# Patient Record
Sex: FEMALE | Race: WHITE | NOT HISPANIC OR LATINO | Employment: OTHER | URBAN - METROPOLITAN AREA
[De-identification: names, ages, dates, MRNs, and addresses within clinical notes are randomized per-mention and may not be internally consistent; named-entity substitution may affect disease eponyms.]

---

## 2017-01-04 ENCOUNTER — ANESTHESIA - HEALTHEAST (OUTPATIENT)
Dept: SURGERY | Facility: CLINIC | Age: 82
End: 2017-01-04

## 2017-01-05 ENCOUNTER — SURGERY - HEALTHEAST (OUTPATIENT)
Dept: SURGERY | Facility: CLINIC | Age: 82
End: 2017-01-05

## 2017-01-05 ENCOUNTER — HOME CARE/HOSPICE - HEALTHEAST (OUTPATIENT)
Dept: HOME HEALTH SERVICES | Facility: HOME HEALTH | Age: 82
End: 2017-01-05

## 2017-01-05 ASSESSMENT — MIFFLIN-ST. JEOR: SCORE: 1141.58

## 2017-01-09 ENCOUNTER — HOME CARE/HOSPICE - HEALTHEAST (OUTPATIENT)
Dept: HOME HEALTH SERVICES | Facility: HOME HEALTH | Age: 82
End: 2017-01-09

## 2017-01-09 ENCOUNTER — COMMUNICATION - HEALTHEAST (OUTPATIENT)
Dept: INTERNAL MEDICINE | Facility: CLINIC | Age: 82
End: 2017-01-09

## 2017-01-10 ENCOUNTER — HOME CARE/HOSPICE - HEALTHEAST (OUTPATIENT)
Dept: HOME HEALTH SERVICES | Facility: HOME HEALTH | Age: 82
End: 2017-01-10

## 2017-01-10 ENCOUNTER — COMMUNICATION - HEALTHEAST (OUTPATIENT)
Dept: INTERNAL MEDICINE | Facility: CLINIC | Age: 82
End: 2017-01-10

## 2017-01-12 ENCOUNTER — HOME CARE/HOSPICE - HEALTHEAST (OUTPATIENT)
Dept: HOME HEALTH SERVICES | Facility: HOME HEALTH | Age: 82
End: 2017-01-12

## 2017-01-12 ENCOUNTER — COMMUNICATION - HEALTHEAST (OUTPATIENT)
Dept: INTERNAL MEDICINE | Facility: CLINIC | Age: 82
End: 2017-01-12

## 2017-01-13 ENCOUNTER — COMMUNICATION - HEALTHEAST (OUTPATIENT)
Dept: INTERNAL MEDICINE | Facility: CLINIC | Age: 82
End: 2017-01-13

## 2017-01-13 ENCOUNTER — HOME CARE/HOSPICE - HEALTHEAST (OUTPATIENT)
Dept: HOME HEALTH SERVICES | Facility: HOME HEALTH | Age: 82
End: 2017-01-13

## 2017-01-14 ENCOUNTER — COMMUNICATION - HEALTHEAST (OUTPATIENT)
Dept: INTERNAL MEDICINE | Facility: CLINIC | Age: 82
End: 2017-01-14

## 2017-01-14 DIAGNOSIS — E03.9 HYPOTHYROIDISM: ICD-10-CM

## 2017-01-16 ENCOUNTER — HOME CARE/HOSPICE - HEALTHEAST (OUTPATIENT)
Dept: HOME HEALTH SERVICES | Facility: HOME HEALTH | Age: 82
End: 2017-01-16

## 2017-01-16 ENCOUNTER — COMMUNICATION - HEALTHEAST (OUTPATIENT)
Dept: INTERNAL MEDICINE | Facility: CLINIC | Age: 82
End: 2017-01-16

## 2017-01-18 ENCOUNTER — HOME CARE/HOSPICE - HEALTHEAST (OUTPATIENT)
Dept: HOME HEALTH SERVICES | Facility: HOME HEALTH | Age: 82
End: 2017-01-18

## 2017-01-19 ENCOUNTER — HOME CARE/HOSPICE - HEALTHEAST (OUTPATIENT)
Dept: HOME HEALTH SERVICES | Facility: HOME HEALTH | Age: 82
End: 2017-01-19

## 2017-01-19 ENCOUNTER — COMMUNICATION - HEALTHEAST (OUTPATIENT)
Dept: INTERNAL MEDICINE | Facility: CLINIC | Age: 82
End: 2017-01-19

## 2017-01-20 ENCOUNTER — HOME CARE/HOSPICE - HEALTHEAST (OUTPATIENT)
Dept: HOME HEALTH SERVICES | Facility: HOME HEALTH | Age: 82
End: 2017-01-20

## 2017-01-23 ENCOUNTER — HOME CARE/HOSPICE - HEALTHEAST (OUTPATIENT)
Dept: HOME HEALTH SERVICES | Facility: HOME HEALTH | Age: 82
End: 2017-01-23

## 2017-01-24 ENCOUNTER — COMMUNICATION - HEALTHEAST (OUTPATIENT)
Dept: INTERNAL MEDICINE | Facility: CLINIC | Age: 82
End: 2017-01-24

## 2017-01-24 ENCOUNTER — HOME CARE/HOSPICE - HEALTHEAST (OUTPATIENT)
Dept: HOME HEALTH SERVICES | Facility: HOME HEALTH | Age: 82
End: 2017-01-24

## 2017-01-29 ENCOUNTER — COMMUNICATION - HEALTHEAST (OUTPATIENT)
Dept: INTERNAL MEDICINE | Facility: CLINIC | Age: 82
End: 2017-01-29

## 2017-01-30 ENCOUNTER — OFFICE VISIT - HEALTHEAST (OUTPATIENT)
Dept: INTERNAL MEDICINE | Facility: CLINIC | Age: 82
End: 2017-01-30

## 2017-01-30 DIAGNOSIS — R06.02 SOB (SHORTNESS OF BREATH): ICD-10-CM

## 2017-01-30 DIAGNOSIS — M17.11 PRIMARY OSTEOARTHRITIS OF RIGHT KNEE: ICD-10-CM

## 2017-01-30 DIAGNOSIS — R11.0 NAUSEA: ICD-10-CM

## 2017-02-02 ENCOUNTER — OFFICE VISIT - HEALTHEAST (OUTPATIENT)
Dept: NURSING | Facility: CLINIC | Age: 82
End: 2017-02-02

## 2017-02-02 ENCOUNTER — COMMUNICATION - HEALTHEAST (OUTPATIENT)
Dept: NURSING | Facility: CLINIC | Age: 82
End: 2017-02-02

## 2017-02-02 DIAGNOSIS — G47.00 INSOMNIA: ICD-10-CM

## 2017-02-02 DIAGNOSIS — Z96.659 STATUS POST TOTAL KNEE REPLACEMENT, UNSPECIFIED LATERALITY: ICD-10-CM

## 2017-02-02 DIAGNOSIS — H35.30 MACULAR DEGENERATION (SENILE) OF RETINA: ICD-10-CM

## 2017-02-02 DIAGNOSIS — J84.10 POSTINFLAMMATORY PULMONARY FIBROSIS (H): ICD-10-CM

## 2017-02-02 DIAGNOSIS — Z96.659 S/P TOTAL KNEE ARTHROPLASTY: ICD-10-CM

## 2017-02-02 DIAGNOSIS — E03.9 HYPOTHYROIDISM, UNSPECIFIED TYPE: ICD-10-CM

## 2017-02-02 DIAGNOSIS — I48.0 PAF (PAROXYSMAL ATRIAL FIBRILLATION) (H): ICD-10-CM

## 2017-02-02 DIAGNOSIS — G47.00 INSOMNIA, UNSPECIFIED TYPE: ICD-10-CM

## 2017-02-02 DIAGNOSIS — M81.0 OSTEOPOROSIS, SENILE: ICD-10-CM

## 2017-02-03 ENCOUNTER — COMMUNICATION - HEALTHEAST (OUTPATIENT)
Dept: INTERNAL MEDICINE | Facility: CLINIC | Age: 82
End: 2017-02-03

## 2017-02-08 ENCOUNTER — COMMUNICATION - HEALTHEAST (OUTPATIENT)
Dept: NURSING | Facility: CLINIC | Age: 82
End: 2017-02-08

## 2017-02-08 ENCOUNTER — AMBULATORY - HEALTHEAST (OUTPATIENT)
Dept: LAB | Facility: CLINIC | Age: 82
End: 2017-02-08

## 2017-02-08 DIAGNOSIS — I48.0 PAF (PAROXYSMAL ATRIAL FIBRILLATION) (H): ICD-10-CM

## 2017-02-14 ENCOUNTER — RECORDS - HEALTHEAST (OUTPATIENT)
Dept: BONE DENSITY | Facility: CLINIC | Age: 82
End: 2017-02-14

## 2017-02-14 ENCOUNTER — RECORDS - HEALTHEAST (OUTPATIENT)
Dept: ADMINISTRATIVE | Facility: OTHER | Age: 82
End: 2017-02-14

## 2017-02-14 ENCOUNTER — OFFICE VISIT - HEALTHEAST (OUTPATIENT)
Dept: INTERNAL MEDICINE | Facility: CLINIC | Age: 82
End: 2017-02-14

## 2017-02-14 DIAGNOSIS — M81.0 AGE-RELATED OSTEOPOROSIS WITHOUT CURRENT PATHOLOGICAL FRACTURE: ICD-10-CM

## 2017-02-14 DIAGNOSIS — M81.0 OSTEOPOROSIS, SENILE: ICD-10-CM

## 2017-02-15 ENCOUNTER — RECORDS - HEALTHEAST (OUTPATIENT)
Dept: ADMINISTRATIVE | Facility: OTHER | Age: 82
End: 2017-02-15

## 2017-02-16 ENCOUNTER — COMMUNICATION - HEALTHEAST (OUTPATIENT)
Dept: NURSING | Facility: CLINIC | Age: 82
End: 2017-02-16

## 2017-02-16 ENCOUNTER — OFFICE VISIT - HEALTHEAST (OUTPATIENT)
Dept: INTERNAL MEDICINE | Facility: CLINIC | Age: 82
End: 2017-02-16

## 2017-02-16 DIAGNOSIS — I48.0 PAF (PAROXYSMAL ATRIAL FIBRILLATION) (H): ICD-10-CM

## 2017-02-16 DIAGNOSIS — F51.01 PRIMARY INSOMNIA: ICD-10-CM

## 2017-02-16 DIAGNOSIS — J84.9 ILD (INTERSTITIAL LUNG DISEASE) (H): ICD-10-CM

## 2017-02-16 DIAGNOSIS — R06.02 SHORTNESS OF BREATH: ICD-10-CM

## 2017-02-16 RX ORDER — ACETAMINOPHEN 500 MG
1000 TABLET ORAL PRN
Status: SHIPPED | COMMUNITY
Start: 2017-02-16

## 2017-02-16 ASSESSMENT — MIFFLIN-ST. JEOR: SCORE: 1110.4

## 2017-02-20 ENCOUNTER — COMMUNICATION - HEALTHEAST (OUTPATIENT)
Dept: INTERNAL MEDICINE | Facility: CLINIC | Age: 82
End: 2017-02-20

## 2017-02-27 ENCOUNTER — RECORDS - HEALTHEAST (OUTPATIENT)
Dept: ADMINISTRATIVE | Facility: OTHER | Age: 82
End: 2017-02-27

## 2017-03-02 ENCOUNTER — COMMUNICATION - HEALTHEAST (OUTPATIENT)
Dept: NURSING | Facility: CLINIC | Age: 82
End: 2017-03-02

## 2017-03-02 ENCOUNTER — AMBULATORY - HEALTHEAST (OUTPATIENT)
Dept: LAB | Facility: CLINIC | Age: 82
End: 2017-03-02

## 2017-03-02 DIAGNOSIS — I48.0 PAF (PAROXYSMAL ATRIAL FIBRILLATION) (H): ICD-10-CM

## 2017-03-02 DIAGNOSIS — G47.00 INSOMNIA: ICD-10-CM

## 2017-03-06 ENCOUNTER — RECORDS - HEALTHEAST (OUTPATIENT)
Dept: ADMINISTRATIVE | Facility: OTHER | Age: 82
End: 2017-03-06

## 2017-03-06 ENCOUNTER — COMMUNICATION - HEALTHEAST (OUTPATIENT)
Dept: INTERNAL MEDICINE | Facility: CLINIC | Age: 82
End: 2017-03-06

## 2017-03-07 ENCOUNTER — COMMUNICATION - HEALTHEAST (OUTPATIENT)
Dept: NURSING | Facility: CLINIC | Age: 82
End: 2017-03-07

## 2017-03-10 ENCOUNTER — COMMUNICATION - HEALTHEAST (OUTPATIENT)
Dept: INTERNAL MEDICINE | Facility: CLINIC | Age: 82
End: 2017-03-10

## 2017-03-10 DIAGNOSIS — G47.00 INSOMNIA: ICD-10-CM

## 2017-03-16 ENCOUNTER — COMMUNICATION - HEALTHEAST (OUTPATIENT)
Dept: NURSING | Facility: CLINIC | Age: 82
End: 2017-03-16

## 2017-03-16 ENCOUNTER — AMBULATORY - HEALTHEAST (OUTPATIENT)
Dept: LAB | Facility: CLINIC | Age: 82
End: 2017-03-16

## 2017-03-16 DIAGNOSIS — I48.0 PAF (PAROXYSMAL ATRIAL FIBRILLATION) (H): ICD-10-CM

## 2017-04-12 ENCOUNTER — COMMUNICATION - HEALTHEAST (OUTPATIENT)
Dept: INTERNAL MEDICINE | Facility: CLINIC | Age: 82
End: 2017-04-12

## 2017-04-12 DIAGNOSIS — E03.9 HYPOTHYROIDISM: ICD-10-CM

## 2017-04-14 ENCOUNTER — AMBULATORY - HEALTHEAST (OUTPATIENT)
Dept: LAB | Facility: CLINIC | Age: 82
End: 2017-04-14

## 2017-04-14 ENCOUNTER — COMMUNICATION - HEALTHEAST (OUTPATIENT)
Dept: NURSING | Facility: CLINIC | Age: 82
End: 2017-04-14

## 2017-04-14 DIAGNOSIS — I48.0 PAF (PAROXYSMAL ATRIAL FIBRILLATION) (H): ICD-10-CM

## 2017-04-28 ENCOUNTER — COMMUNICATION - HEALTHEAST (OUTPATIENT)
Dept: ADMINISTRATIVE | Facility: CLINIC | Age: 82
End: 2017-04-28

## 2017-05-26 ENCOUNTER — AMBULATORY - HEALTHEAST (OUTPATIENT)
Dept: LAB | Facility: CLINIC | Age: 82
End: 2017-05-26

## 2017-05-26 ENCOUNTER — COMMUNICATION - HEALTHEAST (OUTPATIENT)
Dept: NURSING | Facility: CLINIC | Age: 82
End: 2017-05-26

## 2017-05-26 DIAGNOSIS — I48.0 PAF (PAROXYSMAL ATRIAL FIBRILLATION) (H): ICD-10-CM

## 2017-05-30 ENCOUNTER — COMMUNICATION - HEALTHEAST (OUTPATIENT)
Dept: INTERNAL MEDICINE | Facility: CLINIC | Age: 82
End: 2017-05-30

## 2017-06-12 ENCOUNTER — COMMUNICATION - HEALTHEAST (OUTPATIENT)
Dept: INTERNAL MEDICINE | Facility: CLINIC | Age: 82
End: 2017-06-12

## 2017-06-12 DIAGNOSIS — G47.00 INSOMNIA: ICD-10-CM

## 2017-06-22 ENCOUNTER — RECORDS - HEALTHEAST (OUTPATIENT)
Dept: ADMINISTRATIVE | Facility: OTHER | Age: 82
End: 2017-06-22

## 2017-06-26 ENCOUNTER — COMMUNICATION - HEALTHEAST (OUTPATIENT)
Dept: INTERNAL MEDICINE | Facility: CLINIC | Age: 82
End: 2017-06-26

## 2017-06-26 DIAGNOSIS — G47.00 INSOMNIA: ICD-10-CM

## 2017-06-26 DIAGNOSIS — E03.9 HYPOTHYROIDISM: ICD-10-CM

## 2017-07-07 ENCOUNTER — COMMUNICATION - HEALTHEAST (OUTPATIENT)
Dept: NURSING | Facility: CLINIC | Age: 82
End: 2017-07-07

## 2017-07-07 ENCOUNTER — COMMUNICATION - HEALTHEAST (OUTPATIENT)
Dept: INTERNAL MEDICINE | Facility: CLINIC | Age: 82
End: 2017-07-07

## 2017-07-10 ENCOUNTER — AMBULATORY - HEALTHEAST (OUTPATIENT)
Dept: LAB | Facility: CLINIC | Age: 82
End: 2017-07-10

## 2017-07-10 ENCOUNTER — COMMUNICATION - HEALTHEAST (OUTPATIENT)
Dept: INTERNAL MEDICINE | Facility: CLINIC | Age: 82
End: 2017-07-10

## 2017-07-10 ENCOUNTER — COMMUNICATION - HEALTHEAST (OUTPATIENT)
Dept: NURSING | Facility: CLINIC | Age: 82
End: 2017-07-10

## 2017-07-10 DIAGNOSIS — I48.19 PERSISTENT ATRIAL FIBRILLATION (H): ICD-10-CM

## 2017-07-10 DIAGNOSIS — E03.9 HYPOTHYROIDISM: ICD-10-CM

## 2017-07-18 ENCOUNTER — COMMUNICATION - HEALTHEAST (OUTPATIENT)
Dept: NURSING | Facility: CLINIC | Age: 82
End: 2017-07-18

## 2017-07-18 DIAGNOSIS — I48.19 PERSISTENT ATRIAL FIBRILLATION (H): ICD-10-CM

## 2017-07-24 ENCOUNTER — COMMUNICATION - HEALTHEAST (OUTPATIENT)
Dept: NURSING | Facility: CLINIC | Age: 82
End: 2017-07-24

## 2017-07-24 ENCOUNTER — AMBULATORY - HEALTHEAST (OUTPATIENT)
Dept: LAB | Facility: CLINIC | Age: 82
End: 2017-07-24

## 2017-07-24 DIAGNOSIS — I48.19 PERSISTENT ATRIAL FIBRILLATION (H): ICD-10-CM

## 2017-08-08 ENCOUNTER — COMMUNICATION - HEALTHEAST (OUTPATIENT)
Dept: INTERNAL MEDICINE | Facility: CLINIC | Age: 82
End: 2017-08-08

## 2017-08-16 ENCOUNTER — OFFICE VISIT - HEALTHEAST (OUTPATIENT)
Dept: INTERNAL MEDICINE | Facility: CLINIC | Age: 82
End: 2017-08-16

## 2017-08-16 ENCOUNTER — COMMUNICATION - HEALTHEAST (OUTPATIENT)
Dept: NURSING | Facility: CLINIC | Age: 82
End: 2017-08-16

## 2017-08-16 DIAGNOSIS — E03.9 HYPOTHYROIDISM: ICD-10-CM

## 2017-08-16 DIAGNOSIS — I48.19 PERSISTENT ATRIAL FIBRILLATION (H): ICD-10-CM

## 2017-08-16 DIAGNOSIS — M81.0 OSTEOPOROSIS, SENILE: ICD-10-CM

## 2017-08-16 DIAGNOSIS — G47.00 INSOMNIA: ICD-10-CM

## 2017-08-16 DIAGNOSIS — J84.9 ILD (INTERSTITIAL LUNG DISEASE) (H): ICD-10-CM

## 2017-08-16 ASSESSMENT — MIFFLIN-ST. JEOR: SCORE: 1110.4

## 2017-08-17 ENCOUNTER — COMMUNICATION - HEALTHEAST (OUTPATIENT)
Dept: INTERNAL MEDICINE | Facility: CLINIC | Age: 82
End: 2017-08-17

## 2017-08-18 ENCOUNTER — AMBULATORY - HEALTHEAST (OUTPATIENT)
Dept: INTERNAL MEDICINE | Facility: CLINIC | Age: 82
End: 2017-08-18

## 2017-08-18 DIAGNOSIS — E03.9 HYPOTHYROID: ICD-10-CM

## 2017-08-30 ENCOUNTER — RECORDS - HEALTHEAST (OUTPATIENT)
Dept: ADMINISTRATIVE | Facility: OTHER | Age: 82
End: 2017-08-30

## 2017-08-31 ENCOUNTER — AMBULATORY - HEALTHEAST (OUTPATIENT)
Dept: NURSING | Facility: CLINIC | Age: 82
End: 2017-08-31

## 2017-09-11 ENCOUNTER — COMMUNICATION - HEALTHEAST (OUTPATIENT)
Dept: INTERNAL MEDICINE | Facility: CLINIC | Age: 82
End: 2017-09-11

## 2017-09-11 DIAGNOSIS — G47.00 INSOMNIA: ICD-10-CM

## 2017-09-11 DIAGNOSIS — E03.9 HYPOTHYROIDISM: ICD-10-CM

## 2017-09-12 ENCOUNTER — AMBULATORY - HEALTHEAST (OUTPATIENT)
Dept: LAB | Facility: CLINIC | Age: 82
End: 2017-09-12

## 2017-09-12 ENCOUNTER — COMMUNICATION - HEALTHEAST (OUTPATIENT)
Dept: NURSING | Facility: CLINIC | Age: 82
End: 2017-09-12

## 2017-09-12 DIAGNOSIS — E03.9 HYPOTHYROID: ICD-10-CM

## 2017-09-12 DIAGNOSIS — I48.19 PERSISTENT ATRIAL FIBRILLATION (H): ICD-10-CM

## 2017-09-13 ENCOUNTER — COMMUNICATION - HEALTHEAST (OUTPATIENT)
Dept: INTERNAL MEDICINE | Facility: CLINIC | Age: 82
End: 2017-09-13

## 2017-09-26 ENCOUNTER — RECORDS - HEALTHEAST (OUTPATIENT)
Dept: ADMINISTRATIVE | Facility: OTHER | Age: 82
End: 2017-09-26

## 2017-09-27 ENCOUNTER — COMMUNICATION - HEALTHEAST (OUTPATIENT)
Dept: NURSING | Facility: CLINIC | Age: 82
End: 2017-09-27

## 2017-09-28 ENCOUNTER — AMBULATORY - HEALTHEAST (OUTPATIENT)
Dept: NURSING | Facility: CLINIC | Age: 82
End: 2017-09-28

## 2017-09-28 ENCOUNTER — OFFICE VISIT - HEALTHEAST (OUTPATIENT)
Dept: INTERNAL MEDICINE | Facility: CLINIC | Age: 82
End: 2017-09-28

## 2017-09-28 DIAGNOSIS — I42.9 CARDIOMYOPATHY (H): ICD-10-CM

## 2017-09-28 DIAGNOSIS — I48.19 PERSISTENT ATRIAL FIBRILLATION (H): ICD-10-CM

## 2017-09-28 DIAGNOSIS — R44.1 VISUAL HALLUCINATIONS: ICD-10-CM

## 2017-09-28 DIAGNOSIS — R41.82 MENTAL STATUS CHANGE: ICD-10-CM

## 2017-09-28 ASSESSMENT — MIFFLIN-ST. JEOR: SCORE: 1110.4

## 2017-10-03 ENCOUNTER — COMMUNICATION - HEALTHEAST (OUTPATIENT)
Dept: INTERNAL MEDICINE | Facility: CLINIC | Age: 82
End: 2017-10-03

## 2017-10-03 DIAGNOSIS — N39.0 UTI (URINARY TRACT INFECTION): ICD-10-CM

## 2017-10-04 ENCOUNTER — HOSPITAL ENCOUNTER (OUTPATIENT)
Dept: CT IMAGING | Facility: CLINIC | Age: 82
Discharge: HOME | End: 2017-10-04
Attending: INTERNAL MEDICINE

## 2017-10-04 DIAGNOSIS — R44.1 VISUAL HALLUCINATIONS: ICD-10-CM

## 2017-10-04 DIAGNOSIS — R41.82 MENTAL STATUS CHANGE: ICD-10-CM

## 2017-10-10 ENCOUNTER — OFFICE VISIT - HEALTHEAST (OUTPATIENT)
Dept: INTERNAL MEDICINE | Facility: CLINIC | Age: 82
End: 2017-10-10

## 2017-10-10 ENCOUNTER — COMMUNICATION - HEALTHEAST (OUTPATIENT)
Dept: NURSING | Facility: CLINIC | Age: 82
End: 2017-10-10

## 2017-10-10 DIAGNOSIS — N39.0 UTI (URINARY TRACT INFECTION): ICD-10-CM

## 2017-10-10 DIAGNOSIS — R41.0 CONFUSION: ICD-10-CM

## 2017-10-10 DIAGNOSIS — R44.3 HALLUCINATIONS: ICD-10-CM

## 2017-10-10 DIAGNOSIS — R26.81 UNSTEADY GAIT: ICD-10-CM

## 2017-10-10 DIAGNOSIS — I48.19 PERSISTENT ATRIAL FIBRILLATION (H): ICD-10-CM

## 2017-10-10 DIAGNOSIS — M89.9 LYTIC BONE LESIONS ON XRAY: ICD-10-CM

## 2017-10-10 DIAGNOSIS — H11.30 SUBCONJUNCTIVAL BLEED: ICD-10-CM

## 2017-10-10 ASSESSMENT — MIFFLIN-ST. JEOR: SCORE: 1110.4

## 2017-10-11 ENCOUNTER — COMMUNICATION - HEALTHEAST (OUTPATIENT)
Dept: INTERNAL MEDICINE | Facility: CLINIC | Age: 82
End: 2017-10-11

## 2017-10-11 LAB
ALBUMIN PERCENT: 68.9 % (ref 51–67)
ALBUMIN SERPL ELPH-MCNC: 4.3 G/DL (ref 3.2–4.7)
ALPHA 1 PERCENT: 2.7 % (ref 2–4)
ALPHA 2 PERCENT: 9.3 % (ref 5–13)
ALPHA1 GLOB SERPL ELPH-MCNC: 0.2 G/DL (ref 0.1–0.3)
ALPHA2 GLOB SERPL ELPH-MCNC: 0.6 G/DL (ref 0.4–0.9)
B-GLOBULIN SERPL ELPH-MCNC: 0.5 G/DL (ref 0.7–1.2)
BETA PERCENT: 8.7 % (ref 10–17)
GAMMA GLOB SERPL ELPH-MCNC: 0.7 G/DL (ref 0.6–1.4)
GAMMA GLOBULIN PERCENT: 10.4 % (ref 9–20)
PATH ICD:: ABNORMAL
PROT PATTERN SERPL ELPH-IMP: ABNORMAL
PROT SERPL-MCNC: 6.3 G/DL (ref 6–8)
REVIEWING PATHOLOGIST: ABNORMAL

## 2017-10-13 ENCOUNTER — AMBULATORY - HEALTHEAST (OUTPATIENT)
Dept: LAB | Facility: CLINIC | Age: 82
End: 2017-10-13

## 2017-10-13 ENCOUNTER — COMMUNICATION - HEALTHEAST (OUTPATIENT)
Dept: INTERNAL MEDICINE | Facility: CLINIC | Age: 82
End: 2017-10-13

## 2017-10-13 ENCOUNTER — COMMUNICATION - HEALTHEAST (OUTPATIENT)
Dept: NURSING | Facility: CLINIC | Age: 82
End: 2017-10-13

## 2017-10-13 DIAGNOSIS — Z79.01 LONG-TERM (CURRENT) USE OF ANTICOAGULANTS: ICD-10-CM

## 2017-10-13 DIAGNOSIS — I48.19 PERSISTENT ATRIAL FIBRILLATION (H): ICD-10-CM

## 2017-10-13 RX ORDER — WARFARIN SODIUM 3 MG/1
TABLET ORAL
Qty: 110 TABLET | Refills: 0 | Status: SHIPPED | OUTPATIENT
Start: 2017-10-13 | End: 2018-01-13

## 2017-10-16 ENCOUNTER — HOSPITAL ENCOUNTER (OUTPATIENT)
Dept: NUCLEAR MEDICINE | Facility: CLINIC | Age: 82
Discharge: HOME | End: 2017-10-16
Attending: INTERNAL MEDICINE

## 2017-10-16 ENCOUNTER — COMMUNICATION - HEALTHEAST (OUTPATIENT)
Dept: INTERNAL MEDICINE | Facility: CLINIC | Age: 82
End: 2017-10-16

## 2017-10-16 DIAGNOSIS — R82.90 ABNORMAL URINALYSIS: ICD-10-CM

## 2017-10-16 DIAGNOSIS — M89.9 LYTIC BONE LESIONS ON XRAY: ICD-10-CM

## 2017-10-17 ENCOUNTER — COMMUNICATION - HEALTHEAST (OUTPATIENT)
Dept: INTERNAL MEDICINE | Facility: CLINIC | Age: 82
End: 2017-10-17

## 2017-10-20 ENCOUNTER — AMBULATORY - HEALTHEAST (OUTPATIENT)
Dept: LAB | Facility: CLINIC | Age: 82
End: 2017-10-20

## 2017-10-20 ENCOUNTER — COMMUNICATION - HEALTHEAST (OUTPATIENT)
Dept: NURSING | Facility: CLINIC | Age: 82
End: 2017-10-20

## 2017-10-20 DIAGNOSIS — R82.90 ABNORMAL URINALYSIS: ICD-10-CM

## 2017-10-20 DIAGNOSIS — I48.19 PERSISTENT ATRIAL FIBRILLATION (H): ICD-10-CM

## 2017-10-26 ENCOUNTER — COMMUNICATION - HEALTHEAST (OUTPATIENT)
Dept: INTERNAL MEDICINE | Facility: CLINIC | Age: 82
End: 2017-10-26

## 2017-10-31 ENCOUNTER — RECORDS - HEALTHEAST (OUTPATIENT)
Dept: ADMINISTRATIVE | Facility: OTHER | Age: 82
End: 2017-10-31

## 2017-11-01 ENCOUNTER — COMMUNICATION - HEALTHEAST (OUTPATIENT)
Dept: INTERNAL MEDICINE | Facility: CLINIC | Age: 82
End: 2017-11-01

## 2017-11-01 DIAGNOSIS — G47.00 INSOMNIA: ICD-10-CM

## 2017-11-02 ENCOUNTER — COMMUNICATION - HEALTHEAST (OUTPATIENT)
Dept: NURSING | Facility: CLINIC | Age: 82
End: 2017-11-02

## 2017-11-02 ENCOUNTER — OFFICE VISIT - HEALTHEAST (OUTPATIENT)
Dept: INTERNAL MEDICINE | Facility: CLINIC | Age: 82
End: 2017-11-02

## 2017-11-02 DIAGNOSIS — M89.9 LYTIC BONE LESIONS ON XRAY: ICD-10-CM

## 2017-11-02 DIAGNOSIS — R41.82 MENTAL STATUS CHANGE: ICD-10-CM

## 2017-11-02 DIAGNOSIS — N30.90 CYSTITIS: ICD-10-CM

## 2017-11-02 DIAGNOSIS — R44.1 HALLUCINATION, VISUAL: ICD-10-CM

## 2017-11-02 DIAGNOSIS — I48.19 PERSISTENT ATRIAL FIBRILLATION (H): ICD-10-CM

## 2017-11-02 ASSESSMENT — MIFFLIN-ST. JEOR: SCORE: 1155.76

## 2017-11-13 ENCOUNTER — AMBULATORY - HEALTHEAST (OUTPATIENT)
Dept: INTERNAL MEDICINE | Facility: CLINIC | Age: 82
End: 2017-11-13

## 2017-11-13 DIAGNOSIS — N39.0 UTI (URINARY TRACT INFECTION): ICD-10-CM

## 2017-11-13 DIAGNOSIS — E03.9 HYPOTHYROIDISM: ICD-10-CM

## 2017-11-13 DIAGNOSIS — R82.90 ABNORMAL URINALYSIS: ICD-10-CM

## 2017-11-14 ENCOUNTER — AMBULATORY - HEALTHEAST (OUTPATIENT)
Dept: LAB | Facility: CLINIC | Age: 82
End: 2017-11-14

## 2017-11-14 ENCOUNTER — COMMUNICATION - HEALTHEAST (OUTPATIENT)
Dept: NURSING | Facility: CLINIC | Age: 82
End: 2017-11-14

## 2017-11-14 DIAGNOSIS — N39.0 UTI (URINARY TRACT INFECTION): ICD-10-CM

## 2017-11-14 DIAGNOSIS — I48.19 PERSISTENT ATRIAL FIBRILLATION (H): ICD-10-CM

## 2017-11-14 DIAGNOSIS — E03.9 HYPOTHYROIDISM: ICD-10-CM

## 2017-11-16 ENCOUNTER — COMMUNICATION - HEALTHEAST (OUTPATIENT)
Dept: INTERNAL MEDICINE | Facility: CLINIC | Age: 82
End: 2017-11-16

## 2017-11-16 ENCOUNTER — COMMUNICATION - HEALTHEAST (OUTPATIENT)
Dept: NURSING | Facility: CLINIC | Age: 82
End: 2017-11-16

## 2017-11-16 ENCOUNTER — RECORDS - HEALTHEAST (OUTPATIENT)
Dept: ADMINISTRATIVE | Facility: OTHER | Age: 82
End: 2017-11-16

## 2017-11-16 DIAGNOSIS — E03.9 HYPOTHYROIDISM: ICD-10-CM

## 2017-11-16 DIAGNOSIS — N39.0 RECURRENT UTI: ICD-10-CM

## 2017-11-16 DIAGNOSIS — E03.9 HYPOTHYROID: ICD-10-CM

## 2017-11-20 ENCOUNTER — RECORDS - HEALTHEAST (OUTPATIENT)
Dept: ADMINISTRATIVE | Facility: OTHER | Age: 82
End: 2017-11-20

## 2017-11-20 ENCOUNTER — COMMUNICATION - HEALTHEAST (OUTPATIENT)
Dept: INTERNAL MEDICINE | Facility: CLINIC | Age: 82
End: 2017-11-20

## 2017-11-21 ENCOUNTER — RECORDS - HEALTHEAST (OUTPATIENT)
Dept: ADMINISTRATIVE | Facility: OTHER | Age: 82
End: 2017-11-21

## 2017-11-21 ENCOUNTER — AMBULATORY - HEALTHEAST (OUTPATIENT)
Dept: LAB | Facility: CLINIC | Age: 82
End: 2017-11-21

## 2017-11-21 ENCOUNTER — COMMUNICATION - HEALTHEAST (OUTPATIENT)
Dept: NURSING | Facility: CLINIC | Age: 82
End: 2017-11-21

## 2017-11-21 ENCOUNTER — AMBULATORY - HEALTHEAST (OUTPATIENT)
Dept: ADMINISTRATIVE | Facility: REHABILITATION | Age: 82
End: 2017-11-21

## 2017-11-21 DIAGNOSIS — I48.19 PERSISTENT ATRIAL FIBRILLATION (H): ICD-10-CM

## 2017-11-21 DIAGNOSIS — N30.20 CHRONIC CYSTITIS WITHOUT HEMATURIA: ICD-10-CM

## 2017-11-21 DIAGNOSIS — R39.15 URGENCY OF URINATION: ICD-10-CM

## 2017-11-21 DIAGNOSIS — M62.838 SPASM OF MUSCLE: ICD-10-CM

## 2017-12-07 ENCOUNTER — COMMUNICATION - HEALTHEAST (OUTPATIENT)
Dept: NURSING | Facility: CLINIC | Age: 82
End: 2017-12-07

## 2017-12-07 ENCOUNTER — AMBULATORY - HEALTHEAST (OUTPATIENT)
Dept: LAB | Facility: CLINIC | Age: 82
End: 2017-12-07

## 2017-12-07 DIAGNOSIS — I48.19 PERSISTENT ATRIAL FIBRILLATION (H): ICD-10-CM

## 2018-01-08 ENCOUNTER — COMMUNICATION - HEALTHEAST (OUTPATIENT)
Dept: INTERNAL MEDICINE | Facility: CLINIC | Age: 83
End: 2018-01-08

## 2018-01-08 ENCOUNTER — OFFICE VISIT - HEALTHEAST (OUTPATIENT)
Dept: INTERNAL MEDICINE | Facility: CLINIC | Age: 83
End: 2018-01-08

## 2018-01-08 DIAGNOSIS — J20.9 ACUTE BRONCHITIS: ICD-10-CM

## 2018-01-08 DIAGNOSIS — J84.10 POSTINFLAMMATORY PULMONARY FIBROSIS (H): ICD-10-CM

## 2018-01-08 RX ORDER — ALBUTEROL SULFATE 90 UG/1
2 AEROSOL, METERED RESPIRATORY (INHALATION) EVERY 6 HOURS PRN
Qty: 1 EACH | Refills: 0 | Status: SHIPPED | OUTPATIENT
Start: 2018-01-08

## 2018-01-08 ASSESSMENT — MIFFLIN-ST. JEOR: SCORE: 1155.76

## 2018-01-12 ENCOUNTER — COMMUNICATION - HEALTHEAST (OUTPATIENT)
Dept: INTERNAL MEDICINE | Facility: CLINIC | Age: 83
End: 2018-01-12

## 2018-01-18 ENCOUNTER — COMMUNICATION - HEALTHEAST (OUTPATIENT)
Dept: NURSING | Facility: CLINIC | Age: 83
End: 2018-01-18

## 2018-01-18 ENCOUNTER — AMBULATORY - HEALTHEAST (OUTPATIENT)
Dept: LAB | Facility: CLINIC | Age: 83
End: 2018-01-18

## 2018-01-18 DIAGNOSIS — I48.19 PERSISTENT ATRIAL FIBRILLATION (H): ICD-10-CM

## 2018-01-18 LAB — INR PPP: 3.5 (ref 0.9–1.1)

## 2018-01-29 ENCOUNTER — RECORDS - HEALTHEAST (OUTPATIENT)
Dept: ADMINISTRATIVE | Facility: OTHER | Age: 83
End: 2018-01-29

## 2018-01-31 ENCOUNTER — COMMUNICATION - HEALTHEAST (OUTPATIENT)
Dept: INTERNAL MEDICINE | Facility: CLINIC | Age: 83
End: 2018-01-31

## 2018-01-31 DIAGNOSIS — G47.00 INSOMNIA: ICD-10-CM

## 2018-02-01 ENCOUNTER — AMBULATORY - HEALTHEAST (OUTPATIENT)
Dept: LAB | Facility: CLINIC | Age: 83
End: 2018-02-01

## 2018-02-01 ENCOUNTER — COMMUNICATION - HEALTHEAST (OUTPATIENT)
Dept: NURSING | Facility: CLINIC | Age: 83
End: 2018-02-01

## 2018-02-01 DIAGNOSIS — I48.19 PERSISTENT ATRIAL FIBRILLATION (H): ICD-10-CM

## 2018-02-01 LAB — INR PPP: 3.3 (ref 0.9–1.1)

## 2018-02-08 ENCOUNTER — COMMUNICATION - HEALTHEAST (OUTPATIENT)
Dept: NURSING | Facility: CLINIC | Age: 83
End: 2018-02-08

## 2018-02-08 ENCOUNTER — AMBULATORY - HEALTHEAST (OUTPATIENT)
Dept: LAB | Facility: CLINIC | Age: 83
End: 2018-02-08

## 2018-02-08 DIAGNOSIS — I48.19 PERSISTENT ATRIAL FIBRILLATION (H): ICD-10-CM

## 2018-02-08 LAB — INR PPP: 2.9 (ref 0.9–1.1)

## 2018-02-22 ENCOUNTER — COMMUNICATION - HEALTHEAST (OUTPATIENT)
Dept: NURSING | Facility: CLINIC | Age: 83
End: 2018-02-22

## 2018-02-22 ENCOUNTER — AMBULATORY - HEALTHEAST (OUTPATIENT)
Dept: LAB | Facility: CLINIC | Age: 83
End: 2018-02-22

## 2018-02-22 DIAGNOSIS — I48.19 PERSISTENT ATRIAL FIBRILLATION (H): ICD-10-CM

## 2018-02-22 LAB — INR PPP: 2.7 (ref 0.9–1.1)

## 2018-04-05 ENCOUNTER — COMMUNICATION - HEALTHEAST (OUTPATIENT)
Dept: ANTICOAGULATION | Facility: CLINIC | Age: 83
End: 2018-04-05

## 2018-04-05 ENCOUNTER — AMBULATORY - HEALTHEAST (OUTPATIENT)
Dept: LAB | Facility: CLINIC | Age: 83
End: 2018-04-05

## 2018-04-05 DIAGNOSIS — I48.19 PERSISTENT ATRIAL FIBRILLATION (H): ICD-10-CM

## 2018-04-05 LAB — INR PPP: 2.7 (ref 0.9–1.1)

## 2018-04-12 ENCOUNTER — RECORDS - HEALTHEAST (OUTPATIENT)
Dept: ADMINISTRATIVE | Facility: OTHER | Age: 83
End: 2018-04-12

## 2018-04-23 ENCOUNTER — COMMUNICATION - HEALTHEAST (OUTPATIENT)
Dept: INTERNAL MEDICINE | Facility: CLINIC | Age: 83
End: 2018-04-23

## 2018-04-24 ENCOUNTER — COMMUNICATION - HEALTHEAST (OUTPATIENT)
Dept: INTERNAL MEDICINE | Facility: CLINIC | Age: 83
End: 2018-04-24

## 2018-04-24 DIAGNOSIS — G47.00 INSOMNIA: ICD-10-CM

## 2018-05-03 ENCOUNTER — COMMUNICATION - HEALTHEAST (OUTPATIENT)
Dept: INTERNAL MEDICINE | Facility: CLINIC | Age: 83
End: 2018-05-03

## 2018-05-08 ENCOUNTER — COMMUNICATION - HEALTHEAST (OUTPATIENT)
Dept: GENERAL RADIOLOGY | Facility: CLINIC | Age: 83
End: 2018-05-08

## 2018-05-14 ENCOUNTER — COMMUNICATION - HEALTHEAST (OUTPATIENT)
Dept: INTERNAL MEDICINE | Facility: CLINIC | Age: 83
End: 2018-05-14

## 2018-05-14 DIAGNOSIS — I48.19 PERSISTENT ATRIAL FIBRILLATION (H): ICD-10-CM

## 2018-05-14 DIAGNOSIS — Z79.01 LONG-TERM (CURRENT) USE OF ANTICOAGULANTS: ICD-10-CM

## 2018-05-17 ENCOUNTER — AMBULATORY - HEALTHEAST (OUTPATIENT)
Dept: LAB | Facility: CLINIC | Age: 83
End: 2018-05-17

## 2018-05-17 ENCOUNTER — COMMUNICATION - HEALTHEAST (OUTPATIENT)
Dept: ANTICOAGULATION | Facility: CLINIC | Age: 83
End: 2018-05-17

## 2018-05-17 DIAGNOSIS — I48.19 PERSISTENT ATRIAL FIBRILLATION (H): ICD-10-CM

## 2018-05-17 LAB — INR PPP: 3.6 (ref 0.9–1.1)

## 2018-05-21 ENCOUNTER — COMMUNICATION - HEALTHEAST (OUTPATIENT)
Dept: SCHEDULING | Facility: CLINIC | Age: 83
End: 2018-05-21

## 2018-05-21 ENCOUNTER — COMMUNICATION - HEALTHEAST (OUTPATIENT)
Dept: INTERNAL MEDICINE | Facility: CLINIC | Age: 83
End: 2018-05-21

## 2018-05-24 ENCOUNTER — AMBULATORY - HEALTHEAST (OUTPATIENT)
Dept: LAB | Facility: CLINIC | Age: 83
End: 2018-05-24

## 2018-05-24 ENCOUNTER — OFFICE VISIT - HEALTHEAST (OUTPATIENT)
Dept: INTERNAL MEDICINE | Facility: CLINIC | Age: 83
End: 2018-05-24

## 2018-05-24 ENCOUNTER — COMMUNICATION - HEALTHEAST (OUTPATIENT)
Dept: ANTICOAGULATION | Facility: CLINIC | Age: 83
End: 2018-05-24

## 2018-05-24 DIAGNOSIS — I48.19 PERSISTENT ATRIAL FIBRILLATION (H): ICD-10-CM

## 2018-05-24 DIAGNOSIS — M81.0 OSTEOPOROSIS: ICD-10-CM

## 2018-05-24 LAB
ANION GAP SERPL CALCULATED.3IONS-SCNC: 7 MMOL/L (ref 5–18)
BUN SERPL-MCNC: 22 MG/DL (ref 8–28)
CALCIUM SERPL-MCNC: 9.8 MG/DL (ref 8.5–10.5)
CHLORIDE BLD-SCNC: 107 MMOL/L (ref 98–107)
CO2 SERPL-SCNC: 27 MMOL/L (ref 22–31)
CREAT SERPL-MCNC: 0.71 MG/DL (ref 0.6–1.1)
GFR SERPL CREATININE-BSD FRML MDRD: >60 ML/MIN/1.73M2
GLUCOSE BLD-MCNC: 77 MG/DL (ref 70–125)
INR PPP: 2.4 (ref 0.9–1.1)
POTASSIUM BLD-SCNC: 4.4 MMOL/L (ref 3.5–5)
SODIUM SERPL-SCNC: 141 MMOL/L (ref 136–145)

## 2018-05-25 LAB — 25(OH)D3 SERPL-MCNC: 56.6 NG/ML (ref 30–80)

## 2018-05-31 ENCOUNTER — RECORDS - HEALTHEAST (OUTPATIENT)
Dept: ADMINISTRATIVE | Facility: OTHER | Age: 83
End: 2018-05-31

## 2018-06-04 ENCOUNTER — COMMUNICATION - HEALTHEAST (OUTPATIENT)
Dept: INTERNAL MEDICINE | Facility: CLINIC | Age: 83
End: 2018-06-04

## 2018-06-04 DIAGNOSIS — E03.9 HYPOTHYROIDISM, UNSPECIFIED TYPE: ICD-10-CM

## 2018-06-07 ENCOUNTER — COMMUNICATION - HEALTHEAST (OUTPATIENT)
Dept: ANTICOAGULATION | Facility: CLINIC | Age: 83
End: 2018-06-07

## 2018-06-07 ENCOUNTER — AMBULATORY - HEALTHEAST (OUTPATIENT)
Dept: LAB | Facility: CLINIC | Age: 83
End: 2018-06-07

## 2018-06-07 DIAGNOSIS — I48.19 PERSISTENT ATRIAL FIBRILLATION (H): ICD-10-CM

## 2018-06-07 LAB — INR PPP: 2.9 (ref 0.9–1.1)

## 2018-06-19 ENCOUNTER — COMMUNICATION - HEALTHEAST (OUTPATIENT)
Dept: ANTICOAGULATION | Facility: CLINIC | Age: 83
End: 2018-06-19

## 2018-06-19 DIAGNOSIS — I48.19 PERSISTENT ATRIAL FIBRILLATION (H): ICD-10-CM

## 2018-07-02 ENCOUNTER — OFFICE VISIT - HEALTHEAST (OUTPATIENT)
Dept: INTERNAL MEDICINE | Facility: CLINIC | Age: 83
End: 2018-07-02

## 2018-07-02 DIAGNOSIS — I48.19 PERSISTENT ATRIAL FIBRILLATION (H): ICD-10-CM

## 2018-07-02 DIAGNOSIS — I42.9 CARDIOMYOPATHY (H): ICD-10-CM

## 2018-07-02 DIAGNOSIS — J20.9 ACUTE BRONCHITIS: ICD-10-CM

## 2018-07-02 DIAGNOSIS — J84.10 POSTINFLAMMATORY PULMONARY FIBROSIS (H): ICD-10-CM

## 2018-07-03 ENCOUNTER — COMMUNICATION - HEALTHEAST (OUTPATIENT)
Dept: ANTICOAGULATION | Facility: CLINIC | Age: 83
End: 2018-07-03

## 2018-07-05 ENCOUNTER — COMMUNICATION - HEALTHEAST (OUTPATIENT)
Dept: ANTICOAGULATION | Facility: CLINIC | Age: 83
End: 2018-07-05

## 2018-07-05 ENCOUNTER — AMBULATORY - HEALTHEAST (OUTPATIENT)
Dept: LAB | Facility: CLINIC | Age: 83
End: 2018-07-05

## 2018-07-05 DIAGNOSIS — I48.19 PERSISTENT ATRIAL FIBRILLATION (H): ICD-10-CM

## 2018-07-05 LAB — INR PPP: 3.2 (ref 0.9–1.1)

## 2018-07-10 ENCOUNTER — COMMUNICATION - HEALTHEAST (OUTPATIENT)
Dept: INTERNAL MEDICINE | Facility: CLINIC | Age: 83
End: 2018-07-10

## 2018-07-16 ENCOUNTER — COMMUNICATION - HEALTHEAST (OUTPATIENT)
Dept: INTERNAL MEDICINE | Facility: CLINIC | Age: 83
End: 2018-07-16

## 2018-07-24 ENCOUNTER — AMBULATORY - HEALTHEAST (OUTPATIENT)
Dept: LAB | Facility: CLINIC | Age: 83
End: 2018-07-24

## 2018-07-24 ENCOUNTER — COMMUNICATION - HEALTHEAST (OUTPATIENT)
Dept: ANTICOAGULATION | Facility: CLINIC | Age: 83
End: 2018-07-24

## 2018-07-24 DIAGNOSIS — I48.19 PERSISTENT ATRIAL FIBRILLATION (H): ICD-10-CM

## 2018-07-24 LAB — INR PPP: 3.8 (ref 0.9–1.1)

## 2018-07-25 ENCOUNTER — COMMUNICATION - HEALTHEAST (OUTPATIENT)
Dept: INTERNAL MEDICINE | Facility: CLINIC | Age: 83
End: 2018-07-25

## 2018-08-06 ENCOUNTER — COMMUNICATION - HEALTHEAST (OUTPATIENT)
Dept: ANTICOAGULATION | Facility: CLINIC | Age: 83
End: 2018-08-06

## 2018-08-06 ENCOUNTER — AMBULATORY - HEALTHEAST (OUTPATIENT)
Dept: LAB | Facility: CLINIC | Age: 83
End: 2018-08-06

## 2018-08-06 DIAGNOSIS — I48.19 PERSISTENT ATRIAL FIBRILLATION (H): ICD-10-CM

## 2018-08-06 LAB — INR PPP: 3.1 (ref 0.9–1.1)

## 2018-08-17 ENCOUNTER — OFFICE VISIT - HEALTHEAST (OUTPATIENT)
Dept: INTERNAL MEDICINE | Facility: CLINIC | Age: 83
End: 2018-08-17

## 2018-08-17 ENCOUNTER — COMMUNICATION - HEALTHEAST (OUTPATIENT)
Dept: ANTICOAGULATION | Facility: CLINIC | Age: 83
End: 2018-08-17

## 2018-08-17 DIAGNOSIS — I48.19 PERSISTENT ATRIAL FIBRILLATION (H): ICD-10-CM

## 2018-08-17 DIAGNOSIS — J84.10 POSTINFLAMMATORY PULMONARY FIBROSIS (H): ICD-10-CM

## 2018-08-17 LAB — INR PPP: 2.4 (ref 0.9–1.1)

## 2018-08-17 ASSESSMENT — MIFFLIN-ST. JEOR: SCORE: 1155.76

## 2018-08-27 ENCOUNTER — COMMUNICATION - HEALTHEAST (OUTPATIENT)
Dept: INTERNAL MEDICINE | Facility: CLINIC | Age: 83
End: 2018-08-27

## 2018-08-27 DIAGNOSIS — G47.00 INSOMNIA: ICD-10-CM

## 2018-08-29 ENCOUNTER — RECORDS - HEALTHEAST (OUTPATIENT)
Dept: ADMINISTRATIVE | Facility: OTHER | Age: 83
End: 2018-08-29

## 2018-08-31 ENCOUNTER — AMBULATORY - HEALTHEAST (OUTPATIENT)
Dept: LAB | Facility: CLINIC | Age: 83
End: 2018-08-31

## 2018-08-31 ENCOUNTER — COMMUNICATION - HEALTHEAST (OUTPATIENT)
Dept: ANTICOAGULATION | Facility: CLINIC | Age: 83
End: 2018-08-31

## 2018-08-31 DIAGNOSIS — I48.19 PERSISTENT ATRIAL FIBRILLATION (H): ICD-10-CM

## 2018-08-31 LAB — INR PPP: 2.8 (ref 0.9–1.1)

## 2018-10-05 ENCOUNTER — AMBULATORY - HEALTHEAST (OUTPATIENT)
Dept: LAB | Facility: CLINIC | Age: 83
End: 2018-10-05

## 2018-10-05 ENCOUNTER — COMMUNICATION - HEALTHEAST (OUTPATIENT)
Dept: ANTICOAGULATION | Facility: CLINIC | Age: 83
End: 2018-10-05

## 2018-10-05 DIAGNOSIS — I48.19 PERSISTENT ATRIAL FIBRILLATION (H): ICD-10-CM

## 2018-10-05 LAB — INR PPP: 2.8 (ref 0.9–1.1)

## 2018-10-09 ENCOUNTER — AMBULATORY - HEALTHEAST (OUTPATIENT)
Dept: LAB | Facility: CLINIC | Age: 83
End: 2018-10-09

## 2018-10-09 ENCOUNTER — COMMUNICATION - HEALTHEAST (OUTPATIENT)
Dept: ANTICOAGULATION | Facility: CLINIC | Age: 83
End: 2018-10-09

## 2018-10-09 DIAGNOSIS — I48.19 PERSISTENT ATRIAL FIBRILLATION (H): ICD-10-CM

## 2018-10-09 LAB — INR PPP: 4.5 (ref 0.9–1.1)

## 2018-10-11 ENCOUNTER — COMMUNICATION - HEALTHEAST (OUTPATIENT)
Dept: INTERNAL MEDICINE | Facility: CLINIC | Age: 83
End: 2018-10-11

## 2018-10-15 ENCOUNTER — COMMUNICATION - HEALTHEAST (OUTPATIENT)
Dept: INTERNAL MEDICINE | Facility: CLINIC | Age: 83
End: 2018-10-15

## 2018-10-16 ENCOUNTER — COMMUNICATION - HEALTHEAST (OUTPATIENT)
Dept: ANTICOAGULATION | Facility: CLINIC | Age: 83
End: 2018-10-16

## 2018-10-16 ENCOUNTER — AMBULATORY - HEALTHEAST (OUTPATIENT)
Dept: LAB | Facility: CLINIC | Age: 83
End: 2018-10-16

## 2018-10-16 DIAGNOSIS — I48.19 PERSISTENT ATRIAL FIBRILLATION (H): ICD-10-CM

## 2018-10-16 LAB — INR PPP: 2 (ref 0.9–1.1)

## 2018-10-30 ENCOUNTER — COMMUNICATION - HEALTHEAST (OUTPATIENT)
Dept: ANTICOAGULATION | Facility: CLINIC | Age: 83
End: 2018-10-30

## 2018-10-30 ENCOUNTER — AMBULATORY - HEALTHEAST (OUTPATIENT)
Dept: LAB | Facility: CLINIC | Age: 83
End: 2018-10-30

## 2018-10-30 DIAGNOSIS — I48.19 PERSISTENT ATRIAL FIBRILLATION (H): ICD-10-CM

## 2018-10-30 LAB — INR PPP: 3 (ref 0.9–1.1)

## 2018-11-07 ENCOUNTER — COMMUNICATION - HEALTHEAST (OUTPATIENT)
Dept: INTERNAL MEDICINE | Facility: CLINIC | Age: 83
End: 2018-11-07

## 2018-11-08 ENCOUNTER — RECORDS - HEALTHEAST (OUTPATIENT)
Dept: ADMINISTRATIVE | Facility: OTHER | Age: 83
End: 2018-11-08

## 2018-11-08 ENCOUNTER — COMMUNICATION - HEALTHEAST (OUTPATIENT)
Dept: INTERNAL MEDICINE | Facility: CLINIC | Age: 83
End: 2018-11-08

## 2018-11-08 DIAGNOSIS — G47.00 INSOMNIA: ICD-10-CM

## 2018-11-26 ENCOUNTER — COMMUNICATION - HEALTHEAST (OUTPATIENT)
Dept: INTERNAL MEDICINE | Facility: CLINIC | Age: 83
End: 2018-11-26

## 2018-11-26 DIAGNOSIS — I48.19 PERSISTENT ATRIAL FIBRILLATION (H): ICD-10-CM

## 2018-11-27 ENCOUNTER — AMBULATORY - HEALTHEAST (OUTPATIENT)
Dept: LAB | Facility: CLINIC | Age: 83
End: 2018-11-27

## 2018-11-27 ENCOUNTER — COMMUNICATION - HEALTHEAST (OUTPATIENT)
Dept: ANTICOAGULATION | Facility: CLINIC | Age: 83
End: 2018-11-27

## 2018-11-27 DIAGNOSIS — I48.19 PERSISTENT ATRIAL FIBRILLATION (H): ICD-10-CM

## 2018-11-27 LAB — INR PPP: 3.3 (ref 0.9–1.1)

## 2018-11-28 ENCOUNTER — COMMUNICATION - HEALTHEAST (OUTPATIENT)
Dept: INTERNAL MEDICINE | Facility: CLINIC | Age: 83
End: 2018-11-28

## 2018-12-10 ENCOUNTER — AMBULATORY - HEALTHEAST (OUTPATIENT)
Dept: LAB | Facility: CLINIC | Age: 83
End: 2018-12-10

## 2018-12-10 ENCOUNTER — AMBULATORY - HEALTHEAST (OUTPATIENT)
Dept: INTERNAL MEDICINE | Facility: CLINIC | Age: 83
End: 2018-12-10

## 2018-12-10 ENCOUNTER — AMBULATORY - HEALTHEAST (OUTPATIENT)
Dept: NURSING | Facility: CLINIC | Age: 83
End: 2018-12-10

## 2018-12-10 ENCOUNTER — COMMUNICATION - HEALTHEAST (OUTPATIENT)
Dept: ANTICOAGULATION | Facility: CLINIC | Age: 83
End: 2018-12-10

## 2018-12-10 DIAGNOSIS — M81.0 OSTEOPOROSIS: ICD-10-CM

## 2018-12-10 DIAGNOSIS — I48.19 PERSISTENT ATRIAL FIBRILLATION (H): ICD-10-CM

## 2018-12-10 LAB — INR PPP: 3.2 (ref 0.9–1.1)

## 2018-12-27 ENCOUNTER — COMMUNICATION - HEALTHEAST (OUTPATIENT)
Dept: ANTICOAGULATION | Facility: CLINIC | Age: 83
End: 2018-12-27

## 2018-12-27 ENCOUNTER — AMBULATORY - HEALTHEAST (OUTPATIENT)
Dept: LAB | Facility: CLINIC | Age: 83
End: 2018-12-27

## 2018-12-27 DIAGNOSIS — I48.19 PERSISTENT ATRIAL FIBRILLATION (H): ICD-10-CM

## 2018-12-27 LAB — INR PPP: 2.9 (ref 0.9–1.1)

## 2019-01-02 ENCOUNTER — TRANSFERRED RECORDS (OUTPATIENT)
Dept: HEALTH INFORMATION MANAGEMENT | Facility: CLINIC | Age: 84
End: 2019-01-02

## 2019-01-07 ENCOUNTER — COMMUNICATION - HEALTHEAST (OUTPATIENT)
Dept: INTERNAL MEDICINE | Facility: CLINIC | Age: 84
End: 2019-01-07

## 2019-01-10 ENCOUNTER — AMBULATORY - HEALTHEAST (OUTPATIENT)
Dept: LAB | Facility: CLINIC | Age: 84
End: 2019-01-10

## 2019-01-10 ENCOUNTER — COMMUNICATION - HEALTHEAST (OUTPATIENT)
Dept: ANTICOAGULATION | Facility: CLINIC | Age: 84
End: 2019-01-10

## 2019-01-10 ENCOUNTER — RECORDS - HEALTHEAST (OUTPATIENT)
Dept: ANTICOAGULATION | Facility: CLINIC | Age: 84
End: 2019-01-10

## 2019-01-10 DIAGNOSIS — I48.19 PERSISTENT ATRIAL FIBRILLATION (H): ICD-10-CM

## 2019-01-10 LAB — INR PPP: 2.4 (ref 0.9–1.1)

## 2019-01-22 ENCOUNTER — OFFICE VISIT - HEALTHEAST (OUTPATIENT)
Dept: OTOLARYNGOLOGY | Facility: CLINIC | Age: 84
End: 2019-01-22

## 2019-01-22 DIAGNOSIS — H61.23 BILATERAL IMPACTED CERUMEN: ICD-10-CM

## 2019-02-04 ENCOUNTER — HOSPITAL ENCOUNTER (OUTPATIENT)
Dept: OCCUPATIONAL THERAPY | Facility: CLINIC | Age: 84
Discharge: HOME | End: 2019-02-04
Attending: OCCUPATIONAL THERAPIST | Admitting: OCCUPATIONAL THERAPIST
Payer: MEDICARE

## 2019-02-04 PROCEDURE — 97165 OT EVAL LOW COMPLEX 30 MIN: CPT | Mod: GO | Performed by: OCCUPATIONAL THERAPIST

## 2019-02-04 PROCEDURE — 97535 SELF CARE MNGMENT TRAINING: CPT | Mod: GO | Performed by: OCCUPATIONAL THERAPIST

## 2019-02-04 ASSESSMENT — VISUAL ACUITY
OS: 8'/200
OD: 20/100

## 2019-02-04 ASSESSMENT — ACTIVITIES OF DAILY LIVING (ADL): PRIOR_ADL/IADL_STATUS: INDEPENDENT PRIOR TO ONSET

## 2019-02-04 NOTE — PROGRESS NOTES
02/04/19 0900   Visit Type   Type of Visit Initial       Present No   General Information   Start Of Care Date 02/04/19   Referring Physician Joseluis Call   Orders Evaluate And Treat As Indicated   Date of Order 01/23/19   Medical Diagnosis Wet AMD initially, now dry   Onset Of Illness/injury Or Date Of Surgery 12/2018   Surgical/Medical history reviewed Yes  (Afib, pacemaker, Ablation, pulmonary fibrosis, COPD)   Precautions/Limitations COPD   Prior ADL/IADL status Independent prior to onset   Others present at visit Spouse/significant other  (Adam)   Patient/family Goals Statement recipes, measuring spoons, chopping and cutting, clock time microwave, shopping, mobile audio   Social History/Home Environment   Living Environment Apartment/condo   Patient Role/employment History  Retired  ( and other)   Avocational reading, cooking, family activities, walking   Pain Assessment   Pain Reported No   Fall Risk Screen   Fall screen completed by OT   Have you fallen 2 or more times in the past year? No   Have you fallen and had an injury in the past year? No   Is patient a fall risk? Yes  (vision related risk)   Fall screen comments Is active with balance class    Cognitive/Behavioral   Communication Intact   Cognitive Status Intact for evaluation process   Behavior Appropriate   Patient/family aware of diagnosis Yes   How well do you understand your eye condition? Not well   Vision related restrictions on visiting with family/friends Moderate   Reported emotional impact of visual impairment Moderate   Adjustment to disability Good   Physical Status/Equipment   Physical Status No problems observed/reported   Visual Report   Functional Complaints Reading;Writing;Homemaking;Safety in mobility  (distance viewing, cooking, financial management)   Visual Complaints Scotoma Hindrance right eye;Scotoma Hindrance left eye   Robert Bonnet Symptoms? Yes   Magnifier (strength and type) 4X  handheld   Reading glasses Trifocal   Technology (ipad, Susan, audio books - Susan for books)   Lighting and Glare   Is your lighting adequate? No/ at home  (dark apartment)   Is glare a problem? No/ indoors;No/ outdoors   Are you satisfied with your sunglasses? No   Visual Acuity   Acuity right eye 20/100   Acuity left eye 8'/200   Contrast Sensitivity   Contrast sensitivity (score/25) 7/25   Preferred Retinal Locus   Right eye Ring scotoma   Left eye Ring scotoma   MN Read   Smallest print size read 4.0M    Critical print size 4.0M only print size read   Words per minute at critical print size very slowed. Pt with ring scotoma struggling with reading large and small print.  Sees one-two letters at a time   Functional Reading Screen   Reading screen comments SRAFVP: scored at 57% visual impairment on self report assessment   Education   Learner Patient;Significant Other   Readiness Eager   Method Demonstration   Response Verbalizes understanding;Needs reinforcement   Clinical Impression, OT Eval   Criteria for Skilled Therapeutic Interventions Met yes;treatment indicated   Therapy  Diagnosis: Impaired ADL/IADL with deficits in Reading based ADL;Written communication;Home management;Financial management;Eating  (safety: mobility, managment light and glare, cooking tasks)   Assessment of Occupational Performance 5 or more Performance Deficits   Identified Performance Deficits as above   Clinical Decision Making (Complexity) Low complexity   OT Visual Rehabilitation Evaluation Plan   Therapy Plan Occupational therapy intervention   Planned Interventions Scotoma awareness;Visual skills training for near tasks;Visual skills training for safety in mobility;Low vision compensatory training for reading;Low vision compensatory training for written communication;Low vision compensatory trainging for financial management;Low vision compensatory training for object identification;Instruction in environmental adaptations for  glare;Instruction in environmental adaptations for contrast;Instruction in environmental adaptations for lighting;Optical device/ADL device instruction and training;Computer modifications;Instruction in community resources   Frequency / Duration 6 tx visits over 12 week s- 1X every 2 weeks for 12 weeks   Risks and Benefits of Treatment have been explained. Yes   Patient, Family in agreement with plan of care Yes   GOALS   Goals Near Vision;Environmental Modification;Resource Education;Distance Viewing   Goals Addressed this Session Near vision   Goal 1 - Near Vision   Goal Description: Near Vision Patient will verbalize awareness of visual field Loss and demonstrate improved use of visual skills/adaptive equipment for increased independence in reading-based activities of daily living, written communication and detail ADL tasks.   Target Date 04/29/19   Goal 3 - Environmental modification   Goal Description: Environment modification Patient will demonstrate understanding of the impact of lighting, contrast and glare on ADL activities, in conjunction with environmentally-based ADL modifications   Target Date 04/29/19   Goal 4 - Resource education   Goal Description: Resource education Patient will verbalize awareness of community resources for the following:;Audio access to print materials;Access to large print materials;Access to low vision devices;Transportation alternatives   Target Date 04/29/19   Goal 5 - Distance viewing   Goal Description: Distance viewing Patient will demonstrate proficient use of a distance device in conjunction with appropriate visual skills techniques to correctly survey objects in the distance   Target Date 04/29/19   Total Evaluation Time   OT Eval, Low Complexity Minutes (82534) 40   Therapy Certification   Certification date from 02/04/19   Certification date to 04/29/19   Medical Diagnosis AMD, central scotomas

## 2019-02-04 NOTE — DISCHARGE INSTRUCTIONS
Visual Rehabilitation Summary  1. Clinical Findings  a. Your visual acuity (sharpness of vision) is 20/100 in the right eye and 20/600 in the left eye.  b. Your blind spots appear to be formed in a ring around your center vision.  This breaks up large print.   c. Your ability to see things of low contrast is very reduced.   2. Plan  a. Reading  i. Explore technology further   ii. Identify the best way to move your eyes to see what you want to see  iii. Explore lighting  b. Ipad  i. We will explore the accessibility settings  c. Cooking  i. We will address the goals you brought in.  3. Resources  a. Understand resources for low vision  4. Environmental Modifications  a. Identify ways to make things more visible in the home  5. Lighting and glare management  Patti Petersen, OTR/L  - [phone]

## 2019-02-04 NOTE — PROGRESS NOTES
[facility]          OUTPATIENT OCCUPATIONALTHERAPY  EVALUATION  PLAN OF TREATMENT FOR OUTPATIENT REHABILITATION  (COMPLETE FOR INITIAL CLAIMS ONLY)  Patient's Last Name, First Name, M.I.  YOB: 1935  Mariia Tinoco      Provider's Name  [facility] Medical Record No.  [id number]   Onset Date:  12/2018 Start of Care Date:  02/04/19   Type:     ___PT  _X_OT   ___SLP Medical Diagnosis:  AMD, central scotomas   Therapy Diagnosis: Impaired ADL/IADL with deficits in Reading based ADL, Written communication, Home management, Financial management, Eating(safety: mobility, managment light and glare, cooking tasks)    Visits from SOC: 1     _________________________________________________________________________________  Plan of Treatment/Functional Goals:  Planned Interventions: Scotoma awareness, Visual skills training for near tasks, Visual skills training for safety in mobility, Low vision compensatory training for reading, Low vision compensatory training for written communication, Low vision compensatory trainging for financial management, Low vision compensatory training for object identification, Instruction in environmental adaptations for glare, Instruction in environmental adaptations for contrast, Instruction in environmental adaptations for lighting, Optical device/ADL device instruction and training, Computer modifications, Instruction in community resources        Goals  1. Patient will verbalize awareness of visual field Loss and demonstrate improved use of visual skills/adaptive equipment for increased independence in reading-based activities of daily living, written communication and detail ADL tasks.         Target Date: 04/29/19      2.                  3.  Patient will demonstrate understanding of the impact of lighting, contrast and glare on ADL activities,  in conjunction with environmentally-based ADL modifications         Target Date: 04/29/19      4. Patient will verbalize awareness of community resources for the following:, Audio access to print materials, Access to large print materials, Access to low vision devices, Transportation alternatives         Target Date: 04/29/19      5. Patient will demonstrate proficient use of a distance device in conjunction with appropriate visual skills techniques to correctly survey objects in the distance          Target Date: 04/29/19      6.                    7.                 8.                            Therapy Frequency/Duration:  6 tx visits over 12 week s- 1X every 2 weeks for 12 weeks    Patti Petersen, OT       I CERTIFY THE NEED FOR THESE SERVICES FURNISHED UNDER        THIS PLAN OF TREATMENT AND WHILE UNDER MY CARE .             Physician Signature               Date    X_____________________________________________________                        Certification date from: 02/04/19 Certification date to: 04/29/19          Referring Physician: Joseluis Call     Initial Assessment        See Epic Evaluation Start Of Care Date: 02/04/19     Patti Petersen

## 2019-02-07 ENCOUNTER — COMMUNICATION - HEALTHEAST (OUTPATIENT)
Dept: ANTICOAGULATION | Facility: CLINIC | Age: 84
End: 2019-02-07

## 2019-02-07 ENCOUNTER — AMBULATORY - HEALTHEAST (OUTPATIENT)
Dept: LAB | Facility: CLINIC | Age: 84
End: 2019-02-07

## 2019-02-07 DIAGNOSIS — I48.19 PERSISTENT ATRIAL FIBRILLATION (H): ICD-10-CM

## 2019-02-07 LAB — INR PPP: 2.7 (ref 0.9–1.1)

## 2019-02-08 ENCOUNTER — RECORDS - HEALTHEAST (OUTPATIENT)
Dept: ADMINISTRATIVE | Facility: OTHER | Age: 84
End: 2019-02-08

## 2019-02-14 ENCOUNTER — HOSPITAL ENCOUNTER (OUTPATIENT)
Dept: OCCUPATIONAL THERAPY | Facility: CLINIC | Age: 84
Discharge: HOME | End: 2019-02-14
Attending: OCCUPATIONAL THERAPIST | Admitting: OCCUPATIONAL THERAPIST
Payer: MEDICARE

## 2019-02-14 PROCEDURE — 97535 SELF CARE MNGMENT TRAINING: CPT | Mod: GO | Performed by: OCCUPATIONAL THERAPIST

## 2019-02-14 NOTE — DISCHARGE INSTRUCTIONS
Visual Rehabilitation Summary       02/14/2019  1. Seeing  application  a. Press home button until screen turns black   b. Say  Open Seeing    c. Hold phone in front of text and listen  d. The little [place] in the middle is stop, start and pause.   2. Load BARD onto your ipad for listening to books.  3. Enlist family to start typing up your recipes.   Patti Petersen, OTR/L  [phone]

## 2019-02-21 ENCOUNTER — HOSPITAL ENCOUNTER (OUTPATIENT)
Dept: OCCUPATIONAL THERAPY | Facility: CLINIC | Age: 84
Discharge: HOME | End: 2019-02-21
Attending: OCCUPATIONAL THERAPIST | Admitting: OCCUPATIONAL THERAPIST
Payer: MEDICARE

## 2019-02-21 PROCEDURE — 97535 SELF CARE MNGMENT TRAINING: CPT | Mod: GO | Performed by: OCCUPATIONAL THERAPIST

## 2019-02-21 NOTE — DISCHARGE INSTRUCTIONS
Visual Rehabilitation Summary  Cookin. Measuring  a. Jourdan your liquid measure with fabric paint and/or nail polish  b. Use dry measure cups for liquids  c. Scoop liquids with measuring spoons  2. Cutting  a. Cutting glove  b. Slicing guide knife  3. Pouring  a. Liquid level indicators  b. Float a ping pong ball  Eating  1. Use bowls  2. Use contrasting dishes  Patti Petersen, OTR/L  [phone]

## 2019-03-07 ENCOUNTER — HOSPITAL ENCOUNTER (OUTPATIENT)
Dept: OCCUPATIONAL THERAPY | Facility: CLINIC | Age: 84
Discharge: HOME | End: 2019-03-07
Attending: OCCUPATIONAL THERAPIST | Admitting: OCCUPATIONAL THERAPIST
Payer: MEDICARE

## 2019-03-07 PROCEDURE — 97535 SELF CARE MNGMENT TRAINING: CPT | Mod: GO | Performed by: OCCUPATIONAL THERAPIST

## 2019-03-08 ENCOUNTER — AMBULATORY - HEALTHEAST (OUTPATIENT)
Dept: LAB | Facility: CLINIC | Age: 84
End: 2019-03-08

## 2019-03-08 ENCOUNTER — COMMUNICATION - HEALTHEAST (OUTPATIENT)
Dept: ANTICOAGULATION | Facility: CLINIC | Age: 84
End: 2019-03-08

## 2019-03-08 DIAGNOSIS — I48.19 PERSISTENT ATRIAL FIBRILLATION (H): ICD-10-CM

## 2019-03-08 LAB — INR PPP: 1.7 (ref 0.9–1.1)

## 2019-03-11 NOTE — ADDENDUM NOTE
Encounter addended by: Patti Petersen OT on: 3/11/2019 3:12 PM   Actions taken: Flowsheet data copied forward, Flowsheet accepted

## 2019-03-22 ENCOUNTER — COMMUNICATION - HEALTHEAST (OUTPATIENT)
Dept: ANTICOAGULATION | Facility: CLINIC | Age: 84
End: 2019-03-22

## 2019-03-22 ENCOUNTER — AMBULATORY - HEALTHEAST (OUTPATIENT)
Dept: LAB | Facility: CLINIC | Age: 84
End: 2019-03-22

## 2019-03-22 DIAGNOSIS — I48.19 PERSISTENT ATRIAL FIBRILLATION (H): ICD-10-CM

## 2019-03-22 LAB — INR PPP: 2 (ref 0.9–1.1)

## 2019-03-25 ENCOUNTER — COMMUNICATION - HEALTHEAST (OUTPATIENT)
Dept: INTERNAL MEDICINE | Facility: CLINIC | Age: 84
End: 2019-03-25

## 2019-03-25 DIAGNOSIS — E03.9 HYPOTHYROIDISM, UNSPECIFIED TYPE: ICD-10-CM

## 2019-03-28 ENCOUNTER — COMMUNICATION - HEALTHEAST (OUTPATIENT)
Dept: ADMINISTRATIVE | Facility: CLINIC | Age: 84
End: 2019-03-28

## 2019-04-10 ENCOUNTER — AMBULATORY - HEALTHEAST (OUTPATIENT)
Dept: LAB | Facility: CLINIC | Age: 84
End: 2019-04-10

## 2019-04-10 ENCOUNTER — COMMUNICATION - HEALTHEAST (OUTPATIENT)
Dept: ANTICOAGULATION | Facility: CLINIC | Age: 84
End: 2019-04-10

## 2019-04-10 DIAGNOSIS — I48.19 PERSISTENT ATRIAL FIBRILLATION (H): ICD-10-CM

## 2019-04-10 LAB — INR PPP: 1.9 (ref 0.9–1.1)

## 2019-04-23 ENCOUNTER — AMBULATORY - HEALTHEAST (OUTPATIENT)
Dept: LAB | Facility: CLINIC | Age: 84
End: 2019-04-23

## 2019-04-23 ENCOUNTER — COMMUNICATION - HEALTHEAST (OUTPATIENT)
Dept: ANTICOAGULATION | Facility: CLINIC | Age: 84
End: 2019-04-23

## 2019-04-23 DIAGNOSIS — I48.19 PERSISTENT ATRIAL FIBRILLATION (H): ICD-10-CM

## 2019-04-23 LAB — INR PPP: 2.2 (ref 0.9–1.1)

## 2019-04-29 ENCOUNTER — HOSPITAL ENCOUNTER (OUTPATIENT)
Dept: OCCUPATIONAL THERAPY | Facility: CLINIC | Age: 84
Discharge: HOME | End: 2019-04-29
Attending: OCCUPATIONAL THERAPIST | Admitting: OCCUPATIONAL THERAPIST
Payer: MEDICARE

## 2019-04-29 ENCOUNTER — COMMUNICATION - HEALTHEAST (OUTPATIENT)
Dept: INTERNAL MEDICINE | Facility: CLINIC | Age: 84
End: 2019-04-29

## 2019-04-29 DIAGNOSIS — G47.00 INSOMNIA: ICD-10-CM

## 2019-04-29 PROCEDURE — 97535 SELF CARE MNGMENT TRAINING: CPT | Mod: GO | Performed by: OCCUPATIONAL THERAPIST

## 2019-04-29 NOTE — DISCHARGE INSTRUCTIONS
Visual Rehabilitation Summary  Vision Loss Resources: [phone]  1. Consider technology class   a. Voiceover for iphone  b. Calendar management  2. Consider cooking class  3. Consider support group  Contrast   Use felt for managing medications   Avoid contrast around the house  Lisa  Mess around - have her call businesses and function like Susan Petersen, OTR/L  [phone]

## 2019-05-14 ENCOUNTER — AMBULATORY - HEALTHEAST (OUTPATIENT)
Dept: LAB | Facility: CLINIC | Age: 84
End: 2019-05-14

## 2019-05-14 ENCOUNTER — COMMUNICATION - HEALTHEAST (OUTPATIENT)
Dept: ANTICOAGULATION | Facility: CLINIC | Age: 84
End: 2019-05-14

## 2019-05-14 DIAGNOSIS — I48.19 PERSISTENT ATRIAL FIBRILLATION (H): ICD-10-CM

## 2019-05-14 LAB — INR PPP: 1.5 (ref 0.9–1.1)

## 2019-05-15 NOTE — ADDENDUM NOTE
Encounter addended by: Patti Petersen, OT on: 5/15/2019 3:19 PM   Actions taken: Sign clinical note, Flowsheet accepted, Episode resolved

## 2019-05-15 NOTE — PROGRESS NOTES
04/29/19 0800   Signing Clinician's Name / Credentials   Signing clinician's name / credentials Patti PetersenOTR/L   Session Number   Session Number 4   Reporting period 02/04019-04/29/2019   Authorization status authorized: Medicare   Recertification   Recertification Due 04/29/19   Progress Notes   Progress Note Due 04/29/19   GOALS   Goals Near Vision;Environmental Modification;Resource Education;Distance Viewing   Goals Addressed this Session Near vision   Goal 1 - Near Vision   Goal Description: Near Vision Patient will verbalize awareness of visual field Loss and demonstrate improved use of visual skills/adaptive equipment for increased independence in reading-based activities of daily living, written communication and detail ADL tasks.   Near Vision Goal Comment Goal partially met. Pt did not benefit from magnfication aides but did perform spot reading with high power flashlight and OCR application for smart phone. Recommended audio books for continous text, and application completed. Written communcation: demonstrated ability to write and read list using markers and large print, and to complete check writing using check template and large format checks   Target Date 04/29/19   Date Met 04/29/19   Goal 3 - Environmental modification   Goal Description: Environment modification Patient will demonstrate understanding of the impact of lighting, contrast and glare on ADL activities, in conjunction with environmentally-based ADL modifications   Environmental Modification Goal Comment Goal Met. Identified contrast strategies for seeing food on plate, use of cutting glove for ind. chopping foods, liquid level indicator device for ind. pouring liquids, Tactile marking methods for setting appliance dials   Target Date 04/29/19   Date Met 04/29/19   Goal 4 - Resource education   Goal Description: Resource education Patient will verbalize awareness of community resources for the following:;Audio access to print  "materials;Access to large print materials;Access to low vision devices;Transportation alternatives   Resource Education Goal Comment goal met as written. Application to [place] completed for audio book program   Target Date 04/29/19   Goal 5 - Distance viewing   Goal Description: Distance viewing Patient will demonstrate proficient use of a distance device in conjunction with appropriate visual skills techniques to correctly survey objects in the distance   Distance Viewing Goal Comment goal met addressed. Pt declined.   Target Date 04/29/19       Present No   Therapy Diagnosis   Therapy  Diagnosis: Impaired ADL/IADL with deficits in Reading based ADL;Written communication;Home management;Financial management;Eating  (safety: mobility, managment light and glare, cooking tasks)   Subjective Report   Subjective Report \"I will work more with RegainGo (voice assistant for cell phone). \"   Visual Rehab Treatment Lander   Daily Treatment Lander Self Care/Home Management   Self Care/Home Management   Self-Care/Home Mgmt/ADL, Compensatory, Meal Prep Minutes (05087) 60 Minutes   Skilled Intervention Training in use of electronic aids for reading;Instruction in methods for enhancing contrast   Patient Response verbalized and demonstrated understanding   Treatment Detail Technology training: completed technology training using RegainGo Voice Assistant this date. Pt demonstrated understanding, but frustration with investment needed to master technology. Issued resources for Vision Loss resources, and recommended technology classes, cooking and support group via VLR. Pt will contact them to discuss. Contrast strategies: provided training in strategies to increase ind. in locating objects and managing medications. Pt demonstrated ability to locate small pills placed on dark felt. Identfiied this would provide background needed to manage pills independenlty. Dark felt issued    Progress goals completed this date. Pt " declined distance viewing goal   MN Read   Smallest print size read all with Seeing  application, 2M print with flashlight   Equipment/Resources   Written resources provided   (written summary of all recommendations and resources)   Education   Learner Patient;Family   Readiness Acceptance   Method Demonstration   Response Demonstrates understanding   Plan   Plan   (Discharge)   Total Session Time   Timed Code Treatment Minutes 60   Total Treatment Time (sum of timed and untimed services) 60

## 2019-05-20 ENCOUNTER — AMBULATORY - HEALTHEAST (OUTPATIENT)
Dept: LAB | Facility: CLINIC | Age: 84
End: 2019-05-20

## 2019-05-20 ENCOUNTER — COMMUNICATION - HEALTHEAST (OUTPATIENT)
Dept: ANTICOAGULATION | Facility: CLINIC | Age: 84
End: 2019-05-20

## 2019-05-20 DIAGNOSIS — I48.19 PERSISTENT ATRIAL FIBRILLATION (H): ICD-10-CM

## 2019-05-20 LAB — INR PPP: 2.4 (ref 0.9–1.1)

## 2019-05-27 ENCOUNTER — COMMUNICATION - HEALTHEAST (OUTPATIENT)
Dept: INTERNAL MEDICINE | Facility: CLINIC | Age: 84
End: 2019-05-27

## 2019-05-27 DIAGNOSIS — I48.19 PERSISTENT ATRIAL FIBRILLATION (H): ICD-10-CM

## 2019-06-07 ENCOUNTER — COMMUNICATION - HEALTHEAST (OUTPATIENT)
Dept: INTERNAL MEDICINE | Facility: CLINIC | Age: 84
End: 2019-06-07

## 2019-06-11 ENCOUNTER — AMBULATORY - HEALTHEAST (OUTPATIENT)
Dept: NURSING | Facility: CLINIC | Age: 84
End: 2019-06-11

## 2019-06-11 ENCOUNTER — AMBULATORY - HEALTHEAST (OUTPATIENT)
Dept: LAB | Facility: CLINIC | Age: 84
End: 2019-06-11

## 2019-06-11 ENCOUNTER — COMMUNICATION - HEALTHEAST (OUTPATIENT)
Dept: ANTICOAGULATION | Facility: CLINIC | Age: 84
End: 2019-06-11

## 2019-06-11 DIAGNOSIS — Z79.01 LONG TERM (CURRENT) USE OF ANTICOAGULANTS: ICD-10-CM

## 2019-06-11 DIAGNOSIS — I48.19 PERSISTENT ATRIAL FIBRILLATION (H): ICD-10-CM

## 2019-06-11 LAB — INR PPP: 2 (ref 0.9–1.1)

## 2019-07-09 ENCOUNTER — COMMUNICATION - HEALTHEAST (OUTPATIENT)
Dept: ANTICOAGULATION | Facility: CLINIC | Age: 84
End: 2019-07-09

## 2019-07-09 ENCOUNTER — COMMUNICATION - HEALTHEAST (OUTPATIENT)
Dept: LAB | Facility: CLINIC | Age: 84
End: 2019-07-09

## 2019-07-09 ENCOUNTER — AMBULATORY - HEALTHEAST (OUTPATIENT)
Dept: LAB | Facility: CLINIC | Age: 84
End: 2019-07-09

## 2019-07-09 DIAGNOSIS — I48.19 PERSISTENT ATRIAL FIBRILLATION (H): ICD-10-CM

## 2019-07-09 LAB — INR PPP: 2.4 (ref 0.9–1.1)

## 2019-08-15 ENCOUNTER — COMMUNICATION - HEALTHEAST (OUTPATIENT)
Dept: ANTICOAGULATION | Facility: CLINIC | Age: 84
End: 2019-08-15

## 2019-08-15 ENCOUNTER — AMBULATORY - HEALTHEAST (OUTPATIENT)
Dept: LAB | Facility: CLINIC | Age: 84
End: 2019-08-15

## 2019-08-15 DIAGNOSIS — I48.19 PERSISTENT ATRIAL FIBRILLATION (H): ICD-10-CM

## 2019-08-15 LAB — INR PPP: 2.2 (ref 0.9–1.1)

## 2019-09-18 ENCOUNTER — RECORDS - HEALTHEAST (OUTPATIENT)
Dept: ADMINISTRATIVE | Facility: OTHER | Age: 84
End: 2019-09-18

## 2019-09-24 ENCOUNTER — COMMUNICATION - HEALTHEAST (OUTPATIENT)
Dept: INTERNAL MEDICINE | Facility: CLINIC | Age: 84
End: 2019-09-24

## 2019-09-24 DIAGNOSIS — I48.19 PERSISTENT ATRIAL FIBRILLATION (H): ICD-10-CM

## 2019-09-24 DIAGNOSIS — Z79.01 LONG TERM (CURRENT) USE OF ANTICOAGULANTS: ICD-10-CM

## 2019-09-26 ENCOUNTER — COMMUNICATION - HEALTHEAST (OUTPATIENT)
Dept: ANTICOAGULATION | Facility: CLINIC | Age: 84
End: 2019-09-26

## 2019-09-26 ENCOUNTER — AMBULATORY - HEALTHEAST (OUTPATIENT)
Dept: LAB | Facility: CLINIC | Age: 84
End: 2019-09-26

## 2019-09-26 DIAGNOSIS — I48.19 PERSISTENT ATRIAL FIBRILLATION (H): ICD-10-CM

## 2019-09-26 LAB — INR PPP: 1.8 (ref 0.9–1.1)

## 2019-10-10 ENCOUNTER — COMMUNICATION - HEALTHEAST (OUTPATIENT)
Dept: ANTICOAGULATION | Facility: CLINIC | Age: 84
End: 2019-10-10

## 2019-10-10 ENCOUNTER — AMBULATORY - HEALTHEAST (OUTPATIENT)
Dept: LAB | Facility: CLINIC | Age: 84
End: 2019-10-10

## 2019-10-10 DIAGNOSIS — I48.19 PERSISTENT ATRIAL FIBRILLATION (H): ICD-10-CM

## 2019-10-10 LAB — INR PPP: 2.3 (ref 0.9–1.1)

## 2019-10-31 ENCOUNTER — COMMUNICATION - HEALTHEAST (OUTPATIENT)
Dept: ANTICOAGULATION | Facility: CLINIC | Age: 84
End: 2019-10-31

## 2019-10-31 ENCOUNTER — AMBULATORY - HEALTHEAST (OUTPATIENT)
Dept: LAB | Facility: CLINIC | Age: 84
End: 2019-10-31

## 2019-10-31 DIAGNOSIS — I48.19 PERSISTENT ATRIAL FIBRILLATION (H): ICD-10-CM

## 2019-10-31 LAB — INR PPP: 2.7 (ref 0.9–1.1)

## 2019-11-20 ENCOUNTER — COMMUNICATION - HEALTHEAST (OUTPATIENT)
Dept: SCHEDULING | Facility: CLINIC | Age: 84
End: 2019-11-20

## 2019-11-22 ENCOUNTER — COMMUNICATION - HEALTHEAST (OUTPATIENT)
Dept: INTERNAL MEDICINE | Facility: CLINIC | Age: 84
End: 2019-11-22

## 2019-11-22 ENCOUNTER — OFFICE VISIT - HEALTHEAST (OUTPATIENT)
Dept: INTERNAL MEDICINE | Facility: CLINIC | Age: 84
End: 2019-11-22

## 2019-11-22 DIAGNOSIS — J84.10 POSTINFLAMMATORY PULMONARY FIBROSIS (H): ICD-10-CM

## 2019-11-22 DIAGNOSIS — I48.19 PERSISTENT ATRIAL FIBRILLATION (H): ICD-10-CM

## 2019-11-22 DIAGNOSIS — H35.30 MACULAR DEGENERATION (SENILE) OF RETINA: ICD-10-CM

## 2019-11-22 DIAGNOSIS — Z95.0 BIVENTRICULAR CARDIAC PACEMAKER IN SITU: ICD-10-CM

## 2019-11-22 DIAGNOSIS — H81.10 BENIGN PAROXYSMAL POSITIONAL VERTIGO, UNSPECIFIED LATERALITY: ICD-10-CM

## 2019-11-22 DIAGNOSIS — E03.9 HYPOTHYROIDISM, UNSPECIFIED TYPE: ICD-10-CM

## 2019-11-22 LAB
ANION GAP SERPL CALCULATED.3IONS-SCNC: 6 MMOL/L (ref 5–18)
BASOPHILS # BLD AUTO: 0.1 THOU/UL (ref 0–0.2)
BASOPHILS NFR BLD AUTO: 1 % (ref 0–2)
BUN SERPL-MCNC: 15 MG/DL (ref 8–28)
CALCIUM SERPL-MCNC: 9 MG/DL (ref 8.5–10.5)
CHLORIDE BLD-SCNC: 108 MMOL/L (ref 98–107)
CO2 SERPL-SCNC: 26 MMOL/L (ref 22–31)
CREAT SERPL-MCNC: 0.71 MG/DL (ref 0.6–1.1)
EOSINOPHIL # BLD AUTO: 0.2 THOU/UL (ref 0–0.4)
EOSINOPHIL NFR BLD AUTO: 3 % (ref 0–6)
ERYTHROCYTE [DISTWIDTH] IN BLOOD BY AUTOMATED COUNT: 14.5 % (ref 11–14.5)
GFR SERPL CREATININE-BSD FRML MDRD: >60 ML/MIN/1.73M2
GLUCOSE BLD-MCNC: 84 MG/DL (ref 70–125)
HCT VFR BLD AUTO: 38.8 % (ref 35–47)
HGB BLD-MCNC: 13.6 G/DL (ref 12–16)
LYMPHOCYTES # BLD AUTO: 1.5 THOU/UL (ref 0.8–4.4)
LYMPHOCYTES NFR BLD AUTO: 28 % (ref 20–40)
MCH RBC QN AUTO: 33.7 PG (ref 27–34)
MCHC RBC AUTO-ENTMCNC: 35.1 G/DL (ref 32–36)
MCV RBC AUTO: 96 FL (ref 80–100)
MONOCYTES # BLD AUTO: 0.5 THOU/UL (ref 0–0.9)
MONOCYTES NFR BLD AUTO: 9 % (ref 2–10)
NEUTROPHILS # BLD AUTO: 3 THOU/UL (ref 2–7.7)
NEUTROPHILS NFR BLD AUTO: 58 % (ref 50–70)
PLATELET # BLD AUTO: 175 THOU/UL (ref 140–440)
PMV BLD AUTO: 11 FL (ref 8.5–12.5)
POTASSIUM BLD-SCNC: 4.6 MMOL/L (ref 3.5–5)
RBC # BLD AUTO: 4.03 MILL/UL (ref 3.8–5.4)
SODIUM SERPL-SCNC: 140 MMOL/L (ref 136–145)
TSH SERPL DL<=0.005 MIU/L-ACNC: 3.28 UIU/ML (ref 0.3–5)
WBC: 5.2 THOU/UL (ref 4–11)

## 2019-11-22 ASSESSMENT — MIFFLIN-ST. JEOR: SCORE: 1155.76

## 2019-12-05 ENCOUNTER — COMMUNICATION - HEALTHEAST (OUTPATIENT)
Dept: INTERNAL MEDICINE | Facility: CLINIC | Age: 84
End: 2019-12-05

## 2019-12-05 DIAGNOSIS — M81.0 OSTEOPOROSIS: ICD-10-CM

## 2019-12-05 DIAGNOSIS — Z92.29 PERSONAL HISTORY OF OTHER DRUG THERAPY: ICD-10-CM

## 2019-12-09 ENCOUNTER — COMMUNICATION - HEALTHEAST (OUTPATIENT)
Dept: INTERNAL MEDICINE | Facility: CLINIC | Age: 84
End: 2019-12-09

## 2019-12-12 ENCOUNTER — AMBULATORY - HEALTHEAST (OUTPATIENT)
Dept: NURSING | Facility: CLINIC | Age: 84
End: 2019-12-12

## 2019-12-12 ENCOUNTER — AMBULATORY - HEALTHEAST (OUTPATIENT)
Dept: LAB | Facility: CLINIC | Age: 84
End: 2019-12-12

## 2019-12-12 ENCOUNTER — COMMUNICATION - HEALTHEAST (OUTPATIENT)
Dept: ANTICOAGULATION | Facility: CLINIC | Age: 84
End: 2019-12-12

## 2019-12-12 DIAGNOSIS — I48.19 PERSISTENT ATRIAL FIBRILLATION (H): ICD-10-CM

## 2019-12-12 LAB — INR PPP: 2 (ref 0.9–1.1)

## 2020-01-16 ENCOUNTER — COMMUNICATION - HEALTHEAST (OUTPATIENT)
Dept: ANTICOAGULATION | Facility: CLINIC | Age: 85
End: 2020-01-16

## 2020-01-21 ENCOUNTER — COMMUNICATION - HEALTHEAST (OUTPATIENT)
Dept: ANTICOAGULATION | Facility: CLINIC | Age: 85
End: 2020-01-21

## 2020-01-21 ENCOUNTER — AMBULATORY - HEALTHEAST (OUTPATIENT)
Dept: LAB | Facility: CLINIC | Age: 85
End: 2020-01-21

## 2020-01-21 DIAGNOSIS — I48.19 PERSISTENT ATRIAL FIBRILLATION (H): ICD-10-CM

## 2020-01-21 LAB — INR PPP: 2.2 (ref 0.9–1.1)

## 2020-02-07 ENCOUNTER — RECORDS - HEALTHEAST (OUTPATIENT)
Dept: ADMINISTRATIVE | Facility: OTHER | Age: 85
End: 2020-02-07

## 2020-02-11 ENCOUNTER — RECORDS - HEALTHEAST (OUTPATIENT)
Dept: ADMINISTRATIVE | Facility: OTHER | Age: 85
End: 2020-02-11

## 2020-02-18 ENCOUNTER — RECORDS - HEALTHEAST (OUTPATIENT)
Dept: ADMINISTRATIVE | Facility: OTHER | Age: 85
End: 2020-02-18

## 2020-02-18 ENCOUNTER — COMMUNICATION - HEALTHEAST (OUTPATIENT)
Dept: INTERNAL MEDICINE | Facility: CLINIC | Age: 85
End: 2020-02-18

## 2020-02-18 LAB — INR PPP: 3.8 (ref 0.9–1.1)

## 2020-02-25 ENCOUNTER — OFFICE VISIT - HEALTHEAST (OUTPATIENT)
Dept: INTERNAL MEDICINE | Facility: CLINIC | Age: 85
End: 2020-02-25

## 2020-02-25 ENCOUNTER — COMMUNICATION - HEALTHEAST (OUTPATIENT)
Dept: ANTICOAGULATION | Facility: CLINIC | Age: 85
End: 2020-02-25

## 2020-02-25 DIAGNOSIS — I48.19 PERSISTENT ATRIAL FIBRILLATION (H): ICD-10-CM

## 2020-02-25 DIAGNOSIS — R42 VERTIGO: ICD-10-CM

## 2020-02-25 DIAGNOSIS — J84.10 POSTINFLAMMATORY PULMONARY FIBROSIS (H): ICD-10-CM

## 2020-02-25 DIAGNOSIS — H35.30 MACULAR DEGENERATION (SENILE) OF RETINA: ICD-10-CM

## 2020-02-25 DIAGNOSIS — M15.0 PRIMARY OSTEOARTHRITIS INVOLVING MULTIPLE JOINTS: ICD-10-CM

## 2020-02-25 DIAGNOSIS — I42.9 CARDIOMYOPATHY, UNSPECIFIED TYPE (H): ICD-10-CM

## 2020-02-25 DIAGNOSIS — M81.0 OSTEOPOROSIS, SENILE: ICD-10-CM

## 2020-02-25 LAB — INR PPP: 1.9 (ref 0.9–1.1)

## 2020-02-25 ASSESSMENT — MIFFLIN-ST. JEOR: SCORE: 1155.76

## 2020-03-02 ENCOUNTER — RECORDS - HEALTHEAST (OUTPATIENT)
Dept: BONE DENSITY | Facility: CLINIC | Age: 85
End: 2020-03-02

## 2020-03-02 ENCOUNTER — RECORDS - HEALTHEAST (OUTPATIENT)
Dept: ADMINISTRATIVE | Facility: OTHER | Age: 85
End: 2020-03-02

## 2020-03-02 DIAGNOSIS — M81.0 AGE-RELATED OSTEOPOROSIS WITHOUT CURRENT PATHOLOGICAL FRACTURE: ICD-10-CM

## 2020-03-04 ENCOUNTER — COMMUNICATION - HEALTHEAST (OUTPATIENT)
Dept: INTERNAL MEDICINE | Facility: CLINIC | Age: 85
End: 2020-03-04

## 2020-03-10 ENCOUNTER — COMMUNICATION - HEALTHEAST (OUTPATIENT)
Dept: ANTICOAGULATION | Facility: CLINIC | Age: 85
End: 2020-03-10

## 2020-03-10 ENCOUNTER — OFFICE VISIT - HEALTHEAST (OUTPATIENT)
Dept: OCCUPATIONAL THERAPY | Facility: REHABILITATION | Age: 85
End: 2020-03-10

## 2020-03-10 ENCOUNTER — AMBULATORY - HEALTHEAST (OUTPATIENT)
Dept: LAB | Facility: CLINIC | Age: 85
End: 2020-03-10

## 2020-03-10 DIAGNOSIS — R11.0 NAUSEA: ICD-10-CM

## 2020-03-10 DIAGNOSIS — H81.12 BENIGN PAROXYSMAL POSITIONAL VERTIGO, LEFT: ICD-10-CM

## 2020-03-10 DIAGNOSIS — R26.81 UNSTEADINESS ON FEET: ICD-10-CM

## 2020-03-10 DIAGNOSIS — Z78.9 DECREASED ACTIVITIES OF DAILY LIVING (ADL): ICD-10-CM

## 2020-03-10 DIAGNOSIS — I48.19 PERSISTENT ATRIAL FIBRILLATION (H): ICD-10-CM

## 2020-03-10 LAB — INR PPP: 2.2 (ref 0.9–1.1)

## 2020-03-16 ENCOUNTER — COMMUNICATION - HEALTHEAST (OUTPATIENT)
Dept: ANTICOAGULATION | Facility: CLINIC | Age: 85
End: 2020-03-16

## 2020-03-17 ENCOUNTER — OFFICE VISIT - HEALTHEAST (OUTPATIENT)
Dept: OCCUPATIONAL THERAPY | Facility: REHABILITATION | Age: 85
End: 2020-03-17

## 2020-03-17 ENCOUNTER — AMBULATORY - HEALTHEAST (OUTPATIENT)
Dept: LAB | Facility: CLINIC | Age: 85
End: 2020-03-17

## 2020-03-17 ENCOUNTER — COMMUNICATION - HEALTHEAST (OUTPATIENT)
Dept: ANTICOAGULATION | Facility: CLINIC | Age: 85
End: 2020-03-17

## 2020-03-17 DIAGNOSIS — R11.0 NAUSEA: ICD-10-CM

## 2020-03-17 DIAGNOSIS — H81.12 BENIGN PAROXYSMAL POSITIONAL VERTIGO, LEFT: ICD-10-CM

## 2020-03-17 DIAGNOSIS — R26.81 UNSTEADINESS ON FEET: ICD-10-CM

## 2020-03-17 DIAGNOSIS — Z78.9 DECREASED ACTIVITIES OF DAILY LIVING (ADL): ICD-10-CM

## 2020-03-17 DIAGNOSIS — I48.19 PERSISTENT ATRIAL FIBRILLATION (H): ICD-10-CM

## 2020-03-17 LAB — INR PPP: 1.8 (ref 0.9–1.1)

## 2020-03-18 ENCOUNTER — COMMUNICATION - HEALTHEAST (OUTPATIENT)
Dept: INTERNAL MEDICINE | Facility: CLINIC | Age: 85
End: 2020-03-18

## 2020-03-18 DIAGNOSIS — E03.9 HYPOTHYROIDISM, UNSPECIFIED TYPE: ICD-10-CM

## 2020-03-19 ENCOUNTER — COMMUNICATION - HEALTHEAST (OUTPATIENT)
Dept: INTERNAL MEDICINE | Facility: CLINIC | Age: 85
End: 2020-03-19

## 2020-03-19 DIAGNOSIS — R42 VERTIGO: ICD-10-CM

## 2020-03-20 ENCOUNTER — RECORDS - HEALTHEAST (OUTPATIENT)
Dept: ADMINISTRATIVE | Facility: OTHER | Age: 85
End: 2020-03-20

## 2020-04-14 ENCOUNTER — AMBULATORY - HEALTHEAST (OUTPATIENT)
Dept: LAB | Facility: CLINIC | Age: 85
End: 2020-04-14

## 2020-04-14 ENCOUNTER — COMMUNICATION - HEALTHEAST (OUTPATIENT)
Dept: ANTICOAGULATION | Facility: CLINIC | Age: 85
End: 2020-04-14

## 2020-04-14 DIAGNOSIS — I48.19 PERSISTENT ATRIAL FIBRILLATION (H): ICD-10-CM

## 2020-04-14 LAB — INR PPP: 2.9 (ref 0.9–1.1)

## 2020-04-22 ENCOUNTER — COMMUNICATION - HEALTHEAST (OUTPATIENT)
Dept: ANTICOAGULATION | Facility: CLINIC | Age: 85
End: 2020-04-22

## 2020-04-22 DIAGNOSIS — I48.19 PERSISTENT ATRIAL FIBRILLATION (H): ICD-10-CM

## 2020-05-06 ENCOUNTER — COMMUNICATION - HEALTHEAST (OUTPATIENT)
Dept: INTERNAL MEDICINE | Facility: CLINIC | Age: 85
End: 2020-05-06

## 2020-05-06 DIAGNOSIS — Z79.01 LONG TERM (CURRENT) USE OF ANTICOAGULANTS: ICD-10-CM

## 2020-05-06 DIAGNOSIS — I48.19 PERSISTENT ATRIAL FIBRILLATION (H): ICD-10-CM

## 2020-05-12 ENCOUNTER — COMMUNICATION - HEALTHEAST (OUTPATIENT)
Dept: ANTICOAGULATION | Facility: CLINIC | Age: 85
End: 2020-05-12

## 2020-05-12 ENCOUNTER — AMBULATORY - HEALTHEAST (OUTPATIENT)
Dept: LAB | Facility: CLINIC | Age: 85
End: 2020-05-12

## 2020-05-12 DIAGNOSIS — I48.19 PERSISTENT ATRIAL FIBRILLATION (H): ICD-10-CM

## 2020-05-12 LAB — INR PPP: 2.5 (ref 0.9–1.1)

## 2020-06-15 ENCOUNTER — COMMUNICATION - HEALTHEAST (OUTPATIENT)
Dept: INTERNAL MEDICINE | Facility: CLINIC | Age: 85
End: 2020-06-15

## 2020-06-15 ENCOUNTER — COMMUNICATION - HEALTHEAST (OUTPATIENT)
Dept: ANTICOAGULATION | Facility: CLINIC | Age: 85
End: 2020-06-15

## 2020-06-15 ENCOUNTER — AMBULATORY - HEALTHEAST (OUTPATIENT)
Dept: LAB | Facility: CLINIC | Age: 85
End: 2020-06-15

## 2020-06-15 DIAGNOSIS — I48.19 PERSISTENT ATRIAL FIBRILLATION (H): ICD-10-CM

## 2020-06-15 LAB — INR PPP: 1.9 (ref 0.9–1.1)

## 2020-06-18 ENCOUNTER — COMMUNICATION - HEALTHEAST (OUTPATIENT)
Dept: INTERNAL MEDICINE | Facility: CLINIC | Age: 85
End: 2020-06-18

## 2020-06-18 DIAGNOSIS — M81.0 OSTEOPOROSIS: ICD-10-CM

## 2020-06-22 ENCOUNTER — COMMUNICATION - HEALTHEAST (OUTPATIENT)
Dept: ANTICOAGULATION | Facility: CLINIC | Age: 85
End: 2020-06-22

## 2020-06-22 ENCOUNTER — AMBULATORY - HEALTHEAST (OUTPATIENT)
Dept: LAB | Facility: CLINIC | Age: 85
End: 2020-06-22

## 2020-06-22 ENCOUNTER — AMBULATORY - HEALTHEAST (OUTPATIENT)
Dept: NURSING | Facility: CLINIC | Age: 85
End: 2020-06-22

## 2020-06-22 DIAGNOSIS — I48.19 PERSISTENT ATRIAL FIBRILLATION (H): ICD-10-CM

## 2020-06-22 LAB — INR PPP: 1.8 (ref 0.9–1.1)

## 2020-06-30 ENCOUNTER — COMMUNICATION - HEALTHEAST (OUTPATIENT)
Dept: ANTICOAGULATION | Facility: CLINIC | Age: 85
End: 2020-06-30

## 2020-06-30 ENCOUNTER — AMBULATORY - HEALTHEAST (OUTPATIENT)
Dept: LAB | Facility: CLINIC | Age: 85
End: 2020-06-30

## 2020-06-30 DIAGNOSIS — I48.19 PERSISTENT ATRIAL FIBRILLATION (H): ICD-10-CM

## 2020-06-30 LAB — INR PPP: 1.8 (ref 0.9–1.1)

## 2020-07-08 ENCOUNTER — RECORDS - HEALTHEAST (OUTPATIENT)
Dept: ADMINISTRATIVE | Facility: OTHER | Age: 85
End: 2020-07-08

## 2020-07-14 ENCOUNTER — AMBULATORY - HEALTHEAST (OUTPATIENT)
Dept: LAB | Facility: CLINIC | Age: 85
End: 2020-07-14

## 2020-07-14 ENCOUNTER — COMMUNICATION - HEALTHEAST (OUTPATIENT)
Dept: ANTICOAGULATION | Facility: CLINIC | Age: 85
End: 2020-07-14

## 2020-07-14 DIAGNOSIS — I48.19 PERSISTENT ATRIAL FIBRILLATION (H): ICD-10-CM

## 2020-07-14 LAB — INR PPP: 1.5 (ref 0.9–1.1)

## 2020-08-05 ENCOUNTER — AMBULATORY - HEALTHEAST (OUTPATIENT)
Dept: LAB | Facility: CLINIC | Age: 85
End: 2020-08-05

## 2020-08-05 ENCOUNTER — COMMUNICATION - HEALTHEAST (OUTPATIENT)
Dept: ANTICOAGULATION | Facility: CLINIC | Age: 85
End: 2020-08-05

## 2020-08-05 DIAGNOSIS — I48.19 PERSISTENT ATRIAL FIBRILLATION (H): ICD-10-CM

## 2020-08-05 LAB — INR PPP: 3.7 (ref 0.9–1.1)

## 2020-08-12 ENCOUNTER — RECORDS - HEALTHEAST (OUTPATIENT)
Dept: ADMINISTRATIVE | Facility: OTHER | Age: 85
End: 2020-08-12

## 2020-08-19 ENCOUNTER — COMMUNICATION - HEALTHEAST (OUTPATIENT)
Dept: ANTICOAGULATION | Facility: CLINIC | Age: 85
End: 2020-08-19

## 2020-08-19 ENCOUNTER — AMBULATORY - HEALTHEAST (OUTPATIENT)
Dept: LAB | Facility: CLINIC | Age: 85
End: 2020-08-19

## 2020-08-19 DIAGNOSIS — I48.19 PERSISTENT ATRIAL FIBRILLATION (H): ICD-10-CM

## 2020-08-19 LAB — INR PPP: 2.8 (ref 0.9–1.1)

## 2020-09-01 ENCOUNTER — OFFICE VISIT - HEALTHEAST (OUTPATIENT)
Dept: INTERNAL MEDICINE | Facility: CLINIC | Age: 85
End: 2020-09-01

## 2020-09-01 DIAGNOSIS — R05.9 COUGH: ICD-10-CM

## 2020-09-01 DIAGNOSIS — J84.10 POSTINFLAMMATORY PULMONARY FIBROSIS (H): ICD-10-CM

## 2020-09-02 ENCOUNTER — AMBULATORY - HEALTHEAST (OUTPATIENT)
Dept: FAMILY MEDICINE | Facility: CLINIC | Age: 85
End: 2020-09-02

## 2020-09-02 DIAGNOSIS — R05.9 COUGH: ICD-10-CM

## 2020-09-04 ENCOUNTER — COMMUNICATION - HEALTHEAST (OUTPATIENT)
Dept: SCHEDULING | Facility: CLINIC | Age: 85
End: 2020-09-04

## 2020-09-10 ENCOUNTER — COMMUNICATION - HEALTHEAST (OUTPATIENT)
Dept: INTERNAL MEDICINE | Facility: CLINIC | Age: 85
End: 2020-09-10

## 2020-09-15 ENCOUNTER — OFFICE VISIT - HEALTHEAST (OUTPATIENT)
Dept: INTERNAL MEDICINE | Facility: CLINIC | Age: 85
End: 2020-09-15

## 2020-09-15 ENCOUNTER — COMMUNICATION - HEALTHEAST (OUTPATIENT)
Dept: ANTICOAGULATION | Facility: CLINIC | Age: 85
End: 2020-09-15

## 2020-09-15 DIAGNOSIS — R39.15 URINARY URGENCY: ICD-10-CM

## 2020-09-15 DIAGNOSIS — M81.0 OSTEOPOROSIS, SENILE: ICD-10-CM

## 2020-09-15 DIAGNOSIS — Z00.00 ENCOUNTER FOR MEDICARE ANNUAL WELLNESS EXAM: ICD-10-CM

## 2020-09-15 DIAGNOSIS — H53.16 CHARLES BONNET SYNDROME: ICD-10-CM

## 2020-09-15 DIAGNOSIS — N39.41 URGE INCONTINENCE OF URINE: ICD-10-CM

## 2020-09-15 DIAGNOSIS — I48.19 PERSISTENT ATRIAL FIBRILLATION (H): ICD-10-CM

## 2020-09-15 DIAGNOSIS — H35.30 MACULAR DEGENERATION (SENILE) OF RETINA: ICD-10-CM

## 2020-09-15 DIAGNOSIS — J84.10 POSTINFLAMMATORY PULMONARY FIBROSIS (H): ICD-10-CM

## 2020-09-15 DIAGNOSIS — Z23 IMMUNIZATION DUE: ICD-10-CM

## 2020-09-15 DIAGNOSIS — E03.9 HYPOTHYROIDISM, UNSPECIFIED TYPE: ICD-10-CM

## 2020-09-15 DIAGNOSIS — I42.9 CARDIOMYOPATHY, UNSPECIFIED TYPE (H): ICD-10-CM

## 2020-09-15 LAB
ALBUMIN SERPL-MCNC: 3.9 G/DL (ref 3.5–5)
ALBUMIN UR-MCNC: NEGATIVE MG/DL
ALP SERPL-CCNC: 75 U/L (ref 45–120)
ALT SERPL W P-5'-P-CCNC: 15 U/L (ref 0–45)
ANION GAP SERPL CALCULATED.3IONS-SCNC: 9 MMOL/L (ref 5–18)
APPEARANCE UR: CLEAR
AST SERPL W P-5'-P-CCNC: 18 U/L (ref 0–40)
BACTERIA #/AREA URNS HPF: ABNORMAL HPF
BILIRUB SERPL-MCNC: 2.2 MG/DL (ref 0–1)
BILIRUB UR QL STRIP: NEGATIVE
BUN SERPL-MCNC: 17 MG/DL (ref 8–28)
CALCIUM SERPL-MCNC: 9.4 MG/DL (ref 8.5–10.5)
CHLORIDE BLD-SCNC: 104 MMOL/L (ref 98–107)
CHOLEST SERPL-MCNC: 193 MG/DL
CO2 SERPL-SCNC: 28 MMOL/L (ref 22–31)
COLOR UR AUTO: YELLOW
CREAT SERPL-MCNC: 0.72 MG/DL (ref 0.6–1.1)
ERYTHROCYTE [DISTWIDTH] IN BLOOD BY AUTOMATED COUNT: 12.8 % (ref 11–14.5)
FASTING STATUS PATIENT QL REPORTED: YES
GFR SERPL CREATININE-BSD FRML MDRD: >60 ML/MIN/1.73M2
GLUCOSE BLD-MCNC: 87 MG/DL (ref 70–125)
GLUCOSE UR STRIP-MCNC: NEGATIVE MG/DL
HCT VFR BLD AUTO: 42.5 % (ref 35–47)
HDLC SERPL-MCNC: 56 MG/DL
HGB BLD-MCNC: 14.4 G/DL (ref 12–16)
HGB UR QL STRIP: ABNORMAL
INR PPP: 4.4 (ref 0.9–1.1)
KETONES UR STRIP-MCNC: NEGATIVE MG/DL
LDLC SERPL CALC-MCNC: 105 MG/DL
LEUKOCYTE ESTERASE UR QL STRIP: ABNORMAL
MCH RBC QN AUTO: 33.8 PG (ref 27–34)
MCHC RBC AUTO-ENTMCNC: 33.9 G/DL (ref 32–36)
MCV RBC AUTO: 100 FL (ref 80–100)
NITRATE UR QL: NEGATIVE
PH UR STRIP: 6 [PH] (ref 5–8)
PLATELET # BLD AUTO: 200 THOU/UL (ref 140–440)
PMV BLD AUTO: 7.8 FL (ref 7–10)
POTASSIUM BLD-SCNC: 4.4 MMOL/L (ref 3.5–5)
PROT SERPL-MCNC: 6.2 G/DL (ref 6–8)
RBC # BLD AUTO: 4.25 MILL/UL (ref 3.8–5.4)
RBC #/AREA URNS AUTO: ABNORMAL HPF
SODIUM SERPL-SCNC: 141 MMOL/L (ref 136–145)
SP GR UR STRIP: 1.02 (ref 1–1.03)
SQUAMOUS #/AREA URNS AUTO: ABNORMAL LPF
TRIGL SERPL-MCNC: 161 MG/DL
TSH SERPL DL<=0.005 MIU/L-ACNC: 2.88 UIU/ML (ref 0.3–5)
UROBILINOGEN UR STRIP-ACNC: ABNORMAL
WBC #/AREA URNS AUTO: ABNORMAL HPF
WBC: 8.4 THOU/UL (ref 4–11)

## 2020-09-15 ASSESSMENT — MIFFLIN-ST. JEOR: SCORE: 1155.76

## 2020-09-16 LAB
25(OH)D3 SERPL-MCNC: 48.6 NG/ML (ref 30–80)
25(OH)D3 SERPL-MCNC: 48.6 NG/ML (ref 30–80)
BACTERIA SPEC CULT: ABNORMAL
BACTERIA SPEC CULT: ABNORMAL

## 2020-09-17 ENCOUNTER — AMBULATORY - HEALTHEAST (OUTPATIENT)
Dept: INTERNAL MEDICINE | Facility: CLINIC | Age: 85
End: 2020-09-17

## 2020-09-17 ENCOUNTER — COMMUNICATION - HEALTHEAST (OUTPATIENT)
Dept: INTERNAL MEDICINE | Facility: CLINIC | Age: 85
End: 2020-09-17

## 2020-09-17 DIAGNOSIS — R39.15 URINARY URGENCY: ICD-10-CM

## 2020-09-23 ENCOUNTER — AMBULATORY - HEALTHEAST (OUTPATIENT)
Dept: LAB | Facility: CLINIC | Age: 85
End: 2020-09-23

## 2020-09-23 ENCOUNTER — RECORDS - HEALTHEAST (OUTPATIENT)
Dept: ANTICOAGULATION | Facility: CLINIC | Age: 85
End: 2020-09-23

## 2020-09-23 ENCOUNTER — COMMUNICATION - HEALTHEAST (OUTPATIENT)
Dept: ANTICOAGULATION | Facility: CLINIC | Age: 85
End: 2020-09-23

## 2020-09-23 DIAGNOSIS — I48.19 PERSISTENT ATRIAL FIBRILLATION (H): ICD-10-CM

## 2020-09-23 DIAGNOSIS — R39.15 URINARY URGENCY: ICD-10-CM

## 2020-09-23 LAB
ALBUMIN UR-MCNC: ABNORMAL MG/DL
APPEARANCE UR: CLEAR
BACTERIA #/AREA URNS HPF: ABNORMAL HPF
BILIRUB UR QL STRIP: NEGATIVE
COLOR UR AUTO: YELLOW
GLUCOSE UR STRIP-MCNC: NEGATIVE MG/DL
HGB UR QL STRIP: ABNORMAL
INR PPP: 2.7 (ref 0.9–1.1)
KETONES UR STRIP-MCNC: NEGATIVE MG/DL
LEUKOCYTE ESTERASE UR QL STRIP: ABNORMAL
MUCOUS THREADS #/AREA URNS LPF: ABNORMAL LPF
NITRATE UR QL: NEGATIVE
PH UR STRIP: 5.5 [PH] (ref 5–8)
RBC #/AREA URNS AUTO: ABNORMAL HPF
SP GR UR STRIP: 1.02 (ref 1–1.03)
SQUAMOUS #/AREA URNS AUTO: ABNORMAL LPF
TRANS CELLS #/AREA URNS HPF: ABNORMAL LPF
UROBILINOGEN UR STRIP-ACNC: ABNORMAL
WBC #/AREA URNS AUTO: ABNORMAL HPF

## 2020-09-26 LAB — BACTERIA SPEC CULT: ABNORMAL

## 2020-09-28 ENCOUNTER — COMMUNICATION - HEALTHEAST (OUTPATIENT)
Dept: INTERNAL MEDICINE | Facility: CLINIC | Age: 85
End: 2020-09-28

## 2020-10-15 ENCOUNTER — AMBULATORY - HEALTHEAST (OUTPATIENT)
Dept: LAB | Facility: CLINIC | Age: 85
End: 2020-10-15

## 2020-10-15 ENCOUNTER — COMMUNICATION - HEALTHEAST (OUTPATIENT)
Dept: ANTICOAGULATION | Facility: CLINIC | Age: 85
End: 2020-10-15

## 2020-10-15 DIAGNOSIS — I48.19 PERSISTENT ATRIAL FIBRILLATION (H): ICD-10-CM

## 2020-10-15 LAB — INR PPP: 4.5 (ref 0.9–1.1)

## 2020-10-22 ENCOUNTER — COMMUNICATION - HEALTHEAST (OUTPATIENT)
Dept: ANTICOAGULATION | Facility: CLINIC | Age: 85
End: 2020-10-22

## 2020-10-22 ENCOUNTER — AMBULATORY - HEALTHEAST (OUTPATIENT)
Dept: LAB | Facility: CLINIC | Age: 85
End: 2020-10-22

## 2020-10-22 DIAGNOSIS — I48.19 PERSISTENT ATRIAL FIBRILLATION (H): ICD-10-CM

## 2020-10-22 LAB — INR PPP: 2.4 (ref 0.9–1.1)

## 2020-11-04 ENCOUNTER — AMBULATORY - HEALTHEAST (OUTPATIENT)
Dept: LAB | Facility: CLINIC | Age: 85
End: 2020-11-04

## 2020-11-04 ENCOUNTER — COMMUNICATION - HEALTHEAST (OUTPATIENT)
Dept: ANTICOAGULATION | Facility: CLINIC | Age: 85
End: 2020-11-04

## 2020-11-04 DIAGNOSIS — I48.19 PERSISTENT ATRIAL FIBRILLATION (H): ICD-10-CM

## 2020-11-04 LAB — INR PPP: 2.1 (ref 0.9–1.1)

## 2020-11-19 ENCOUNTER — COMMUNICATION - HEALTHEAST (OUTPATIENT)
Dept: SCHEDULING | Facility: CLINIC | Age: 85
End: 2020-11-19

## 2020-11-19 ENCOUNTER — COMMUNICATION - HEALTHEAST (OUTPATIENT)
Dept: ANTICOAGULATION | Facility: CLINIC | Age: 85
End: 2020-11-19

## 2020-11-20 ENCOUNTER — COMMUNICATION - HEALTHEAST (OUTPATIENT)
Dept: CARE COORDINATION | Facility: CLINIC | Age: 85
End: 2020-11-20

## 2020-11-20 ENCOUNTER — COMMUNICATION - HEALTHEAST (OUTPATIENT)
Dept: INTERNAL MEDICINE | Facility: CLINIC | Age: 85
End: 2020-11-20

## 2020-11-20 ENCOUNTER — OFFICE VISIT - HEALTHEAST (OUTPATIENT)
Dept: INTERNAL MEDICINE | Facility: CLINIC | Age: 85
End: 2020-11-20

## 2020-11-20 ENCOUNTER — AMBULATORY - HEALTHEAST (OUTPATIENT)
Dept: CARE COORDINATION | Facility: CLINIC | Age: 85
End: 2020-11-20

## 2020-11-20 DIAGNOSIS — U07.1 PNEUMONIA DUE TO 2019 NOVEL CORONAVIRUS: ICD-10-CM

## 2020-11-20 DIAGNOSIS — U07.1 2019 NOVEL CORONAVIRUS DISEASE (COVID-19): ICD-10-CM

## 2020-11-20 DIAGNOSIS — J12.82 PNEUMONIA DUE TO 2019 NOVEL CORONAVIRUS: ICD-10-CM

## 2020-11-21 ENCOUNTER — COMMUNICATION - HEALTHEAST (OUTPATIENT)
Dept: SCHEDULING | Facility: CLINIC | Age: 85
End: 2020-11-21

## 2020-11-21 ENCOUNTER — NURSE TRIAGE (OUTPATIENT)
Dept: NURSING | Facility: CLINIC | Age: 85
End: 2020-11-21

## 2020-11-21 NOTE — TELEPHONE ENCOUNTER
Janny daughter     ER Thursday, got COVID test, and found out it is positive.     Has [facility] chart...

## 2020-11-23 ENCOUNTER — OFFICE VISIT - HEALTHEAST (OUTPATIENT)
Dept: INTERNAL MEDICINE | Facility: CLINIC | Age: 85
End: 2020-11-23

## 2020-11-23 ENCOUNTER — COMMUNICATION - HEALTHEAST (OUTPATIENT)
Dept: INTERNAL MEDICINE | Facility: CLINIC | Age: 85
End: 2020-11-23

## 2020-11-23 ENCOUNTER — COMMUNICATION - HEALTHEAST (OUTPATIENT)
Dept: SCHEDULING | Facility: CLINIC | Age: 85
End: 2020-11-23

## 2020-11-23 DIAGNOSIS — U07.1 PNEUMONIA DUE TO 2019 NOVEL CORONAVIRUS: ICD-10-CM

## 2020-11-23 DIAGNOSIS — J12.82 PNEUMONIA DUE TO 2019 NOVEL CORONAVIRUS: ICD-10-CM

## 2020-11-23 DIAGNOSIS — R94.5 ABNORMAL RESULTS OF LIVER FUNCTION STUDIES: ICD-10-CM

## 2020-11-24 ENCOUNTER — RECORDS - HEALTHEAST (OUTPATIENT)
Dept: ADMINISTRATIVE | Facility: OTHER | Age: 85
End: 2020-11-24

## 2020-11-25 ENCOUNTER — RECORDS - HEALTHEAST (OUTPATIENT)
Dept: ADMINISTRATIVE | Facility: OTHER | Age: 85
End: 2020-11-25

## 2020-11-30 ENCOUNTER — COMMUNICATION - HEALTHEAST (OUTPATIENT)
Dept: INTERNAL MEDICINE | Facility: CLINIC | Age: 85
End: 2020-11-30

## 2020-11-30 ENCOUNTER — COMMUNICATION - HEALTHEAST (OUTPATIENT)
Dept: SCHEDULING | Facility: CLINIC | Age: 85
End: 2020-11-30

## 2020-12-01 ENCOUNTER — RECORDS - HEALTHEAST (OUTPATIENT)
Dept: ADMINISTRATIVE | Facility: OTHER | Age: 85
End: 2020-12-01

## 2020-12-01 ENCOUNTER — COMMUNICATION - HEALTHEAST (OUTPATIENT)
Dept: INTERNAL MEDICINE | Facility: CLINIC | Age: 85
End: 2020-12-01

## 2020-12-02 ENCOUNTER — COMMUNICATION - HEALTHEAST (OUTPATIENT)
Dept: INTERNAL MEDICINE | Facility: CLINIC | Age: 85
End: 2020-12-02

## 2020-12-02 ENCOUNTER — OFFICE VISIT - HEALTHEAST (OUTPATIENT)
Dept: INTERNAL MEDICINE | Facility: CLINIC | Age: 85
End: 2020-12-02

## 2020-12-02 DIAGNOSIS — I42.9 CARDIOMYOPATHY, UNSPECIFIED TYPE (H): ICD-10-CM

## 2020-12-02 DIAGNOSIS — I48.19 PERSISTENT ATRIAL FIBRILLATION (H): ICD-10-CM

## 2020-12-02 DIAGNOSIS — U07.1 PNEUMONIA DUE TO 2019 NOVEL CORONAVIRUS: ICD-10-CM

## 2020-12-02 DIAGNOSIS — J12.82 PNEUMONIA DUE TO 2019 NOVEL CORONAVIRUS: ICD-10-CM

## 2020-12-02 DIAGNOSIS — J84.10 POSTINFLAMMATORY PULMONARY FIBROSIS (H): ICD-10-CM

## 2020-12-02 LAB — INR PPP: 3.4 (ref 0.9–1.1)

## 2020-12-02 ASSESSMENT — PATIENT HEALTH QUESTIONNAIRE - PHQ9: SUM OF ALL RESPONSES TO PHQ QUESTIONS 1-9: 13

## 2020-12-03 ENCOUNTER — RECORDS - HEALTHEAST (OUTPATIENT)
Dept: ADMINISTRATIVE | Facility: OTHER | Age: 85
End: 2020-12-03

## 2020-12-07 ENCOUNTER — COMMUNICATION - HEALTHEAST (OUTPATIENT)
Dept: INTERNAL MEDICINE | Facility: CLINIC | Age: 85
End: 2020-12-07

## 2020-12-07 LAB — INR PPP: 2.3 (ref 0.9–1.1)

## 2020-12-08 ENCOUNTER — DOCUMENTATION ONLY (OUTPATIENT)
Dept: OTHER | Facility: CLINIC | Age: 85
End: 2020-12-08

## 2020-12-08 ENCOUNTER — AMBULATORY - HEALTHEAST (OUTPATIENT)
Dept: OTHER | Facility: CLINIC | Age: 85
End: 2020-12-08

## 2020-12-08 ENCOUNTER — COMMUNICATION - HEALTHEAST (OUTPATIENT)
Dept: INTERNAL MEDICINE | Facility: CLINIC | Age: 85
End: 2020-12-08

## 2020-12-10 ENCOUNTER — COMMUNICATION - HEALTHEAST (OUTPATIENT)
Dept: INTERNAL MEDICINE | Facility: CLINIC | Age: 85
End: 2020-12-10

## 2020-12-10 LAB — INR PPP: 2.3 (ref 0.9–1.1)

## 2020-12-14 ENCOUNTER — COMMUNICATION - HEALTHEAST (OUTPATIENT)
Dept: INTERNAL MEDICINE | Facility: CLINIC | Age: 85
End: 2020-12-14

## 2020-12-14 LAB — INR PPP: 2.9 (ref 0.9–1.1)

## 2020-12-17 ENCOUNTER — HOSPITAL ENCOUNTER (OUTPATIENT)
Dept: CT IMAGING | Facility: CLINIC | Age: 85
Discharge: HOME | End: 2020-12-17
Attending: INTERNAL MEDICINE

## 2020-12-17 ENCOUNTER — OFFICE VISIT - HEALTHEAST (OUTPATIENT)
Dept: INTERNAL MEDICINE | Facility: CLINIC | Age: 85
End: 2020-12-17

## 2020-12-17 DIAGNOSIS — J84.10 POSTINFLAMMATORY PULMONARY FIBROSIS (H): ICD-10-CM

## 2020-12-17 DIAGNOSIS — J12.82 PNEUMONIA DUE TO 2019 NOVEL CORONAVIRUS: ICD-10-CM

## 2020-12-17 DIAGNOSIS — R91.8 PULMONARY NODULES: ICD-10-CM

## 2020-12-17 DIAGNOSIS — B00.1 COLD SORE: ICD-10-CM

## 2020-12-17 DIAGNOSIS — J12.89 OTHER VIRAL PNEUMONIA: ICD-10-CM

## 2020-12-17 DIAGNOSIS — U07.1 PNEUMONIA DUE TO 2019 NOVEL CORONAVIRUS: ICD-10-CM

## 2020-12-17 DIAGNOSIS — H81.10 BENIGN PAROXYSMAL POSITIONAL VERTIGO, UNSPECIFIED LATERALITY: ICD-10-CM

## 2020-12-17 ASSESSMENT — MIFFLIN-ST. JEOR: SCORE: 1133.08

## 2020-12-17 ASSESSMENT — PATIENT HEALTH QUESTIONNAIRE - PHQ9: SUM OF ALL RESPONSES TO PHQ QUESTIONS 1-9: 4

## 2020-12-18 ENCOUNTER — COMMUNICATION - HEALTHEAST (OUTPATIENT)
Dept: INTERNAL MEDICINE | Facility: CLINIC | Age: 85
End: 2020-12-18

## 2020-12-21 ENCOUNTER — RECORDS - HEALTHEAST (OUTPATIENT)
Dept: ADMINISTRATIVE | Facility: OTHER | Age: 85
End: 2020-12-21

## 2020-12-21 ENCOUNTER — COMMUNICATION - HEALTHEAST (OUTPATIENT)
Dept: INTERNAL MEDICINE | Facility: CLINIC | Age: 85
End: 2020-12-21

## 2020-12-21 LAB — INR PPP: 1.8 (ref 0.9–1.1)

## 2020-12-23 ENCOUNTER — COMMUNICATION - HEALTHEAST (OUTPATIENT)
Dept: INTERNAL MEDICINE | Facility: CLINIC | Age: 85
End: 2020-12-23

## 2020-12-23 LAB — INR PPP: 2.2 (ref 0.9–1.1)

## 2020-12-24 ENCOUNTER — RECORDS - HEALTHEAST (OUTPATIENT)
Dept: ADMINISTRATIVE | Facility: OTHER | Age: 85
End: 2020-12-24

## 2020-12-29 ENCOUNTER — RECORDS - HEALTHEAST (OUTPATIENT)
Dept: ADMINISTRATIVE | Facility: OTHER | Age: 85
End: 2020-12-29

## 2021-01-04 ENCOUNTER — COMMUNICATION - HEALTHEAST (OUTPATIENT)
Dept: INTERNAL MEDICINE | Facility: CLINIC | Age: 86
End: 2021-01-04

## 2021-01-04 DIAGNOSIS — I48.19 PERSISTENT ATRIAL FIBRILLATION (H): ICD-10-CM

## 2021-01-04 DIAGNOSIS — Z79.01 LONG TERM (CURRENT) USE OF ANTICOAGULANTS: ICD-10-CM

## 2021-01-04 RX ORDER — WARFARIN SODIUM 3 MG/1
TABLET ORAL
Qty: 97 TABLET | Refills: 1 | Status: SHIPPED | OUTPATIENT
Start: 2021-01-04 | End: 2021-10-06

## 2021-01-05 ENCOUNTER — RECORDS - HEALTHEAST (OUTPATIENT)
Dept: ADMINISTRATIVE | Facility: OTHER | Age: 86
End: 2021-01-05

## 2021-01-06 ENCOUNTER — COMMUNICATION - HEALTHEAST (OUTPATIENT)
Dept: ANTICOAGULATION | Facility: CLINIC | Age: 86
End: 2021-01-06

## 2021-01-06 ENCOUNTER — AMBULATORY - HEALTHEAST (OUTPATIENT)
Dept: LAB | Facility: CLINIC | Age: 86
End: 2021-01-06

## 2021-01-06 DIAGNOSIS — I48.19 PERSISTENT ATRIAL FIBRILLATION (H): ICD-10-CM

## 2021-01-06 LAB — INR PPP: 1.8 (ref 0.9–1.1)

## 2021-01-19 ENCOUNTER — COMMUNICATION - HEALTHEAST (OUTPATIENT)
Dept: INTERNAL MEDICINE | Facility: CLINIC | Age: 86
End: 2021-01-19

## 2021-01-27 ENCOUNTER — AMBULATORY - HEALTHEAST (OUTPATIENT)
Dept: LAB | Facility: CLINIC | Age: 86
End: 2021-01-27

## 2021-01-27 ENCOUNTER — COMMUNICATION - HEALTHEAST (OUTPATIENT)
Dept: ANTICOAGULATION | Facility: CLINIC | Age: 86
End: 2021-01-27

## 2021-01-27 DIAGNOSIS — I48.19 PERSISTENT ATRIAL FIBRILLATION (H): ICD-10-CM

## 2021-01-27 LAB — INR PPP: 1.9 (ref 0.9–1.1)

## 2021-02-11 ENCOUNTER — COMMUNICATION - HEALTHEAST (OUTPATIENT)
Dept: ANTICOAGULATION | Facility: CLINIC | Age: 86
End: 2021-02-11

## 2021-02-11 ENCOUNTER — AMBULATORY - HEALTHEAST (OUTPATIENT)
Dept: LAB | Facility: CLINIC | Age: 86
End: 2021-02-11

## 2021-02-11 DIAGNOSIS — I48.19 PERSISTENT ATRIAL FIBRILLATION (H): ICD-10-CM

## 2021-02-11 LAB — INR PPP: 2 (ref 0.9–1.1)

## 2021-02-17 ENCOUNTER — AMBULATORY - HEALTHEAST (OUTPATIENT)
Dept: ANTICOAGULATION | Facility: CLINIC | Age: 86
End: 2021-02-17

## 2021-03-01 ENCOUNTER — COMMUNICATION - HEALTHEAST (OUTPATIENT)
Dept: INTERNAL MEDICINE | Facility: CLINIC | Age: 86
End: 2021-03-01

## 2021-03-01 DIAGNOSIS — E03.9 HYPOTHYROIDISM, UNSPECIFIED TYPE: ICD-10-CM

## 2021-03-01 RX ORDER — LEVOTHYROXINE SODIUM 75 UG/1
TABLET ORAL
Qty: 90 TABLET | Refills: 1 | Status: SHIPPED | OUTPATIENT
Start: 2021-03-01 | End: 2021-10-06

## 2021-03-04 ENCOUNTER — COMMUNICATION - HEALTHEAST (OUTPATIENT)
Dept: ANTICOAGULATION | Facility: CLINIC | Age: 86
End: 2021-03-04

## 2021-03-04 ENCOUNTER — AMBULATORY - HEALTHEAST (OUTPATIENT)
Dept: LAB | Facility: CLINIC | Age: 86
End: 2021-03-04

## 2021-03-04 DIAGNOSIS — I48.19 PERSISTENT ATRIAL FIBRILLATION (H): ICD-10-CM

## 2021-03-04 LAB — INR PPP: 2.9 (ref 0.9–1.1)

## 2021-03-16 ENCOUNTER — COMMUNICATION - HEALTHEAST (OUTPATIENT)
Dept: ANTICOAGULATION | Facility: CLINIC | Age: 86
End: 2021-03-16

## 2021-03-16 ENCOUNTER — OFFICE VISIT - HEALTHEAST (OUTPATIENT)
Dept: INTERNAL MEDICINE | Facility: CLINIC | Age: 86
End: 2021-03-16

## 2021-03-16 DIAGNOSIS — E03.9 HYPOTHYROIDISM, UNSPECIFIED TYPE: ICD-10-CM

## 2021-03-16 DIAGNOSIS — M81.0 OSTEOPOROSIS, SENILE: ICD-10-CM

## 2021-03-16 DIAGNOSIS — I48.19 PERSISTENT ATRIAL FIBRILLATION (H): ICD-10-CM

## 2021-03-16 DIAGNOSIS — N64.4 PAIN OF LEFT BREAST: ICD-10-CM

## 2021-03-16 DIAGNOSIS — J84.10 POSTINFLAMMATORY PULMONARY FIBROSIS (H): ICD-10-CM

## 2021-03-16 DIAGNOSIS — I42.9 CARDIOMYOPATHY, UNSPECIFIED TYPE (H): ICD-10-CM

## 2021-03-16 LAB
ALBUMIN SERPL-MCNC: 3.6 G/DL (ref 3.5–5)
ALP SERPL-CCNC: 115 U/L (ref 45–120)
ALT SERPL W P-5'-P-CCNC: 21 U/L (ref 0–45)
ANION GAP SERPL CALCULATED.3IONS-SCNC: 10 MMOL/L (ref 5–18)
AST SERPL W P-5'-P-CCNC: 23 U/L (ref 0–40)
BILIRUB SERPL-MCNC: 2.3 MG/DL (ref 0–1)
BUN SERPL-MCNC: 13 MG/DL (ref 8–28)
CALCIUM SERPL-MCNC: 9.1 MG/DL (ref 8.5–10.5)
CHLORIDE BLD-SCNC: 106 MMOL/L (ref 98–107)
CO2 SERPL-SCNC: 27 MMOL/L (ref 22–31)
CREAT SERPL-MCNC: 0.77 MG/DL (ref 0.6–1.1)
ERYTHROCYTE [DISTWIDTH] IN BLOOD BY AUTOMATED COUNT: 15.1 % (ref 11–14.5)
GFR SERPL CREATININE-BSD FRML MDRD: >60 ML/MIN/1.73M2
GLUCOSE BLD-MCNC: 74 MG/DL (ref 70–125)
HCT VFR BLD AUTO: 37.9 % (ref 35–47)
HGB BLD-MCNC: 13.4 G/DL (ref 12–16)
INR PPP: 2 (ref 0.9–1.1)
MCH RBC QN AUTO: 32.8 PG (ref 27–34)
MCHC RBC AUTO-ENTMCNC: 35.4 G/DL (ref 32–36)
MCV RBC AUTO: 93 FL (ref 80–100)
PLATELET # BLD AUTO: 182 THOU/UL (ref 140–440)
PMV BLD AUTO: 10.1 FL (ref 7–10)
POTASSIUM BLD-SCNC: 4.8 MMOL/L (ref 3.5–5)
PROT SERPL-MCNC: 5.9 G/DL (ref 6–8)
RBC # BLD AUTO: 4.08 MILL/UL (ref 3.8–5.4)
SODIUM SERPL-SCNC: 143 MMOL/L (ref 136–145)
TSH SERPL DL<=0.005 MIU/L-ACNC: 4.59 UIU/ML (ref 0.3–5)
WBC: 5.5 THOU/UL (ref 4–11)

## 2021-03-16 RX ORDER — LANOLIN ALCOHOL/MO/W.PET/CERES
3 CREAM (GRAM) TOPICAL AT BEDTIME
Status: SHIPPED | COMMUNITY
Start: 2021-03-16

## 2021-03-16 ASSESSMENT — MIFFLIN-ST. JEOR: SCORE: 1155.76

## 2021-03-17 ENCOUNTER — HOSPITAL ENCOUNTER (OUTPATIENT)
Dept: ULTRASOUND IMAGING | Facility: CLINIC | Age: 86
Discharge: HOME | End: 2021-03-17
Attending: INTERNAL MEDICINE

## 2021-03-17 ENCOUNTER — HOSPITAL ENCOUNTER (OUTPATIENT)
Dept: MAMMOGRAPHY | Facility: CLINIC | Age: 86
Discharge: HOME | End: 2021-03-17
Attending: INTERNAL MEDICINE

## 2021-03-17 DIAGNOSIS — N64.4 PAIN OF LEFT BREAST: ICD-10-CM

## 2021-03-17 LAB
25(OH)D3 SERPL-MCNC: 80.3 NG/ML (ref 30–80)
25(OH)D3 SERPL-MCNC: 80.3 NG/ML (ref 30–80)

## 2021-03-18 ENCOUNTER — COMMUNICATION - HEALTHEAST (OUTPATIENT)
Dept: INTERNAL MEDICINE | Facility: CLINIC | Age: 86
End: 2021-03-18

## 2021-03-19 ENCOUNTER — COMMUNICATION - HEALTHEAST (OUTPATIENT)
Dept: INTERNAL MEDICINE | Facility: CLINIC | Age: 86
End: 2021-03-19

## 2021-03-22 ENCOUNTER — COMMUNICATION - HEALTHEAST (OUTPATIENT)
Dept: LAB | Facility: CLINIC | Age: 86
End: 2021-03-22

## 2021-03-23 ENCOUNTER — AMBULATORY - HEALTHEAST (OUTPATIENT)
Dept: NURSING | Facility: CLINIC | Age: 86
End: 2021-03-23

## 2021-03-25 ENCOUNTER — COMMUNICATION - HEALTHEAST (OUTPATIENT)
Dept: ANTICOAGULATION | Facility: CLINIC | Age: 86
End: 2021-03-25

## 2021-03-25 DIAGNOSIS — I48.19 PERSISTENT ATRIAL FIBRILLATION (H): ICD-10-CM

## 2021-03-25 DIAGNOSIS — Z79.01 LONG TERM (CURRENT) USE OF ANTICOAGULANTS: ICD-10-CM

## 2021-03-29 ENCOUNTER — COMMUNICATION - HEALTHEAST (OUTPATIENT)
Dept: INTERNAL MEDICINE | Facility: CLINIC | Age: 86
End: 2021-03-29

## 2021-04-13 ENCOUNTER — AMBULATORY - HEALTHEAST (OUTPATIENT)
Dept: LAB | Facility: CLINIC | Age: 86
End: 2021-04-13

## 2021-04-13 ENCOUNTER — COMMUNICATION - HEALTHEAST (OUTPATIENT)
Dept: ANTICOAGULATION | Facility: CLINIC | Age: 86
End: 2021-04-13

## 2021-04-13 DIAGNOSIS — I48.19 PERSISTENT ATRIAL FIBRILLATION (H): ICD-10-CM

## 2021-04-13 DIAGNOSIS — Z79.01 LONG TERM (CURRENT) USE OF ANTICOAGULANTS: ICD-10-CM

## 2021-04-13 LAB — INR PPP: 3.5 (ref 0.9–1.1)

## 2021-04-28 ENCOUNTER — AMBULATORY - HEALTHEAST (OUTPATIENT)
Dept: LAB | Facility: CLINIC | Age: 86
End: 2021-04-28

## 2021-04-28 ENCOUNTER — COMMUNICATION - HEALTHEAST (OUTPATIENT)
Dept: ANTICOAGULATION | Facility: CLINIC | Age: 86
End: 2021-04-28

## 2021-04-28 DIAGNOSIS — Z79.01 LONG TERM (CURRENT) USE OF ANTICOAGULANTS: ICD-10-CM

## 2021-04-28 DIAGNOSIS — I48.19 PERSISTENT ATRIAL FIBRILLATION (H): ICD-10-CM

## 2021-04-28 LAB — INR PPP: 3.3 (ref 0.9–1.1)

## 2021-05-07 ENCOUNTER — RECORDS - HEALTHEAST (OUTPATIENT)
Dept: ADMINISTRATIVE | Facility: OTHER | Age: 86
End: 2021-05-07

## 2021-05-19 ENCOUNTER — AMBULATORY - HEALTHEAST (OUTPATIENT)
Dept: LAB | Facility: CLINIC | Age: 86
End: 2021-05-19

## 2021-05-19 ENCOUNTER — COMMUNICATION - HEALTHEAST (OUTPATIENT)
Dept: ANTICOAGULATION | Facility: CLINIC | Age: 86
End: 2021-05-19

## 2021-05-19 DIAGNOSIS — Z79.01 LONG TERM (CURRENT) USE OF ANTICOAGULANTS: ICD-10-CM

## 2021-05-19 DIAGNOSIS — I48.19 PERSISTENT ATRIAL FIBRILLATION (H): ICD-10-CM

## 2021-05-19 LAB — INR PPP: 3.4 (ref 0.9–1.1)

## 2021-05-24 ENCOUNTER — RECORDS - HEALTHEAST (OUTPATIENT)
Dept: ADMINISTRATIVE | Facility: CLINIC | Age: 86
End: 2021-05-24

## 2021-05-25 ENCOUNTER — RECORDS - HEALTHEAST (OUTPATIENT)
Dept: ADMINISTRATIVE | Facility: CLINIC | Age: 86
End: 2021-05-25

## 2021-05-26 ENCOUNTER — RECORDS - HEALTHEAST (OUTPATIENT)
Dept: ADMINISTRATIVE | Facility: CLINIC | Age: 86
End: 2021-05-26

## 2021-05-26 NOTE — TELEPHONE ENCOUNTER
ANTICOAGULATION  MANAGEMENT    Assessment     Today's INR result of 2.00 is Therapeutic (goal INR of 2.0-3.0)        Warfarin taken as previously instructed    No new diet changes affecting INR    No interaction expected between CBD Oil and warfarin    Continues to tolerate warfarin with no reported s/s of bleeding or thromboembolism     Previous INR was Subtherapeutic at 1.70 on 3/8/19.    Plan:     Spoke with Tete regarding INR result and instructed:     Warfarin Dosing Instructions:  (has 3mg tabs).   - Continue current warfarin dose 4.5 mg daily on Thursdays; and 3 mg daily rest of week.    Instructed patient to follow up no later than:  2 wks.   - scheduled on 4/9/19 @ [facility].    Education provided: importance of consistent vitamin K intake, target INR goal and significance of current INR result, no interaction anticipated between warfarin and new to CBD Oil and importance of notifying clinic for changes in medications    Tete verbalizes understanding and agrees to warfarin dosing plan.    Instructed to call the [facility] for any changes, questions or concerns. (#[phone])   ?   Cassandra Banuelos RN    Subjective/Objective:      Mariia Tinoco, a 83 y.o. female is on warfarin.     Mariia reports:     Home warfarin dose: as updated on anticoagulation calendar per template     Missed doses: No     Medication changes:  Yes:  Started CBD oil 4 days for sleep.     S/S of bleeding or thromboembolism:  No     New Injury or illness:  No     Changes in diet or alcohol consumption:  No     Upcoming surgery, procedure or cardioversion:  No    Anticoagulation Episode Summary     Current INR goal:   2.0-3.0   TTR:   71.5 % (4.3 y)   Next INR check:   4/5/2019   INR from last check:   2.00 (3/22/2019)   Weekly max warfarin dose:      Target end date:      INR check location:      Preferred lab:      Send INR reminders to:   ANTICOAGULATION POOL C (DTN,VAD,CGR,GAV)    Indications    PAF (paroxysmal atrial  fibrillation) (H) (Resolved) [I48.0]           Comments:            Anticoagulation Care Providers     Provider Role Specialty Phone number    Eagle Shabazz MD Referring Internal Medicine [phone]

## 2021-05-27 ENCOUNTER — COMMUNICATION - HEALTHEAST (OUTPATIENT)
Dept: INTERNAL MEDICINE | Facility: CLINIC | Age: 86
End: 2021-05-27

## 2021-05-27 ENCOUNTER — RECORDS - HEALTHEAST (OUTPATIENT)
Dept: ADMINISTRATIVE | Facility: CLINIC | Age: 86
End: 2021-05-27

## 2021-05-27 ASSESSMENT — PATIENT HEALTH QUESTIONNAIRE - PHQ9
SUM OF ALL RESPONSES TO PHQ QUESTIONS 1-9: 4
SUM OF ALL RESPONSES TO PHQ QUESTIONS 1-9: 13

## 2021-05-27 NOTE — TELEPHONE ENCOUNTER
Check on this please.  I thought [employer] had an employee who worked on this.  Otherwise,  [employer] may be of some benefit.

## 2021-05-27 NOTE — TELEPHONE ENCOUNTER
Called [employer] and spoke to Christiano. She confirmed pt is covered 100% between Medicare and [facility].  Called pt. She was very relieved and happy I had called. Gave her my direct # if any more concerns.

## 2021-05-27 NOTE — TELEPHONE ENCOUNTER
Manish    Wondering if patient will qualify for the Prolia Injection assistance program? States that she knows that will have some copays left over after insurance pays their portion. It will be too costly for her portion.    Please call her back @ [phone], ok to lv msg.

## 2021-05-27 NOTE — TELEPHONE ENCOUNTER
ANTICOAGULATION  MANAGEMENT    Assessment     Today's INR result of 1.9 is Subtherapeutic (goal INR of 2.0-3.0)        Missed dose(s) may be affecting INR     Per template, missed 2 doses in the past week.    No new diet changes affecting INR    No new medication/supplements affecting INR    Continues to tolerate warfarin with no reported s/s of bleeding or thromboembolism     Previous INR was Therapeutic    Plan:     Left a detailed message for Mariia regarding INR result and instructed:   No call back received from patient after multiple messages.    Warfarin Dosing Instructions:  take a one time booster dose of 4.5 mg today then continue current warfarin dose    4.5 mg every Thu; 3 mg all other days        (0 % change)    Instructed patient to follow up no later than: 1-2 weeks.    Education provided: importance of therapeutic range, target INR goal and significance of current INR result and importance of taking warfarin as instructed      Instructed to call the [facility] for any changes, questions or concerns. (#[phone])   ?   Patti Lindsey RN    Subjective/Objective:      Mariia Tinoco, a 83 y.o. female is on warfarin.     Mariia reports:     Home warfarin dose: as updated on anticoagulation calendar per template     Missed doses: Yes: 2 times in the past week     Medication changes:  No     S/S of bleeding or thromboembolism:  No     New Injury or illness:  No     Changes in diet or alcohol consumption:  No     Upcoming surgery, procedure or cardioversion:  No    Anticoagulation Episode Summary     Current INR goal:   2.0-3.0   TTR:   70.6 % (4.3 y)   Next INR check:   4/24/2019   INR from last check:   1.90! (4/10/2019)   Weekly max warfarin dose:      Target end date:      INR check location:      Preferred lab:      Send INR reminders to:   ANTICOAGULATION POOL C (DTN,VAD,CGR,GAV)    Indications    PAF (paroxysmal atrial fibrillation) (H) (Resolved) [I48.0]           Comments:             Anticoagulation Care Providers     Provider Role Specialty Phone number    Eagle Shabazz MD Referring Internal Medicine [phone]

## 2021-05-27 NOTE — TELEPHONE ENCOUNTER
RN cannot approve Refill Request    RN can NOT refill this medication PCP messaged that patient is overdue for Labs.         Micheline Mas, [facility] Triage/Med Refill 3/25/2019    Requested Prescriptions   Pending Prescriptions Disp Refills     levothyroxine (SYNTHROID, LEVOTHROID) 75 MCG tablet [Pharmacy Med Name: LEVOTHYROXIN TAB 0.075MG] 90 tablet 2     Sig: TAKE 1 TABLET DAILY    Thyroid Hormones Protocol Failed - 3/25/2019 11:46 AM       Failed - TSH on file in past 12 months for patient age 12 & older    TSH   Date Value Ref Range Status   11/14/2017 5.46 (H) 0.30 - 5.00 uIU/mL Final                  Passed - Provider visit in past 12 months or next 3 months    Last office visit with prescriber/PCP: 8/17/2018 Eagle Shabazz MD OR same dept: 8/17/2018 Eagle Shabazz MD OR same specialty: 8/17/2018 Eagle Shabazz MD  Last physical: 12/7/2016 Last MTM visit: Visit date not found   Next visit within 3 mo: Visit date not found  Next physical within 3 mo: Visit date not found  Prescriber OR PCP: Eagle Shabazz MD  Last diagnosis associated with med order: 1. Hypothyroidism, unspecified type  - levothyroxine (SYNTHROID, LEVOTHROID) 75 MCG tablet [Pharmacy Med Name: LEVOTHYROXIN TAB 0.075MG]; TAKE 1 TABLET DAILY  Dispense: 90 tablet; Refill: 2    If protocol passes may refill for 12 months if within 3 months of last provider visit (or a total of 15 months).

## 2021-05-28 ENCOUNTER — RECORDS - HEALTHEAST (OUTPATIENT)
Dept: ADMINISTRATIVE | Facility: CLINIC | Age: 86
End: 2021-05-28

## 2021-05-28 NOTE — TELEPHONE ENCOUNTER
ANTICOAGULATION  MANAGEMENT    Assessment     Today's INR result of 2.40 is Therapeutic (goal INR of 2.0-3.0)        Warfarin taken as previously instructed    No new diet changes affecting INR    No new medication/supplements affecting INR    Continues to tolerate warfarin with no reported s/s of bleeding or thromboembolism     Previous INR was Subtherapeutic at 1.50 on 5/14/19 d/t missed dose.    Leaving for [place] on 5/21/19 for grand-daughter's graduation.    Plan:     Spoke with Tete regarding INR result and instructed:     Warfarin Dosing Instructions:    - Continue current warfarin dose 4.5 mg daily on Thursdays; and 3 mg daily rest of week.    Instructed patient to follow up no later than: 2 wks.    - scheduled on 6/11/19 during Prolia injection @ [facility].   - her  drives her to [employer] due to macular degeneration.    Education provided: importance of consistent vitamin K intake, target INR goal and significance of current INR result and importance of notifying clinic for changes in medications    Tete verbalizes understanding and agrees to warfarin dosing plan.    Instructed to call the [facility] for any changes, questions or concerns. (#[phone])   ?   Cassandra Banuelos RN    Subjective/Objective:      Mariia Tinoco, a 84 y.o. female is on warfarin.     Mariia reports:     Home warfarin dose: as updated on anticoagulation calendar per template     Missed doses: No     Medication changes:  No     S/S of bleeding or thromboembolism:  No     New Injury or illness:  No     Changes in diet or alcohol consumption:  No     Upcoming surgery, procedure or cardioversion:  No    Anticoagulation Episode Summary     Current INR goal:   2.0-3.0   TTR:   69.9 % (4.4 y)   Next INR check:   6/3/2019   INR from last check:   2.40 (5/20/2019)   Weekly max warfarin dose:      Target end date:      INR check location:      Preferred lab:      Send INR reminders to:   [facility]     PAF (paroxysmal atrial fibrillation) (H) (Resolved) [I48.0]           Comments:            Anticoagulation Care Providers     Provider Role Specialty Phone number    Eagle Shabazz MD Referring Internal Medicine [phone]

## 2021-05-28 NOTE — TELEPHONE ENCOUNTER
ANTICOAGULATION  MANAGEMENT    Assessment     Today's INR result of 2.20 is Therapeutic (goal INR of 2.0-3.0)        Warfarin taken as previously instructed    No new diet changes affecting INR    No new medication/supplements affecting INR    Continues to tolerate warfarin with no reported s/s of bleeding or thromboembolism     Previous INR was Subtherapeutic at 1.90 on 4/10/19.    Plan:     Spoke with Tete regarding INR result and instructed:     Warfarin Dosing Instructions:    - Continue current warfarin dose 4.5 mg daily on Thursdays; and 3 mg daily rest of week.    Instructed patient to follow up no later than:  2 wks.   - scheduled on 5/14/19 @ [facility].    Education provided: importance of consistent vitamin K intake, target INR goal and significance of current INR result, importance of taking warfarin as instructed and importance of notifying clinic for changes in medications    Tete verbalizes understanding and agrees to warfarin dosing plan.    Instructed to call the [facility] for any changes, questions or concerns. (#[phone])   ?   Cassandra Banuelos RN    Subjective/Objective:      Mariia Tinoco, a 83 y.o. female is on warfarin.     Mariia reports:     Home warfarin dose: as updated on anticoagulation calendar per template     Missed doses: No     Medication changes:  No     S/S of bleeding or thromboembolism:  No     New Injury or illness:  No     Changes in diet or alcohol consumption:  No     Upcoming surgery, procedure or cardioversion:  No    Anticoagulation Episode Summary     Current INR goal:   2.0-3.0   TTR:   70.6 % (4.4 y)   Next INR check:   5/7/2019   INR from last check:   2.20 (4/23/2019)   Weekly max warfarin dose:      Target end date:      INR check location:      Preferred lab:      Send INR reminders to:   ANTICOAGULATION POOL C (DTN,VAD,CGR,GAV)    Indications    PAF (paroxysmal atrial fibrillation) (H) (Resolved) [I48.0]           Comments:            Anticoagulation Care  Providers     Provider Role Specialty Phone number    Eagle Shabazz MD Referring Internal Medicine [phone]

## 2021-05-28 NOTE — TELEPHONE ENCOUNTER
ANTICOAGULATION  MANAGEMENT    Assessment     Today's INR result of 1.50 is Subtherapeutic (goal INR of 2.0-3.0)        Missed dose(s) may be affecting INR    Increased greens/vitamin K intake may be affecting INR    No new medication/supplements affecting INR    Continues to tolerate warfarin with no reported s/s of bleeding or thromboembolism     Previous INR was Therapeutic at 2.20 on 4/23/19.    Leaving for [place] on 5/21/19 for grand-daughter's graduation.    Plan:     Spoke with Tete regarding INR result and instructed:     Warfarin Dosing Instructions:  (has 3mg tabs)    - just for tonight, advised one time booster with 4.5mg warfarin dose,   - then continue current warfarin dose 4.5 mg daily on Thursdays; and 3 mg daily rest of week.    Instructed patient to follow up no later than:  5-7 days.   - scheduled on 5/20/19 @ [facility].    Education provided: importance of consistent vitamin K intake, impact of vitamin K foods on INR, target INR goal and significance of current INR result and importance of taking warfarin as instructed    Tete verbalizes understanding and agrees to warfarin dosing plan.    Instructed to call the [facility] for any changes, questions or concerns. (#[phone])   ?   Cassandra Banuelos RN    Subjective/Objective:      Mariia Tinoco, a 84 y.o. female is on warfarin.     Mariia reports:     Home warfarin dose: as updated on anticoagulation calendar per template    -      Missed doses: Yes:  Reported missing warfarin dose on 5/3 or 5/4.     Medication changes:  No     S/S of bleeding or thromboembolism:  No     New Injury or illness:  Yes:  Had f/u visit at [facility] - decreased vision sec.to macular degeneration.     Changes in diet or alcohol consumption:  Yes:  Reported eating spinach salad.  (which normally does not eat.)     Upcoming surgery, procedure or cardioversion:  No    Anticoagulation Episode Summary     Current INR goal:   2.0-3.0   TTR:   70.0 % (4.4 y)   Next INR  check:   5/21/2019   INR from last check:   1.50! (5/14/2019)   Weekly max warfarin dose:      Target end date:      INR check location:      Preferred lab:      Send INR reminders to:   ANTICOAGULATION POOL C (DTN,VAD,CGR,GAV)    Indications    PAF (paroxysmal atrial fibrillation) (H) (Resolved) [I48.0]           Comments:            Anticoagulation Care Providers     Provider Role Specialty Phone number    Eagle Shabazz MD Referring Internal Medicine [phone]

## 2021-05-28 NOTE — TELEPHONE ENCOUNTER
Controlled Substance Refill Request  Medication:   Requested Prescriptions     Pending Prescriptions Disp Refills     zolpidem (AMBIEN) 5 MG tablet 30 tablet 0     Sig: TAKE 1 TABLET AT BEDTIME ASNEEDED     Date Last Fill: 11/9/2018  Pharmacy: [facility]   Submit electronically to pharmacy  Controlled Substance Agreement on File:   Encounter-Level CSA Scan Date:    There are no encounter-level csa scan date.       Last office visit: 8/17/2018 Eagle Shabazz MD Teresa K., RN Clinical Supervisor...................10:22 AM

## 2021-05-28 NOTE — TELEPHONE ENCOUNTER
Who is calling:  patient  Reason for Call:  Patient returning missed call, patient states she will be out of the house for the next 2 hours.  Date of last appointment with primary care: 8-17-18  Okay to leave a detailed message: Yes

## 2021-05-29 ENCOUNTER — RECORDS - HEALTHEAST (OUTPATIENT)
Dept: ADMINISTRATIVE | Facility: CLINIC | Age: 86
End: 2021-05-29

## 2021-05-29 NOTE — TELEPHONE ENCOUNTER
ANTICOAGULATION  MANAGEMENT    Assessment     Today's INR result of 2.00 is Therapeutic (goal INR of 2.0-3.0)        Warfarin taken as previously instructed    No new diet changes affecting INR    No interaction expected between Prolia injection and warfarin    Continues to tolerate warfarin with no reported s/s of bleeding or thromboembolism     Previous INR was Therapeutic at 2.40 on 5/20/19.    Plan:     Spoke with Tete regarding INR result and instructed:     Warfarin Dosing Instructions:    - Continue current warfarin dose 4.5 mg daily on Thursdays; and 3 mg daily rest of week.    Instructed patient to follow up no later than:  4 wks.   - scheduled on 7/9/19 @ [facility].    Education provided: importance of consistent vitamin K intake, target INR goal and significance of current INR result and importance of notifying clinic for changes in medications    Tete verbalizes understanding and agrees to warfarin dosing plan.    Instructed to call the [facility] for any changes, questions or concerns. (#[phone])   ?   Cassandra Banuelos RN    Subjective/Objective:      Mariia Tinoco, a 84 y.o. female is on warfarin.     Mariia reports:     Home warfarin dose: as updated on anticoagulation calendar per template    (has macular degeneration and hard to see what she is filling out.  But verbalized correct warfarin dose.)     Missed doses: No     Medication changes:  Yes:  6/12/19 Prolia injection administered; (q6 months).     S/S of bleeding or thromboembolism:  No     New Injury or illness:  No     Changes in diet or alcohol consumption:  No     Upcoming surgery, procedure or cardioversion:  No    Anticoagulation Episode Summary     Current INR goal:   2.0-3.0   TTR:   70.3 % (4.5 y)   Next INR check:   7/9/2019   INR from last check:   2.00 (6/11/2019)   Weekly max warfarin dose:      Target end date:      INR check location:      Preferred lab:      Send INR reminders to:   [facility]     Indications    PAF (paroxysmal atrial fibrillation) (H) (Resolved) [I48.0]           Comments:            Anticoagulation Care Providers     Provider Role Specialty Phone number    Eagle Shabazz MD Referring Internal Medicine [phone]

## 2021-05-29 NOTE — PROGRESS NOTES
The following steps were completed to comply with the REMS program for Prolia:  1. Ordering provider has previously reviewed information in the Medication Guide and Patient Counseling Chart, including the serious risks of Prolia  and the symptoms of each risk and have been advised to seek prompt medical attention if they have signs or symptoms of any of the serious risks.  2. Provided each patient a copy of the Medication Guide and Patient Brochure.  See MAR for administration details.   Indication: Prolia  (denosumab) is a prescription medicine used to treat osteoporosis in patients who:   Are at high risk for fracture, meaning patients who have had a fracture related to osteoporosis, or who have multiple risk factors for fracture; Cannot use another osteoporosis medicine or other osteoporosis medicines did not work well.   The timeline for early/late injections would be 4 weeks early and any time after the 6 month cathy. If a patient receives their injection late, then the subsequent injection would be 6 months from the date that they actually received the injection    Have the screening questions been asked prior to this administration? Yes

## 2021-05-29 NOTE — TELEPHONE ENCOUNTER
Patient Returning Call  Reason for call:  Patient returned call   Information relayed to patient:  Yes and patient answered no to all questions except for #8 and stated they did have to change insurances but unsure it this was prior to her last injection in December of 2018.  Patient has additional questions:  Yes  If YES, what are your questions/concerns:  Patient requested to let Jazlyn know she did return call and to follow up with her if there is anything else needed.  Okay to leave a detailed message?: Yes

## 2021-05-29 NOTE — TELEPHONE ENCOUNTER
"LMTCB.  [facility].  Please go over below questions with pt.  Do not transfer pt to the Osteo or Endo line.    Prolia Injection Phone Screen      Screening questions have been asked 2-3 days prior to administration visit for Prolia. If any questions are answered with \"Yes,\" this phone encounter were will routed to ordering provider for further evaluation.     1.  When was the last injection?  12/10/18    2.  Has insurance for this injection been verified?  Yes    3.  Did you experience any new onset achiness or rashes that lasted for over a month with your previous Prolia injection?       4.  Do you have a fever over 101?F or a new deep cough that is unusual for you today?     5.  Have you started any new medications in the last 6 months that you were told could affect your immune system? These may have been prescribed by oncologist, transplant, rheumatology, or dermatology.       6.  In the last 6 months have you have gastric bypass or parathyroid surgery?       7.  Do you plan dental work requiring drilling into the bone such as implants/extractions or oral surgery in the next 2-3 months?       8. Do you have new insurance since the last injection?    Patient informed if symptoms discussed above present prior to their administration appointment, they are to notify clinic immediately.     Jazlyn Monique              "

## 2021-05-29 NOTE — TELEPHONE ENCOUNTER
RN cannot approve Refill Request    RN can NOT refill this medication med is not covered by policy/route to provider.      Micheline Mas, [facility] Triage/Med Refill 5/27/2019    Requested Prescriptions   Pending Prescriptions Disp Refills     JANTOVEN 3 mg tablet [Pharmacy Med Name: JANTOVEN TAB 3MG] 110 tablet 1     Sig: FOR DIRECTIONS ON HOW TO   TAKE THIS MEDICINE, READ   THE ENCLOSED MEDICATION    INFORMATION FORM       Warfarin Refill Protocol  Failed - 5/27/2019  5:38 AM        Failed -  Route to appropriate pool/provider     Last Anticoagulation Summary:   Anticoagulation Episode Summary     Current INR goal:   2.0-3.0   TTR:   69.9 % (4.4 y)   Next INR check:   6/3/2019   INR from last check:   2.40 (5/20/2019)   Weekly max warfarin dose:      Target end date:      INR check location:      Preferred lab:      Send INR reminders to:   [facility]    Indications    PAF (paroxysmal atrial fibrillation) (H) (Resolved) [I48.0]           Comments:            Anticoagulation Care Providers     Provider Role Specialty Phone number    Eagle Shabazz MD Referring Internal Medicine [phone]                Passed - Provider visit in last year     Last office visit with prescriber/PCP: 8/17/2018 Eagle Shabazz MD OR same dept: 8/17/2018 Eagle Shabazz MD OR same specialty: 8/17/2018 Eagle Shabazz MD  Last physical: 12/7/2016 Last MTM visit: Visit date not found    Next appt within 3 mo: Visit date not found Next physical within 3 mo: Visit date not found  Prescriber OR PCP: Eagle Shabazz MD  Last diagnosis associated with med order: There are no diagnoses linked to this encounter.  If protocol passes may refill for 6 months if within 3 months of last provider visit (or a total of 9 months).

## 2021-05-30 ENCOUNTER — RECORDS - HEALTHEAST (OUTPATIENT)
Dept: ADMINISTRATIVE | Facility: CLINIC | Age: 86
End: 2021-05-30

## 2021-05-30 VITALS — HEIGHT: 64 IN | WEIGHT: 150 LBS | BODY MASS INDEX: 25.61 KG/M2

## 2021-05-30 VITALS — BODY MASS INDEX: 26.33 KG/M2 | WEIGHT: 157 LBS

## 2021-05-30 VITALS — WEIGHT: 154.25 LBS | BODY MASS INDEX: 25.7 KG/M2 | HEIGHT: 65 IN

## 2021-05-30 VITALS — WEIGHT: 150 LBS | BODY MASS INDEX: 25.75 KG/M2

## 2021-05-30 NOTE — TELEPHONE ENCOUNTER
Patient here now and needs INR I think the order is  can you please put in a new standing order thanks

## 2021-05-30 NOTE — TELEPHONE ENCOUNTER
ANTICOAGULATION  MANAGEMENT    Assessment     Today's INR result of 2.40 is Therapeutic (goal INR of 2.0-3.0)        Warfarin taken as previously instructed    No new diet changes affecting INR    No new medication/supplements affecting INR    Continues to tolerate warfarin with no reported s/s of bleeding or thromboembolism     Previous INR was Therapeutic at 2.00 on 6/11/19.    Plan:     Spoke with Mariia regarding INR result and instructed:     Warfarin Dosing Instructions:  (has 3mg tabs)   - Continue current warfarin dose 4.5 mg daily on Saturday; and 3 mg daily rest of week.     Instructed patient to follow up no later than:  4 wks.   - scheduledo n 8/15/19 @ [facility].    Education provided: importance of consistent vitamin K intake, target INR goal and significance of current INR result and importance of notifying clinic for changes in medications    Tete verbalizes understanding and agrees to warfarin dosing plan.    Instructed to call the [facility] for any changes, questions or concerns. (#[phone])   ?   Cassandra Banuelos RN    Subjective/Objective:      Mariia MELLY Tinoco, a 84 y.o. female is on warfarin.     Mariia reports:     Home warfarin dose: as updated on anticoagulation calendar per template     Missed doses: No     Medication changes:  No     S/S of bleeding or thromboembolism:  No     New Injury or illness:  No     Changes in diet or alcohol consumption:  No     Upcoming surgery, procedure or cardioversion:  No    Anticoagulation Episode Summary     Current INR goal:   2.0-3.0   TTR:   70.8 % (4.6 y)   Next INR check:   8/13/2019   INR from last check:   2.40 (7/9/2019)   Weekly max warfarin dose:      Target end date:      INR check location:      Preferred lab:      Send INR reminders to:   [facility]    Indications    PAF (paroxysmal atrial fibrillation) (H) (Resolved) [I48.0]           Comments:            Anticoagulation Care Providers     Provider Role Specialty Phone  number    Eagle Shabazz MD [facility] Internal Medicine [phone]

## 2021-05-31 VITALS — HEIGHT: 64 IN | WEIGHT: 160 LBS | BODY MASS INDEX: 27.31 KG/M2

## 2021-05-31 VITALS — HEIGHT: 64 IN | BODY MASS INDEX: 25.61 KG/M2 | WEIGHT: 150 LBS

## 2021-05-31 VITALS — BODY MASS INDEX: 25.61 KG/M2 | WEIGHT: 150 LBS | HEIGHT: 64 IN

## 2021-05-31 VITALS — WEIGHT: 150 LBS | BODY MASS INDEX: 25.61 KG/M2 | HEIGHT: 64 IN

## 2021-05-31 NOTE — TELEPHONE ENCOUNTER
ANTICOAGULATION  MANAGEMENT    Assessment     Today's INR result of 2.20 is Therapeutic (goal INR of 2.0-3.0)        Missed dose(s) may be affecting INR    No new diet changes affecting INR    No new medication/supplements affecting INR    Continues to tolerate warfarin with no reported s/s of bleeding or thromboembolism     Previous INR was Therapeutic at 2.40 on 7/9/19.    Plan:     Spoke with Tete regarding INR result and instructed:     Warfarin Dosing Instructions:  (has 3mg tabs)   - Continue current warfarin dose 4.5 mg daily on Thursday; and 3 mg daily rest of week.    Instructed patient to follow up no later than:  4-6 wks.   - scheduled on 9/26/19 @ [facility].    Education provided: importance of consistent vitamin K intake, target INR goal and significance of current INR result and importance of notifying clinic for changes in medications    Tete verbalizes understanding and agrees to warfarin dosing plan.    Instructed to call the [facility] for any changes, questions or concerns. (#[phone])   ?   Cassandra Banuelos RN    Subjective/Objective:      Mariia Tinoco, a 84 y.o. female is on warfarin.     Mariai reports:     Home warfarin dose: as updated on anticoagulation calendar per template     Missed doses: Yes:  Reported missing warfarin doses on 7/16 and 7/27.     Medication changes:  No     S/S of bleeding or thromboembolism:  No     New Injury or illness:  No     Changes in diet or alcohol consumption:  No     Upcoming surgery, procedure or cardioversion:  No    Anticoagulation Episode Summary     Current INR goal:   2.0-3.0   TTR:   71.5 % (4.7 y)   Next INR check:   9/26/2019   INR from last check:   2.20 (8/15/2019)   Weekly max warfarin dose:      Target end date:      INR check location:      Preferred lab:      Send INR reminders to:   [facility]    Indications    PAF (paroxysmal atrial fibrillation) (H) (Resolved) [I48.0]           Comments:            Anticoagulation  Care Providers     Provider Role Specialty Phone number    Eagle Shabazz MD Referring Internal Medicine [phone]

## 2021-06-01 ENCOUNTER — RECORDS - HEALTHEAST (OUTPATIENT)
Dept: ADMINISTRATIVE | Facility: CLINIC | Age: 86
End: 2021-06-01

## 2021-06-01 VITALS — WEIGHT: 157.2 LBS | BODY MASS INDEX: 26.98 KG/M2

## 2021-06-01 VITALS — BODY MASS INDEX: 27.31 KG/M2 | HEIGHT: 64 IN | WEIGHT: 160 LBS

## 2021-06-01 VITALS — WEIGHT: 158.5 LBS | BODY MASS INDEX: 27.21 KG/M2

## 2021-06-01 NOTE — TELEPHONE ENCOUNTER
RN cannot approve Refill Request    RN can NOT refill this medication Protocol failed and NO refill given        Micheline Mas, [facility] Triage/Med Refill 9/24/2019    Requested Prescriptions   Pending Prescriptions Disp Refills     JANTOVEN 3 mg tablet [Pharmacy Med Name: JANTOVEN TAB 3MG] 110 tablet 1     Sig: FOR DIRECTIONS ON HOW TO   TAKE THIS MEDICINE, READ   THE ENCLOSED MEDICATION    INFORMATION FORM       Warfarin Refill Protocol  Failed - 9/24/2019 12:18 AM        Failed - Provider visit in last year     Last office visit with prescriber/PCP: Visit date not found OR same dept: Visit date not found OR same specialty: 8/17/2018 Eagle Shabazz MD  Last physical: Visit date not found Last MTM visit: Visit date not found    Next appt within 3 mo: Visit date not found Next physical within 3 mo: Visit date not found  Prescriber OR PCP: Robert Atwood MD  Last diagnosis associated with med order: There are no diagnoses linked to this encounter.  If protocol passes may refill for 6 months if within 3 months of last provider visit (or a total of 9 months).          Failed -  Route to appropriate pool/provider     Last Anticoagulation Summary:   Anticoagulation Episode Summary     Current INR goal:   2.0-3.0   TTR:   61.7 %   Next INR check:   9/26/2019   INR from last check:   2.20 (8/15/2019)   Weekly max warfarin dose:      Target end date:      INR check location:      Preferred lab:      Send INR reminders to:   [facility]    Indications    PAF (paroxysmal atrial fibrillation) (H) (Resolved) [I48.0]           Comments:            Anticoagulation Care Providers     Provider Role Specialty Phone number    Eagle Shabazz MD Referring Internal Medicine [phone]

## 2021-06-01 NOTE — TELEPHONE ENCOUNTER
ANTICOAGULATION  MANAGEMENT    Assessment     Today's INR result of 1.80 is Therapeutic (goal INR of 2.0-3.0)        Warfarin taken as previously instructed    Increased greens/vitamin K intake may be affecting INR    No new medication/supplements affecting INR    Continues to tolerate warfarin with no reported s/s of bleeding or thromboembolism     Previous INR was Therapeutic at 2.20 on 8/15/19.    Plan:     Spoke with Tete regarding INR result and instructed:     Warfarin Dosing Instructions:   (has 3mg tabs)   - today, advised one time booster with 6mg warfarin dose, then continue current warfarin dose 4.5 mg daily on Thursday; and 3 mg daily rest of week.    Instructed patient to follow up no later than:  1-2 wks.   - scheduled on 10/10/19 @ [facility].    Education provided: importance of consistent vitamin K intake, impact of vitamin K foods on INR and target INR goal and significance of current INR result    Tete verbalizes understanding and agrees to warfarin dosing plan.    Instructed to call the [facility] for any changes, questions or concerns. (#[phone])   ?   Cassandra Banuelos RN    Subjective/Objective:      Mariia Tinoco, a 84 y.o. female is on warfarin.     Mariia reports:     Home warfarin dose: as updated on anticoagulation calendar per template     Missed doses: No     Medication changes:  No     S/S of bleeding or thromboembolism:  No     New Injury or illness:  No:    - Progressive sight loss (legally blind).     Changes in diet or alcohol consumption:  Yes:  Will start incorporating spinach in her diet.     Upcoming surgery, procedure or cardioversion:  No    Anticoagulation Episode Summary     Current INR goal:   2.0-3.0   TTR:   60.2 %   Next INR check:   10/10/2019   INR from last check:   1.80! (9/26/2019)   Weekly max warfarin dose:      Target end date:      INR check location:      Preferred lab:      Send INR reminders to:   [facility]    Indications    PAF  (paroxysmal atrial fibrillation) (H) (Resolved) [I48.0]           Comments:            Anticoagulation Care Providers     Provider Role Specialty Phone number    Eagle Shabazz MD Referring Internal Medicine [phone]

## 2021-06-02 NOTE — TELEPHONE ENCOUNTER
ANTICOAGULATION  MANAGEMENT    Assessment     Today's INR result of 2.30 is Therapeutic (goal INR of 2.0-3.0)        Warfarin taken as previously instructed    No new diet changes affecting INR    No new medication/supplements affecting INR    Continues to tolerate warfarin with no reported s/s of bleeding or thromboembolism     Previous INR was Subtherapeutic at 1.80 on 9/26/19.    Plan:     Spoke with Tete regarding INR result and instructed:     Warfarin Dosing Instructions:   (has 3mg tabs)   - Continue current warfarin dose 4.5 mg daily on Thursdays; and 3 mg daily rest of week.    Instructed patient to follow up no later than:  2 wks.    Education provided: importance of consistent vitamin K intake and target INR goal and significance of current INR result    Tete verbalizes understanding and agrees to warfarin dosing plan.    Instructed to call the [facility] for any changes, questions or concerns. (#[phone])   ?   Cassandra Banuelos RN    Subjective/Objective:      Mariia Tinoco, a 84 y.o. female is on warfarin.     Mariia reports:     Home warfarin dose: as updated on anticoagulation calendar per template (she verbalized back correct warfarin dose.     Missed doses: No     Medication changes:  No     S/S of bleeding or thromboembolism:  No     New Injury or illness:  No     Changes in diet or alcohol consumption:  No     Upcoming surgery, procedure or cardioversion:  No    Anticoagulation Episode Summary     Current INR goal:   2.0-3.0   TTR:   59.8 %   Next INR check:   10/24/2019   INR from last check:   2.30 (10/10/2019)   Weekly max warfarin dose:      Target end date:      INR check location:      Preferred lab:      Send INR reminders to:   [facility]    Indications    PAF (paroxysmal atrial fibrillation) (H) (Resolved) [I48.0]           Comments:            Anticoagulation Care Providers     Provider Role Specialty Phone number    Eagle Shabazz MD Referring Internal  Medicine [phone]

## 2021-06-02 NOTE — TELEPHONE ENCOUNTER
ANTICOAGULATION  MANAGEMENT    Assessment     Today's INR result of 2.70 is Therapeutic (goal INR of 2.0-3.0)        Warfarin taken as previously instructed    No new diet changes affecting INR    No new medication/supplements affecting INR    Continues to tolerate warfarin with no reported s/s of bleeding or thromboembolism     Previous INR was Therapeutic at 2.30 on 10/10/19.    Reported doing well with no new complaints.    Plan:     Spoke with Mariia regarding INR result and instructed:    1.  Advised to come in sooner if any s/sx of any bleeding.    Warfarin Dosing Instructions:   (has 3mg tabs)   - Continue current warfarin dose 4.5 mg daily on Thursdays; and 3 mg daily rest of week.    Instructed patient to follow up no later than:  4 wks.   - does not drive and INR is scheduled on 12/12/19 during Prolia injection @ [facility]    Education provided: target INR goal and significance of current INR result    Tete verbalizes understanding and agrees to warfarin dosing plan.    Instructed to call the [facility] for any changes, questions or concerns. (#[phone])   ?   Cassandra Banuelos RN    Subjective/Objective:      Mariiasergey Tinoco, a 84 y.o. female is on warfarin.     Mariia reports:     Home warfarin dose: as updated on anticoagulation calendar per template     Missed doses: No     Medication changes:  No     S/S of bleeding or thromboembolism:  No     New Injury or illness:  No     Changes in diet or alcohol consumption:  No     Upcoming surgery, procedure or cardioversion:  No    Anticoagulation Episode Summary     Current INR goal:   2.0-3.0   TTR:   61.0 %   Next INR check:   11/28/2019   INR from last check:   2.70 (10/31/2019)   Weekly max warfarin dose:      Target end date:      INR check location:      Preferred lab:      Send INR reminders to:   [facility]    Indications    PAF (paroxysmal atrial fibrillation) (H) (Resolved) [I48.0]           Comments:            Anticoagulation  Care Providers     Provider Role Specialty Phone number    Eagle Shabazz MD Referring Internal Medicine [phone]

## 2021-06-03 NOTE — PATIENT INSTRUCTIONS - HE
1. Benign paroxysmal positional vertigo, symptom onset about 10 days ago, with very stereotypical symptom pattern.    She reported that she would feel a distinct sensation of the room spinning which would occur when she lay in bed and rolled over, and also when she sat up from supine position.  No hearing symptoms.   No symptoms to suggest central vertigo, namely: No new visual changes, no difficulty with swallowing or speech, no problems with motor coordination.    She will experience a little bit of nausea, but no vomiting.  Symptoms have been  consistently occurring when in bed.  Went up and about during the course of the day with head upright, she would be fairly symptom-free.    She told me that she is been actually almost symptom free for the past 2 days, so she seems to be getting over this episode.    On physical exam, she is unsteady during Romberg testing.  Pupils are equal, no nystagmus.  Tympanic canals clear, tympanic memories look normal.  Facial muscles symmetrical.  She is actually able to walk smoothly around exam room.    I explained to her the physiologic mechanism of BPPV involving the inner ear and otoliths.  I told her that she is probably getting over this episode of BPPV, and I expect her to make a full recovery over the next several days.  I told her that BPPV can recur, and if it does she should come back and see us.  If she is experiencing nausea or vomiting, we do have medication to help that.    It is very important that she not fall, since she takes warfarin for her paroxysmal atrial fibrillation.    Her other medical issues appear to be quite stable, as enumerated below    2.  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  Rate control is with metoprolol succinate.  Lab Results   Component Value Date    INR 2.70 (H) 10/31/2019    INR 2.30 (H) 10/10/2019    INR 1.80 (H) 09/26/2019     BP Readings from Last 3  Encounters:   11/22/19 108/60   08/17/18 122/70   07/02/18 118/62       3.  Hypothyroidism on levothyroxine replacement.  Lab Results   Component Value Date    TSH 5.46 (H) 11/14/2017     Today, will send her for to a TSH.  Let's also throw in a basic metabolic panel and CBC.    4.  COPD and pulmonary interstitial fibrosis, using Breo inhaler.    5.  Severe macular degeneration    See back in 3 months.

## 2021-06-03 NOTE — PROGRESS NOTES
Office Visit - Follow Up   Mariia Tinoco   84 y.o. female    Date of Visit: 11/22/2019    Chief Complaint   Patient presents with     Follow-up     pt c/o vertigo for the past 10-11 days-pt states the last 2 nights she hasn't had it, but this has happened before-pt states that it only happens at night while she is in bed or in the morning when she first wakes up        Assessment and Plan     1.  Benign paroxysmal positional vertigo, symptom onset about 10 days ago, with very stereotypical symptom pattern.    She reported that she would feel a distinct sensation of the room spinning which would occur when she lay in bed and rolled over, and also when she sat up from supine position.  No hearing symptoms.   No symptoms to suggest central vertigo, namely: No new visual changes, no difficulty with swallowing or speech, no problems with motor coordination.    She will experience a little bit of nausea, but no vomiting.  Symptoms have been  consistently occurring when in bed.  Went up and about during the course of the day with head upright, she would be fairly symptom-free.    She told me that she is been actually almost symptom free for the past 2 days, so she seems to be getting over this episode.    On physical exam, she is unsteady during Romberg testing.  Pupils are equal, no nystagmus.  Tympanic canals clear, tympanic memories look normal.  Facial muscles symmetrical.  She is actually able to walk smoothly around exam room.    I explained to her the physiologic mechanism of BPPV involving the inner ear and otoliths.  I told her that she is probably getting over this episode of BPPV, and I expect her to make a full recovery over the next several days.  I told her that BPPV can recur, and if it does she should come back and see us.  If she is experiencing nausea or vomiting, we do have medication to help that.    It is very important that she not fall, since she takes warfarin for her paroxysmal atrial  fibrillation.    Her other medical issues appear to be quite stable, as enumerated below    2.  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  Rate control is with metoprolol succinate.  Lab Results   Component Value Date    INR 2.70 (H) 10/31/2019    INR 2.30 (H) 10/10/2019    INR 1.80 (H) 09/26/2019     BP Readings from Last 3 Encounters:   11/22/19 108/60   08/17/18 122/70   07/02/18 118/62       3.  Hypothyroidism on levothyroxine replacement.  Lab Results   Component Value Date    TSH 5.46 (H) 11/14/2017     Today, will send her for to a TSH.  Let's also throw in a basic metabolic panel and CBC.    4.  COPD and pulmonary interstitial fibrosis, using Breo inhaler.    5.  Severe macular degeneration    See back in 3 months.         History of Present Illness   This 84 y.o. old comes to clinic with her  for evaluation of    vertigo, symptom onset about 10 days ago, with very stereotypical symptom pattern.    She reported that she would feel a distinct sensation of the room spinning which would occur when she lay in bed and rolled over, and also when she sat up from supine position.  No hearing symptoms.   No symptoms to suggest central vertigo, namely: No new visual changes, no difficulty with swallowing or speech, no problems with motor coordination.    She will experience a little bit of nausea, but no vomiting.  Symptoms have been  consistently occurring when in bed.  Went up and about during the course of the day with head upright, she would be fairly symptom-free.    She told me that she is been actually almost symptom free for the past 2 days, so she seems to be getting over this episode.    On physical exam, she is unsteady during Romberg testing.  Pupils are equal, no nystagmus.  Tympanic canals clear, tympanic memories look normal.  Facial muscles symmetrical.  She is actually able to walk smoothly around exam room.    Atrial  "fibrillation   Kanu Corrigan MD  - 09/18/2019 11:30 AM CDT    [facility]  [address], [address]  [place] [address]  ersistent atrial fibrillation who underwent radiofrequency ablation of the compact AV node and placement of a dual-chamber permanent pacemaker at [facility] in 2012.      Subsequently, Ms. Tinoco developed left ventricular systolic dysfunction secondary to chronic right ventricular pacing with an ejection fraction that fell to 35%. Therefore, in July of 2015 she underwent an upgrade of her dual-chamber permanent pacemaker to a biventricular pacemaker. With this, she has done very well, and her left ventricular function has improved, as well as ejection fraction of approximately 50%.      Echocardiogram 9-18-19 shows left ventricular ejection fraction of 50% with mild mitral regurgitation.   Toprol XL for recovered cardiomyopathy and Coumadin for cardioembolic stroke prophylaxis given her CHADS2-VASc risk score of 4.       COPD, Gold stage II, Group C; pulmonary fibrosis  No change in Breo. Previous absolute eosinophils 200-300. Some increase in exacerbation frequency treated by her primary care physician. Spirometry is stable. Low symptom burden.  Chronic and stable dyspnea secondary to pulmonary fibrosis  radiographically stable findings. The previous radiographic findings have been described as consistent with UIP. She has not demonstrated need for oxygen therapy and spirometry has remained relatively stable.      Pulmonary function testing has documented clear evidence of obstructive lung disease. Inhaled therapy with ICS/LABA was recommended. Cough symptoms are improved on ICS/LABA. Also has increase in \"phlegmy cough\" likely due to some underlying bronchiectasis. This has been described on previous CT scans. She is not interested in using a flutter device as symptoms are more nuisance that worrisome.        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 Recurrent, persistent atrial fibrillation requiring multiple direct current cardioversions and failure or intolerance to flecainide, Multaq, sotalol, and amiodarone. 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  2. Sinus node dysfunction leading to dual chamber permanent pacemaker implantation in September of 2012 at [facility].   3. Status post radiofrequency ablation of the compact AV node in February of 2014.   4. 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  5. Upgrade to a biventricular pacemaker July of 2015.  6. 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  7. Frozen shoulder of unclear etiology.        Medications, Allergies, Social, and Problem List   Current Outpatient Medications   Medication Sig Dispense Refill     acetaminophen (TYLENOL) 500 MG tablet Take 1,000 mg by mouth every 6 (six) hours.       albuterol (PROAIR HFA;PROVENTIL HFA;VENTOLIN HFA) 90 mcg/actuation inhaler Inhale 2 puffs every 6 (six) hours as needed for wheezing. 1 each 0     calcium carbonate (OS-ZARI) 600 mg (1,500 mg) tablet Take 600 mg by mouth 2 (two) times a day with meals.        cholecalciferol, vitamin D3, 2,000 unit capsule Take 1 capsule (2,000 Units total) by mouth daily.  0     clobetasol (TEMOVATE) 0.05 % external solution Apply 1 application topically 2 (two) times a day as needed. To scalp as needed       diphenhydrAMINE (BENADRYL) 25 mg tablet Take 25 mg by mouth at bedtime as needed for sleep.       fluticasone-vilanterol (BREO ELLIPTA) 100-25 mcg/dose DsDv inhaler Inhale 1 puff daily.       levothyroxine (SYNTHROID, LEVOTHROID) 75 MCG tablet TAKE 1 TABLET DAILY 90 tablet 3     metoprolol succinate (TOPROL XL) 25 MG Take 25 mg by mouth daily.       warfarin (JANTOVEN) 3 MG  "tablet Take 1 tablet (3mg) daily and take 1 &1/2 tablets (4.5mg) on Thursday by mouth joyce, as directed.  Adjust dose based on INR results. 110 tablet 1     ondansetron (ZOFRAN) 8 MG tablet Take 1 tablet (8 mg total) by mouth every 8 (eight) hours as needed. 20 tablet 0     No current facility-administered medications for this visit.      Allergies   Allergen Reactions     Bee Venom Protein (Honey Bee) Anaphylaxis and Hives     Ultram [Tramadol] Nausea And Vomiting     Social History     Tobacco Use     Smoking status: Never Smoker     Smokeless tobacco: Never Used   Substance Use Topics     Alcohol use: Yes     Alcohol/week: 1.0 standard drinks     Types: 1 Glasses of wine per week     Comment: occasional     Drug use: No     Patient Active Problem List   Diagnosis     Macular Degeneration     Colonic Diverticulosis     Cervical Disc Degeneration     Hypothyroidism     Chronic Interstitial Lung Disease     Persistent atrial fibrillation     Chronic Bronchitis     Biventricular cardiac pacemaker in situ     Cardiomyopathy (H)     Osteoporosis, senile on Prolia     Primary osteoarthritis of right knee        Reviewed, reconciled and updated       Physical Exam   General Appearance: Very pleasant, normal mental status, clear speech, breathing comfortably, able to rise from seated position and walk unimpaired around exam room.    However, when I perform the Romberg test with her eyes closed, she became unsteady    /60 (Patient Site: Right Arm, Patient Position: Sitting, Cuff Size: Adult Regular)   Pulse 77   Temp 97.6  F (36.4  C)   Ht 5' 4\" (1.626 m)   Wt 160 lb (72.6 kg)   SpO2 97% Comment: RA  BMI 27.46 kg/m      Facial muscles symmetrical.  Pupils equal; no systagmus  Tympanic canals clear except for a little bit of wax, tympanic membranes appear normal  Lungs clear  Heart regular rate and rhythm  Abdomen nontender  Extremities no edema     Additional Information        Manas Graham MD      " 9

## 2021-06-03 NOTE — TELEPHONE ENCOUNTER
Vertigo this has been going on now for about 10-11 days    It always happens in bed. It happens about 3-4 times a night, it always when turning when turning her head or when getting up. It only lasts seconds but it is intense. It is a spinning sensation.    Walking and talking fine during the day    No chest    No fever    No vomiting    Nausea comes and goes    No diarrhea    No bleeding    No pregnancy    No chest pain    No headache    No ear pain    Appointment scheduled.    Tasha Bernard RN   [facility] Medication Refill and Triage Nurse  8:42 AM  11/20/2019      Reason for Disposition    Patient wants to be seen    Protocols used: DIZZINESS-A-OH

## 2021-06-04 VITALS
WEIGHT: 160 LBS | SYSTOLIC BLOOD PRESSURE: 118 MMHG | OXYGEN SATURATION: 98 % | HEIGHT: 64 IN | HEART RATE: 84 BPM | DIASTOLIC BLOOD PRESSURE: 60 MMHG | BODY MASS INDEX: 27.31 KG/M2

## 2021-06-04 VITALS
WEIGHT: 160 LBS | SYSTOLIC BLOOD PRESSURE: 108 MMHG | BODY MASS INDEX: 27.31 KG/M2 | HEART RATE: 77 BPM | TEMPERATURE: 97.6 F | DIASTOLIC BLOOD PRESSURE: 60 MMHG | OXYGEN SATURATION: 97 % | HEIGHT: 64 IN

## 2021-06-04 NOTE — TELEPHONE ENCOUNTER
ANTICOAGULATION  MANAGEMENT    Assessment     Today's INR result of 2.00 is Therapeutic (goal INR of 2.0-3.0)        Warfarin taken as previously instructed    Increased greens/vitamin K intake may be affecting INR    No interaction expected between Prolia injection today and warfarin    Continues to tolerate warfarin with no reported s/s of bleeding or thromboembolism     Previous INR was Therapeutic at 2.70 on     Plan:     Spoke with Tete regarding INR result and instructed:    1.  Advised when eating salads, to measure out 1 cup per servings.    Warfarin Dosing Instructions:  (had 3mg tabs)   - Continue current warfarin dose 4.5 mg daily on Thursdays; and 3 mg daily rest of week    Instructed patient to follow up no later than:  4 wks.   - scheduled on 1/9/20 @ [facility].    Education provided: importance of consistent vitamin K intake, impact of vitamin K foods on INR and target INR goal and significance of current INR result    Tete verbalizes understanding and agrees to warfarin dosing plan.    Instructed to call the [facility] for any changes, questions or concerns. (#[phone])   ?   Cassandra Banuelos RN    Subjective/Objective:      Mariia Tinoco, a 84 y.o. female is on warfarin.     Mariia reports:     Home warfarin dose: as updated on anticoagulation calendar per template     Missed doses: No     Medication changes:  Yes: today had Prolia injection.     S/S of bleeding or thromboembolism:  No     New Injury or illness:  No.   - has severe macular degeneration.     Changes in diet or alcohol consumption:  Yes: reported eating salads 3x/wk, but increased servings.     Upcoming surgery, procedure or cardioversion:  No    Anticoagulation Episode Summary     Current INR goal:   2.0-3.0   TTR:   72.5 % (1 y)   Next INR check:   1/9/2020   INR from last check:   2.00 (12/12/2019)   Weekly max warfarin dose:      Target end date:      INR check location:      Preferred lab:      Send INR reminders to:    [facility]    Indications    PAF (paroxysmal atrial fibrillation) (H) (Resolved) [I48.0]           Comments:            Anticoagulation Care Providers     Provider Role Specialty Phone number    Eagle Shabazz MD Referring Internal Medicine [phone]

## 2021-06-04 NOTE — TELEPHONE ENCOUNTER
"Prolia Injection Phone Screen      Screening questions have been asked 2-3 days prior to administration visit for Prolia. If any questions are answered with \"Yes,\" this phone encounter were will routed to ordering provider for further evaluation.     1.  When was the last injection?  6/11/19    2.  Has insurance for this injection been verified?  Yes    3.  Did you experience any new onset achiness or rashes that lasted for over a month with your previous Prolia injection?   No    4.  Do you have a fever over 101?F or a new deep cough that is unusual for you today? No    5.  Have you started any new medications in the last 6 months that you were told could affect your immune system? These may have been prescribed by oncologist, transplant, rheumatology, or dermatology.   No    6.  In the last 6 months have you have gastric bypass or parathyroid surgery?   No    7.  Do you plan dental work requiring drilling into the bone such as implants/extractions or oral surgery in the next 2-3 months?   No    8. Do you have new insurance since the last injection?  No    Patient informed if symptoms discussed above present prior to their administration appointment, they are to notify clinic immediately.     Jazlyn Monique                  "

## 2021-06-05 VITALS
OXYGEN SATURATION: 97 % | DIASTOLIC BLOOD PRESSURE: 70 MMHG | TEMPERATURE: 97.6 F | HEIGHT: 64 IN | BODY MASS INDEX: 26.46 KG/M2 | WEIGHT: 155 LBS | SYSTOLIC BLOOD PRESSURE: 118 MMHG | HEART RATE: 84 BPM

## 2021-06-05 VITALS
HEIGHT: 64 IN | WEIGHT: 160 LBS | BODY MASS INDEX: 27.31 KG/M2 | OXYGEN SATURATION: 99 % | TEMPERATURE: 97.6 F | HEART RATE: 74 BPM | SYSTOLIC BLOOD PRESSURE: 110 MMHG | DIASTOLIC BLOOD PRESSURE: 64 MMHG

## 2021-06-05 VITALS
HEIGHT: 64 IN | SYSTOLIC BLOOD PRESSURE: 110 MMHG | WEIGHT: 160 LBS | BODY MASS INDEX: 27.31 KG/M2 | HEART RATE: 78 BPM | DIASTOLIC BLOOD PRESSURE: 60 MMHG | OXYGEN SATURATION: 98 %

## 2021-06-05 NOTE — TELEPHONE ENCOUNTER
ANTICOAGULATION  MANAGEMENT PROGRAM    Mariia Tinoco is overdue for INR check.     Left message to call and schedule INR appointment as soon as possible.      Cassandra Banuelos RN

## 2021-06-05 NOTE — TELEPHONE ENCOUNTER
ANTICOAGULATION  MANAGEMENT    Assessment     Today's INR result of 2.20 is Therapeutic (goal INR of 2.0-3.0)        Missed dose(s) may be affecting INR    - reported missing one dose on 1/13,    No new diet changes affecting INR    No new medication/supplements affecting INR    Continues to tolerate warfarin with no reported s/s of bleeding or thromboembolism     Previous INR was Therapeutic at 2.00 on 12/12/19.  (last 4 INR's therapeutic).    Plan:     Spoke with Mariia regarding INR result and instructed:     Warfarin Dosing Instructions:  (has 3mg tabs)   - Continue current warfarin dose 4.5 mg daily on Thursdays; and 3 mg daily rest of week.    Instructed patient to follow up no later than:  4-6 wks.   - scheduled on 2/25/20 during f/u visit with Dr. Shabazz.    Education provided: importance of consistent vitamin K intake, target INR goal and significance of current INR result and importance of taking warfarin as instructed    Tete verbalizes understanding and agrees to warfarin dosing plan.    Instructed to call the [facility] for any changes, questions or concerns. (#[phone])   ?   Cassandra Banuelos RN    Subjective/Objective:      Mariia A Earnest, a 84 y.o. female is on warfarin.     Mariia reports:     Home warfarin dose: as updated on anticoagulation calendar per template     Missed doses: Yes:  Reported missing one dose on 1/13/20.     Medication changes:  No     S/S of bleeding or thromboembolism:  No     New Injury or illness:  No     Changes in diet or alcohol consumption:  No     Upcoming surgery, procedure or cardioversion:  No    Anticoagulation Episode Summary     Current INR goal:   2.0-3.0   TTR:   75.1 % (1 y)   Next INR check:   3/3/2020   INR from last check:   2.20 (1/21/2020)   Weekly max warfarin dose:      Target end date:      INR check location:      Preferred lab:      Send INR reminders to:   [facility]    Indications    PAF (paroxysmal atrial fibrillation)  (H) (Resolved) [I48.0]           Comments:            Anticoagulation Care Providers     Provider Role Specialty Phone number    Eagle Shabazz MD Referring Internal Medicine [phone]

## 2021-06-06 NOTE — TELEPHONE ENCOUNTER
ANTICOAGULATION  MANAGEMENT    Assessment     Today's INR result of 1.90 is Subtherapeutic (goal INR of 2.0-3.0)        Warfarin recently held as instructed which may be affecting INR    - HELD warfarin dose on 2/18 after Dermatology procedure.    No new diet changes affecting INR    No new medication/supplements affecting INR    Continues to tolerate warfarin with no reported s/s of bleeding or thromboembolism     Previous INR was Supratherapeutic at 3.80 on 2/18/2020.    Plan:     Spoke with Tete regarding INR result and instructed:     Warfarin Dosing Instructions:  (has 3mg tabs)   - Continue current warfarin dose 4.5 mg daily on Thursdays; and 3 mg daily rest of week.    Instructed patient to follow up no later than:  1-2 wks.   - INR scheduled on 3/10/2020 @ MID after PT/OT.     Education provided: importance of consistent vitamin K intake and target INR goal and significance of current INR result    Tete verbalizes understanding and agrees to warfarin dosing plan.    Instructed to call the [facility] for any changes, questions or concerns. (#[phone])   ?   Cassandra Banuelos RN    Subjective/Objective:      Mariia Tinoco, a 84 y.o. female is on warfarin.     Mariia reports:     Home warfarin dose: as updated on anticoagulation calendar per template     Missed doses: Yes:  Reported HELD warfarin dose on 2/18, after dermatology procedure.     Medication changes:  No     S/S of bleeding or thromboembolism:  No     New Injury or illness:  No     Changes in diet or alcohol consumption:  No     Upcoming surgery, procedure or cardioversion:  No    Anticoagulation Episode Summary     Current INR goal:   2.0-3.0   TTR:   70.4 % (1 y)   Next INR check:   3/10/2020   INR from last check:   1.90! (2/25/2020)   Weekly max warfarin dose:      Target end date:      INR check location:      Preferred lab:      Send INR reminders to:   [facility]    Indications    PAF (paroxysmal atrial  fibrillation) (H) (Resolved) [I48.0]           Comments:            Anticoagulation Care Providers     Provider Role Specialty Phone number    Eagle Shabazz MD Referring Internal Medicine [phone]

## 2021-06-06 NOTE — TELEPHONE ENCOUNTER
Who is calling:  patient  Reason for Call:  Patient called stating she had surgery for her skin cancer this morning & had an INR result of 3.8. She is concerned because it has never been that high before.  Date of last appointment with primary care: 11/22/19  Okay to leave a detailed message: Yes

## 2021-06-06 NOTE — PROGRESS NOTES
Office Visit - Follow Up   Mariia Tinoco   84 y.o. female    Date of Visit: 2/25/2020    Chief Complaint   Patient presents with     Follow-up     3 month     INR Check        Assessment and Plan   1. Vertigo  Refer to vestibular rehab.  - Ambulatory referral to PT/OT    2. Macular Degeneration  Continue follow-up with her ophthalmologist.  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    3. 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  - DXA Bone Density Scan; Future    4. Cardiomyopathy, unspecified type (H)  Euvolemic and stable.    5. Persistent atrial fibrillation  INR today I believe is due.    6. Primary osteoarthritis involving multiple joints  Continue regular exercise.    7. Chronic Interstitial Lung Disease  Stable.  Continue inhaled medications per pulmonary.    Tells me she is not sleeping well and she is taking some sort of over-the-counter sleep aid.  She notes dry mouth at night.  I suspect that is likely the cause.  I have urged discontinuation of the sleep aid but that is not an option for her she tells me.  I have suggested Biotene for the dry mouth.        Return in about 6 months (around 8/25/2020) for AWV.     History of Present Illness   This 84 y.o. old Tete comes in for follow-up of her multi-medical problems.  I have not seen her in over a year.  Delightful woman with a history of interstitial lung disease followed by pulmonary as well as cardiomyopathy followed by cardiology.  All of this is been stable she tells me.  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  Follows with ophthalmology for that.  She notes generalized aches and pains from arthritis which if she stays active is improved.  She is due for an INR today.  She is eating healthy she tells me.  Her biggest concern is ongoing vertigo.  Saw Dr. Graham this last fall.  She still has vertigo at middle of the night when she turns over in bed she will get nauseated and have room spinning.  He is due for a  "bone density on her Prolia.  It is been a couple years at least.    Review of Systems: A comprehensive review of systems was negative except as noted.     Medications, Allergies and Problem List   Reviewed, reconciled and updated  Post Discharge Medication Reconciliation Status:      Physical Exam   General Appearance:   Delightful woman.  No distress.  She has a recent skin cancer surgery on her right cheek which is healing well.  Lungs with dry crackles at the bases bilaterally.  No wheezes.  Heart regular.    /60   Pulse 84   Ht 5' 4\" (1.626 m)   Wt 160 lb (72.6 kg)   SpO2 98%   BMI 27.46 kg/m           Additional Information   Current Outpatient Medications   Medication Sig Dispense Refill     acetaminophen (TYLENOL) 500 MG tablet Take 1,000 mg by mouth every 6 (six) hours.       albuterol (PROAIR HFA;PROVENTIL HFA;VENTOLIN HFA) 90 mcg/actuation inhaler Inhale 2 puffs every 6 (six) hours as needed for wheezing. 1 each 0     calcium carbonate (OS-ZARI) 600 mg (1,500 mg) tablet Take 600 mg by mouth 2 (two) times a day with meals.        cholecalciferol, vitamin D3, 2,000 unit capsule Take 1 capsule (2,000 Units total) by mouth daily.  0     clobetasol (TEMOVATE) 0.05 % external solution Apply 1 application topically 2 (two) times a day as needed. To scalp as needed       diphenhydrAMINE (BENADRYL) 25 mg tablet Take 25 mg by mouth at bedtime as needed for sleep.       fluticasone-vilanterol (BREO ELLIPTA) 100-25 mcg/dose DsDv inhaler Inhale 1 puff daily.       levothyroxine (SYNTHROID, LEVOTHROID) 75 MCG tablet TAKE 1 TABLET DAILY 90 tablet 3     metoprolol succinate (TOPROL XL) 25 MG Take 25 mg by mouth daily.       warfarin (JANTOVEN) 3 MG tablet Take 1 tablet (3mg) daily and take 1 &1/2 tablets (4.5mg) on Thursday by mouth daicassandra, as directed.  Adjust dose based on INR results. 110 tablet 1     ondansetron (ZOFRAN) 8 MG tablet Take 1 tablet (8 mg total) by mouth every 8 (eight) hours as needed. 20 tablet " 0     No current facility-administered medications for this visit.      Allergies   Allergen Reactions     Bee Venom Protein (Honey Bee) Anaphylaxis and Hives     Ultram [Tramadol] Nausea And Vomiting     Social History     Tobacco Use     Smoking status: Never Smoker     Smokeless tobacco: Never Used   Substance Use Topics     Alcohol use: Yes     Alcohol/week: 1.0 standard drinks     Types: 1 Glasses of wine per week     Comment: occasional     Drug use: No       Review and/or order of clinical lab tests:  Review and/or order of radiology tests:  Review and/or order of medicine tests:  Discussion of test results with performing physician:  Decision to obtain old records and/or obtain history from someone other than the patient:  Review and summarization of old records and/or obtaining history from someone other than the patient and.or discussion of case with another health care provider:  Independent visualization of image, tracing or specimen itself:    Time: not applicable     Eagle Shabazz MD

## 2021-06-06 NOTE — TELEPHONE ENCOUNTER
Noted that patient is scheduled for OT tomorrow prior to her INR.   Did not contact patient at this time, will outreach if she no shows or reschedules the OT/INR appointments

## 2021-06-06 NOTE — TELEPHONE ENCOUNTER
Called and spoke with Tete.     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     - INR as supra at 3.80     - [facility] [phone] to obtain and have them fax INR result - awaiting for INR results to be faxed.

## 2021-06-06 NOTE — TELEPHONE ENCOUNTER
Anticoagulation    Mariia Tinoco, 84 y.o., female    Chart reviewed for evaluation of next INR follow up date to limit exposure to health care setting during COVID-19 concern.    Lab Results   Component Value Date    INR 2.20 (H) 03/10/2020    INR 1.90 (H) 02/25/2020    INR 3.80 (!) 02/18/2020    INR 2.20 (H) 01/21/2020    INR 2.00 (H) 12/12/2019    INR 2.70 (H) 10/31/2019    INR 2.30 (H) 10/10/2019    INR 1.80 (H) 09/26/2019    INR 2.20 (H) 08/15/2019    INR 2.40 (H) 07/09/2019    INR 2.00 (H) 06/11/2019    INR 2.40 (H) 05/20/2019    INR 2.80 (!) 01/24/2017    INR 2.40 (!) 01/19/2017    INR 4.10 (!) 01/16/2017    INR 3.50 (!) 01/12/2017    INR 2.80 (!) 01/09/2017         Assessment/plan:    Last out of range INR 2/18/20 with no adjustment.    Recommend next INR in 1-2 weeks ( to 3/31)  (2-3 weeks since last check)    Required patient education:      Importance of notifying clinic for diarrhea, nausea/vomiting, reduced intake , illness and/or medication changes.    Dorene Oconnell, PharmD  Anticoagulation clinic

## 2021-06-06 NOTE — TELEPHONE ENCOUNTER
ANTICOAGULATION  MANAGEMENT    Assessment     Today's INR result of 3.80 is Supratherapeutic (goal INR of 2.0-3.0)        Warfarin taken as previously instructed    No new diet changes affecting INR    Reported taking Doxycycline at 7am for prophylaxis prior to dermatology procedure.    Continues to tolerate warfarin with no reported s/s of bleeding or thromboembolism     Previous INR was Therapeutic at 2.20 - 1/21/2020.   (last 4 INR's therapeutic).    Plan:     Spoke with Tete regarding INR result and instructed:     Warfarin Dosing Instructions:    - tonight, advised to HOLD warfarin dose,   - then continue current warfarin dose 4.5 mg daily on Thursdays; and 3 mg daily rest of week.    Instructed patient to follow up no later than:  One wk.   - INR scheduled on 2/25/20 during f/u visit with Dr. Shabazz.    Education provided: importance of consistent vitamin K intake, target INR goal and significance of current INR result and importance of notifying clinic for changes in medications    Tete verbalizes understanding and agrees to warfarin dosing plan.    Instructed to call the [facility] for any changes, questions or concerns. (#[phone])   ?   Cassandra Banuelos RN    Subjective/Objective:      Mariiasergey Tinoco, a 84 y.o. female is on warfarin.     Mariia reports:     Home warfarin dose: verbally confirmed home dose with Tete and updated on anticoagulation calendar     Missed doses: No     Medication changes:  Yes.  ABX prophylaxis prior to dermatology procedure, 2/18/20.    S/S of bleeding or thromboembolism:  No     New Injury or illness:  Yes.  Had cryosurgery for squamous cell 2/18/20.     Changes in diet or alcohol consumption:  No     Upcoming surgery, procedure or cardioversion:  No    Anticoagulation Episode Summary     Current INR goal:   2.0-3.0   TTR:   71.3 % (1 y)   Next INR check:   2/25/2020   INR from last check:   3.80! (2/18/2020)   Weekly max warfarin dose:      Target end date:       INR check location:      Preferred lab:      Send INR reminders to:   [facility]    Indications    PAF (paroxysmal atrial fibrillation) (H) (Resolved) [I48.0]           Comments:            Anticoagulation Care Providers     Provider Role Specialty Phone number    Eagle Shabazz MD Referring Internal Medicine [phone]

## 2021-06-06 NOTE — PROGRESS NOTES
Discharge Summary  Patient Name: Mariia Tinoco  Date: 2020  Referral Diagnosis: vertigo  Referring provider: Eagle Shabazz MD  Visit Diagnosis:   1. Benign paroxysmal positional vertigo, left     2. Unsteadiness on feet     3. Decreased activities of daily living (ADL)     4. Nausea         Goal status: not met    Patient was seen for 2 visits between 3- and 3-.    Therapy will be discontinued at this time.  The patient will need a new referral to resume.    Thank you for your referral.  Deborah Hayden  2020   7:06 PM    Rehabilitation Daily Progress     Patient Name: Mariia Tinoco  Date: 3/17/2020  Visit #: 2  Referral Diagnosis: vertigo  Referring provider: Eagle Shabazz MD  Visit Diagnosis:     ICD-10-CM    1. Benign paroxysmal positional vertigo, left  H81.12    2. Unsteadiness on feet  R26.81    3. Decreased activities of daily living (ADL)  Z78.9    4. Nausea  R11.0          Assessment:     Patient is appropriate to continue with skilled occupational therapy intervention, as indicated by initial plan of care.    Goal Status:  Patient will bend: to dress;without vertigo;without loss of balance;in 12 weeks  Patient able to perform bed mobility: without vertigo;in 12 weeks  Patient will turn head: without vertigo;for conversation;in 12 weeks  Patient will look up / down: for drinking;without vertigo;in 12 weeks      Plan / Patient Education:     Continue with initial plan of care. Progress with home program as tolerated.    Subjective:     Pain Ratin    Subjective progress relative to last visit:  Intensity of dizziness is:  less  Frequency of dizziness is: less  Duration of dizziness is: less  Balance is:  better    Objective:     Positional Tests:  Hallpike Right:  NT  Hallpike Left:  Abnormal - Nystagmus left torsional, up beating, 2 second latency and fatigues in 25 seconds  Head Roll Right: NT  Head Roll Left:  NT    Plan for next visit: consider half somersault  unless patient is significantly improved then will repeat Epley maneuver.    Treatment Today: Patient treated with left Epley maneuver today. Patient too nauseated to complete a second maneuver.  TREATMENT MINUTES COMMENTS   Evaluation     Self-care/ Home management     Neuromuscular Re-education     Canalith repositioning procedure 8          Total 8    Blank areas are intentional and mean the treatment did not include these items.          Deborah Hayden  3/17/2020  7:07 AM

## 2021-06-06 NOTE — TELEPHONE ENCOUNTER
Received a faxed INR result for Mariia Tinoco  From [facility]  INR result dated 2/18/2020 is 3.80

## 2021-06-07 NOTE — TELEPHONE ENCOUNTER
Patient having bouts of vertigo. She did go to the occupational therapist that Dr. Shabazz sent her to and she has not improved.  She would like to go to the [facility]. I did try to check with specialty to see if they knew their process right now and they were unsure. Ok for referral?  Cammy Fuentes CSS

## 2021-06-07 NOTE — TELEPHONE ENCOUNTER
ANTICOAGULATION  MANAGEMENT    Assessment     Today's INR result of 2.90 is Therapeutic (goal INR of 2.0-3.0)        Warfarin taken as previously instructed    No new diet changes affecting INR    No new medication/supplements affecting INR    Continues to tolerate warfarin with no reported s/s of bleeding or thromboembolism     Previous INR was Subtherapeutic at 1.80 on 3/17/20    Plan:     Spoke with Tete regarding INR result and instructed:   1.  Concerns with Covid-19 exposure due to age 84 and specifically with chronic Interstitial lung disease.   2.  Advised to come in sooner if any s/sx of any bleeding or med changes (ABX / Prednisone)       Warfarin Dosing Instructions:  (has 3mg tabs)   - instead of Tues/Thurs for 4.5mg tabs; advised to change to Mon/Thurs to spread taking higher warfarin dose.   - Continue current warfarin dose 4.5 mg daily on Mon/Thurs; and 3 mg daily rest of week.    Instructed patient to follow up no later than:  2-4 wks.   - scheduled on 5/12/20 @ MID   - (advised to come in sooner if any changes with diet, medications, and any s/sx of any bleeding).    Education provided: importance of consistent vitamin K intake, target INR goal and significance of current INR result, importance of taking warfarin as instructed, monitoring for bleeding signs and symptoms, when to seek medical attention/emergency care and importance of notifying clinic for diarrhea, nausea/vomiting, reduced intake and/or illness    Tete verbalizes understanding and agrees to warfarin dosing plan.    Instructed to call the [facility] for any changes, questions or concerns. (#[phone])   ?   Cassandra Banuelos RN    Subjective/Objective:      Mariia MELLY CoyTinoco, a 84 y.o. female is on warfarin.     Mariia reports:     Home warfarin dose: verbally confirmed home dose with Tete and updated on anticoagulation calendar     Missed doses: No     Medication changes:  No     S/S of bleeding or thromboembolism:   No     New Injury or illness:  No     Changes in diet or alcohol consumption:  No     Upcoming surgery, procedure or cardioversion:  No    Anticoagulation Episode Summary     Current INR goal:   2.0-3.0   TTR:   79.3 % (1 y)   Next INR check:   5/12/2020   INR from last check:   2.90 (4/14/2020)   Weekly max warfarin dose:      Target end date:      INR check location:      Preferred lab:      Send INR reminders to:   [facility]    Indications    PAF (paroxysmal atrial fibrillation) (H) (Resolved) [I48.0]           Comments:            Anticoagulation Care Providers     Provider Role Specialty Phone number    Eagle Shabazz MD Referring Internal Medicine [phone]

## 2021-06-07 NOTE — TELEPHONE ENCOUNTER
Referral Request  Type of referral: Vertigo treatment  Who s requesting: Patient  Why the request: Patient having bouts of vertigo  Have you been seen for this request: Yes  Does patient have a preference on a group/provider? [facility], [place] location.    Okay to leave a detailed message?  Yes     She did go to the occupational therapist that Dr. Shabazz sent her to and she has not improved.  She would like to go to the [facility].

## 2021-06-07 NOTE — TELEPHONE ENCOUNTER
Fine for that referral but I am not sure if they will be able to see her at this time due to the current pandemic.  Please place referral.  Diagnosis is vertigo.

## 2021-06-08 NOTE — PROGRESS NOTES
Medication Therapy Management Initial Visit     ASSESSMENT AND PLAN  1. Insomnia  Chronic issue, maintained on long-term Ambien which has been effective for her.  Based on her most recent hospitalization is come to the attention that the degree of anxiety is present and this may have contributed to some of her symptoms.  Discussed risks versus benefits of additional medications which may be used to address improving sleep as well as depression and/or anxiety.  Based on the ability to use a wide range of doses, recommend to initiate trazodone at 25 mg daily at bedtime.  Recommended to discontinue use of all Benadryl products.  Also educated to hold Ambien while initiating trazodone.  Her and her daughter demonstrate understanding and will follow up with me by phone next Monday to discuss efficacy.  -traZODone (DESYREL) 50 MG tablet; Take 0.5-1 tablets (25-50 mg total) by mouth bedtime.  Dispense: 30 tablet; Refill: 0  -Discontinue diphenhydramine  -Educate to hold Ambien while initiating trazodone    2. S/P total knee arthroplasty  Recovering nicely, however having difficulty managing pain.  Discussed that she could safely use a little more Tylenol throughout the day with a maximum dose of 4000 mg daily.  Until her pain is better controlled recommend he use this dose of Tylenol and as she heals start to decrease the dose back down. History of issues with constipation discussed more natural means of maintaining normal bowel regimen including senna tea.  - Increase tylenol to 1000mg by mouth four times daily   - Recommend senna tea, regular use of citracel, and fluids     3. Chronic Interstitial Lung Disease  Following with pulmonology, current regimen has been effective however observation in the hospital suggesting working on incentive spirometer and potentially a component of anxiety, hopefully addressed with more sleep and trazodone as discussed above. Provided coupon for one time free  "inhaler of Anoro Ellipta.   - provide free inhaler coupon     4. Hypothyroidism, unspecified type  Stable, serial TSHs have been within normal limits, recommend continue current regimen.    5. Osteoporosis, senile  Stable on Prolia every 6 months and following with Dr. Luis.  Recommend continue current regimen.    6. PAF (paroxysmal atrial fibrillation)  Stable, recommend continue following with cardiologist.  We'll INR drawn today for convenience of patient's warfarin dose will be adjusted per anticoagulation nurse program.  - INR    7. Macular Degeneration  Stable. Recommended to continue current regimen.     FOLLOW-UP PLAN  Mariia was advised to follow up by phone on Monday to reassess sleep.    The following instructions were given:   Patient Instructions   Consider senna tea (help with keeping bowels regular)     Tylenol (acetaminophen) 1000mg by mouth every 6 hours     Trazodone 25-50mg daily at bedtime     Anoro Ellipta free inhaler coupon     Call [facility] on Monday to follow up on sleeping [phone]       SUBJECTIVE AND OBJECTIVE  Mariia Tinoco is a 81 y.o. female who was referred by Eagle Shabazz MD for MTM services.  Mariia's chief concern today is medication management. Tete was recently discharged from the hospital for observation due to difficulty breathing.  This appointment today was due to her daughter's recommendation because they wanted more information on her medications.  Tete is very independent and knowledgeable about her medications.  She had knee surgery on January 5, and had a difficult time with the pain medications that she was given, making her very nauseous.  She also has not been eating very much since the surgery, however is feeling better week by week.  Her main concern is also that she is not sleeping.    Insomnia: She feels that her \"head doesn't turn off\" this makes it difficult for her to follow asleep and/or stay asleep.  She also has several children that have " difficulty with sleeping and feels this may be genetic.  She only gets about 2-3 hours of sleep per night.  Says that a long ongoing problem and has tried several medications including Tylenol PM, and a drill, over-the-counter sleep aids, and Ambien.  Ambien usually works the best for her and has been taking this for years.  She is not experiencing any of the negative side effects known to this medication.  This generally allows her to get 4-6 hours of sleep at night.    Total knee arthroplasty: Was very nauseous on her pain medications and has since stopped all opioid use.  She is now using Tylenol 3 times daily and icing.  She continues to do her physical therapy and exercising.  She is taking 975 mg by mouth 3 times daily, and finds that she is waking up overnight with excessive pain requiring treatment.  She is also chronically somewhat constipated, and normally only going every few days.  She has Citrucel at home which she does use regularly.  She was recommended to use milk of magnesia as needed and she is wondering why she needs to take this.  She had also been given senna with her knee surgery and is currently not taking this.  She is trying to drink more water.    Pulmonary fibrosis: Follows with pulmonologist, Dr. Salmon.  She was recently switched from Advair to Anoro Ellipta which he feels has maintained the same degree of breathing, however her coughing is improved.  This is expensive for her yet attainable.    Hypothyroid: Continues on stable dose of levothyroxine, denies any symptoms of hyper or hypothyroid, labs have been stable.    Osteoporosis: prolia every 6 month, second injection this month. Normal vitamin D level in 2015. Vitamin D daily and calcium twice daily.  Follows with Dr. Luis.    Cardiomyopathy with pacemaker: Follows with cardiology once yearly.  Pacemaker is tested twice yearly.  Stable on metoprolol once daily and anticoagulated with warfarin.  INR today.    Macular degeneration:  Stable on PreserVision supplement.    This note has been dictated using voice recognition software. Any grammatical or context distortions are unintentional and inherent to the software.    Mariia was provided with follow up instructions, and this care plan was communicated via EMR with her primary care provider, Eagle Shbaazz MD, and is the authorizing prescriber for this visit.  Direct supervision was available by either the patient's PCP or another available physician when needed.    Time spent: 60 minutes  Level of service: 5    Rasta DuffyD, BCACP  Medication Management (MTM) Pharmacist  [facility]    We reviewed Mariia's medication list with them, discussing reason for use, directions for use, and potential side effects of each medication.  Indication, safety, efficacy, and convenience was assessed for all reviewed medications.  No environmental factors were noted currently affecting patient.    Current Outpatient Prescriptions   Medication Sig Dispense Refill     acetaminophen (TYLENOL) 500 MG tablet Take 1,000 mg by mouth every 6 (six) hours.       calcium carbonate (OS-ZARI) 600 mg (1,500 mg) tablet Take 600 mg by mouth 2 (two) times a day with meals.        cholecalciferol, vitamin D3, 2,000 unit cap Take 2,000 Units by mouth daily.       clobetasol (TEMOVATE) 0.05 % external solution Apply 1 application topically 2 (two) times a day as needed. To scalp as needed       levothyroxine (SYNTHROID, LEVOTHROID) 50 MCG tablet TAKE 1 TABLET BY MOUTH EVERY DAY 90 tablet 0     magnesium hydroxide (MILK OF MAG) 400 mg/5 mL Susp suspension Take 30 mL by mouth daily as needed.  0     metoprolol succinate (TOPROL XL) 25 MG Take 25 mg by mouth daily.       MULTIVITAMIN ORAL Take 1 tablet by mouth daily.        ondansetron (ZOFRAN) 8 MG tablet Take 1 tablet (8 mg total) by mouth every 8 (eight) hours as needed. 20 tablet 0     traZODone (DESYREL) 50 MG tablet Take 0.5-1 tablets (25-50 mg total)  by mouth bedtime. 30 tablet 0     umeclidinium-vilanterol (ANORO ELLIPTA) 62.5-25 mcg/actuation inhaler Inhale 1 puff daily.       vitamins  A,C,E-zinc-copper (PRESERVISION AREDS) 14,320-226-200 unit-mg-unit cap Take 1 tablet by mouth 2 (two) times a day.       warfarin (COUMADIN) 3 MG tablet Take 1 tablet (3 mg total) by mouth daily. 30 tablet 0     zolpidem (AMBIEN) 5 MG tablet TAKE 1 TABLET BY MOUTH AT BEDTIME AS NEEDED 30 tablet 0     Current Facility-Administered Medications   Medication Dose Route Frequency Provider Last Rate Last Dose     denosumab 60 mg (PROLIA 60 mg/ml)  60 mg Subcutaneous Q6 Months Cleopatra Chong MD   60 mg at 07/05/16 5564

## 2021-06-08 NOTE — ANESTHESIA PROCEDURE NOTES
Spinal Block    Patient location during procedure: OR  Start time: 1/5/2017 9:02 AM  End time: 1/5/2017 9:07 AM  Reason for block: primary anesthetic    Staffing:  Performing  Anesthesiologist: SAM DUDLEY    Preanesthetic Checklist  Completed: patient identified, risks, benefits, and alternatives discussed, timeout performed, consent obtained, airway assessed, oxygen available, suction available, emergency drugs available and hand hygiene performed  Spinal Block  Patient position: sitting  Prep: ChloraPrep  Patient monitoring: heart rate, cardiac monitor, continuous pulse ox and blood pressure  Approach: midline  Location: L3-4  Injection technique: single-shot  Needle type: pencil-tip   Needle gauge: 24 G      Additional Notes:  Clear csf pre and post injection.

## 2021-06-08 NOTE — ANESTHESIA PROCEDURE NOTES
Peripheral Block    Patient location during procedure: pre-op  Start time: 1/5/2017 8:50 AM  End time: 1/5/2017 8:52 AM  post-op analgesia per surgeon order as noted in medical record  Staffing:  Performing  Anesthesiologist: SAM DUDLEY  Preanesthetic Checklist  Completed: patient identified, site marked, risks, benefits, and alternatives discussed, timeout performed, consent obtained, airway assessed, oxygen available, suction available, emergency drugs available and hand hygiene performed  Peripheral Block  Block type: saphenous  Prep: ChloraPrep  Patient position: supine  Patient monitoring: cardiac monitor, continuous pulse oximetry, heart rate and blood pressure  Laterality: right  Injection technique: ultrasound guided    Ultrasound used to visualize needle placement in proximity to nerve being blocked: yes   Permanent ultrasound image captured for medical record    Needle  Needle type: Stimuplex   Needle gauge: 22 G  Needle length: 4 in  no peripheral nerve catheter placed  Assessment  Injection assessment: negative aspiration for heme, no paresthesia on injection, incremental injection and no difficulty with injection

## 2021-06-08 NOTE — PROGRESS NOTES
Office Visit - Follow Up   Mariia Tinoco   81 y.o. female    Date of Visit: 2/16/2017    Chief Complaint   Patient presents with     Hospital Visit Follow Up     ER f/u - doing better, discuss if she should of Trazodone     INR Check        Assessment and Plan   1. Shortness of breath  Improved and breathing is at baseline.  Call if more problems.  She'll follow-up with her pulmonologist will full PFTs in 3 months.    2. ILD (interstitial lung disease)  As above.    3. PAF (paroxysmal atrial fibrillation)  I discussed with her that many if not all medications can interfere with her INR.  She is to notify her INR anticoagulation nurse about any med changes including the prednisone and the trazodone.  - INR    4. Primary insomnia  Counseled patient at length today on her symptoms.  She can try melatonin 5-6 mg at bedtime to see if that helps.  Otherwise I tried to reassure her about trazodone 25-50 mg a day and quit be a nice alternative to try rather than taking the Ambien.  She understands.        Return in about 6 months (around 8/16/2017) for Recheck.     History of Present Illness   This 81 y.o. old patient comes in today for follow-up.  She went in the hospital couple weeks ago now with shortness of breath.  Was felt likely this is due to anxiety.  She did extensive evaluation at that time.  Refer reader to the discharge summary.  She had follow-up with her pulmonologist who felt things are stable although she did have a cold and put her on prednisone for this.  She is feeling well at this time.  Her biggest concern however today is her chronic insomnia.  She saw Dr. Tripp from pharmacy who suggested getting off the Ambien and starting trazodone patient.  Patient is concerned.  She is worried about her INR and other side effects that can occur with trazodone and wants to discuss those with me.  She is also wondering if she could try melatonin again.  Otherwise things are going well for her.  She is feeling  "better.     Review of Systems: A comprehensive review of systems was negative except as noted.     Medications, Allergies and Problem List   Reviewed and updated     Physical Exam   General Appearance:   Delightful woman.  Good spirits.  Blood pressure is noted.  Lungs with the dry crackles unchanged otherwise clear.    Visit Vitals     /58 (Patient Site: Right Arm, Patient Position: Sitting, Cuff Size: Adult Regular)     Pulse 76     Ht 5' 4\" (1.626 m)     Wt 150 lb (68 kg)     SpO2 98%     BMI 25.75 kg/m2            Additional Information   Current Outpatient Prescriptions   Medication Sig Dispense Refill     acetaminophen (TYLENOL) 500 MG tablet Take 1,000 mg by mouth every 6 (six) hours.       calcium carbonate (OS-ZARI) 600 mg (1,500 mg) tablet Take 600 mg by mouth 2 (two) times a day with meals.        cholecalciferol, vitamin D3, 2,000 unit cap Take 2,000 Units by mouth daily.       clobetasol (TEMOVATE) 0.05 % external solution Apply 1 application topically 2 (two) times a day as needed. To scalp as needed       levothyroxine (SYNTHROID, LEVOTHROID) 50 MCG tablet TAKE 1 TABLET BY MOUTH EVERY DAY 90 tablet 0     magnesium hydroxide (MILK OF MAG) 400 mg/5 mL Susp suspension Take 30 mL by mouth daily as needed.  0     metoprolol succinate (TOPROL XL) 25 MG Take 25 mg by mouth daily.       MULTIVITAMIN ORAL Take 1 tablet by mouth daily.        predniSONE (DELTASONE) 20 MG tablet Take 20 mg by mouth daily.       umeclidinium-vilanterol (ANORO ELLIPTA) 62.5-25 mcg/actuation inhaler Inhale 1 puff daily.       vitamins  A,C,E-zinc-copper (PRESERVISION AREDS) 14,320-226-200 unit-mg-unit cap Take 1 tablet by mouth 2 (two) times a day.       warfarin (COUMADIN) 3 MG tablet Take 1 tablet (3 mg total) by mouth daily. 30 tablet 0     zolpidem (AMBIEN) 5 MG tablet TAKE 1 TABLET BY MOUTH AT BEDTIME AS NEEDED 30 tablet 0     acetaminophen (TYLENOL) 500 MG tablet Take 1,000 mg by mouth every 6 (six) hours.       " ondansetron (ZOFRAN) 8 MG tablet Take 1 tablet (8 mg total) by mouth every 8 (eight) hours as needed. 20 tablet 0     traZODone (DESYREL) 50 MG tablet Take 0.5-1 tablets (25-50 mg total) by mouth bedtime. 30 tablet 0     Current Facility-Administered Medications   Medication Dose Route Frequency Provider Last Rate Last Dose     denosumab 60 mg (PROLIA 60 mg/ml)  60 mg Subcutaneous Q6 Months Cleopatra Chong MD   60 mg at 07/05/16 1114     Allergies   Allergen Reactions     Bee Venom Protein (Honey Bee) Anaphylaxis and Hives     Ultram [Tramadol] Nausea And Vomiting     Social History   Substance Use Topics     Smoking status: Never Smoker     Smokeless tobacco: Never Used     Alcohol use 0.6 oz/week     1 Glasses of wine per week      Comment: occasional       Review and/or order of clinical lab tests:  Review and/or order of radiology tests:  Review and/or order of medicine tests:  Discussion of test results with performing physician:  Decision to obtain old records and/or obtain history from someone other than the patient:  Review and summarization of old records and/or obtaining history from someone other than the patient and.or discussion of case with another health care provider:  Independent visualization of image, tracing or specimen itself:    Time: not applicable     Eagle Shabazz MD

## 2021-06-08 NOTE — TELEPHONE ENCOUNTER
ANTICOAGULATION  MANAGEMENT    Assessment     Today's INR result of 1.90 is Subtherapeutic (goal INR of 2.0-3.0)        Missed dose(s) may be affecting INR    No new diet changes affecting INR    No new medication/supplements affecting INR    Continues to tolerate warfarin with no reported s/s of bleeding or thromboembolism     Previous INR was Therapeutic at 2.50 on 5/12/20.   (last 2 INR's therapeutic).    Plan:     Spoke with Tete regarding INR result and instructed:     Warfarin Dosing Instructions:    - today, advised one time booster with 6mg warfarin dose, then continue current warfarin dose 4.5 mg daily on Mon/Thurs; and 3 mg daily rest of week.    Instructed patient to follow up no later than:  1-2 wks.   - INR scheduled on 6/22/20 @ [facility].    Education provided: target INR goal and significance of current INR result and importance of taking warfarin as instructed    Tete verbalizes understanding and agrees to warfarin dosing plan.    Instructed to call the [facility] for any changes, questions or concerns. (#[phone])   ?   Cassandra Banuelos RN    Subjective/Objective:      Mariiasergye Tinoco, a 85 y.o. female is on warfarin.     Mariia reports:     Home warfarin dose: as updated on anticoagulation calendar per template     Missed doses: Yes:  Reported missing one warfarin dose.     Medication changes:  No     S/S of bleeding or thromboembolism:  No     New Injury or illness:  No     Changes in diet or alcohol consumption:  No     Upcoming surgery, procedure or cardioversion:  No    Anticoagulation Episode Summary     Current INR goal:   2.0-3.0   TTR:   82.8 % (1 y)   Next INR check:   6/29/2020   INR from last check:   1.90! (6/15/2020)   Weekly max warfarin dose:      Target end date:      INR check location:      Preferred lab:      Send INR reminders to:   [facility]    Indications    PAF (paroxysmal atrial fibrillation) (H) (Resolved) [I48.0]           Comments:             Anticoagulation Care Providers     Provider Role Specialty Phone number    Eagle Shabazz MD Referring Internal Medicine [phone]

## 2021-06-08 NOTE — TELEPHONE ENCOUNTER
New Appointment Needed  What is the reason for the visit:    Same day Lab- INR  Provider Preference: Lab  How soon do you need to be seen?: today, patient added for today at 1:40pm  Waitlist offered?: No  Okay to leave a detailed message:  Yes, no call back needed

## 2021-06-08 NOTE — PROGRESS NOTES
1. Osteoporosis, senile         Return in about 6 months (around 8/14/2017) for Recheck.    Patient Instructions   Prolia 3rd today.  Prolia 4th in 6 months with my nurse. I will see you in 1 year.  DXA in 2 years .   Phone number to schedule [phone].  Please avoid any extensive dental work as implants and teeth extractions for the next 1-2 months.  Daily calcium need is 5721-5406 mg a day from the diet and supplements.  Calcium citrate is easier to digest.  Vitamin D 2000 IU daily recommended.      Chief Complaint   Patient presents with     Osteoporosis Follow Up       Visit Vitals     /62     Pulse 70     Wt 150 lb (68 kg)     BMI 25.75 kg/m2         Did you experience any problems with previous Prolia injection? no  Any medication change in the last 6 months? no  Did you take prednisone or other immunosupressant drugs in the last 6 months   (chemo, transplant, rheum, dermatology conditions)? No, she does have pulmonary fibrosis, but not on the steroids.  Did you have any serious infection in the last 6 months?no  Any recent hospitalizations?no  Do you plan any dental work in the next 2-3 months?no  How much calcium do you take daily from the diet and supplements?1200 mg  How much vit D do you take daily? 2000 IU  Last DXA? Done today, discussed and reviewed      Patient is here today for the 3rd Prolia injection. Patient tolerated previous injections well.   We discussed calcium and vit D daily needs today. She was treated in the past with oral bisphosphonate for many years.  Next Prolia injection will be in 6 months.     15 minutes spent with the patient and more then 50 % of the time in counseling.  This note has been dictated using voice recognition software. Any grammatical or context distortions are unintentional and inherent to the software      Patient Active Problem List   Diagnosis     Macular Degeneration     Colonic Diverticulosis     Cervical Disc Degeneration     Hypothyroidism      Chronic Interstitial Lung Disease     Persistent atrial fibrillation     Chronic Bronchitis     Biventricular cardiac pacemaker in situ     Cardiomyopathy     Osteoporosis, senile     Primary osteoarthritis of right knee     ILD (interstitial lung disease)     Eye irritation     Shortness of breath     Nausea     Abnormal chest CT       Current Outpatient Prescriptions on File Prior to Visit   Medication Sig Dispense Refill     acetaminophen (TYLENOL) 500 MG tablet Take 1,000 mg by mouth every 6 (six) hours.       calcium carbonate (OS-ZARI) 600 mg (1,500 mg) tablet Take 600 mg by mouth 2 (two) times a day with meals.        cholecalciferol, vitamin D3, 2,000 unit cap Take 2,000 Units by mouth daily.       clobetasol (TEMOVATE) 0.05 % external solution Apply 1 application topically 2 (two) times a day as needed. To scalp as needed       levothyroxine (SYNTHROID, LEVOTHROID) 50 MCG tablet TAKE 1 TABLET BY MOUTH EVERY DAY 90 tablet 0     magnesium hydroxide (MILK OF MAG) 400 mg/5 mL Susp suspension Take 30 mL by mouth daily as needed.  0     metoprolol succinate (TOPROL XL) 25 MG Take 25 mg by mouth daily.       MULTIVITAMIN ORAL Take 1 tablet by mouth daily.        traZODone (DESYREL) 50 MG tablet Take 0.5-1 tablets (25-50 mg total) by mouth bedtime. 30 tablet 0     umeclidinium-vilanterol (ANORO ELLIPTA) 62.5-25 mcg/actuation inhaler Inhale 1 puff daily.       vitamins  A,C,E-zinc-copper (PRESERVISION AREDS) 14,320-226-200 unit-mg-unit cap Take 1 tablet by mouth 2 (two) times a day.       warfarin (COUMADIN) 3 MG tablet Take 1 tablet (3 mg total) by mouth daily. 30 tablet 0     zolpidem (AMBIEN) 5 MG tablet TAKE 1 TABLET BY MOUTH AT BEDTIME AS NEEDED 30 tablet 0     ondansetron (ZOFRAN) 8 MG tablet Take 1 tablet (8 mg total) by mouth every 8 (eight) hours as needed. 20 tablet 0     Current Facility-Administered Medications on File Prior to Visit   Medication Dose Route Frequency Provider Last Rate Last Dose      denosumab 60 mg (PROLIA 60 mg/ml)  60 mg Subcutaneous Q6 Months Cleopatra Chong MD   60 mg at 07/05/16 0548

## 2021-06-08 NOTE — ANESTHESIA CARE TRANSFER NOTE
Last vitals:   Vitals:    01/05/17 1058   BP: 103/56   Pulse: 75   Resp: 16   Temp: 36.4  C (97.5  F)   SpO2: 100%     Patient's level of consciousness is awake  Spontaneous respirations: yes  Maintains airway independently: yes  Dentition unchanged: yes  Oropharynx: oropharynx clear of all foreign objects    QCDR Measures:  ASA# 20 - Surgical Safety Checklist: ASA20A - Safety Checks Done  PQRS# 430 - Adult PONV Prevention: NA - Not adult patient, not GA or 3 or more risk factors NOT present  ASA# 8 - Peds PONV Prevention: NA - Not pediatric patient, not GA or 2 or more risk factors NOT present  PQRS# 424 - Svetlana-op Temp Management: 4559F - At least one body temp DOCUMENTED => 35.5C or 95.9F within required timeframe  PQRS# 426 - PACU Transfer Protocol: - Transfer of care checklist used  ASA# 14 - Acute Post-op Pain: ASA14B - Patient did NOT experience pain >= 7 out of 10    I completed my SBAR handoff to the receiving nurse per policy and procedure.

## 2021-06-08 NOTE — PROGRESS NOTES
This patient walked into our clinic this morning with complaints of shortness of breath after knee arthroplasty a couple weeks ago.  She's not been doing well.  She is very short of breath.  She is tearful today.  She states she is nauseated all the time.  She has a long history of heart and lung issues.  She has A. fib and cardiomyopathy as well as interstitial lung disease.    Her vital signs were reviewed.  Her vitals are stable at this time.  She is tearful.  No distress however.    Assessment and plan 81-year-old woman status post total knee with several days of shortness of breath and persistent nausea and not doing well at home.  Differential is broad and I discussed with patient and her family that we really cannot evaluate her issues here in the office.  She needs emergency room for prompt and comprehensive evaluation.  She may need admission to hospital.  She was wheeled over to the ER at [facility].

## 2021-06-08 NOTE — ANESTHESIA PREPROCEDURE EVALUATION
Anesthesia Evaluation      Patient summary reviewed   No history of anesthetic complications     Airway   Mallampati: II  Neck ROM: full   Pulmonary - normal exam   (+) COPD moderate,                          Cardiovascular   (+) dysrhythmias, cardiomyopathy,     Rhythm: irregular  Rate: normal,         Neuro/Psych - negative ROS     Endo/Other    (+) hypothyroidism, arthritis,      GI/Hepatic/Renal - negative ROS           Dental    (+) chipped and caps                       Anesthesia Plan  Planned anesthetic: spinal    ASA 3     Anesthetic plan and risks discussed with: patient  Anesthesia plan special considerations: antiemetics,   Post-op plan: routine recovery        Discussed regional and possible general anesthesia with patient.  Questions answered.

## 2021-06-08 NOTE — ANESTHESIA PROCEDURE NOTES
Peripheral Block    Patient location during procedure: pre-op  Start time: 1/5/2017 8:45 AM  End time: 1/5/2017 8:49 AM  post-op analgesia per surgeon order as noted in medical record  Staffing:  Performing  Anesthesiologist: SAM DUDLEY  Preanesthetic Checklist  Completed: patient identified, site marked, risks, benefits, and alternatives discussed, timeout performed, consent obtained, airway assessed, oxygen available, suction available, emergency drugs available and hand hygiene performed  Peripheral Block  Block type: sciatic  Prep: ChloraPrep  Patient position: supine  Patient monitoring: cardiac monitor, continuous pulse oximetry, heart rate and blood pressure  Laterality: right  Injection technique: ultrasound guided    Ultrasound used to visualize needle placement in proximity to nerve being blocked: yes   Permanent ultrasound image captured for medical record    Needle  Needle type: Stimuplex   Needle gauge: 22 G  Needle length: 4 in  no peripheral nerve catheter placed  Assessment  Injection assessment: negative aspiration for heme, no paresthesia on injection, incremental injection and no difficulty with injection  Additional Notes  A selective tibial nerve block was completed

## 2021-06-08 NOTE — ANESTHESIA POSTPROCEDURE EVALUATION
Patient: Mariia Tinoco  #2  RIGHT TOTAL KNEE ARTHROPLASTY  Anesthesia type: spinal    Patient location: PACU  Last vitals:   Vitals:    01/05/17 1325   BP: 113/66   Pulse: 75   Resp: 16   Temp:    SpO2: 97%     Post vital signs: stable  Level of consciousness: awake and responds to simple questions  Post-anesthesia pain: pain controlled  Post-anesthesia nausea and vomiting: no  Pulmonary: unassisted, return to baseline  Cardiovascular: stable and blood pressure at baseline  Hydration: adequate  Anesthetic events: no    QCDR Measures:  ASA# 11 - Svetlana-op Cardiac Arrest: ASA11B - Patient did NOT experience unanticipated cardiac arrest  ASA# 12 - Svetlana-op Mortality Rate: ASA12B - Patient did NOT die  ASA# 13 - PACU Re-Intubation Rate: ASA13B - Patient did NOT require a new airway mgmt  ASA# 10 - Composite Anes Safety: ASA10A - No serious adverse event  ASA# 38 - New Corneal Injury: ASA38A - No new exposure keratitis or corneal abrasion in PACU    Additional Notes:

## 2021-06-08 NOTE — TELEPHONE ENCOUNTER
ANTICOAGULATION  MANAGEMENT    Assessment     Today's INR result of 2.50 is Therapeutic (goal INR of 2.0-3.0)        Warfarin taken as previously instructed    No new diet changes affecting INR    No new medication/supplements affecting INR    Continues to tolerate warfarin with no reported s/s of bleeding or thromboembolism     Previous INR was Therapeutic at 2.90 on 4/14/20.    Plan:     Spoke with Tete regarding INR result and instructed:     Warfarin Dosing Instructions:   (has 3mg tabs)   - Continue current warfarin dose 4.5 mg daily on Mon/Thurs; and 3 mg daily rest of week.    Instructed patient to follow up no later than:  4 wks.   - INR scheduled on 6/15/20 along with Prolia injection @ [facility].    Education provided: importance of consistent vitamin K intake and target INR goal and significance of current INR result    Tete verbalizes understanding and agrees to warfarin dosing plan.    Instructed to call the [facility] for any changes, questions or concerns. (#[phone])   ?   Cassandra Banuelos RN    Subjective/Objective:      Mariia Tinoco, a 85 y.o. female is on warfarin.     Mariia reports:     Home warfarin dose: as updated on anticoagulation calendar per template     Missed doses: No     Medication changes:  No     S/S of bleeding or thromboembolism:  No     New Injury or illness:  No     Changes in diet or alcohol consumption:  No     Upcoming surgery, procedure or cardioversion:  No    Anticoagulation Episode Summary     Current INR goal:   2.0-3.0   TTR:   83.1 % (1 y)   Next INR check:   6/9/2020   INR from last check:   2.50 (5/12/2020)   Weekly max warfarin dose:      Target end date:      INR check location:      Preferred lab:      Send INR reminders to:   [facility]    Indications    PAF (paroxysmal atrial fibrillation) (H) (Resolved) [I48.0]           Comments:            Anticoagulation Care Providers     Provider Role Specialty Phone number    Eagle Shabazz MD  [facility] Internal Medicine [phone]

## 2021-06-09 ENCOUNTER — AMBULATORY - HEALTHEAST (OUTPATIENT)
Dept: LAB | Facility: CLINIC | Age: 86
End: 2021-06-09

## 2021-06-09 ENCOUNTER — COMMUNICATION - HEALTHEAST (OUTPATIENT)
Dept: ANTICOAGULATION | Facility: CLINIC | Age: 86
End: 2021-06-09

## 2021-06-09 DIAGNOSIS — I48.19 PERSISTENT ATRIAL FIBRILLATION (H): ICD-10-CM

## 2021-06-09 DIAGNOSIS — Z79.01 LONG TERM (CURRENT) USE OF ANTICOAGULANTS: ICD-10-CM

## 2021-06-09 LAB — INR PPP: 2.4 (ref 0.9–1.1)

## 2021-06-09 NOTE — TELEPHONE ENCOUNTER
ANTICOAGULATION  MANAGEMENT    Assessment     Today's INR result of 1.50 is Subtherapeutic (goal INR of 2.0-3.0)        Missed dose(s) may be affecting INR    - reported missing warfarin dose on 7/10 and also taking less warfarin dose per wk.    No new diet changes affecting INR    No new medication/supplements affecting INR    - reported ordering her Jantoven ([facility] mail-order and will be recvd on 7/21/20.    Continues to tolerate warfarin with no reported s/s of bleeding or thromboembolism     Previous INR was Subtherapeutic at 1.80 on 6/30/20.    Plan:     Spoke with Tete regarding INR result and instructed:    1.  Instead of cutting tabs in halves; adjusted warfarin dose so she wont have to cut tablets.    Warfarin Dosing Instructions:    (has 3mg tabs)   - Change warfarin dose to 6 mg daily on Tues/Thurs/Sat; and 3 mg daily rest of week.   - (11.1 % change)    Instructed patient to follow up no later than:  1-2 wks.    - does not drive (macular deg.) Will be coming in with 's appt also on 8/5/20 @ [facility].    Education provided: importance of consistent vitamin K intake, target INR goal and significance of current INR result, importance of taking warfarin as instructed and importance of notifying clinic for changes in medications    Tete verbalizes understanding and agrees to warfarin dosing plan.    Instructed to call the [facility] for any changes, questions or concerns. (#[phone])   ?   Cassandra Banuelos RN    Subjective/Objective:      Mariia Tinoco, a 85 y.o. female is on warfarin.     Mariia reports:     Home warfarin dose: template incorrect; verbally confirmed home dose with Tete and updated on anticoagulation calendar - taking less warfarin dose per wk.     Missed doses: Yes:  One missed dose on 7/10.     Medication changes:  No     S/S of bleeding or thromboembolism:  No     New Injury or illness:  No     Changes in diet or alcohol consumption:  No     Upcoming surgery, procedure or  cardioversion:  No    Anticoagulation Episode Summary     Current INR goal:   2.0-3.0   TTR:   74.9 % (1 y)   Next INR check:   7/28/2020   INR from last check:   1.50! (7/14/2020)   Weekly max warfarin dose:      Target end date:      INR check location:      Preferred lab:      Send INR reminders to:   [facility] [place]    Indications    PAF (paroxysmal atrial fibrillation) (H) (Resolved) [I48.0]           Comments:            Anticoagulation Care Providers     Provider Role Specialty Phone number    Eagle Shabazz MD Referring Internal Medicine [phone]

## 2021-06-09 NOTE — TELEPHONE ENCOUNTER
Last Prolia injection given on 12/12/19. Patient is scheduled for 6/22/20. She has no active Prolia injection orders, please advise if ok to place future orders. Thanks.

## 2021-06-09 NOTE — TELEPHONE ENCOUNTER
Who is calling:  Patient   Reason for Call:  The patient is returning a call to [facility] regarding today's INR. The patient is going to be out for a couple hours but available around 4 pm or later.    Okay to leave a detailed message: Yes

## 2021-06-09 NOTE — TELEPHONE ENCOUNTER
ANTICOAGULATION  MANAGEMENT    Assessment     Today's INR result of 1.80 is Subtherapeutic (goal INR of 2.0-3.0)        Warfarin taken as previously instructed    No new diet changes affecting INR    No new medication/supplements affecting INR    Continues to tolerate warfarin with no reported s/s of bleeding or thromboembolism     Previous INR was Subtherapeutic at 1.90 on 6/15/20.    No new changes with regiments - diet or meds.    Plan:     Spoke with Tete regarding INR result and instructed:     Warfarin Dosing Instructions:  (has 3mg tabs)   - Change warfarin dose to 4.5 mg daily on Mon/Wed/Fri ; and 3 mg daily rest of week.   - (6.3 % change)    Instructed patient to follow up no later than:  1-2 wks.   - INR scheduled on 7/6/20 @ [facility].    Education provided: importance of consistent vitamin K intake, target INR goal and significance of current INR result and monitoring for clotting signs and symptoms    Tete verbalizes understanding and agrees to warfarin dosing plan.    Instructed to call the [facility] for any changes, questions or concerns. (#[phone])   ?   Cassandra Banuelos RN    Subjective/Objective:      Mariia Tinoco, a 85 y.o. female is on warfarin.     Mariia reports:     Home warfarin dose: as updated on anticoagulation calendar per template     Missed doses: No     Medication changes:  Yes:  Prolia injection administered.   - during exacerbation of bronchitis - takes Doxycycline for 7 days and Prednisone 40mg for 5 days, then 20mg fo 5 days.     S/S of bleeding or thromboembolism:  No     New Injury or illness:  No     Changes in diet or alcohol consumption:  No     Upcoming surgery, procedure or cardioversion:  No    Anticoagulation Episode Summary     Current INR goal:   2.0-3.0   TTR:   80.9 % (1 y)   Next INR check:   7/6/2020   INR from last check:   1.80! (6/22/2020)   Weekly max warfarin dose:      Target end date:      INR check location:      Preferred lab:      Send INR  Problem: Pain (Non-Labor and/or Postpartum)  Goal: Acceptable pain/ comfort level is achieved/maintained at rest and with activity without oversedation (based on self-reporting using NRS / Faces)  RN and pt discussed pain management goals and levels.       reminders to:   [facility]    Indications    PAF (paroxysmal atrial fibrillation) (H) (Resolved) [I48.0]           Comments:            Anticoagulation Care Providers     Provider Role Specialty Phone number    Eagle Shabazz MD Referring Internal Medicine [phone]

## 2021-06-09 NOTE — TELEPHONE ENCOUNTER
"Prolia Injection Phone Screen      Screening questions have been asked 2-3 days prior to administration visit for Prolia. If any questions are answered with \"Yes,\" this phone encounter were will routed to ordering provider for further evaluation.     1.  When was the last injection?  12/12/19    2.  Has insurance for this injection been verified?  Yes    3.  Did you experience any new onset achiness or rashes that lasted for over a month with your previous Prolia injection?   No    4.  Do you have a fever over 101?F or a new deep cough that is unusual for you today? No    5.  Have you started any new medications in the last 6 months that you were told could affect your immune system? These may have been prescribed by oncologist, transplant, rheumatology, or dermatology.   No    6.  In the last 6 months have you have gastric bypass or parathyroid surgery?   No    7.  Do you plan dental work requiring drilling into the bone such as implants/extractions or oral surgery in the next 2-3 months?   No    Patient informed if symptoms discussed above present prior to their administration appointment, they are to notify clinic immediately.     Brynn Vazquez            "

## 2021-06-09 NOTE — TELEPHONE ENCOUNTER
Yes I believe you usually order the Prolia for patient. Orders placed to be co-sign today.     DXA last completed on 3/2/20    Notes recorded by Eagle Shabazz MD on 3/3/2020 at 5:10 PM CST   Please send results:   Bones are looking better.  Good news.  Continue Prolia and calcium and vitamin D, as well as regular walking, and we should recheck again in 2 years.

## 2021-06-09 NOTE — PROGRESS NOTES
Mariia Tinoco is here today for her Prolia injection. She is within the expected time frame. Injection given without incident. See MAR for administration details.       The following steps were completed to comply with the REMS program for Prolia:  1. Ordering provider has previously reviewed information in the Medication Guide and Patient Counseling Chart, including the serious risks of Prolia  and the symptoms of each risk and have been advised to seek prompt medical attention if they have signs or symptoms of any of the serious risks.  2. Provided each patient a copy of the Medication Guide and Patient Brochure.  See MAR for administration details.   Indication: Prolia  (denosumab) is a prescription medicine used to treat osteoporosis in patients who:   Are at high risk for fracture, meaning patients who have had a fracture related to osteoporosis, or who have multiple risk factors for fracture; Cannot use another osteoporosis medicine or other osteoporosis medicines did not work well.   The timeline for early/late injections would be 4 weeks early and any time after the 6 month cathy. If a patient receives their injection late, then the subsequent injection would be 6 months from the date that they actually received the injection    Have the screening questions been asked prior to this administration? Yes      Janny Rivero LPN

## 2021-06-09 NOTE — TELEPHONE ENCOUNTER
ANTICOAGULATION  MANAGEMENT    Assessment     Today's INR result of 1.80 is Subtherapeutic (goal INR of 2.0-3.0)        Warfarin taken as previously instructed    No new diet changes affecting INR    No new medication/supplements affecting INR    Continues to tolerate warfarin with no reported s/s of bleeding or thromboembolism     Previous INR was Subtherapeutic at 1.80 on 6/22/20.  (3x INR's subtherapeutic).    Continues to report no new changes in diet (no protein drinks or bars, or no new meds.  Has not taken any Prednisone any new supplements. Has not renewed her Jantoven from mail order yet.    Plan:     Spoke with Tete regarding INR result and instructed:    1.  Subtherapeutic INR continues, will adjust wkly warfarin dose again and ensure INR stability.    Warfarin Dosing Instructions:  (has 3mg tabs)   - tonight, advised one time booster with 6mg warfarin dose,   - then change warfarin dose to 3 mg daily on Mon/Wed/Fri; and 4.5 mg daily rest of week.   - (5.9 % change)    Instructed patient to follow up no later than:  1-2 wks.   - INR scheduled on 7/14/20 @ [facility].    Education provided: importance of consistent vitamin K intake, target INR goal and significance of current INR result, importance of notifying clinic for changes in medications, monitoring for clotting signs and symptoms and when to seek medical attention/emergency care    Tete verbalizes understanding and agrees to warfarin dosing plan.    Instructed to call the [facility] for any changes, questions or concerns. (#[phone])   ?   Cassandra Banuelos RN    Subjective/Objective:      Mariia Tinoco, a 85 y.o. female is on warfarin.     Mariia reports:     Home warfarin dose: as updated on anticoagulation calendar per template     Missed doses: No     Medication changes:  No     S/S of bleeding or thromboembolism:  No     New Injury or illness:  No     Changes in diet or alcohol consumption:  No     Upcoming surgery, procedure or  cardioversion:  No    Anticoagulation Episode Summary     Current INR goal:   2.0-3.0   TTR:   78.7 % (1 y)   Next INR check:   7/14/2020   INR from last check:   1.80! (6/30/2020)   Weekly max warfarin dose:      Target end date:      INR check location:      Preferred lab:      Send INR reminders to:   [facility]    Indications    PAF (paroxysmal atrial fibrillation) (H) (Resolved) [I48.0]           Comments:            Anticoagulation Care Providers     Provider Role Specialty Phone number    Eagle Shabazz MD Referring Internal Medicine [phone]

## 2021-06-10 NOTE — TELEPHONE ENCOUNTER
ANTICOAGULATION  MANAGEMENT    Assessment     Today's INR result of 2.80 is Therapeutic (goal INR of 2.0-3.0)        Missed dose(s) may be affecting INR    - reported missing 6mg warfarin dose one wk ago.    No new diet changes affecting INR    No new medication/supplements affecting INR    Continues to tolerate warfarin with no reported s/s of bleeding or thromboembolism     Previous INR was Supratherapeutic at 3.70 on 8/5/20.    Plan:     Spoke on phone with Tete regarding INR result and instructed:     Warfarin Dosing Instructions:   (has 3mg tabs)    Continue current warfarin dose 6 mg daily on Wed/Sat; and 3 mg daily rest of week.    Instructed patient to follow up no later than:  2 wks.   - INR scheduled on 9/2/20 @ [facility].    Education provided: importance of consistent vitamin K intake, target INR goal and significance of current INR result, importance of taking warfarin as instructed and importance of notifying clinic for changes in medications    Tete verbalizes understanding and agrees to warfarin dosing plan.    Instructed to call the [facility] for any changes, questions or concerns. (#[phone])   ?   Cassandra Banuelos RN    Subjective/Objective:      Mariiasergey Tinoco, a 85 y.o. female is on warfarin.     Mariia reports:     Home warfarin dose: verbally confirmed home dose with Tete and updated on anticoagulation calendar     Missed doses: Yes:  Missed 6mg warfarin dose on 8/12.     Medication changes:  Yes:  Has received her new batch of Jantoven from mail order and has been taking this RX since last INR.     S/S of bleeding or thromboembolism:  No     New Injury or illness:  No     Changes in diet or alcohol consumption:  No     Upcoming surgery, procedure or cardioversion:  No    Anticoagulation Episode Summary     Current INR goal:   2.0-3.0   TTR:   68.6 % (1 y)   Next INR check:   9/2/2020   INR from last check:   2.80 (8/19/2020)   Weekly max warfarin dose:      Target end date:       INR check location:      Preferred lab:      Send INR reminders to:   [facility]    Indications    PAF (paroxysmal atrial fibrillation) (H) (Resolved) [I48.0]           Comments:            Anticoagulation Care Providers     Provider Role Specialty Phone number    Eagle Shabazz MD Referring Internal Medicine [phone]

## 2021-06-10 NOTE — TELEPHONE ENCOUNTER
ANTICOAGULATION  MANAGEMENT    Assessment     Today's INR result of 3.70 is Supratherapeutic (goal INR of 2.0-3.0)        Warfarin taken as previously instructed    - received new batch of Jantoven tabs 2 wks ago from mail order.    No new diet changes affecting INR    No new medication/supplements affecting INR    Continues to tolerate warfarin with no reported s/s of bleeding or thromboembolism     Previous INR was Subtherapeutic at 1.50 on 7/14/20    Plan:     Spoke on phone with Tete regarding INR result and instructed:     Warfarin Dosing Instructions:  (has 3mg tabs)   - today, advised to hold warfarin dose,   - then change warfarin dose to 6 mg daily on Wed/Sat; and 3 mg daily rest of week.   - (10 % change)    Instructed patient to follow up no later than: 2 wks.   - INR scheduled on 8/19/20 @ [facility].    Education provided: importance of consistent vitamin K intake and target INR goal and significance of current INR result    Tete verbalizes understanding and agrees to warfarin dosing plan.    Instructed to call the [facility] for any changes, questions or concerns. (#[phone])   ?   Cassandra Banuelos RN    Subjective/Objective:      Mariia Tinoco, a 85 y.o. female is on warfarin.     Mariia reports:     Home warfarin dose: as updated on anticoagulation calendar per template     Missed doses: No     Medication changes:  No     S/S of bleeding or thromboembolism:  No     New Injury or illness:  No     Changes in diet or alcohol consumption:  No     Upcoming surgery, procedure or cardioversion:  No    Anticoagulation Episode Summary     Current INR goal:   2.0-3.0   TTR:   71.6 % (1 y)   Next INR check:   8/19/2020   INR from last check:   3.70! (8/5/2020)   Weekly max warfarin dose:      Target end date:      INR check location:      Preferred lab:      Send INR reminders to:   [facility]    Indications    PAF (paroxysmal atrial fibrillation) (H) (Resolved) [I48.0]            Comments:            Anticoagulation Care Providers     Provider Role Specialty Phone number    Eagle Shabazz MD Referring Internal Medicine [phone]

## 2021-06-11 NOTE — TELEPHONE ENCOUNTER
She can do it when she comes in for INR. Please place order for UA with micro.  Dx is abnormal UA.  Thanks.

## 2021-06-11 NOTE — TELEPHONE ENCOUNTER
----- Message from Eagle Shabazz MD sent at 9/16/2020  4:48 PM CDT -----  Please call pt:    Tete, you grew a few bacteria out in your urine.  Are you having any symptoms of urinary tract infection?  If not, I would recommend we just repeat a urinalysis at your convenience in the next few days.  If you are having symptoms I would recommend that we give you a few days of an antibiotic.  Let me know.  Remainder of your labs all look perfect.

## 2021-06-11 NOTE — TELEPHONE ENCOUNTER
Called and spoke with Tete.     - reported will be finished with Prednisone and Doxycylcine on 9/11/20.    (her pulmonologist, prescribes Doxy and Prednisone to have on hand, in case she has flare-up of chronic bronchitis.  Hx of chr. Interstitial lung disease._        - INR already scheduled on Tues. 9/15/20 during OV with Dr. Shabazz.

## 2021-06-11 NOTE — TELEPHONE ENCOUNTER
Who is calling: Patient  Reason for Call:  The patient is returning a call to [facility] regarding today's INR.     Okay to leave a detailed message: Yes

## 2021-06-11 NOTE — PROGRESS NOTES
Assessment and Plan:   1. Encounter for Medicare annual wellness exam  Given her underlying lung disease will update her 23 valent pneumococcal vaccine.  She will get flu shot soon.  Otherwise up-to-date on her health maintenance.  We discussed regular exercise through the winter months.    2. Persistent atrial fibrillation (H)    - INR  - HM2(CBC w/o Differential)    3. Urinary urgency  Offered repeat urologic evaluation she declines.  - Urinalysis-UC if Indicated    4. Urge incontinence of urine  Above.  - Urinalysis-UC if Indicated    5. 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   - Comprehensive Metabolic Panel  - Vitamin D, Total (25-Hydroxy)    6. Macular Degeneration  Stable.  Continue close follow-up with her ophthalmologist.    7. Robert Bonnet syndrome  As above.    8. Cardiomyopathy, unspecified type (H)  Seems euvolemic.  She will be meeting with her cardiologist later this fall.  - Comprehensive Metabolic Panel    9. Chronic Interstitial Lung Disease  Stable.  We discussed COVID-19 today.  She would be very high risk for morbidity and mortality from this.    10. Hypothyroidism, unspecified type  Seems euthyroid.  Check TSH.  - Lipid Cascade  - Thyroid Isabella        The patient's current medical problems were reviewed.    I have had an Advance Directives discussion with the patient.  The following health maintenance schedule was reviewed with the patient and provided in printed form in the after visit summary:   Health Maintenance   Topic Date Due     INFLUENZA VACCINE RULE BASED (1) 08/01/2020     FALL RISK ASSESSMENT  11/22/2020     MEDICARE ANNUAL WELLNESS VISIT  09/15/2021     ADVANCE CARE PLANNING  12/07/2021     DXA SCAN  03/02/2022     TD 18+ HE  11/28/2024     SPIROMETRY  Completed     COPD ACTION PLAN  Completed     PNEUMOCOCCAL IMMUNIZATION 65+ LOW/MEDIUM RISK   Completed     ZOSTER VACCINES  Addressed     HEPATITIS B VACCINES  Aged Out        Subjective:   Chief Complaint: Mariia Tinoco is an 85 y.o. female here for an Annual Wellness visit.   HPI: Tete comes in today for annual wellness.  Pleasant woman whom I have known for many years.  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  She has Robert Bonnet syndrome.  She will get a hallucination at least twice a day she tells me.  She has chronic bronchitis and pulmonary fibrosis for which she follows with pulmonary.  Due for pneumococcal booster today.  She will get her flu shot next week or later this week with her .  She had a recent flare of her chronic bronchitis which she treated with medications from her pulmonologist.  She is better now.  She sees her cardiologist later this year.  No heart failure or bleeding on her warfarin.  She notes urinary urgency and incontinence.  She has met with urology in the past but does not feel the need to see them again.  She felt that visit to be very unhelpful.  They told her to do some pelvic floor exercises which she dislikes doing so she does not do them.  She tries to stay active.  Walks nearly every day.  She listens to and audiobook frequently.  She can no longer read due to her blindness.    Review of Systems:    Please see above.  The rest of the review of systems are negative for all systems.    Patient Care Team:  Eagle Shabazz MD as PCP - Dorie Brito MD as Physician (Critical Care Medicine)  Eagle Shabazz MD as Assigned PCP  Dorene Oconnell, PharmD as Pharmacist     Patient Active Problem List   Diagnosis     Macular Degeneration     Colonic Diverticulosis     Cervical Disc Degeneration     Hypothyroidism     Chronic Interstitial Lung Disease     Persistent atrial fibrillation (H)     Chronic Bronchitis     Biventricular cardiac pacemaker in situ     Cardiomyopathy (H)     Osteoporosis, senile on Prolia     Primary osteoarthritis of right  knee     Benign paroxysmal positional vertigo, unspecified laterality     Robert Bonnet syndrome     Past Medical History:   Diagnosis Date     Acute sinusitis     past hx     Biventricular cardiac pacemaker in situ      Cardiomyopathy (H)      Chronic bronchitis (H)      Chronic interstitial lung disease (H)      COPD (chronic obstructive pulmonary disease) (H)      Disc disease, degenerative, cervical      Diverticulosis      Edema      Hypothyroidism      Macular degeneration      Osteoarthritis      Osteoporosis      Ovarian cancer (H)      Persistent atrial fibrillation (H)      Pulmonary fibrosis (H)      Tuberculosis     past hx      Past Surgical History:   Procedure Laterality Date     LUNG REMOVAL, PARTIAL Right     part of lobe     NC CARDIOVERSION ELECTIVE ARRHYTHMIA EXTERNAL      Description: Elective Cardioversion External;  Recorded: 08/31/2012;     NC REMOVAL OF OVARY(S)      Description: Oophorectomy;  Recorded: 08/31/2012;     NC REMOVAL OF TONSILS,<11 Y/O      Description: Tonsillectomy;  Recorded: 08/31/2012;     NC TOTAL ABDOM HYSTERECTOMY      Description: Hysterectomy;  Recorded: 08/31/2012;     NC TOTAL KNEE ARTHROPLASTY Right 1/5/2017    Procedure:  RIGHT TOTAL KNEE ARTHROPLASTY;  Surgeon: Andres Phillips MD;  Location: [facility];  Service: Orthopedics      No family history on file.   Social History     Socioeconomic History     Marital status:      Spouse name: Not on file     Number of children: Not on file     Years of education: Not on file     Highest education level: Not on file   Occupational History     Not on file   Social Needs     Financial resource strain: Not on file     Food insecurity     Worry: Not on file     Inability: Not on file     Transportation needs     Medical: Not on file     Non-medical: Not on file   Tobacco Use     Smoking status: Never Smoker     Smokeless tobacco: Never Used   Substance and Sexual Activity     Alcohol use: Yes      Alcohol/week: 1.0 standard drinks     Types: 1 Glasses of wine per week     Comment: occasional     Drug use: No     Sexual activity: Not on file   Lifestyle     Physical activity     Days per week: Not on file     Minutes per session: Not on file     Stress: Not on file   Relationships     Social connections     Talks on phone: Not on file     Gets together: Not on file     Attends Alevism service: Not on file     Active member of club or organization: Not on file     Attends meetings of clubs or organizations: Not on file     Relationship status: Not on file     Intimate partner violence     Fear of current or ex partner: Not on file     Emotionally abused: Not on file     Physically abused: Not on file     Forced sexual activity: Not on file   Other Topics Concern     Not on file   Social History Narrative     Not on file      Current Outpatient Medications   Medication Sig Dispense Refill     acetaminophen (TYLENOL) 500 MG tablet Take 1,000 mg by mouth every 6 (six) hours.       albuterol (PROAIR HFA;PROVENTIL HFA;VENTOLIN HFA) 90 mcg/actuation inhaler Inhale 2 puffs every 6 (six) hours as needed for wheezing. 1 each 0     calcium carbonate (OS-ZARI) 600 mg (1,500 mg) tablet Take 600 mg by mouth 2 (two) times a day with meals.        cholecalciferol, vitamin D3, 2,000 unit capsule Take 1 capsule (2,000 Units total) by mouth daily.  0     clobetasol (TEMOVATE) 0.05 % external solution Apply 1 application topically 2 (two) times a day as needed. To scalp as needed       doxylamine succinate (SLEEP AID, DOXYLAMINE, ORAL) Take 0.5 tablets by mouth at bedtime as needed.       fluticasone-vilanterol (BREO ELLIPTA) 100-25 mcg/dose DsDv inhaler Inhale 1 puff daily.       levothyroxine (SYNTHROID, LEVOTHROID) 75 MCG tablet TAKE 1 TABLET DAILY 90 tablet 3     metoprolol succinate (TOPROL XL) 25 MG Take 25 mg by mouth daily.       warfarin ANTICOAGULANT (JANTOVEN) 3 MG tablet Take 1 tablet (3mg) daily and take 1 &1/2  "tablets (4.5mg) on Thursday by mouth joyce, as directed.  Adjust dose based on INR results. 110 tablet 1     ondansetron (ZOFRAN) 8 MG tablet Take 1 tablet (8 mg total) by mouth every 8 (eight) hours as needed. 20 tablet 0     No current facility-administered medications for this visit.       Objective:   Vital Signs:   Visit Vitals  /64   Pulse 74   Temp 97.6  F (36.4  C)   Ht 5' 4\" (1.626 m)   Wt 160 lb (72.6 kg)   SpO2 99%   BMI 27.46 kg/m           VisionScreening:  No exam data present     PHYSICAL EXAM  Delightful woman.  No distress.  Pretty good spirits.  HEENT is unremarkable.  She is wearing her mask.  Neck supple.  Lungs with bibasilar dry crackles which are unchanged.  Good aeration no wheezes.  Heart sounds regular today.  Abdomen soft and nontender.  Extremities without edema.  No focal motor deficits today.    Assessment Results 9/15/2020   Activities of Daily Living No help needed   Instrumental Activities of Daily Living No help needed   Get Up and Go Score Less than 12 seconds   Some recent data might be hidden     A Mini-Cog score of 0-2 suggests the possibility of dementia, score of 3-5 suggests no dementia        Identified Health Risks:     Information on urinary incontinence and treatment options given to patient.  Patient's advanced directive was discussed and I am comfortable with the patient's wishes.        "

## 2021-06-11 NOTE — TELEPHONE ENCOUNTER
Who is calling:  patient  Reason for Call:  Tomorrow is the last day for patient to take her prednisone, and she is also done with her antibiotic.  Patient needs to know when she should schedule INR  Date of last appointment with primary care: n/a  Okay to leave a detailed message: Yes

## 2021-06-11 NOTE — TELEPHONE ENCOUNTER
ANTICOAGULATION  MANAGEMENT    Assessment     Today's INR result of 2.70 is Therapeutic (goal INR of 2.0-3.0)        Warfarin taken as previously instructed    No new diet changes affecting INR    No new medication/supplements affecting INR    Continues to tolerate warfarin with no reported s/s of bleeding or thromboembolism     Previous INR was Supratherapeutic at 4.40 on 9/15/20.    Reported doing well after ABX and Prednisone therapy for acute bronchitis.    Plan:     Spoke on phone with Tete regarding INR result and instructed:     Warfarin Dosing Instructions:    - Continue current warfarin dose 6 mg daily on Wed/Sat; and 3 mg daily rest of week.    Instructed patient to follow up no later than:  2 wks.   - INR scheduled on 10/14/20 @ [facility].    Education provided: target INR goal and significance of current INR result, importance of notifying clinic for changes in medications and importance of notifying clinic for diarrhea, nausea/vomiting, reduced intake and/or illness    Tete verbalizes understanding and agrees to warfarin dosing plan.    Instructed to call the [facility] for any changes, questions or concerns. (#[phone])   ?   Cassandra Banuelos RN    Subjective/Objective:      Mariia Tinoco, a 85 y.o. female is on warfarin.     Mariia reports:     Home warfarin dose: verbally confirmed home dose with Tete and updated on anticoagulation calendar     Missed doses: No     Medication changes:  No     S/S of bleeding or thromboembolism:  No     New Injury or illness:  No     Changes in diet or alcohol consumption:  No     Upcoming surgery, procedure or cardioversion:  No    Anticoagulation Episode Summary     Current INR goal:   2.0-3.0   TTR:   65.5 % (1 y)   Next INR check:   10/7/2020   INR from last check:   2.70 (9/23/2020)   Weekly max warfarin dose:      Target end date:      INR check location:      Preferred lab:      Send INR reminders to:   [facility]    Indications     PAF (paroxysmal atrial fibrillation) (H) (Resolved) [I48.0]           Comments:            Anticoagulation Care Providers     Provider Role Specialty Phone number    Eagle Shabazz MD Referring Internal Medicine [phone]

## 2021-06-11 NOTE — TELEPHONE ENCOUNTER
Spoke with her, she is not having any symptoms.  She dose have an INR appointment next week on the 23 rd, can she wait till then?  If not I will call her to come in this week.    Dolores Zayas CMA ([facility])

## 2021-06-11 NOTE — TELEPHONE ENCOUNTER
ANTICOAGULATION  MANAGEMENT    Assessment     Today's INR result of 4.40 is Supratherapeutic (goal INR of 2.0-3.0)        Less warfarin taken than instructed which may be affecting INR    No new diet changes affecting INR    Potential interaction between Doxycycline and Prednisone and warfarin which may affect subsequent INRs    - per Pulmonologist - completed Doxycycline for 7 days and tapering Prednisone 40mg daily x5 days, then 20mg for 5 days from 9/2 - 12/20    - has RX on hand given by Pulmonologist for flare-up of chronic bronchitis r/t Interstitial lung disease.   - vaccinated today with Pneumo Polysac 23-V.    Continues to tolerate warfarin with no reported s/s of bleeding or thromboembolism     Previous INR was Therapeutic at 2.80 on 8/1920.    Tete reported being so sick.  But feels so much better now.    Annual wellness visit today with Dr. Shabazz.  Urinary incontinence - UC result pending.      Plan:     Spoke on phone with Tete regarding INR result and instructed:     Warfarin Dosing Instructions:  (has 3mg tabs)   - HOLD warfarin doses for 2 days, 9/15-16. then continue current warfarin dose 6 mg daily on Wed/Sat; and 3 mg daily rest of week.    Instructed patient to follow up no later than:  7- 10 days.   - INR scheduled on 9/23/20 @ [facility].    Education provided: importance of consistent vitamin K intake, target INR goal and significance of current INR result, potential interaction between warfarin and Doxycycline and Prednisone and importance of notifying clinic for changes in medications    Tete verbalizes understanding and agrees to warfarin dosing plan.    Instructed to call the [facility] for any changes, questions or concerns. (#[phone])   ?   Cassandra Banuelos RN    Subjective/Objective:      Mariia Tinoco, a 85 y.o. female is on warfarin.     Mariia reports:     Home warfarin dose: template incorrect; verbally confirmed home dose with Tete and updated on anticoagulation  calendar - she reported she is blind, when filling out the form.  However, she stated correct warfarin dose, as instructed.     Missed doses: No     Medication changes:  Yes:  Completed Doxycycline and Prednisone.   - per [employer], Concurrent use of WARFARIN and DOXYCYCLINE may result in an increased risk of bleeding.   - per [employer], Concurrent use of PREDNISONE and WARFARIN may result in increased risk of bleeding or diminished effects of warfarin.        S/S of bleeding or thromboembolism:  No     New Injury or illness:  Yes:  Flare up of chronic bronchitis with cough, shortness of breath, and body aches.     Changes in diet or alcohol consumption:  No     Upcoming surgery, procedure or cardioversion:  No    Anticoagulation Episode Summary     Current INR goal:   2.0-3.0   TTR:   65.1 % (1 y)   Next INR check:   9/23/2020   INR from last check:   4.40! (9/15/2020)   Weekly max warfarin dose:      Target end date:      INR check location:      Preferred lab:      Send INR reminders to:   [facility]    Indications    PAF (paroxysmal atrial fibrillation) (H) (Resolved) [I48.0]           Comments:            Anticoagulation Care Providers     Provider Role Specialty Phone number    Eagle Shabazz MD Referring Internal Medicine [phone]

## 2021-06-11 NOTE — PROGRESS NOTES
"Mariia Tinoco is a 85 y.o. female who is being evaluated via a billable telephone visit.      The patient has been notified of following:     \"This telephone visit will be conducted via a call between you and your physician/provider. We have found that certain health care needs can be provided without the need for a physical exam.  This service lets us provide the care you need with a short phone conversation.  If a prescription is necessary we can send it directly to your pharmacy.  If lab work is needed we can place an order for that and you can then stop by our lab to have the test done at a later time.    Telephone visits are billed at different rates depending on your insurance coverage. During this emergency period, for some insurers they may be billed the same as an in-person visit.  Please reach out to your insurance provider with any questions.    If during the course of the call the physician/provider feels a telephone visit is not appropriate, you will not be charged for this service.\"    Patient has given verbal consent to a Telephone visit? Yes    What phone number would you like to be contacted at? [phone]    Patient would like to receive their AVS by AVS Preference: Nelida.    Additional provider notes: 85-year-old woman with a history of interstitial lung disease and chronic bronchitis.  The past 24 hours she has been experiencing increasing coughing, some chills, she is feeling unwell.  She has a rattle in her chest.  She feels a little better this morning compared to last night.  No known exposure to COVID.  Assessment/Plan:  This 85-year-old woman with underlying lung disease now has increasing cough and some chills.  She should be screened for COVID-19.  An order for Cologuard testing has been placed.  In the meantime she will quarantine until we get the results back.  If this test is negative and she continues to have chills and feeling sick, she should probably be evaluated for " pneumonia.  She knows she has to call back.      Phone call duration:  8 minutes    Cathy Fuentes [facility]

## 2021-06-11 NOTE — PATIENT INSTRUCTIONS - HE
Patient Education   Urinary Incontinence, Female (Adult)  Urinary incontinence means loss of control of the bladder. This problem affects many women, especially as they get older. If you have incontinence, you may be embarrassed to ask for help. But know that this problem can be treated.  Types of Incontinence  There are different types of incontinence. Two of the main types are described here. You can have more than one type.    Stress incontinence. With this type, urine leaks when pressure (stress) is put on the bladder. This may happen when you cough, sneeze, or laugh. Stress incontinence most often occurs because the pelvic floor muscles that support the bladder and urethra are weak. This can happen after pregnancy and vaginal childbirth or a hysterectomy. It can also be due to excess body weight or hormone changes.    Urge incontinence (also called overactive bladder). With this type, a sudden urge to urinate is felt often. This may happen even though there may not be much urine in the bladder. The need to urinate often during the night is common. Urge incontinence most often occurs because of bladder spasms. This may be due to bladder irritation or infection. Damage to bladder nerves or pelvic muscles, constipation, and certain medicines can also lead to urge incontinence.  Treatment of urinary incontinence depends on the cause. Further evaluation is needed to find the type you have. This will likely include an exam and certain tests. Based on the results, you and your healthcare provider can then plan treatment. Until a diagnosis is made, the home care tips below can help relieve symptoms.  Home care    Do pelvic floor muscle exercises, if they are prescribed. The pelvic floor muscles help support the bladder and urethra. Many women find that their symptoms improve when doing special exercises that strengthen these muscles. To do the exercises contract the muscles you would use to stop your stream of urine,  but do this when you re not urinating. Hold for 10 seconds, then relax. Repeat 10 to 20 times in a row, at least 3 times a day. Your provider may give you other instructions for how to do the exercises and how often.    Keep a bladder diary. This helps track how often and how much you urinate over a set period of time. Bring this diary with you to your next visit with the provider. The information can help your provider learn more about your bladder problem.    Lose weight, if advised to by your provider. Excess weight puts pressure on the bladder. Your provider can help you create a weight-loss plan that s right for you. This may include exercising more and making certain diet changes.    Don't consume foods and drinks that may irritate the bladder. These can include alcohol and caffeinated drinks.    Quit smoking. Smoking and other tobacco use can lead to chronic cough that strains the pelvic floor muscles. Smoking may also damage the bladder and urethra. Talk with your provider about treatments or methods you can use to quit smoking.    If drinking large amounts of fluid causes you to have symptoms, you may be advised to limit your fluid intake. You may also be advised to drink most of your fluids during the day and to limit fluids at night.    If you re worried about urine leakage or accidents, you may wear absorbent pads to catch urine. Change the pads often. This helps reduce discomfort. It may also reduce the risk of skin or bladder infections.  Follow-up care  Follow up with your healthcare provider, or as directed. It may take some to find the right treatment for your problem. Your treatment plan may include special therapies or medicines. Certain procedures or surgery may also be options. Be sure to discuss any questions you have with your provider.  When to seek medical advice  Call the healthcare provider right away if any of these occur:    Fever of 100.4 F (38 C) or higher, or as directed by your  provider    Bladder pain or fullness    Abdominal swelling    Nausea or vomiting    Back pain    Weakness, dizziness or fainting  Date Last Reviewed: 10/1/2017    1164-7154 [employer]. [address] [facility], [place] [address]. All rights reserved. This information is not intended as a substitute for professional medical care. Always follow your healthcare professional's instructions.       Advance Directive  Patient s advance directive was discussed and I am comfortable with the patient s wishes.  Patient Education   Personalized Prevention Plan  You are due for the preventive services outlined below.  Your care team is available to assist you in scheduling these services.  If you have already completed any of these items, please share that information with your care team to update in your medical record.  Health Maintenance   Topic Date Due     INFLUENZA VACCINE RULE BASED (1) 08/01/2020     FALL RISK ASSESSMENT  11/22/2020     MEDICARE ANNUAL WELLNESS VISIT  09/15/2021     ADVANCE CARE PLANNING  12/07/2021     DXA SCAN  03/02/2022     TD 18+ HE  11/28/2024     SPIROMETRY  Completed     COPD ACTION PLAN  Completed     PNEUMOCOCCAL IMMUNIZATION 65+ LOW/MEDIUM RISK  Completed     ZOSTER VACCINES  Addressed     HEPATITIS B VACCINES  Aged Out

## 2021-06-12 NOTE — PROGRESS NOTES
Mariia is here today for her Prolia injection. She is within the expected time frame. Injection given without incident. See MAR for administration details.   Cammy Fuentes CSS

## 2021-06-12 NOTE — PROGRESS NOTES
Office Visit - Follow Up   Mariia Tinoco   82 y.o. female    Date of Visit: 8/16/2017    Chief Complaint   Patient presents with     Follow-up     6 mo f/u - not fasting     Insomnia     discuss how much melatonin to take      INR Check        Assessment and Plan   1. Insomnia  She can continue with the melatonin.  She only uses rare Ambien once or twice a week.  We discussed good sleep hygiene today per    2. Osteoporosis, senile  She is due for a Prolia shot.  We will try to get that here at this clinic rather than having her go out to the [employer].  She will see Dr. Luis next year.  - Comprehensive Metabolic Panel    3. Persistent atrial fibrillation  INR today for monitoring.  - HM2(CBC w/o Differential)  - INR    4. ILD (interstitial lung disease)  Stable per patient report.  She is not hypoxic today.  She actually looks quite good.  Continue inhalers per Dr. Salmon.  - Comprehensive Metabolic Panel    5. Hypothyroidism  She seems euthyroid.  Check TSH today for monitoring.  - Thyroid Stimulating Hormone (TSH)        Return in about 6 months (around 2/16/2018) for AWV.     History of Present Illness   This 82 y.o. old patient comes in today for follow-up.  She reports she has been doing relatively well.  She continues to struggle with her sleep.  She complains bitterly of difficulties with sleeping.  She sleeps from 930 until about 1 and then will wake up and have varying amounts of time awake until 530 when she gets up.  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  Looking forward to going to work those days.  Enjoys that.  She is taking some melatonin and wonders if she can increase up from the 500 mcg that she is currently on for her sleep.  Her breathing seems stable.  She is on a new inhaler from her and pulmonologist who she states causes her to cough more but in general she is doing fairly stable.  There is no other new somatic  "concerns.  No symptoms of hyper or hypothyroidism.  No bleeding on her warfarin.  She is due for an INR today.  No other somatic concerns.    Review of Systems: A comprehensive review of systems was negative except as noted.     Medications, Allergies and Problem List   Reviewed and updated     Physical Exam   General Appearance:   Delightful woman.  Lungs are fairly clear today.  She looks very good today.    /60 (Patient Site: Right Arm, Patient Position: Sitting, Cuff Size: Adult Regular)  Pulse 77  Ht 5' 4\" (1.626 m)  Wt 150 lb (68 kg)  SpO2 99%  BMI 25.75 kg/m2         Additional Information   Current Outpatient Prescriptions   Medication Sig Dispense Refill     acetaminophen (TYLENOL) 500 MG tablet Take 1,000 mg by mouth every 6 (six) hours.       calcium carbonate (OS-ZARI) 600 mg (1,500 mg) tablet Take 600 mg by mouth 2 (two) times a day with meals.        cholecalciferol, vitamin D3, 2,000 unit cap Take 2,000 Units by mouth daily.       clobetasol (TEMOVATE) 0.05 % external solution Apply 1 application topically 2 (two) times a day as needed. To scalp as needed       fluticasone-vilanterol (BREO ELLIPTA) 100-25 mcg/dose DsDv inhaler Inhale 1 puff daily.       levothyroxine (SYNTHROID, LEVOTHROID) 50 MCG tablet TAKE 1 TABLET BY MOUTH EVERY DAY 90 tablet 0     MELATONIN ORAL Take 500 mcg by mouth at bedtime. Take only 5 times weekly       metoprolol succinate (TOPROL XL) 25 MG Take 25 mg by mouth daily.       MULTIVITAMIN ORAL Take 1 tablet by mouth daily.        vitamins  A,C,E-zinc-copper (PRESERVISION AREDS) 14,320-226-200 unit-mg-unit cap Take 1 tablet by mouth 2 (two) times a day.       warfarin (COUMADIN) 3 MG tablet Take 1 tablet (3 mg total) by mouth daily. 90 tablet 1     zolpidem (AMBIEN) 5 MG tablet TAKE 1 TABLET BY MOUTH AT BEDTIME AS NEEDED (Patient taking differently: TAKE 1 TABLET BY MOUTH AT BEDTIME AS NEEDED - TAKES TWICE WEEKLY) 30 tablet 0     magnesium hydroxide (MILK OF MAG) 400 " mg/5 mL Susp suspension Take 30 mL by mouth daily as needed.  0     ondansetron (ZOFRAN) 8 MG tablet Take 1 tablet (8 mg total) by mouth every 8 (eight) hours as needed. 20 tablet 0     Current Facility-Administered Medications   Medication Dose Route Frequency Provider Last Rate Last Dose     denosumab 60 mg (PROLIA 60 mg/ml)  60 mg Subcutaneous Q6 Months Cleopatra Chong MD   60 mg at 07/05/16 1114     Allergies   Allergen Reactions     Bee Venom Protein (Honey Bee) Anaphylaxis and Hives     Ultram [Tramadol] Nausea And Vomiting     Social History   Substance Use Topics     Smoking status: Never Smoker     Smokeless tobacco: Never Used     Alcohol use 0.6 oz/week     1 Glasses of wine per week      Comment: occasional       Review and/or order of clinical lab tests:  Review and/or order of radiology tests:  Review and/or order of medicine tests:  Discussion of test results with performing physician:  Decision to obtain old records and/or obtain history from someone other than the patient:  Review and summarization of old records and/or obtaining history from someone other than the patient and.or discussion of case with another health care provider:  Independent visualization of image, tracing or specimen itself:    Time: total time spent with the patient was 25 minutes of which >50% was spent in counseling and coordination of care     Eagle Shabazz MD

## 2021-06-12 NOTE — TELEPHONE ENCOUNTER
ANTICOAGULATION  MANAGEMENT    Assessment     Today's INR result of 2.10 is Therapeutic (goal INR of 2.0-3.0)        Missed dose(s) may be affecting INR    - reported missing warfarin dose on 10/30.    No new diet changes affecting INR    No new medication/supplements affecting INR    Continues to tolerate warfarin with no reported s/s of bleeding or thromboembolism     Previous INR was Therapeutic at 2.40 on 10/22/20.    Plan:     Spoke on phone with Wendy regarding INR result and instructed:     Warfarin Dosing Instructions:   (has 3mg tabs)   - Continue current warfarin dose 6 mg daily on Wednesdays; and 3 mg daily rest of week.    Instructed patient to follow up no later than:  4 wks.    Education provided: importance of consistent vitamin K intake, target INR goal and significance of current INR result, importance of taking warfarin as instructed and importance of notifying clinic for changes in medications    Tete verbalizes understanding and agrees to warfarin dosing plan.    Instructed to call the [facility] for any changes, questions or concerns. (#[phone])   ?   Cassandra Banuelos RN    Subjective/Objective:      Mariiasergey Tinoco, a 85 y.o. female is on warfarin.     Mariia reports:     Home warfarin dose: as updated on anticoagulation calendar per template     Missed doses: Yes:  Missed warfarin dose on 10/30.     Medication changes:  No     S/S of bleeding or thromboembolism:  No     New Injury or illness:  No     Changes in diet or alcohol consumption:  No     Upcoming surgery, procedure or cardioversion:  No    Anticoagulation Episode Summary     Current INR goal:  2.0-3.0   TTR:  61.3 % (1 y)   Next INR check:  12/2/2020   INR from last check:  2.10 (11/4/2020)   Weekly max warfarin dose:     Target end date:     INR check location:     Preferred lab:     Send INR reminders to:  [facility]    Indications    PAF (paroxysmal atrial fibrillation) (H) (Resolved) [I48.0]            Comments:           Anticoagulation Care Providers     Provider Role Specialty Phone number    Eagle Shabazz MD Referring Internal Medicine [phone]

## 2021-06-12 NOTE — TELEPHONE ENCOUNTER
ANTICOAGULATION  MANAGEMENT    Assessment     Today's INR result of 2.4 is Therapeutic (goal INR of 2.0-3.0)        Warfarin taken as previously instructed    No new diet changes affecting INR    No new medication/supplements affecting INR    Continues to tolerate warfarin with no reported s/s of bleeding or thromboembolism     Previous INR was Supratherapeutic    Plan:     Spoke on phone with Mariia regarding INR result and instructed:     Warfarin Dosing Instructions:  Continue current warfarin dose 6 mg daily on Wed; and 3 mg daily rest of week  (0 % change)    Instructed patient to follow up no later than: 2 weeks.     Education provided: importance of following up for INR monitoring at instructed interval and importance of taking warfarin as instructed    Mariia verbalizes understanding and agrees to warfarin dosing plan.    Instructed to call the [facility] for any changes, questions or concerns. (#[phone])   ?   Te Adams RN    Subjective/Objective:      Mariia PICKARD Earnest, a 85 y.o. female is on warfarin.     Mariia reports:     Home warfarin dose: verbally confirmed home dose with pt and updated on anticoagulation calendar     Missed doses: No     Medication changes:  No     S/S of bleeding or thromboembolism:  No     New Injury or illness:  No     Changes in diet or alcohol consumption:  No     Upcoming surgery, procedure or cardioversion:  No    Anticoagulation Episode Summary     Current INR goal:  2.0-3.0   TTR:  61.3 % (1 y)   Next INR check:  11/6/2020   INR from last check:  2.40 (10/22/2020)   Weekly max warfarin dose:     Target end date:     INR check location:     Preferred lab:     Send INR reminders to:  [facility]    Indications    PAF (paroxysmal atrial fibrillation) (H) (Resolved) [I48.0]           Comments:           Anticoagulation Care Providers     Provider Role Specialty Phone number    Eagle Shabazz MD Referring Internal Medicine [phone]

## 2021-06-13 NOTE — TELEPHONE ENCOUNTER
I just want to be sure before I call, you only have 20 minute INF/EDF visits open. Ok to schedule this? Thank you.    Yancy Lopez, CMA

## 2021-06-13 NOTE — TELEPHONE ENCOUNTER
Orders being requested:   Skilled Nursing  3 times a week for 1 week  2 times a week for 3 weeks    Reason service is needed/diagnosis:   Recent hospitalization  INR draw    When are orders needed by:   As soon as possible.    Where to send Orders: Phone:  Verbal orders to: [facility], [phone]     Okay to leave detailed message?  Yes    [facility] would like to draw INR this afternoon.  Thank you.

## 2021-06-13 NOTE — TELEPHONE ENCOUNTER
INR result is 1.8  INR   Date Value Ref Range Status   12/14/2020 2.90 (!) 0.9 - 1.1 Final       Will the patient be seen, or did they already see, MD or CNP today? No    Most Recent Warfarin dose day/week  Sunday Monday Tuesday Wednesday Thursday Friday Saturday    3 3 3 3 3 3     Sunday Monday Tuesday Wednesday Thursday Friday Saturday   3             Has the patient missed any doses of Coumadin, Warfarin, Jantoven in the past 7 days? No    Has the patients medications changed since the last visit? Yes Valacyclovir 1gram paitent took 2 tablets total one  on 12/17 and 1 on 12/18    Has the patient experienced any bleeding recently? No    Has the patient experienced any injuries or illness recently? No     Has the patient experienced any 'new' shortness of breath, severe headaches, or changes in vision recently? No    Has the patient had any changes in their diet, or alcohol consumption? No    Is the patient here today to prepare for any type of upcoming surgery, procedure, or for a cardioversion procedure? No    What phone number can we reach the patient at today? Crystal

## 2021-06-13 NOTE — TELEPHONE ENCOUNTER
I am not going to be able to do a video visit today since I am booked and first day back to work..  Please schedule a hosp visit with me this week and call pt to see how she is doing.     Appropriate/Calm

## 2021-06-13 NOTE — PROGRESS NOTES
"Mariia Tinoco is a 85 y.o. female who is being evaluated via a billable telephone visit.      The patient has been notified of following:     \"This telephone visit will be conducted via a call between you and your physician/provider. We have found that certain health care needs can be provided without the need for a physical exam.  This service lets us provide the care you need with a short phone conversation.  If a prescription is necessary we can send it directly to your pharmacy.  If lab work is needed we can place an order for that and you can then stop by our lab to have the test done at a later time.    Telephone visits are billed at different rates depending on your insurance coverage. During this emergency period, for some insurers they may be billed the same as an in-person visit.  Please reach out to your insurance provider with any questions.    If during the course of the call the physician/provider feels a telephone visit is not appropriate, you will not be charged for this service.\"    Patient has given verbal consent to a Telephone visit? Yes    What phone number would you like to be contacted at? [phone]    Patient would like to receive their AVS by AVS Preference: Nelida.    Additional provider notes:   Chief Complaint   Patient presents with     Covid Concern     Tested positive today, feels weak, cough, has deminished lung capacity     Very pleasant patient of Dr. peralta  who has a history of COPD, hypothyroidism atrial fibrillation anticoagulated on Coumadin who had presented with a 4-day history of low-grade fever and cough, went to the emergency room was found to have bilateral patchy infiltrates in her lung chest x-ray but a resting and walking oxygen saturation of 98%. She was given IV ceftriaxone.  And discharged on Omnicef and Z-Zia.  Her Covid test had been sent out but she found out today that it was positive.  She is a bit frightened with the result.  She has baseline COPD which " for which she is taking inhalers.  She has not taken prednisone for a long time as a boost.  She is talking in complete sentences and her shortness of breath is not worsened her symptoms are mostly fatigue.    Assessment/Plan:  1. Pneumonia due to 2019 novel coronavirus  I do believe that her pneumonia is only due to Covid there is no real evidence of bacterial pneumonia.  But she should still continue the antibiotics because she already started them.  The question is whether to add prednisone.  She does not appear to be more short of breath, so she does not really need it for her COPD.  Guidelines recommend against outpatient prednisone unless she is having desaturation.  We will hold on for it for now.  I was advised that she get a pulse oximeter and monitor her pulse ox if great less than 95% she should call us otherwise less than 90% go to the emergency room.  At this time she was given advice about contact information her 's test was also ordered  on his chart.  History of heart disease and probably should not have been at admitted on that note alone but now that she is home she can just rest and will try and look to see if she is eligible for any clinical trial.      Phone call duration:  15 minutes    bo [place] I am concerned that she had baseline COPD and

## 2021-06-13 NOTE — TELEPHONE ENCOUNTER
ANTICOAGULATION  MANAGEMENT    Assessment     Today's INR result of 2.3 is Therapeutic (goal INR of 2.0-3.0)        Warfarin recently held as instructed which may be affecting INR     Nurse reports patient has been taking 3 mg daily. Previous dosing: warfarin 6 mg Wednesday and 3 mg all other days of the week     No new diet changes affecting INR - back to normal diet     No new medication/supplements affecting INR - finished dexamethasone, per micromedex unspecified if has delayed response.     Continues to tolerate warfarin with no reported s/s of bleeding or thromboembolism     Previous INR was Supratherapeutic    Plan:     Spoke on phone with home care nurse Madison  regarding INR result and instructed:     Warfarin Dosing Instructions:  Continue current warfarin dose 3 mg daily (0 % change)    Instructed patient to follow up no later than: 12/10    Education provided: importance of taking warfarin as instructed    Madison verbalizes understanding and agrees to warfarin dosing plan.    Instructed to call the [facility] for any changes, questions or concerns. (#[phone])   ?   Cirilo Ba RN    Subjective/Objective:      Mariia Tinoco, a 85 y.o. female is on warfarin.     Mariia reports:     Home warfarin dose: template incorrect; verbally confirmed home dose with Madison  and updated on anticoagulation calendar - confirmed she did not take 33 mg of warfarin on Sunday      Missed doses: No     Medication changes:  No     S/S of bleeding or thromboembolism:  No     New Injury or illness:  Yes: COVID, crackles in lungs, to be evaluated by PCP      Changes in diet or alcohol consumption:  No     Upcoming surgery, procedure or cardioversion:  No    Anticoagulation Episode Summary     Current INR goal:  2.0-3.0   TTR:  58.4 % (1 y)   Next INR check:  12/14/2020   INR from last check:  2.30 (12/7/2020)   Weekly max warfarin dose:     Target end date:     INR check location:     Preferred lab:     Send  INR reminders to:  [facility]    Indications    PAF (paroxysmal atrial fibrillation) (H) (Resolved) [I48.0]           Comments:           Anticoagulation Care Providers     Provider Role Specialty Phone number    Eagle Shabazz MD Referring Internal Medicine [phone]

## 2021-06-13 NOTE — TELEPHONE ENCOUNTER
ANTICOAGULATION  MANAGEMENT- Home Care/Care Facility Result    Assessment     Today's INR result of 2.3 is Therapeutic (goal INR of 2.0-3.0)        Warfarin taken as previously instructed    Increased greens/vitamin K intake may be affecting INR -she is also continuing her ensure daily.     No new medication/supplements affecting INR    Continues to tolerate warfarin with no reported s/s of bleeding or thromboembolism     Previous INR was Therapeutic    Plan:     Spoke with Madison home care nurse discussed INR result and instructed:     Warfarin Dosing Instructions: Continue current warfarin dose 3 mg daily  (0 % change)    Next INR to be drawn: 12/14    Education provided: importance of therapeutic range, importance of following up for INR monitoring at instructed interval and importance of taking warfarin as instructed    Madison verbalizes understanding and agrees to warfarin dosing plan.   ?   Nano Artis RN    Subjective/Objective:      Mariia Tinoco, a 85 y.o. female is established on warfarin.     Home care/care facility RN's report of Mariia INR, recent warfarin dosing, diet changes, medication changes, and symptoms is documented below.    Additional findings: verbally confirmed home dose with Madison and updated on anticoagulation calendar    Anticoagulation Episode Summary     Current INR goal:  2.0-3.0   TTR:  58.4 % (1 y)   Next INR check:  12/14/2020   INR from last check:  2.30 (12/10/2020)   Weekly max warfarin dose:     Target end date:     INR check location:     Preferred lab:     Send INR reminders to:  [facility]    Indications    PAF (paroxysmal atrial fibrillation) (H) (Resolved) [I48.0]           Comments:           Anticoagulation Care Providers     Provider Role Specialty Phone number    Eagle Shabazz MD Referring Internal Medicine [phone]

## 2021-06-13 NOTE — TELEPHONE ENCOUNTER
Spoke with Viry at [facility] and relayed message below from Dr. Shabazz regarding requested information below.  She verbalized understanding and had no further questions at this time.  Yoly CRAMER CMA/KATELYN....................10:42 AM

## 2021-06-13 NOTE — TELEPHONE ENCOUNTER
Dr. Valenzuela,     - after your visual visit today with Tete, would it be feasablie for her to check INR at [facility] outpatient lab?   Please advise.     - when Tete presented in ED on 11/19/20, they did not tests her INR.     - positive Covid-19 patient's can get their INR's check as out patient only @ [facility] outpatient lab or [facility] outpatient lab. No INR's done in clinics for positive covid-19 patients.

## 2021-06-13 NOTE — TELEPHONE ENCOUNTER
Additional Information    Negative: [1] Caller is not with the adult (patient) AND [2] reporting urgent symptoms    Negative: Lab result questions    Negative: Medication questions    Negative: Caller can't be reached by phone    Negative: Caller has already spoken to PCP or another triager    Negative: RN needs further essential information from caller in order to complete triage    Negative: Requesting regular office appointment    Negative: [1] Caller requesting NON-URGENT health information AND [2] PCP's office is the best resource    Health Information question, no triage required and triager able to answer question    Protocols used: INFORMATION ONLY CALL-A-    Janny (daughter) calls and says that she is out of state and says that her mother tested positive for Covid yesterday. Caller says that her mother and father are quarantining in their home. Janny  Says that she wonders if a spirometer would help her mother. Janny says that her mother's O2 level is at 97%. Because caller was out of state and not with her mother, RN told caller that pt's symptoms could not be assessed. Caller voiced understanding. Caller says that she will have her brothers and sisters call pt. Tonight to see how pt. Is. COVID 19 Nurse Triage Plan/Patient Instructions    Please be aware that novel coronavirus (COVID-19) may be circulating in the community. If you develop symptoms such as fever, cough, or SOB or if you have concerns about the presence of another infection including coronavirus (COVID-19), please contact your health care provider or visit [email or web address].     Disposition/Instructions    Home care recommended. Follow home care protocol based instructions.    Thank you for taking steps to prevent the spread of this virus.  o Limit your contact with others.  o Wear a simple mask to cover your cough.  o Wash your hands well and often.    Resources    [facility]: About COVID-19:  [email or web address]    CDC: What to Do If You're Sick: www.cdc.gov/coronavirus/2019-ncov/about/steps-when-sick.html    CDC: Ending Home Isolation: www.cdc.gov/coronavirus/2019-ncov/hcp/disposition-in-home-patients.html     CDC: Caring for Someone: www.cdc.gov/coronavirus/2019-ncov/if-you-are-sick/care-for-someone.html     [facility]: Interim Guidance for Hospital Discharge to Home: www.[email or web address] access [email or web address]/[email or web address]/hcp/hospdischarge.[email or web address]    [facility] clinical trials (COVID-19 research studies): [email or web address].[facility]-clinical-trials     Below are the COVID-19 hotlines at the [place] [facility] ([facility]). Interpreters are available.   o For health questions: Call 053-710-2213 or 1-653.535.4810 (7 a.m. to 7 p.m.)  o For questions about schools and childcare: Call 952-712-9248 or 1-355.307.3219 (7 a.m. to 7 p.m.)

## 2021-06-13 NOTE — TELEPHONE ENCOUNTER
ANTICOAGULATION  MANAGEMENT- Home Care/Care Facility Result    Assessment     Today's INR result of 2.9 is Therapeutic (goal INR of 2.0-3.0)        Warfarin taken as previously instructed    No new diet changes affecting INR    No new medication/supplements affecting INR    Continues to tolerate warfarin with no reported s/s of bleeding or thromboembolism     Previous INR was Therapeutic    Plan:     Spoke with Home Care Nurse Marley discussed INR result and instructed:     Warfarin Dosing Instructions: Continue current warfarin dose 3 mg every day  (0 % change)    Next INR to be drawn: one week    Education provided: importance of therapeutic range    Marley verbalizes understanding and agrees to warfarin dosing plan.   ?   Patti Lindsey RN    Subjective/Objective:      Mariia Tinoco, a 85 y.o. female is established on warfarin.     Home care/care facility RN's report of Mariia INR, recent warfarin dosing, diet changes, medication changes, and symptoms is documented below.    Additional findings: none    Anticoagulation Episode Summary     Current INR goal:  2.0-3.0   TTR:  58.4 % (1 y)   Next INR check:  12/21/2020   INR from last check:  2.90 (12/14/2020)   Weekly max warfarin dose:     Target end date:     INR check location:     Preferred lab:     Send INR reminders to:  [facility]    Indications    PAF (paroxysmal atrial fibrillation) (H) (Resolved) [I48.0]           Comments:           Anticoagulation Care Providers     Provider Role Specialty Phone number    Eagle Shabazz MD Referring Internal Medicine [phone]

## 2021-06-13 NOTE — TELEPHONE ENCOUNTER
ANTICOAGULATION  MANAGEMENT: Discharge Review    Mariia Tinoco chart reviewed for anticoagulation continuity of care    Emergency room visit on  11/19/2020 for  Shortness of breath and pneumonia.   - hx of chronic ILD, COPD, and chronic bronchitis.   - Ceftriazone (Rocephin) 1g infused.   - given Azithromycin 500mg daily, and will continue one tab daily for the next 4 days, 11/20-24.    Discharge disposition: Home    INR Results:     No results for input(s): INR in the last 168 hours.    Warfarin inpatient management: home regimen continued.    Warfarin discharge instructions: home regimen continued     Medication Changes Affecting Anticoagulation: Yes:  Azithromycin and Cefdinir    Additional Factors Affecting Anticoagulation: No    Plan     No adjustment to anticoagulation plan needed      Patient not contacted    Anticoagulation calendar updated    Cassandra Banuelos RN
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
## 2021-06-13 NOTE — TELEPHONE ENCOUNTER
INR result is 2.9  INR   Date Value Ref Range Status   12/10/2020 2.30 (!) 0.9 - 1.1 Final       Will the patient be seen, or did they already see, MD or CNP today? No    Most Recent Warfarin dose day/week  Sunday Monday Tuesday Wednesday Thursday Friday Saturday    3 mg 3 mg 3 mg 3 mg 3 mg 3 mg     Sunday Monday Tuesday Wednesday Thursday Friday Saturday   3 mg             Has the patient missed any doses of Coumadin, Warfarin, Jantoven in the past 7 days? No    Has the patients medications changed since the last visit? No    Has the patient experienced any bleeding recently? No    Has the patient experienced any injuries or illness recently? No    Has the patient experienced any 'new' shortness of breath, severe headaches, or changes in vision recently? No    Has the patient had any changes in their diet, or alcohol consumption? No    Is the patient here today to prepare for any type of upcoming surgery, procedure, or for a cardioversion procedure? No    What phone number can we reach the patient at today? [facility] - [phone].

## 2021-06-13 NOTE — TELEPHONE ENCOUNTER
FYI - Status Update  Who is Calling: Home Care  Update: Patient's INR was out of range yesterday, 11/30/20, and the home care nurse received orders from the on call provider for holding warfarin for 11/30/20 and 12/1/20 and to recheck the INR on 12/2/20.  Okay to leave a detailed message?:  No return call needed
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
## 2021-06-13 NOTE — TELEPHONE ENCOUNTER
Spoke with the patient's , Adam, and relayed message below from Dr. Shabazz.  He verbalized understanding and states that they are getting ready to go to the ER right now.  He had no further questions at this time.  Yoly CRAMER CMA/KATELYN....................8:15 AM

## 2021-06-13 NOTE — PROGRESS NOTES
"Mariia Tinoco is a 85 y.o. female who is being evaluated via a billable telephone visit.      The patient has been notified of following:     \"This telephone visit will be conducted via a call between you and your physician/provider. We have found that certain health care needs can be provided without the need for a physical exam.  This service lets us provide the care you need with a short phone conversation.  If a prescription is necessary we can send it directly to your pharmacy.  If lab work is needed we can place an order for that and you can then stop by our lab to have the test done at a later time.    Telephone visits are billed at different rates depending on your insurance coverage. During this emergency period, for some insurers they may be billed the same as an in-person visit.  Please reach out to your insurance provider with any questions.    If during the course of the call the physician/provider feels a telephone visit is not appropriate, you will not be charged for this service.\"    Patient has given verbal consent to a Telephone visit? Yes    What phone number would you like to be contacted at? [phone]    Patient would like to receive their AVS by AVS Preference: Nelida.   Chief Complaint   Patient presents with     Covid Concern     Pt has Covid and pneumonia, feels terrible, discuss hospital but pt does not want to go      Earlier in the day I had spoken to her daughter.  Who had been messaging me on my chart who has my chart proxy and I was given verbal permission Tete to do so.  the daughter Janny is out of state.  She continues to have intermittent diarrhea was good for 24 hours and then it relapse.  She has remained afebrile.  Her pulse ox has been above 95% they have been meticulously checking it.  Blood pressure has been normal heart rate has been normal she categorically denies tachypnea or shortness of breath.  She just has sawdust feeling in her mouth cannot eat anything " except a few spoonfuls of rice.  And feels terribly fatigued and has had a dry hacking cough.  On reviewing her chart I am patient has a diagnosis of COPD is on Brio Ellipta and a diagnosis of interstitial fibrosis related to possibly amiodarone.  Note from [facility]st at [facility] .     1. COPD, Gold stage II, Group B - No change in Breo. Previous absolute eosinophils 200-300. Some increase in exacerbation frequency in 2018 treated by her primary care physician. Low symptom burden.    2. INTERSTITIAL LUNG DISEASE - appears stable. No oxygen requirements. Really no evidence that this is progressing. Stable physiology. Not a significant clinical problem currently. Should not need annual CPFT.     3. Pacing-induced cardiomyopathy with current BiV pacemaker, recurrent persistent atrial fibrillation. Records suggest possible contribution from amiodarone to ILD.    4. 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 Also had years of microbial therapy at the time.    Plan:  1. No change to breo  2. No oxygen needs  3. Treat exacerbations with doxycycline for 7 days and prednisone 40 for 5, 20 for 5.   4. Annual follow up okay.     Dorie [facility] MD         Assessment/Plan:  1. Pneumonia due to 2019 novel coronavirus  Initially  I had advised him to continue the antibiotics because once started it the course should be completed.  However the cefdinir can cause diarrhea definitely recommend stopping it because we do not want to aggravate any further dehydration.  I did tell the patient that at this stage because she is saturating adequately she would not need oxygen and she would probably not qualify for IV Decadron or remdesivir.  However even if she did not have Covid a diagnosis of bilateral pneumonia in her age group with pre-existing disease usually merits and hospitalization for monitoring.  If she has coexisting dehydration diarrhea and inability to eat that even  "makes the it more critical that she be monitored closely and given IV hydration.  She is also at a very high count risk for complications from Covid itself.  She categorically declines to be admitted to the hospital.  I did tell her to improve her nourishment.  Discussed with her anticoagulation nurse that the antibiotics could have raised her INR.  Will try and see if we can get labs to monitor her electrolytes at the very least  And her INR she could go to [facility] lab which will take Covid patients if she is up to it if she is not up to it and that means she does need to be hospitalized.  We cannot give IV fluids at home.  I did call her [place] pulmonologist and arranged a virtual visit for her for tomorrow as she was not here today.  If they want to discuss the prednisone use for her existing COPD diagnosis that may be considered because she may help her dry cough.  She declines codeine cough syrup, \"because it never helped her\".  There is also a chance that she could be enrolled in in the monoclonal antibody trial if there is one available .    I did try to encourage her to have an sure to eat some more the food other than rice.  And again reiterated that if she desaturates below 90% she needs to go to the ER and if she continues to have diarrhea she also needs to go to the ER.  - INR; Future  - Comprehensive Metabolic Panel; Future  - Lactate Dehydrogenase (LDH); Future  - HM1(CBC and Differential); Future  - C-Reactive Protein(CRP); Future    2. Abnormal results of liver function studies     - INR; Future        Phone call duration:  15 minutes    DAYSI MORTON   "

## 2021-06-13 NOTE — TELEPHONE ENCOUNTER
INR result is  2.3  INR   Date Value Ref Range Status   12/02/2020 3.40 (!) 0.9 - 1.1 Final       Will the patient be seen, or did they already see, MD or CNP today? No    Most Recent Warfarin dose day/week  Sunday Monday Tuesday Wednesday Thursday Friday Saturday    Held  held held 3 3 3     Sunday Monday Tuesday Wednesday Thursday Friday Saturday   33             Has the patient missed any doses of Coumadin, Warfarin, Jantoven in the past 7 days? No    Has the patients medications changed since the last visit? completed on Friday    Has the patient experienced any bleeding recently? No    Has the patient experienced any injuries or illness recently? COVID    Has the patient experienced any 'new' shortness of breath, severe headaches, or changes in vision recently? Yes has crackles in her right lower lobe    Has the patient had any changes in their diet, or alcohol consumption? Yes is now eating 3 meals a day    Is the patient here today to prepare for any type of upcoming surgery, procedure, or for a cardioversion procedure? No    What phone number can we reach the patient at today? home phone listed in demographics.

## 2021-06-13 NOTE — PROGRESS NOTES
Office Visit - Follow Up   Mariia Tinoco   82 y.o. female    Date of Visit: 11/2/2017    Chief Complaint   Patient presents with     Follow-up     3 weeks f/u - hasn't had any visual anna x 2 weeks      INR Check        Assessment and Plan   1. Hallucination, visual  Unclear as to the etiology.  Await EEG and suggestion from neurology.    2. Mental status change  As above.    3. Cystitis  She noted a vaginal odor prior to taking the antibiotics.  She feels well now.  She will be returning for urinalysis to ensure clearing.    4. Lytic bone lesions on xray  Presumably benign.  They were present over a year ago at another outside institution and there is been no change.  No evidence of myeloma.    5. Persistent atrial fibrillation  INR today.        Return in about 6 months (around 5/2/2018) for Recheck.     History of Present Illness   This 82 y.o. old Tete comes in today for follow-up of her current issues.  She has been having episodes of visual hallucinations as well as confusion and gait instability.  She did finally see neurology and she is currently undergoing workup.  Fortunately she has had no more of these spells.  Neurologist has her set up for a 24-hour EEG.  Concern is possible seizure.  She also was found to have a urinary tract infection.  She is on antibiotics for that.  She is tolerating that without difficulty.  She does have sleep disturbance.  She cannot tell me if these episodes occurred after nights where she had poor sleep.  She states she always has poor sleep but rarely takes an Ambien.  That does seem to help her she states.  She is due for an INR today.  She was also found to have some lytic appearing lesions on her skull imaging.  She has no pain in the bones.  We evaluated her for myeloma which was negative.  Bone scan was negative.    Review of Systems: A comprehensive review of systems was negative except as noted.     Medications, Allergies and Problem List   Reviewed and updated  "    Physical Exam   General Appearance:   Delightful woman.  She is in good spirits today.  Weight is up a few pounds actually.  No focal neurologic deficits or confusion today.    /60 (Patient Site: Right Arm, Patient Position: Sitting, Cuff Size: Adult Regular)  Pulse 84  Ht 5' 4\" (1.626 m)  Wt 160 lb (72.6 kg)  SpO2 99%  BMI 27.46 kg/m2         Additional Information   Current Outpatient Prescriptions   Medication Sig Dispense Refill     acetaminophen (TYLENOL) 500 MG tablet Take 1,000 mg by mouth every 6 (six) hours.       amoxicillin-clavulanate (AUGMENTIN) 875-125 mg per tablet Take 1 tablet by mouth 2 (two) times a day for 7 days. 14 tablet 0     calcium carbonate (OS-ZARI) 600 mg (1,500 mg) tablet Take 600 mg by mouth 2 (two) times a day with meals.        cholecalciferol, vitamin D3, 2,000 unit cap Take 2,000 Units by mouth daily.       clobetasol (TEMOVATE) 0.05 % external solution Apply 1 application topically 2 (two) times a day as needed. To scalp as needed       fluticasone-vilanterol (BREO ELLIPTA) 100-25 mcg/dose DsDv inhaler Inhale 1 puff daily.       levothyroxine (SYNTHROID, LEVOTHROID) 50 MCG tablet TAKE 1 TABLET DAILY 90 tablet 2     magnesium hydroxide (MILK OF MAG) 400 mg/5 mL Susp suspension Take 30 mL by mouth daily as needed.  0     MELATONIN ORAL Take 500 mcg by mouth at bedtime. Take only 5 times weekly       metoprolol succinate (TOPROL XL) 25 MG Take 25 mg by mouth daily.       vitamins  A,C,E-zinc-copper (PRESERVISION AREDS) 14,[phone] unit-mg-unit cap Take 1 tablet by mouth 2 (two) times a day.       warfarin (JANTOVEN) 3 MG tablet Take 1 or 1&1/2 tablets (3mg or 4.5mg) by mouth daily, as directed.  Dose adjusted based on INR results. 110 tablet 0     zolpidem (AMBIEN) 5 MG tablet TAKE 1 TABLET AT BEDTIME ASNEEDED 30 tablet 0     ondansetron (ZOFRAN) 8 MG tablet Take 1 tablet (8 mg total) by mouth every 8 (eight) hours as needed. 20 tablet 0     Current " Facility-Administered Medications   Medication Dose Route Frequency Provider Last Rate Last Dose     denosumab 60 mg (PROLIA 60 mg/ml)  60 mg Subcutaneous Q6 Months Cleopatra Chong MD   60 mg at 08/31/17 0911     Allergies   Allergen Reactions     Bee Venom Protein (Honey Bee) Anaphylaxis and Hives     Ultram [Tramadol] Nausea And Vomiting     Social History   Substance Use Topics     Smoking status: Never Smoker     Smokeless tobacco: Never Used     Alcohol use 0.6 oz/week     1 Glasses of wine per week      Comment: occasional       Review and/or order of clinical lab tests:  Review and/or order of radiology tests:  Review and/or order of medicine tests:  Discussion of test results with performing physician:  Decision to obtain old records and/or obtain history from someone other than the patient:  Review and summarization of old records and/or obtaining history from someone other than the patient and.or discussion of case with another health care provider:  Independent visualization of image, tracing or specimen itself:    Time: total time spent with the patient was 25 minutes of which >50% was spent in counseling and coordination of care     Eagle Shabazz MD

## 2021-06-13 NOTE — TELEPHONE ENCOUNTER
Yes she can ,  We could then do her electrolytes , LDH and other markers as well . Very disappointed by the ER not doing any inflammatory markers for covid infection and the INR .please call her and say that this would be an opportunity to check her kidney function etc, but  If she is not up to going today , could defer till tomorrow and see what her lung specialist may want to do .( seeing her virtually )  If anything we dont want her INR to be low , due to the added risk of covid thromboembolism.

## 2021-06-13 NOTE — PROGRESS NOTES
"Mariia Tinoco is a 85 y.o. female who is being evaluated via a billable video visit.      The patient has been notified of following:     \"This video visit will be conducted via a call between you and your physician/provider. We have found that certain health care needs can be provided without the need for an in-person physical exam.  This service lets us provide the care you need with a video conversation.  If a prescription is necessary we can send it directly to your pharmacy.  If lab work is needed we can place an order for that and you can then stop by our lab to have the test done at a later time.    Video visits are billed at different rates depending on your insurance coverage. Please reach out to your insurance provider with any questions.    If during the course of the call the physician/provider feels a video visit is not appropriate, you will not be charged for this service.\"    Patient has given verbal consent to a Video visit? Yes  How would you like to obtain your AVS? AVS Preference: MyChart.  If dropped by the video visit, the video invitation should be sent to: Send to e-mail at: [email or web address]  Will anyone else be joining your video visit? Yes: Geovanna. How would they like to receive their invitation? Send to e-mail at: [email or web address]      Video Start Time: 1111    Additional provider notes:     Office Visit - Follow Up   Mariia Tinoco   85 y.o. female    Date of Visit: 12/2/2020    Chief Complaint   Patient presents with     Hospital Visit Follow Up     [facility] Covid/Pneumonia 11/24-11/26 - ? when approriate to recheck chest xray     Advice Only     review current medications ( if patient needs to stay on pepcid), pt has one more dose of steriod ? if she needs more      Advice Only     current home weight is 154 lbs (still down 6 lbs from previous weight) - discuss staying on ensure         Assessment and Plan   1. Pneumonia due to 2019 novel " coronavirus  She is done remarkably well considering her comorbidities.  I will see her back in a couple weeks and we will check her oxygen levels to see if she needs to continue on the oxygen.  She has 1 more day of dexamethasone then will discontinue it.  She can also discontinue the Pepcid at that point or use it as needed.    2. Persistent atrial fibrillation (H)  INR is high and should be having another one soon.    3. Cardiomyopathy, unspecified type (H)  Stable.  No issues with heart failure at this time.    4. Chronic Interstitial Lung Disease  As above.  She is done remarkably well considering her underlying lung issues.        Return in about 2 weeks (around 12/16/2020) for Recheck.     History of Present Illness   This 85 y.o. old Tete joins me on a video visit.  On the visit also was her daughter Alana and her  Adam was in the background.  She had novel coronavirus pneumonia.  She has underlying multiple medical problems including cardiomyopathy and interstitial lung disease followed by Dr. Salmon.  She is home now.  She is on oxygen at 2 L.  Not wearing at night.  Feels better.  Weight is down a few pounds.  States she is weak.  Walks about 5 minutes every hour.  Trying to increase her strength.  Does not want to do home therapy.  She is getting home care for INRs.  She got up this morning and her O2 sat was 97% after being off of oxygen all night long.  She does not know where she got the Covid.  Possibly  Jehovah's witness.  Is taking 1-2 Ensure a day.  She does not sleep well normally and continues to have that issue.    Review of Systems: A comprehensive review of systems was negative except as noted.     Medications, Allergies and Problem List   Reviewed, reconciled and updated  Post Discharge Medication Reconciliation Status: discharge medications reconciled, continue medications without change     Physical Exam   General Appearance:   Delightful woman who is wearing oxygen and a mask.  She is in good  spirits and there is no respiratory distress on video noticeable.    There were no vitals taken for this visit.         Additional Information   Current Outpatient Medications   Medication Sig Dispense Refill     acetaminophen (TYLENOL) 500 MG tablet Take 1,000 mg by mouth every 6 (six) hours.       albuterol (PROAIR HFA;PROVENTIL HFA;VENTOLIN HFA) 90 mcg/actuation inhaler Inhale 2 puffs every 6 (six) hours as needed for wheezing. 1 each 0     calcium carbonate (OS-ZARI) 600 mg (1,500 mg) tablet Take 600 mg by mouth 2 (two) times a day with meals.        cholecalciferol, vitamin D3, 2,000 unit capsule Take 1 capsule (2,000 Units total) by mouth daily.  0     clobetasol (TEMOVATE) 0.05 % external solution Apply 1 application topically 2 (two) times a day as needed. To scalp as needed       doxylamine succinate (SLEEP AID, DOXYLAMINE, ORAL) Take 0.5 tablets by mouth at bedtime as needed.       fluticasone-vilanterol (BREO ELLIPTA) 100-25 mcg/dose DsDv inhaler Inhale 1 puff daily.       levothyroxine (SYNTHROID, LEVOTHROID) 75 MCG tablet TAKE 1 TABLET DAILY 90 tablet 3     metoprolol succinate (TOPROL XL) 25 MG Take 25 mg by mouth daily.       ondansetron (ZOFRAN) 8 MG tablet Take 1 tablet (8 mg total) by mouth every 8 (eight) hours as needed. 20 tablet 0     warfarin ANTICOAGULANT (JANTOVEN) 3 MG tablet Take 1 tablet (3mg) daily and take 1 &1/2 tablets (4.5mg) on Thursday by mouth joyce, as directed.  Adjust dose based on INR results. 110 tablet 1     No current facility-administered medications for this visit.      Allergies   Allergen Reactions     Bee Venom Protein (Honey Bee) Anaphylaxis and Hives     Ultram [Tramadol] Nausea And Vomiting     Social History     Tobacco Use     Smoking status: Never Smoker     Smokeless tobacco: Never Used   Substance Use Topics     Alcohol use: Yes     Alcohol/week: 1.0 standard drinks     Types: 1 Glasses of wine per week     Comment: occasional     Drug use: No       Review and/or  order of clinical lab tests:  Review and/or order of radiology tests:  Review and/or order of medicine tests:  Discussion of test results with performing physician:  Decision to obtain old records and/or obtain history from someone other than the patient:  Review and summarization of old records and/or obtaining history from someone other than the patient and.or discussion of case with another health care provider:  Independent visualization of image, tracing or specimen itself:    Time: not applicable     Eagle Shabazz MD    Hospital Follow-up Visit:    Hospital/Nursing Home/ Rehab Facility: [facility]   Date of Admission: 11/24/20  Date of Discharge: 11/26/20  Reason(s) for Admission: Cough, shortness of breath, fatigue, diarrhea, COVID-19 pneumonia infection     Was your hospitalization related to COVID-19? Yes   How are you feeling today? No change  In the past 24 hours have you had shortness of breath when speaking, walking, or climbing stairs? My breathing issues have stayed the same but using oxygen during the day  Do you have a cough? Mild coughing ( has improved)  When is the last time you had a fever greater than 100? Never   Are you having any other symptoms? Loss of appetite, Loss of sense of taste or smell and Emotional distress   Do you have any other stressors you would like to discuss with your provider? No         PHQ Assessment Total Score 12/2/2020   PHQ-2 Total Score 2   PHQ-9 Total Score 13   Some recent data might be hidden       Was the patient in the ICU or did the patient experience delirium during hospitalization? No        Problems taking medications regularly:  None  Medication changes since discharge: Yes, Tessalon 100 mg cap three times a day prn, Decadron 6 mg tablet daily, pepcid 20 mg two times a day, and oxygen - 2 liters per nasal cannula  Problems adhering to non-medication therapy:  None    Summary of hospitalization:  CareEverywhere information obtained and  reviewed  Diagnostic Tests/Treatments reviewed.  Follow up needed: chest xray  Other Healthcare Providers Involved in Patient s Care:         Homecare  Update since discharge: improved.      Post Discharge Medication Reconciliation: discharge medications reconciled, continue medications without change.  Plan of care communicated with patient and family              Video-Visit Details    Type of service:  Video Visit    Video End Time (time video stopped): 1136  Originating Location (pt. Location): Home    Distant Location (provider location):  [facility]     Platform used for Video Visit: Darrell Shabazz MD

## 2021-06-13 NOTE — TELEPHONE ENCOUNTER
INR result is        2.3  INR   Date Value Ref Range Status   12/07/2020 2.30 (!) 0.9 - 1.1 Final       Will the patient be seen, or did they already see, MD or CNP today? No    Most Recent Warfarin dose day/week  Sunday Monday Tuesday Wednesday Thursday Friday Saturday Sunday Monday Tuesday Wednesday Thursday Friday Saturday    3 3 3          Has the patient missed any doses of Coumadin, Warfarin, Jantoven in the past 7 days? No    Has the patients medications changed since the last visit? No    Has the patient experienced any bleeding recently? No    Has the patient experienced any injuries or illness recently? Yes COVID positive admitted on 11.29.20    Has the patient experienced any 'new' shortness of breath, severe headaches, or changes in vision recently? No    Has the patient had any changes in their diet, or alcohol consumption? Yes Eating a salad a day     Is the patient here today to prepare for any type of upcoming surgery, procedure, or for a cardioversion procedure? No    What phone number can we reach the patient at today? home phone listed in demographics.

## 2021-06-13 NOTE — TELEPHONE ENCOUNTER
Who is calling:  Patient  Reason for Call:  INR - see below  Date of last appointment with primary care:   Okay to leave a detailed message: Yes    Patient states that she was instructed to get her INR done at 's lab. Patient tried to schedule INR but they won't let her schedule or come into labs because she has COVID.    Please call and advise.

## 2021-06-13 NOTE — PROGRESS NOTES
Office Visit - Follow Up   Mariia Tinoco   85 y.o. female    Date of Visit: 12/17/2020    Chief Complaint   Patient presents with     Follow-up     discuss getting off oxygen         Assessment and Plan   1. Pneumonia due to 2019 novel coronavirus  Improving.  She can discontinue the oxygen at this time.  We will further image with CT chest given the nodule which may in hindsight have been due to the Covid but want to make sure that there is no mass there.  - CT Chest Without Contrast; Future    2. Pulmonary nodules  As above.  - CT Chest Without Contrast; Future    3. Chronic Interstitial Lung Disease  Stable.  She can stop the oxygen at this time.    4. Cold sore  Valtrex prescribed.  - valACYclovir (VALTREX) 1000 MG tablet; Take 2 at onset of cold sore and 2 12 hours later.  Dispense: 8 tablet; Refill: 2    5. Benign paroxysmal positional vertigo, unspecified laterality  Offered vestibular therapy and she declines.  She will let me know if she changes her mind.        Return in about 3 months (around 3/17/2021) for AWV.     History of Present Illness   This 85 y.o. old Tete comes in today for follow-up of her recent pneumonia due to corona COVID-19.  She was admitted to [facility] and was treated as per protocols.  She has been home now for a few weeks.  She is getting better.  She is on supplemental oxygen but wonders if she can come off of that.  She did have an enlarged pulmonary nodule on an x-ray done in the ER at [facility] prior to her hospitalization at [facility].  They recommended CT scan for that.  She has a pulmonologist, Dr. Dorie Salmon.    She noted that she developed vertigo yesterday.  Bothersome mainly at night.  Has a history of that and has done vestibular rehab in the past.  Does not wish to do that at this time.    She started developing a cold sore yesterday.  She has not had a cold sore in many years.  Its painful.    Review of Systems: A comprehensive review of systems was negative  "except as noted.     Medications, Allergies and Problem List   Reviewed, reconciled and updated  Post Discharge Medication Reconciliation Status:      Physical Exam   General Appearance:   Pleasant woman in no distress.  She looks well.  Oxygen is 96% on room air.  Lungs reveal bibasilar fine crackles unchanged from usual.  She is in good spirits.  Mild cold sore today    /70 (Patient Site: Left Arm, Patient Position: Sitting, Cuff Size: Adult Regular)   Pulse 84   Temp 97.6  F (36.4  C)   Ht 5' 4\" (1.626 m)   Wt 155 lb (70.3 kg)   SpO2 97%   BMI 26.61 kg/m           Additional Information   Current Outpatient Medications   Medication Sig Dispense Refill     acetaminophen (TYLENOL) 500 MG tablet Take 1,000 mg by mouth every 6 (six) hours.       albuterol (PROAIR HFA;PROVENTIL HFA;VENTOLIN HFA) 90 mcg/actuation inhaler Inhale 2 puffs every 6 (six) hours as needed for wheezing. 1 each 0     calcium carbonate (OS-ZARI) 600 mg (1,500 mg) tablet Take 600 mg by mouth 2 (two) times a day with meals.        cholecalciferol, vitamin D3, 2,000 unit capsule Take 1 capsule (2,000 Units total) by mouth daily.  0     clobetasol (TEMOVATE) 0.05 % external solution Apply 1 application topically 2 (two) times a day as needed. To scalp as needed       doxylamine succinate (SLEEP AID, DOXYLAMINE, ORAL) Take 0.5 tablets by mouth at bedtime as needed.       fluticasone-vilanterol (BREO ELLIPTA) 100-25 mcg/dose DsDv inhaler Inhale 1 puff daily.       levothyroxine (SYNTHROID, LEVOTHROID) 75 MCG tablet TAKE 1 TABLET DAILY 90 tablet 3     metoprolol succinate (TOPROL XL) 25 MG Take 25 mg by mouth daily.       warfarin ANTICOAGULANT (JANTOVEN) 3 MG tablet Take 1 tablet (3mg) daily and take 1 &1/2 tablets (4.5mg) on Thursday by mouth daiy, as directed.  Adjust dose based on INR results. 110 tablet 1     ondansetron (ZOFRAN) 8 MG tablet Take 1 tablet (8 mg total) by mouth every 8 (eight) hours as needed. 20 tablet 0     " valACYclovir (VALTREX) 1000 MG tablet Take 2 at onset of cold sore and 2 12 hours later. 8 tablet 2     No current facility-administered medications for this visit.      Allergies   Allergen Reactions     Bee Venom Protein (Honey Bee) Anaphylaxis and Hives     Ultram [Tramadol] Nausea And Vomiting     Social History     Tobacco Use     Smoking status: Never Smoker     Smokeless tobacco: Never Used   Substance Use Topics     Alcohol use: Yes     Alcohol/week: 1.0 standard drinks     Types: 1 Glasses of wine per week     Comment: occasional     Drug use: No       Review and/or order of clinical lab tests:  Review and/or order of radiology tests:  Review and/or order of medicine tests:  Discussion of test results with performing physician:  Decision to obtain old records and/or obtain history from someone other than the patient:  Review and summarization of old records and/or obtaining history from someone other than the patient and.or discussion of case with another health care provider:  Independent visualization of image, tracing or specimen itself:    Time: not applicable     Eagle Shabazz MD

## 2021-06-13 NOTE — TELEPHONE ENCOUNTER
Dr. Valenzuela,     - please advise:  (Dr. Shabazz patient and he is off today).     - as you know Mariia Tinoco is on warfarin;  She is currently on antibiotics - Azithromycin and Cefdinir, which are known to have major increase risk for bleeding with warfarin.       - real importance of checking her INR today. Especially, in light of not eating can really increase her risk more for bleeding.     - with positive Covid-19, can you give clearance for her to come to office today and check INR status?

## 2021-06-13 NOTE — TELEPHONE ENCOUNTER
According to chart we do not have a consent to communicated with Janny (daughter). If patient is positive for covid I'm not sure if INR can be done at home or what their protocol on this is. Dr. Valenzuela it looks like you had a virtual visit with patient today. Please review message below and advise, thank you.

## 2021-06-13 NOTE — TELEPHONE ENCOUNTER
Left message to call back for: Janny  Information to relay to patient:  Please relay message below from Dr. Valenzuela.  Thank you.  Yoly CRAMER, LUL/CMT....................8:08 AM

## 2021-06-13 NOTE — TELEPHONE ENCOUNTER
of patient calling. He is reporting that patient has ;  Very difficult breathing and also   Bladder involuntary urinating , no other UTI sx were added.  Chills, body aches since Sunday.  Patient states her breathing issue is moderate,, she states she has a hx of pulmonary fibrosis, and reports . This is the worst her breathing has ever been.  She is also reporting she has extreme urinary incontinents , and is urinating every 1/2 hour , and this started on Monday as well.  Patient and  report they have been going to [employer] regularly.  Advised that she may have been exposed to COVID.  Patient was advised to go to the ER @ [facility], now for both of these issues.  Cathy Castellon RN  Care Connection Triage/refill nurse  COVID 19 Nurse Triage Plan/Patient Instructions    Please be aware that novel coronavirus (COVID-19) may be circulating in the community. If you develop symptoms such as fever, cough, or SOB or if you have concerns about the presence of another infection including coronavirus (COVID-19), please contact your health care provider or visit [email or web address].     Disposition/Instructions    ED Visit recommended. Follow protocol based instructions.      Bring Your Own Device:  Please also bring your smart device(s) (smart phones, tablets, laptops) and their charging cables for your personal use and to communicate with your care team during your visit.      Thank you for taking steps to prevent the spread of this virus.  o Limit your contact with others.  o Wear a simple mask to cover your cough.  o Wash your hands well and often.    Resources    [facility]: About COVID-19: [email or web address]    CDC: What to Do If You're Sick: www.cdc.gov/coronavirus/2019-ncov/about/steps-when-sick.html    CDC: Ending Home Isolation: www.cdc.gov/coronavirus/2019-ncov/hcp/disposition-in-home-patients.html     CDC: Caring for Someone:  www.cdc.gov/coronavirus/2019-ncov/if-you-are-sick/care-for-someone.html     [facility]: Interim Guidance for Hospital Discharge to Home: www.health.[facility].mn.us/diseases/coronavirus/hcp/hospdischarge.pdf    [facility] clinical trials (COVID-19 research studies): [email or web address].[email or web address]-trials     Below are the COVID-19 hotlines at the [place] Department of Health ([facility]). Interpreters are available.   o For health questions: Call 447-948-9705 or 1-386.826.6285 (7 a.m. to 7 p.m.)  o For questions about schools and childcare: Call 827-470-5775 or 1-482.962.6507 (7 a.m. to 7 p.m.)           Reason for Disposition    MODERATE difficulty breathing (e.g., speaks in phrases, SOB even at rest, pulse 100-120) of new onset or worse than normal    Additional Information    Negative: Breathing stopped and hasn't returned    Negative: Choking on something    Negative: SEVERE difficulty breathing (e.g., struggling for each breath, speaks in single words, pulse > 120)    Negative: Bluish (or gray) lips or face    Negative: Difficult to awaken or acting confused (e.g., disoriented, slurred speech)    Negative: Passed out (i.e., fainted, collapsed and was not responding)    Negative: Wheezing started suddenly after medicine, an allergic food, or bee sting    Negative: Stridor    Negative: Slow, shallow and weak breathing    Negative: Sounds like a life-threatening emergency to the triager    Negative: Chest pain    Negative: Wheezing (high pitched whistling sound) and previous asthma attacks or use of asthma medicines    Negative: Difficulty breathing and only present when coughing    Negative: Difficulty breathing and only from stuffy or runny nose    Protocols used: BREATHING DIFFICULTY-A-OH

## 2021-06-13 NOTE — TELEPHONE ENCOUNTER
I got verbal consent from the  Mother plus they have given her proxy access on my chart. SHe was told that [facility] lab will check labs on Covid pts including INR , linda already communicated with me and the patient, so not sure why jordan is not aware of this  We cannot set up IV fluids at home , that is only for hospice patients or patients post discharge after being evaluated .. If she is so weak she needs to be hospitalized . At the very least she needs her labs . If she is too weak to the labs then that is also a sign she needs to be hopsitalized .

## 2021-06-13 NOTE — TELEPHONE ENCOUNTER
[facility] called with FYI:    - INR was out of range on 11/30 at 4.9 and 4.8    - was advised to hold warfarin for 11/30 ans 12/1.    - INR scheduled on 12/1.       - review of recent hospitalization @ [facility] from 11/24 - 26/20    - Positive for Covid-19 pneumonia / diarrhea / dehydration.    - Discharged to Home with [facility] ([facility]).    - discharged meds:     11/25, Tessalon 100mg one three times a day, PRN for cough      Oxygen for home use 22 L per NC, for 3 months.     11/26, Dexamethasone (Decadron) 6mg daily #8 tabs given.      Famotidine (Pepcid) 20mg two times a day while on steroids.     - 11/26 and 11/27, take 2mg warfarin dose.  Thereafter, resume usual dose.       - INR on 11/26 was 3.3     - INR on 11/25 was 3.6     - INR on 11/24 was 4.7

## 2021-06-13 NOTE — TELEPHONE ENCOUNTER
Patient's daughter [place] to discuss concerns she has that patient needs an INR drawn per virtual visit provider today and the lab will not do it due to Covid-19 concerns.      She is asking for a referral for a home health nurse to draw an INR and possible need for IV fluids as she says patient has not been eating and drinking properly.    She wanted to get a hold of someone at the clinic.  This nurse contacted clinic via [employer], and they will follow up.  Caller said she will use [employer] also.  Her contact number is [phone].      Abby Min, RN  [facility]      Reason for Disposition    [1] Caller requests to speak ONLY to PCP AND [2] URGENT question    Protocols used: PCP CALL - NO TRIAGE-A-

## 2021-06-13 NOTE — TELEPHONE ENCOUNTER
Received message from  regarding patient not feeling better after ER visit. Spoke with Dr Valenzuela who review the chart and verified the COVID results were positive. She asked that we enroll her in the Get Well Loop. Spoke with spouse and informed him of this. Patient's spouse will wait for a call. He is so scared hearing the results. Just spoke with Dr Valenzuela and she will do a telephone visit with patient and spouse. Appointment scheduled.   Yancy Lopez, CMA

## 2021-06-13 NOTE — TELEPHONE ENCOUNTER
Hospital follow-up are always 40-minute visits.  She will need to have a 40-minute visit.  It looks like there is a wait list opening on Wed at 11 am.  Please put her in there for 40 min.

## 2021-06-13 NOTE — TELEPHONE ENCOUNTER
----- Message from Eagle Shabazz MD sent at 12/17/2020  5:08 PM CST -----  Please call pt:    Chest CT shows no masses.  Only changes suggestive of your Covid infection.  Good news.

## 2021-06-13 NOTE — TELEPHONE ENCOUNTER
Called and spoke with , Adam.     - reported Tete went into the hospital on 11/24/20 @ [facility].

## 2021-06-13 NOTE — TELEPHONE ENCOUNTER
We should be able to send an order to her company for a portable tank.  Dx is covid pneumonia.  They should have all the necessary info on file.

## 2021-06-13 NOTE — PROGRESS NOTES
Office Visit - Follow Up   Mariia Tinoco   82 y.o. female    Date of Visit: 9/28/2017    Chief Complaint   Patient presents with     Hospital Visit Follow Up     ER f/u - seen Dr. Johnson yesterday         Assessment and Plan   1. Visual hallucinations  Concerning.  I do not want to assume this is Robert Cruz.  She really is not blind.  I am more concerned about a transient ischemic event.  She is unable to have an MRI due to her pacemaker.  We will do CTA of the head and neck.  Labs today for monitoring to rule out underlying infections etc.  If she has further episodes she is to go back to the emergency room.  Low threshold for neurology consultation peer  - Comprehensive Metabolic Panel  - Urinalysis-UC if Indicated  - HM2(CBC w/o Differential)  - CTA Head and Neck; Future  - CT Head With Contrast; Future    2. Mental status change  As above.  - Comprehensive Metabolic Panel  - Urinalysis-UC if Indicated  - HM2(CBC w/o Differential)  - CTA Head and Neck; Future  - CT Head With Contrast; Future    3. Cardiomyopathy  Stable.  No symptoms of heart failure.    4. Persistent atrial fibrillation  Stable.  Continue warfarin therapy and medications per her cardiologist.        Return in about 4 weeks (around 10/26/2017) for Recheck.     History of Present Illness   This 82 y.o. old patient comes in today for follow-up of a recent ER visit.  She was seen in emergency room on Monday evening.  She had had somewhat of a stressful day making a lot of cookies.  She then was going to go out to dinner with her girlfriends.  She remembers having some vague visual disturbance where she thought she looked bad in the mirror prior to going out.  She then reports she does not remember exactly what happened.  She was confused.  She was seeing distorted faces of her friends.  She was apparently staggering when she walked.  She was very out of it and confused.  Her friends got very worried and called her brother who came and got  "her.  Staggered to the car apparently.  Needed help of 2 people.  Brother then took her to her  who then brought her to the emergency room.  In the emergency room no evaluation was done.  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  She is saw her ophthalmologist yesterday who told her that that was not the case.  She had an episode yesterday but she was not confused.  It was shorter in duration.  She did not have headaches.  She did not have focal weakness.  Her last INR was apparently therapeutic she states.    Review of Systems: A comprehensive review of systems was negative except as noted.     Medications, Allergies and Problem List   Reviewed and updated     Physical Exam   General Appearance:   Pleasant woman.  Heart seems fairly regular today.  She has no focal neurologic deficits today and does not seem confused today.    /60 (Patient Site: Right Arm, Patient Position: Sitting, Cuff Size: Adult Regular)  Pulse 84  Ht 5' 4\" (1.626 m)  Wt 150 lb (68 kg)  SpO2 99%  BMI 25.75 kg/m2         Additional Information   Current Outpatient Prescriptions   Medication Sig Dispense Refill     acetaminophen (TYLENOL) 500 MG tablet Take 1,000 mg by mouth every 6 (six) hours.       calcium carbonate (OS-ZARI) 600 mg (1,500 mg) tablet Take 600 mg by mouth 2 (two) times a day with meals.        cholecalciferol, vitamin D3, 2,000 unit cap Take 2,000 Units by mouth daily.       clobetasol (TEMOVATE) 0.05 % external solution Apply 1 application topically 2 (two) times a day as needed. To scalp as needed       fluticasone-vilanterol (BREO ELLIPTA) 100-25 mcg/dose DsDv inhaler Inhale 1 puff daily.       levothyroxine (SYNTHROID, LEVOTHROID) 50 MCG tablet TAKE 1 TABLET DAILY 90 tablet 2     magnesium hydroxide (MILK OF MAG) 400 mg/5 mL Susp suspension Take 30 mL by mouth daily as needed.  0     MELATONIN ORAL Take 500 mcg by mouth at bedtime. Take only 5 times weekly       metoprolol " succinate (TOPROL XL) 25 MG Take 25 mg by mouth daily.       vitamins  A,C,E-zinc-copper (PRESERVISION AREDS) 14,[phone] unit-mg-unit cap Take 1 tablet by mouth 2 (two) times a day.       warfarin (COUMADIN) 3 MG tablet Take 1 tablet (3 mg total) by mouth daily. 90 tablet 1     zolpidem (AMBIEN) 5 MG tablet TAKE 1 TABLET AT BEDTIME ASNEEDED 30 tablet 0     ondansetron (ZOFRAN) 8 MG tablet Take 1 tablet (8 mg total) by mouth every 8 (eight) hours as needed. 20 tablet 0     Current Facility-Administered Medications   Medication Dose Route Frequency Provider Last Rate Last Dose     denosumab 60 mg (PROLIA 60 mg/ml)  60 mg Subcutaneous Q6 Months Cleopatra Chong MD   60 mg at 08/31/17 0911     Allergies   Allergen Reactions     Bee Venom Protein (Honey Bee) Anaphylaxis and Hives     Ultram [Tramadol] Nausea And Vomiting     Social History   Substance Use Topics     Smoking status: Never Smoker     Smokeless tobacco: Never Used     Alcohol use 0.6 oz/week     1 Glasses of wine per week      Comment: occasional       Review and/or order of clinical lab tests:  Review and/or order of radiology tests:  Review and/or order of medicine tests:  Discussion of test results with performing physician:  Decision to obtain old records and/or obtain history from someone other than the patient:  Review and summarization of old records and/or obtaining history from someone other than the patient and.or discussion of case with another health care provider:  Independent visualization of image, tracing or specimen itself:    Time: total time spent with the patient was 25 minutes of which >50% was spent in counseling and coordination of care     Eagle Shabazz MD

## 2021-06-13 NOTE — TELEPHONE ENCOUNTER
Called and spoke with Tetejohanna Tinoco,     - at this time [facility] outpatient labs or Clinics are not scheduling any INR's for patient who are currently positive for Covid-19.     - awaiting for response from Anticoagulation Manager and Pharmacy team for further orders.     - Tete does know and understand, if her symptoms worsen to go to ED.    (known Hx of ILD and COPD)

## 2021-06-13 NOTE — TELEPHONE ENCOUNTER
ANTICOAGULATION  MANAGEMENT - BPA Interacting Medication Review    Interacting medication(s): Azithromycin 250mg with warfarin.   - pneumonia   - and Cefdinir 300mg two times a day for 7 days.  Started on 11/19 thru 26.   - Hx of chronic interstitial lung disease (ILD), chronic bronchitis, and COPD.   - 11/20/20 resulted today and POSITIVE for Covid-19.    Duration: 5 days, 11/20 to 24. till all gone.    New medication?:  Yes, interaction per Micromedex, may increase INR and risk of bleeding.    Plan:    Spoke with Tete regarding potential interaction.     Warfarin instructions: continue current warfarin dose    Follow up INR recommended in:   Check INR on 11/23.    (NOTE:  Awaiting for Covid-19 result)    Anticoagulation calendar updated    Cassandra Banuelos RN

## 2021-06-13 NOTE — TELEPHONE ENCOUNTER
INR result is 3.4  INR   Date Value Ref Range Status   11/04/2020 2.10 (H) 0.90 - 1.10 Final       Will the patient be seen, or did they already see, MD or CNP today? Telehealth w/PCP    Most Recent Warfarin dose day/week  Sunday Monday Tuesday Wednesday Thursday Friday Saturday      3 3 3 3     Sunday Monday Tuesday Wednesday Thursday Friday Saturday   3 held held           Has the patient missed any doses of Coumadin, Warfarin, Jantoven in the past 7 days? No    Has the patients medications changed since the last visit? No    Has the patient experienced any bleeding recently? No    Has the patient experienced any injuries or illness recently? No    Has the patient experienced any 'new' shortness of breath, severe headaches, or changes in vision recently? No    Has the patient had any changes in their diet, or alcohol consumption? No    Is the patient here today to prepare for any type of upcoming surgery, procedure, or for a cardioversion procedure? No    What phone number can we reach the patient at today? home phone listed in demographics.

## 2021-06-13 NOTE — TELEPHONE ENCOUNTER
Will call back on 11/23, to schedule an INR.     - she is awaiting call from Dr. Valenzuela.     -  and patient - little nervous with the positive Covid-19 result.    Tete not feeling any better, despite ED visit on 11/19.     - will need to check INR due to known interaction with Omnicef and Azithromax with warfarin can increase her risk for bleeding.

## 2021-06-13 NOTE — PROGRESS NOTES
Clinic Care Coordination Contact    Situation: Patient chart reviewed by care coordinator.    Background: At ED at [facility] on 11-19 with shortness of breath, pneumonia, possible COVI.  CC to contact to review instructions and to assist with setting up PCP appointment.     Assessment: Talked to  Adam, on consent to communicate.  Patient was consulted during the call.  She has multiple underlying conditions and is not doing any better today than she was before she went to the ED.  They have supportive kids who will do anything for them.  He is frustrated that they have not gotten results for her COVID tests.  Recommended that a same day PCP appointment would be appropriate if possible and they agree.  He is not able to do a virtual visit, but could do a phone visit with Dr. Shabazz or other available provider.  Reviewed Medicare home care would be available but he declined needing that assistance.  Declined needing care coordination services at this time.     Plan/Recommendations: Will route to Dr. Shabazz's pool to see if there is availability to schedule same day phone appointment, or with another provider.      Lida Ramirez, [facility]  Clinic Care Coordinator  [phone]

## 2021-06-13 NOTE — TELEPHONE ENCOUNTER
Please review message below and advise. Patient does have a upcoming appt scheduled for 12/17/20 with you.     Patient does also sees Dr. Dorie Salmon ([facility]) as well. Should this message be going to their facility?    Detail Level: Zone Additional Notes: Lesions of concern on the back, clinically consistent with seborrheic keratoses. Additional Notes: Discussed PDT with patient in detail. Patient elects PDT. Will submit authorization for PDT. Detail Level: Detailed

## 2021-06-13 NOTE — TELEPHONE ENCOUNTER
Janny Daughter  shared that she knows mom had positive Covid test, reports that she is taking the antibiotics. They are trying to get an oximeter and if can will let us know. See Dr. Urias note.     Encouraged her to call back with any other questions. She verbalized understanding and was appreciative of the assistance.

## 2021-06-14 NOTE — TELEPHONE ENCOUNTER
INR result is 2.2  INR   Date Value Ref Range Status   12/21/2020 1.80 (!) 0.9 - 1.1 Final       Will the patient be seen, or did they already see, MD or CNP today? No    Most Recent Warfarin dose day/week  Sunday Monday Tuesday Wednesday Thursday Friday Saturday      3 3 3 3     Sunday Monday Tuesday Wednesday Thursday Friday Saturday   3 4.5 3           Has the patient missed any doses of Coumadin, Warfarin, Jantoven in the past 7 days? No    Has the patients medications changed since the last visit? No    Has the patient experienced any bleeding recently? No    Has the patient experienced any injuries or illness recently? No    Has the patient experienced any 'new' shortness of breath, severe headaches, or changes in vision recently? No    Has the patient had any changes in their diet, or alcohol consumption? No    Is the patient here today to prepare for any type of upcoming surgery, procedure, or for a cardioversion procedure? No    What phone number can we reach the patient at today? home phone listed in demographics.

## 2021-06-14 NOTE — TELEPHONE ENCOUNTER
ANTICOAGULATION  MANAGEMENT- Home Care/Care Facility Result    Assessment     Today's INR result of 2.20 is Therapeutic (goal INR of 2.0-3.0)        Warfarin taken as previously instructed    No new diet changes affecting INR    - appetite good now.    No new medication/supplements affecting INR    Continues to tolerate warfarin with no reported s/s of bleeding or thromboembolism     Previous INR was Subtherapeutic at 1.80 on 12/21/20.    Post Covid-19 and slowly improving.    Will be discharged today from [facility].  Future INR's will be done @ Clinic.    Plan:     Spoke with PAYAM Michele with [facility]. discussed INR result and instructed:     Warfarin Dosing Instructions:   - Continue current warfarin dose 4.5 mg daily on Mondays ; and 3 mg daily rest of week.    Next INR to be drawn:  1-2 wks.    Education provided: importance of consistent vitamin K intake, target INR goal and significance of current INR result, when to seek medical attention/emergency care and importance of notifying clinic for diarrhea, nausea/vomiting, reduced intake and/or illness    PAYAM Michele and Tete verbalizes understanding and agrees to warfarin dosing plan.   ?   Cassandra Banuelos RN    Subjective/Objective:      Mariia Tinoco, a 85 y.o. female is established on warfarin.     Home care/care facility RN's report of Mariia INR, recent warfarin dosing, diet changes, medication changes, and symptoms is documented below.    Additional findings: verbally confirmed home dose with PAYAM Michele and updated on anticoagulation calendar    Anticoagulation Episode Summary     Current INR goal:  2.0-3.0   TTR:  57.8 % (1 y)   Next INR check:  1/6/2021   INR from last check:  2.20 (12/23/2020)   Weekly max warfarin dose:     Target end date:     INR check location:     Preferred lab:     Send INR reminders to:  [facility]    Indications    PAF (paroxysmal atrial fibrillation) (H) (Resolved) [I48.0]            Comments:           Anticoagulation Care Providers     Provider Role Specialty Phone number    Eagle Shabazz MD Referring Internal Medicine [phone]

## 2021-06-15 PROBLEM — M17.11 PRIMARY OSTEOARTHRITIS OF RIGHT KNEE: Status: ACTIVE | Noted: 2017-01-05

## 2021-06-15 NOTE — TELEPHONE ENCOUNTER
ANTICOAGULATION  MANAGEMENT    Assessment     Today's INR result of 2.00 is Therapeutic (goal INR of 2.0-3.0)        Warfarin taken as previously instructed    No new diet changes affecting INR    No new medication/supplements affecting INR    - if any exacerbation chronic bronchitis - has on hand Doxcycline for 5 days and Prednisone 40mg for 5 days, then decreased to 20mg for 5 days, / stop.    Continues to tolerate warfarin with no reported s/s of bleeding or thromboembolism     Previous INR was Subtherapeutic at 1.90 from 1/27/21.    Plan:     Spoke on phone with Tete regarding INR result and instructed:      Warfarin Dosing Instructions:    - Change warfarin dose to 3 mg daily on Mon/Wed/Fri; and 4.5 mg daily rest of week.   - (5.9 % change)    Instructed patient to follow up no later than:  1-2 wks   - INR scheduled on 3/4/21 @ [facility].    Education provided: importance of consistent vitamin K intake and target INR goal and significance of current INR result    Tete verbalizes understanding and agrees to warfarin dosing plan.    Instructed to call the [facility] for any changes, questions or concerns. (#[phone])   ?   Cassandra Banuelos RN    Subjective/Objective:      Mariia Tinoco, a 85 y.o. female is on warfarin. Mariia Chiang reports:     Home warfarin dose: verbally confirmed home dose with Tete and updated on anticoagulation calendar     Missed doses: No     Medication changes:  No     S/S of bleeding or thromboembolism:  No     New Injury or illness:  No     Changes in diet or alcohol consumption:  No     Upcoming surgery, procedure or cardioversion:  No    Anticoagulation Episode Summary     Current INR goal:  2.0-3.0   TTR:  48.1 % (1 y)   Next INR check:  2/25/2021   INR from last check:  2.00 (2/11/2021)   Weekly max warfarin dose:     Target end date:     INR check location:     Preferred lab:     Send INR reminders to:  [facility]    Indications    PAF (paroxysmal  atrial fibrillation) (H) (Resolved) [I48.0]           Comments:           Anticoagulation Care Providers     Provider Role Specialty Phone number    Eagle Shabazz MD Referring Internal Medicine [phone]

## 2021-06-15 NOTE — PROGRESS NOTES
"  Office Visit - Follow Up   Mariia Tinoco   82 y.o. female    Date of Visit: 1/8/2018    Chief Complaint   Patient presents with     Cough     x 1-2 week. Sxs: dry cough, sob, and running nose        Assessment and Plan   1. Acute bronchitis  Acute bronchitis in this woman with chronic interstitial lung disease and some reactive airways as well.  Currently not wheezing but she was wheezing yesterday.  Renew her albuterol inhaler.  Short course of steroids today given.  Codeine cough syrup for the cough suppression.  Given her underlying state we will treat with Augmentin 875 twice daily for a week.  Call if any difficulties.  Counseled on probiotic use while on the antibiotic.    2. Chronic Interstitial Lung Disease  As above.        Return if symptoms worsen or fail to improve.     History of Present Illness   This 82 y.o. old patient with known interstitial lung disease comes in today with several days of nasal congestion, cough, shortness of breath, sputum production.  No fevers.   has similar symptoms.  She gets a similar episode every winter.  Usually ends up lasting several months.  Cough is keeping her up at night and that is the worst part for her.  She has chronic insomnia.    Review of Systems: A comprehensive review of systems was negative except as noted.     Medications, Allergies and Problem List   Reviewed and updated     Physical Exam   General Appearance:   Pleasant woman.  No distress.  Lungs reveal her dry crackles at the bases bilaterally but no wheezes today.  She does not appear to be in any distress and her O2 sat is 97% on room air.    /70 (Patient Site: Right Arm, Patient Position: Sitting, Cuff Size: Adult Regular)  Pulse 80  Temp 98.2  F (36.8  C)  Ht 5' 4\" (1.626 m)  Wt 160 lb (72.6 kg)  SpO2 97%  BMI 27.46 kg/m2         Additional Information   Current Outpatient Prescriptions   Medication Sig Dispense Refill     acetaminophen (TYLENOL) 500 MG tablet Take 1,000 mg " by mouth every 6 (six) hours.       calcium carbonate (OS-ZARI) 600 mg (1,500 mg) tablet Take 600 mg by mouth 2 (two) times a day with meals.        cholecalciferol, vitamin D3, 2,000 unit cap Take 2,000 Units by mouth daily.       clobetasol (TEMOVATE) 0.05 % external solution Apply 1 application topically 2 (two) times a day as needed. To scalp as needed       fluticasone-vilanterol (BREO ELLIPTA) 100-25 mcg/dose DsDv inhaler Inhale 1 puff daily.       levothyroxine (SYNTHROID) 75 MCG tablet Take 1 tablet (75 mcg total) by mouth daily. 90 tablet 1     MELATONIN ORAL Take 500 mcg by mouth at bedtime. Take only 5 times weekly       metoprolol succinate (TOPROL XL) 25 MG Take 25 mg by mouth daily.       vitamins  A,C,E-zinc-copper (PRESERVISION AREDS) 14,320-226-200 unit-mg-unit cap Take 1 tablet by mouth 2 (two) times a day.       warfarin (JANTOVEN) 3 MG tablet Take 1 or 1&1/2 tablets (3mg or 4.5mg) by mouth daily, as directed.  Dose adjusted based on INR results. 110 tablet 0     zolpidem (AMBIEN) 5 MG tablet TAKE 1 TABLET AT BEDTIME ASNEEDED 30 tablet 0     albuterol (PROAIR HFA;PROVENTIL HFA;VENTOLIN HFA) 90 mcg/actuation inhaler Inhale 2 puffs every 6 (six) hours as needed for wheezing. 1 each 0     amoxicillin-clavulanate (AUGMENTIN) 875-125 mg per tablet Take 1 tablet by mouth 2 (two) times a day for 7 days. 14 tablet 0     codeine-guaiFENesin  mg/5 mL Liqd Take 5-10 mL by mouth every 4 (four) hours. 240 mL 0     ondansetron (ZOFRAN) 8 MG tablet Take 1 tablet (8 mg total) by mouth every 8 (eight) hours as needed. 20 tablet 0     predniSONE (DELTASONE) 20 MG tablet Take 2 tablets (40 mg total) by mouth daily. 10 tablet 0     Current Facility-Administered Medications   Medication Dose Route Frequency Provider Last Rate Last Dose     denosumab 60 mg (PROLIA 60 mg/ml)  60 mg Subcutaneous Q6 Months Cleopatra Chong MD   60 mg at 08/31/17 0911     Allergies   Allergen Reactions     Bee Venom Protein (Honey  Bee) Anaphylaxis and Hives     Ultram [Tramadol] Nausea And Vomiting     Social History   Substance Use Topics     Smoking status: Never Smoker     Smokeless tobacco: Never Used     Alcohol use 0.6 oz/week     1 Glasses of wine per week      Comment: occasional       Review and/or order of clinical lab tests:  Review and/or order of radiology tests:  Review and/or order of medicine tests:  Discussion of test results with performing physician:  Decision to obtain old records and/or obtain history from someone other than the patient:  Review and summarization of old records and/or obtaining history from someone other than the patient and.or discussion of case with another health care provider:  Independent visualization of image, tracing or specimen itself:    Time: total time spent with the patient was 15 minutes of which >50% was spent in counseling and coordination of care     Eagle Shabazz MD

## 2021-06-15 NOTE — TELEPHONE ENCOUNTER
ANTICOAGULATION  MANAGEMENT    Assessment     Today's INR result of 2.90 is Therapeutic (goal INR of 2.0-3.0)        Warfarin taken as previously instructed    No new diet changes affecting INR    No new medication/supplements affecting INR    Continues to tolerate warfarin with no reported s/s of bleeding or thromboembolism     Previous INR was Therapeutic at 2.00 on 2/11/21.    Reported she completed her 2 shots vaccination with Covid-19.  She did react again to the 2nd dose, and stated it always puts her back again health wise.  But now feeling better.    Plan:     Spoke on phone with Tete regarding INR result and instructed:   - will recheck INR in 2 wks, due to increased warfarin dose 3 wks ago.      Warfarin Dosing Instructions:    - Continue current warfarin dose 3 mg daily on Mon/Wed/Fri; and 4.5 mg daily rest of week.    Instructed patient to follow up no later than:  2 wks.   - INR scheduled on 3/16/21 during f/u visit with Dr. Shabazz.    Education provided: importance of consistent vitamin K intake, target INR goal and significance of current INR result and importance of notifying clinic for changes in medications    Tete verbalizes understanding and agrees to warfarin dosing plan.    Instructed to call the [facility] for any changes, questions or concerns. (#[phone])   ?   Cassandra Banuelos RN    Subjective/Objective:      Mariia Tinoco, a 85 y.o. female is on warfarin. Mariia Chiang reports:     Home warfarin dose: as updated on anticoagulation calendar per template     Missed doses: No     Medication changes:  No     S/S of bleeding or thromboembolism:  No     New Injury or illness:  No     Changes in diet or alcohol consumption:  No     Upcoming surgery, procedure or cardioversion:  No    Anticoagulation Episode Summary     Current INR goal:  2.0-3.0   TTR:  52.0 % (1 y)   Next INR check:  3/18/2021   INR from last check:  2.90 (3/4/2021)   Weekly max warfarin dose:     Target end  date:     INR check location:     Preferred lab:     Send INR reminders to:  [facility]    Indications    PAF (paroxysmal atrial fibrillation) (H) (Resolved) [I48.0]           Comments:           Anticoagulation Care Providers     Provider Role Specialty Phone number    Eagle Shabazz MD Referring Internal Medicine [phone]

## 2021-06-15 NOTE — TELEPHONE ENCOUNTER
Refill Approved    Rx renewed per Medication Renewal Policy. Medication was last renewed on 3/19/20.    Jourdan Moreira, [facility] Triage/Med Refill 3/1/2021     Requested Prescriptions   Pending Prescriptions Disp Refills     levothyroxine (SYNTHROID, LEVOTHROID) 75 MCG tablet [Pharmacy Med Name: LEVOTHYROXIN TAB 0.075MG] 90 tablet 3     Sig: TAKE 1 TABLET DAILY       Thyroid Hormones Protocol Passed - 3/1/2021  1:20 AM        Passed - Provider visit in past 12 months or next 3 months     Last office visit with prescriber/PCP: 12/17/2020 Eagle Shabazz MD OR same dept: Visit date not found OR same specialty: 12/17/2020 Eagle Shabazz MD  Last physical: 9/15/2020 Last MTM visit: Visit date not found   Next visit within 3 mo: Visit date not found  Next physical within 3 mo: Visit date not found  Prescriber OR PCP: Eagle Shabazz MD  Last diagnosis associated with med order: 1. Hypothyroidism, unspecified type  - levothyroxine (SYNTHROID, LEVOTHROID) 75 MCG tablet [Pharmacy Med Name: LEVOTHYROXIN TAB 0.075MG]; TAKE 1 TABLET DAILY  Dispense: 90 tablet; Refill: 3    If protocol passes may refill for 12 months if within 3 months of last provider visit (or a total of 15 months).             Passed - TSH on file in past 12 months for patient age 12 & older     TSH   Date Value Ref Range Status   09/15/2020 2.88 0.30 - 5.00 uIU/mL Final

## 2021-06-15 NOTE — PROGRESS NOTES
Office Visit - Follow Up   Mariai Tinoco   85 y.o. female    Date of Visit: 3/16/2021    Chief Complaint   Patient presents with     Follow-up     Breast Pain     Left; nipple area x 1 year        Assessment and Plan   1. 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  We discussed what to do after Prolia for 2 more years.  We may need to give her a dose of bisphosphonate intravenously and then discontinue the Prolia.  We will need to discuss next year.  She will have another bone density in February.  - denosumab 60 mg (PROLIA 60 mg/ml)  - Vitamin D, Total (25-Hydroxy)    2. Cardiomyopathy, unspecified type (H)  Stable and euvolemic.  Doing well.  Continue follow with cardiology.  - Comprehensive Metabolic Panel  - HM2(CBC w/o Differential)    3. Hypothyroidism, unspecified type  Seems euthyroid.  Check TSH.  - Thyroid Cascade    4. Chronic Interstitial Lung Disease  Chronic shortness of breath is stable.  Notes more fatigue since her Covid but I suspect that is probably a combination of multiple factors.    5. Persistent atrial fibrillation (H)  INR today.    6. Pain of left breast  She has not had an ultrasound in years.  - Mammo Diagnostic Left; Future  - US Breast Left Limited 1-3 Quadrants; Future        Return in about 6 months (around 9/16/2021) for AWV.     History of Present Illness   This 85 y.o. old Tete comes in I for follow-up.  She had Covid in the last year.  She is done remarkably well since then.  She has multiple medical underlying problems including interstitial lung disease and cardiomyopathy.  She is quite jagdish that she did so well with the Covid.  She understands that.  She is in good spirits today.  Her only issue today is some left breast pain.  Hurts under the nipple.  Its been present for about a year now.  She is not had a mammogram in years.  She denies  "other somatic concerns for me.  Good spirits today.    Review of Systems: A comprehensive review of systems was negative except as noted.     Medications, Allergies and Problem List   Reviewed, reconciled and updated  Post Discharge Medication Reconciliation Status:      Physical Exam   General Appearance:   Delightful woman.  Lungs are clear.  Tender around the not the nipple.  No rashes.  She does have a seborrheic keratosis on the left breast it looks like.    /60 (Patient Site: Left Arm, Patient Position: Sitting, Cuff Size: Adult Regular)   Pulse 78   Ht 5' 4\" (1.626 m)   Wt 160 lb (72.6 kg)   SpO2 98%   BMI 27.46 kg/m           Additional Information   Current Outpatient Medications   Medication Sig Dispense Refill     acetaminophen (TYLENOL) 500 MG tablet Take 1,000 mg by mouth every 6 (six) hours.       albuterol (PROAIR HFA;PROVENTIL HFA;VENTOLIN HFA) 90 mcg/actuation inhaler Inhale 2 puffs every 6 (six) hours as needed for wheezing. 1 each 0     calcium carbonate (OS-ZARI) 600 mg (1,500 mg) tablet Take 600 mg by mouth 2 (two) times a day with meals.        cholecalciferol, vitamin D3, 2,000 unit capsule Take 1 capsule (2,000 Units total) by mouth daily.  0     clobetasol (TEMOVATE) 0.05 % external solution Apply 1 application topically 2 (two) times a day as needed. To scalp as needed       doxylamine succinate (SLEEP AID, DOXYLAMINE, ORAL) Take 0.5 tablets by mouth at bedtime as needed.       fluticasone-vilanterol (BREO ELLIPTA) 100-25 mcg/dose DsDv inhaler Inhale 1 puff daily.       levothyroxine (SYNTHROID, LEVOTHROID) 75 MCG tablet TAKE 1 TABLET DAILY 90 tablet 1     melatonin 3 mg Tab tablet Take 3 mg by mouth at bedtime as needed.       metoprolol succinate (TOPROL XL) 25 MG Take 25 mg by mouth daily.       warfarin ANTICOAGULANT (JANTOVEN) 3 MG tablet TAKE 1 TABLET (=3MG) DAILY AND TAKE 1 AND 1/2 TABLETS (=4.5MG) ON THURSDAY AS DIRECTED. ADJUST DOSE BASED ON INTERNATIONAL NORMALIZED RATIO " RESULTS. 97 tablet 1     ondansetron (ZOFRAN) 8 MG tablet Take 1 tablet (8 mg total) by mouth every 8 (eight) hours as needed. 20 tablet 0     Current Facility-Administered Medications   Medication Dose Route Frequency Provider Last Rate Last Admin     [START ON 3/30/2021] denosumab 60 mg (PROLIA 60 mg/ml)  60 mg Subcutaneous Once Eagle Shabazz MD         Allergies   Allergen Reactions     Bee Venom Protein (Honey Bee) Anaphylaxis and Hives     Ultram [Tramadol] Nausea And Vomiting     Social History     Tobacco Use     Smoking status: Never Smoker     Smokeless tobacco: Never Used   Substance Use Topics     Alcohol use: Yes     Alcohol/week: 1.0 standard drinks     Types: 1 Glasses of wine per week     Comment: occasional     Drug use: No       Review and/or order of clinical lab tests:  Review and/or order of radiology tests:  Review and/or order of medicine tests:  Discussion of test results with performing physician:  Decision to obtain old records and/or obtain history from someone other than the patient:  Review and summarization of old records and/or obtaining history from someone other than the patient and.or discussion of case with another health care provider:  Independent visualization of image, tracing or specimen itself:    Time: not applicable     Eagle Shabazz MD

## 2021-06-15 NOTE — TELEPHONE ENCOUNTER
ANTICOAGULATION  MANAGEMENT    Assessment     Today's INR result of 2.00 is Therapeutic (goal INR of 2.0-3.0)        Warfarin taken as previously instructed    No new diet changes affecting INR    No new medication/supplements affecting INR    Continues to tolerate warfarin with no reported s/s of bleeding or thromboembolism     Previous INR was Therapeutic at 2.90 on 3/4/21.    Plan:     Spoke on phone with Tete regarding INR result and instructed:      Warfarin Dosing Instructions:    - Continue current warfarin dose 3 mg daily on Mon/Wed/Fri; and 4.5 mg daily rest of week.    Instructed patient to follow up no later than:  4 wks.   - INR scheduled on 4/13/21 @ [facility].    Education provided: importance of consistent vitamin K intake, target INR goal and significance of current INR result and importance of notifying clinic for changes in medications    Tete verbalizes understanding and agrees to warfarin dosing plan.    Instructed to call the [facility] for any changes, questions or concerns. (#[phone])   ?   Cassandra Banuelos RN    Subjective/Objective:      Mariia Tinoco, a 85 y.o. female is on warfarin. Mariia      Mariia reports:     Home warfarin dose: as updated on anticoagulation calendar per template     Missed doses: No     Medication changes:  No     S/S of bleeding or thromboembolism:  No     New Injury or illness:  No     Changes in diet or alcohol consumption:  No     Upcoming surgery, procedure or cardioversion:  No    Anticoagulation Episode Summary     Current INR goal:  2.0-3.0   TTR:  52.6 % (1 y)   Next INR check:  4/13/2021   INR from last check:  2.00 (3/16/2021)   Weekly max warfarin dose:     Target end date:     INR check location:     Preferred lab:     Send INR reminders to:  [facility]    Indications    PAF (paroxysmal atrial fibrillation) (H) (Resolved) [I48.0]           Comments:           Anticoagulation Care Providers     Provider Role Specialty Phone number     Eagle Shabazz MD [facility] Internal Medicine [phone]

## 2021-06-16 PROBLEM — H81.10 BENIGN PAROXYSMAL POSITIONAL VERTIGO, UNSPECIFIED LATERALITY: Status: ACTIVE | Noted: 2019-11-22

## 2021-06-16 PROBLEM — H53.16 CHARLES BONNET SYNDROME: Status: ACTIVE | Noted: 2020-09-15

## 2021-06-16 NOTE — TELEPHONE ENCOUNTER
Telephone Encounter by Cassandra Banuelos RN at 3/17/2020  9:44 AM     Author: Cassandra Banuelos RN Service: -- Author Type: Registered Nurse    Filed: 3/17/2020 10:40 AM Encounter Date: 3/17/2020 Status: Attested    : Cassandra Banuelos RN (Registered Nurse) Cosigner: Eagle Shabazz MD at 3/17/2020 10:52 AM    Attestation signed by Eagle Shabazz MD at 3/17/2020 10:52 AM    agree                ANTICOAGULATION  MANAGEMENT    Assessment     Today's INR result of 1.80 is Subtherapeutic (goal INR of 2.0-3.0)        Warfarin taken as previously instructed    No new diet changes affecting INR    No new medication/supplements affecting INR    Continues to tolerate warfarin with no reported s/s of bleeding or thromboembolism     Previous INR was Therapeutic at 2.20 on 3/10/20.     Regiments have not changed, diet is the same and has not missed any warfarin doses.    Plan:     Spoke with Mariia regarding INR result and instructed:    1.  Advised Tete, to come in sooner if any s/sx of any bleeding, increased bruising, illnesses, diarrhea, and medication changes.  She verbalized understanding and agreed with plan.    Warfarin Dosing Instructions:   (has 3mg tabs)   - Change warfarin dose to 4.5 mg daily on Tues/Thurs; and 3 mg daily rest of week.   - (6.7 % change)    Instructed patient to follow up no later than:   2-4 wks.   - INR scheduled on 4/14/20 @ [facility].   - Tete expressed concern of Covid-19 d/t compromised health.    Education provided: importance of consistent vitamin K intake and target INR goal and significance of current INR result    Tete verbalizes understanding and agrees to warfarin dosing plan.    Instructed to call the [facility] for any changes, questions or concerns. (#[phone])   ?   Cassandra Banuelos RN    Subjective/Objective:      Mariia Tinoco, a 84 y.o. female is on warfarin.     Mariia reports:     Home warfarin dose: verbally confirmed home dose with Tete and  updated on anticoagulation calendar     Missed doses: No     Medication changes:  No     S/S of bleeding or thromboembolism:  No     New Injury or illness:  No     Changes in diet or alcohol consumption:  No     Upcoming surgery, procedure or cardioversion:  No    Anticoagulation Episode Summary     Current INR goal:   2.0-3.0   TTR:   73.2 % (1 y)   Next INR check:   4/14/2020   INR from last check:   1.80! (3/17/2020)   Weekly max warfarin dose:      Target end date:      INR check location:      Preferred lab:      Send INR reminders to:   [facility]    Indications    PAF (paroxysmal atrial fibrillation) (H) (Resolved) [I48.0]           Comments:            Anticoagulation Care Providers     Provider Role Specialty Phone number    Eagle Shabazz MD Referring Internal Medicine [phone]

## 2021-06-16 NOTE — TELEPHONE ENCOUNTER
Provider Review: Anticoagulation Annual Referral Renewal    [facility] Renewal Decision:  Renew [facility] warfarin management      INR Range:   Continue management at current INR goal   Anticipated Duration of Therapy (from today):  Long-term anticoagulation      Eagle Shabazz MD  1:42 PM

## 2021-06-16 NOTE — TELEPHONE ENCOUNTER
"Prolia Injection Phone Screen      Screening questions have been asked 2-3 days prior to administration visit for Prolia. If any questions are answered with \"Yes,\" this phone encounter were will routed to ordering provider for further evaluation.     1.  When was the last injection?  6/22/2020    2.  Has insurance for this injection been verified?  Yes    3.  Did you experience any new onset achiness or rashes that lasted for over a month with your previous Prolia injection?   No    4.  Do you have a fever over 101?F or a new deep cough that is unusual for you today? No    5.  Have you started any new medications in the last 6 months that you were told could affect your immune system? These may have been prescribed by oncologist, transplant, rheumatology, or dermatology.   No    6.  In the last 6 months have you have gastric bypass or parathyroid surgery?   No    7.  Do you plan dental work requiring drilling into the bone such as implants/extractions or oral surgery in the next 2-3 months?   No    8. Do you have new insurance since the last injection?  No    9. Have you received the Covid-19 vaccine? Yes  If No - Proceed with Prolia injection  If Yes - Date of vaccination 2/20/2021. Will need to wait until 2 weeks after 2nd dose of Covid-19 vaccine before administering Prolia       Patient informed if symptoms discussed above present prior to their administration appointment, they are to notify clinic immediately.     Carlita CORRIGAN(R)            "

## 2021-06-16 NOTE — TELEPHONE ENCOUNTER
"Anticoagulation Annual Referral Renewal Review    Mariia Tinoco's chart reviewed for annual renewal of referral to anticoagulation monitoring.        Criteria for anticoagulation nurse and/or pharmacist renewal met   Warfarin indication: Atrial Fibrillation, persistent Yes, per indication   Current with INR monitoring/compliant Yes Yes   Date of last office visit 03/16/2021 Yes, had office visit within last year   Time in Therapeutic Range (TTR) 52.6 % No, TTR < 60 %       Mariia Tinoco did NOT meet all criteria for anticoagulation management program initiated renewal and requires provider review. Using dot phrase, \".acmrenewalprovider\", please advise if Mariia's anticoagulation management referral should be renewed or if patient should be seen in office to review anticoagulation therapy      Cassandra Banuelos RN  1:03 PM      "

## 2021-06-16 NOTE — TELEPHONE ENCOUNTER
Telephone Encounter by Cassandra Banuelos RN at 3/10/2020  8:34 AM     Author: Cassandra Banuelos RN Service: -- Author Type: Registered Nurse    Filed: 3/10/2020 11:03 AM Encounter Date: 3/10/2020 Status: Attested    : Cassandra Banuelos RN (Registered Nurse) Cosigner: Eagle Shabazz MD at 3/10/2020 11:25 AM    Attestation signed by Eagle Shabazz MD at 3/10/2020 11:25 AM    agree                ANTICOAGULATION  MANAGEMENT    Assessment     Today's INR result of 2.20 is Therapeutic (goal INR of 2.0-3.0)        Warfarin taken as previously instructed    No new diet changes affecting INR    No new medication/supplements affecting INR    Continues to tolerate warfarin with no reported s/s of bleeding or thromboembolism     Previous INR was Subtherapeutic at 1.90 on 2/25/20.    Plan:     Spoke with Tete regarding INR result and instructed:     Warfarin Dosing Instructions:  (has 3mg tabs)   - Continue current warfarin dose 4.5 mg daily on Thursdays; and 3 mg daily rest of week.    Instructed patient to follow up no later than:  1-2 wks.   - INR scheduled on 3/17/20 @ [facility].    Education provided: target INR goal and significance of current INR result    Tete verbalizes understanding and agrees to warfarin dosing plan.    Instructed to call the [facility] for any changes, questions or concerns. (#[phone])   ?   Cassandra Banuelos RN    Subjective/Objective:      Mariia Tinoco, a 84 y.o. female is on warfarin.     Mariia reports:     Home warfarin dose: as updated on anticoagulation calendar per template     Missed doses: No     Medication changes:  No     S/S of bleeding or thromboembolism:  No     New Injury or illness:  No     Changes in diet or alcohol consumption:  No     Upcoming surgery, procedure or cardioversion:  No    Anticoagulation Episode Summary     Current INR goal:   2.0-3.0   TTR:   72.2 % (1 y)   Next INR check:   3/24/2020   INR from last check:   2.20 (3/10/2020)   Weekly  max warfarin dose:      Target end date:      INR check location:      Preferred lab:      Send INR reminders to:   [facility]    Indications    PAF (paroxysmal atrial fibrillation) (H) (Resolved) [I48.0]           Comments:            Anticoagulation Care Providers     Provider Role Specialty Phone number    Eagle Shabazz MD Referring Internal Medicine [phone]

## 2021-06-16 NOTE — TELEPHONE ENCOUNTER
Reason for Call: Request for an order or referral:    Order or referral being requested: Pt stating waiting for the handicap form to be sent back to her - this was given to PCP to complete is section and then mail back to the patient  -  Still has not received, this has been 2 weeks per patient waiting    Date needed: as soon as possible    Has the patient been seen by the PCP for this problem? YES    Additional comments: n/a    Phone number Patient can be reached at:  Home number on file [phone] (home)    Best Time:  anytme    Can we leave a detailed message on this number?  Yes    Call taken on 3/29/2021 at 9:26 AM by Leslie Gray

## 2021-06-17 NOTE — TELEPHONE ENCOUNTER
Telephone Encounter by Cassandra Banuelos RN at 5/19/2021  2:19 PM     Author: Cassandra Banuelos RN Service: -- Author Type: Registered Nurse    Filed: 5/19/2021  3:51 PM Encounter Date: 5/19/2021 Status: Signed    : Cassandra Banuelos RN (Registered Nurse)       ANTICOAGULATION  MANAGEMENT    Assessment     Today's INR result of 3.40 is Supratherapeutic (goal INR of 2.0-3.0)        Warfarin taken as previously instructed    No new diet changes affecting INR    No new medication/supplements affecting INR     Continues to tolerate warfarin with no reported s/s of bleeding or thromboembolism     Previous INR was Supratherapeutic at 3.30 on 4/28/21.    NOTE:  Would like to know other anticoagulation meds that does not require INRs.  Her INR's have been up and down, despite dosing changes since testing positive for covid-19 in Dec 2020.  She has not changed her daily medications or regiments, and eating Vitamin-K foods consistently.    Plan:     Spoke on phone with Abdifatah  regarding INR result and instructed:   - advised to call and check medication insurance coverage for Eliquis or Xarelto.  Copay can be costly for a 30 day supply.  Then we can get clearance from Dr. Shabazz on switching to newer DOAC.      Warfarin Dosing Instructions:   (3mg tabs)   - Change warfarin dose to 4.5 mg daily on Mon/Fri; and 3 mg daily rest of week.   - (5.9 % change)    Instructed patient to follow up no later than:  1-2 wks.   - INR scheduled on 6/9/21 @ [facility]    Education provided: importance of consistent vitamin K intake, target INR goal and significance of current INR result and monitoring for bleeding signs and symptoms    Abdifatah verbalizes understanding and agrees to warfarin dosing plan.    Instructed to call the [facility] for any changes, questions or concerns. (#[phone])   ?   Cassandra Banuelos RN    Subjective/Objective:      Mariia Tinoco, a 86 y.o. female is on warfarin. Mariia Chiang reports:      Home warfarin dose: as updated on anticoagulation calendar per template     Missed doses: No     Medication changes:  No.     S/S of bleeding or thromboembolism:  No     New Injury or illness:  No     Changes in diet or alcohol consumption:  No     Upcoming surgery, procedure or cardioversion:  No    Anticoagulation Episode Summary     Current INR goal:  2.0-3.0   TTR:  41.9 % (1 y)   Next INR check:  6/2/2021   INR from last check:  3.40 (5/19/2021)   Weekly max warfarin dose:     Target end date:     INR check location:     Preferred lab:     Send INR reminders to:  [facility]    Indications    PAF (paroxysmal atrial fibrillation) (H) (Resolved) [I48.0]           Comments:           Anticoagulation Care Providers     Provider Role Specialty Phone number    Eagle Shabazz MD Referring Internal Medicine [phone]

## 2021-06-17 NOTE — TELEPHONE ENCOUNTER
"Telephone Encounter by Cassandra Banuelos RN at 1/6/2021 12:36 PM     Author: Cassandra Banuelos RN Service: -- Author Type: Registered Nurse    Filed: 1/6/2021 12:55 PM Encounter Date: 1/6/2021 Status: Signed    : Cassandra Banuelos RN (Registered Nurse)       ANTICOAGULATION  MANAGEMENT    Assessment     Today's INR result of 1.80 is Subtherapeutic (goal INR of 2.0-3.0)        Warfarin taken as previously instructed    No new diet changes affecting INR    - eating good.  However, reported \"food does not look appealing to her. But still eats.\"    No new medication/supplements affecting INR    Continues to tolerate warfarin with no reported s/s of bleeding or thromboembolism     Previous INR was Therapeutic at 2.20 on 12/23/20.    Reported Fatigue continues; residual effects from recent Covid-19.    Plan:     Spoke on phone with Tete regarding INR result and instructed:     Warfarin Dosing Instructions:    - Change warfarin dose to 4.5 mg daily on Wed/Sat; and 3 mg daily rest of week.   - (6.7 % change)    Instructed patient to follow up no later than:  1-2 wks.   - INR scheduled on 1/27/21 @ [facility].    Education provided: importance of consistent vitamin K intake, target INR goal and significance of current INR result, monitoring for bleeding signs and symptoms, when to seek medical attention/emergency care and importance of notifying clinic for diarrhea, nausea/vomiting, reduced intake and/or illness    Tete verbalizes understanding and agrees to warfarin dosing plan.    Instructed to call the [facility] for any changes, questions or concerns. (#[phone])   ?   Cassandra Banuelos RN    Subjective/Objective:      Mariia Tinoco, a 85 y.o. female is on warfarin.     Mariia reports:     Home warfarin dose: verbally confirmed home dose with Tete and updated on anticoagulation calendar     Missed doses: No     Medication changes:  No     S/S of bleeding or thromboembolism:  No     New Injury or " illness:  Yes: reported Fatigue.     Changes in diet or alcohol consumption:  No     Upcoming surgery, procedure or cardioversion:  No    Anticoagulation Episode Summary     Current INR goal:  2.0-3.0   TTR:  55.9 % (1 y)   Next INR check:  1/20/2021   INR from last check:  1.80 (1/6/2021)   Weekly max warfarin dose:     Target end date:     INR check location:     Preferred lab:     Send INR reminders to:  [facility]    Indications    PAF (paroxysmal atrial fibrillation) (H) (Resolved) [I48.0]           Comments:           Anticoagulation Care Providers     Provider Role Specialty Phone number    Eagle Shabazz MD Referring Internal Medicine [phone]

## 2021-06-17 NOTE — TELEPHONE ENCOUNTER
Telephone Encounter by Cassandra Banuelos RN at 4/13/2021  9:05 AM     Author: Cassandra Banuelos RN Service: -- Author Type: Registered Nurse    Filed: 4/13/2021 10:52 AM Encounter Date: 4/13/2021 Status: Signed    : Cassandar Banuelos RN (Registered Nurse)       ANTICOAGULATION  MANAGEMENT    Assessment     Today's INR result of 3.50 is Supratherapeutic (goal INR of 2.0-3.0)        Warfarin taken as previously instructed    No new diet changes affecting INR    No new medication/supplements affecting INR    Continues to tolerate warfarin with no reported s/s of bleeding or thromboembolism     Previous INR was Therapeutic at 2.00 on 3/16/21.  (last 3 INR's therapeutic).    Plan:     Spoke on phone with  regarding INR result and instructed:   - up / down INR's could be r/t her past exposure to Covid-19 in Nov 2020 and she still recovering.      Warfarin Dosing Instructions:  (3mg tabs)   - advised to HOLD warfarin dose tonight,   - then continue current warfarin dose 3 mg daily on Mon/Wed/Fri; and 4.5 mg daily rest of week.    Instructed patient to follow up no later than:  1-2 wks.   - INR scheduled on 4/27/21 @ [facility].    Education provided: importance of consistent vitamin K intake, target INR goal and significance of current INR result, importance of notifying clinic for changes in medications, monitoring for bleeding signs and symptoms and when to seek medical attention/emergency care    Tete verbalizes understanding and agrees to warfarin dosing plan.    Instructed to call the [facility] for any changes, questions or concerns. (#[phone])   ?   Cassandra Banuelos RN    Subjective/Objective:      Mariia Tinoco, a 85 y.o. female is on warfarin. Mariia      Mariia reports:     Home warfarin dose: as updated on anticoagulation calendar per template     Missed doses: No     Medication changes:  No     S/S of bleeding or thromboembolism:  No     New Injury or illness:  No     Changes in diet or  alcohol consumption:  No     Upcoming surgery, procedure or cardioversion:  No    Anticoagulation Episode Summary     Current INR goal:  2.0-3.0   TTR:  51.8 % (1 y)   Next INR check:  4/27/2021   INR from last check:  3.50 (4/13/2021)   Weekly max warfarin dose:     Target end date:     INR check location:     Preferred lab:     Send INR reminders to:  [facility]    Indications    PAF (paroxysmal atrial fibrillation) (H) (Resolved) [I48.0]           Comments:           Anticoagulation Care Providers     Provider Role Specialty Phone number    Eagle Shabazz MD Referring Internal Medicine [phone]

## 2021-06-17 NOTE — TELEPHONE ENCOUNTER
Telephone Encounter by Cassandra Banuelos RN at 1/27/2021  9:31 AM     Author: Cassandra Banuelos RN Service: -- Author Type: Registered Nurse    Filed: 1/27/2021 11:53 AM Encounter Date: 1/27/2021 Status: Signed    : Cassandra Banuelos RN (Registered Nurse)       ANTICOAGULATION  MANAGEMENT    Assessment     Today's INR result of 1.90 is Subtherapeutic (goal INR of 2.0-3.0)        Warfarin taken as previously instructed    No new diet changes affecting INR    No new medication/supplements affecting INR    - recently vaccinated with 1st Covid-19.    Continues to tolerate warfarin with no reported s/s of bleeding or thromboembolism     Previous INR was Subtherapeutic at 1.80 on 1/6/21.    Plan:     Spoke on phone with Tete regarding INR result and instructed:     Warfarin Dosing Instructions:    - today, advised one time booster with 6mg warfarin dose,   - then change warfarin dose to 4.5 mg daily on Mon/Wed/Fri; and 3 mg daily rest of week.   - (6.3 % change)    Instructed patient to follow up no later than:  1-2 wks.   - INR scheduled on 2/11/21 @ [facility].     Education provided: importance of consistent vitamin K intake, target INR goal and significance of current INR result, importance of notifying clinic for changes in medications, when to seek medical attention/emergency care and importance of notifying clinic for diarrhea, nausea/vomiting, reduced intake and/or illness    Tete verbalizes understanding and agrees to warfarin dosing plan.    Instructed to call the [facility] for any changes, questions or concerns. (#[phone])   ?   Cassandra Banuelos RN    Subjective/Objective:      Mariia Tinoco, a 85 y.o. female is on warfarin.     Mariia reports:     Home warfarin dose: as updated on anticoagulation calendar per template     Missed doses: No     Medication changes:  Yes:  Reported getting her 1st Covid-19 vaccination.     S/S of bleeding or thromboembolism:  No     New Injury or illness:   No     Changes in diet or alcohol consumption:  No     Upcoming surgery, procedure or cardioversion:  No    Anticoagulation Episode Summary     Current INR goal:  2.0-3.0   TTR:  50.1 % (1 y)   Next INR check:  2/10/2021   INR from last check:  1.90 (1/27/2021)   Weekly max warfarin dose:     Target end date:     INR check location:     Preferred lab:     Send INR reminders to:  [facility]    Indications    PAF (paroxysmal atrial fibrillation) (H) (Resolved) [I48.0]           Comments:           Anticoagulation Care Providers     Provider Role Specialty Phone number    Eagle Shabazz MD Referring Internal Medicine [phone]

## 2021-06-17 NOTE — TELEPHONE ENCOUNTER
Telephone Encounter by Aurelio Duarte RN at 12/2/2020 10:37 AM     Author: Aurelio Duarte RN Service: -- Author Type: Registered Nurse    Filed: 12/2/2020 10:47 AM Encounter Date: 12/2/2020 Status: Signed    : Aurelio Duarte RN (Registered Nurse)       ANTICOAGULATION  MANAGEMENT- Home Care/Care Facility Result    Assessment     Today's INR result of 3.4 is Supratherapeutic (goal INR of 2.0-3.0)        Warfarin recently held as instructed which may be affecting INR held 11/30, 12/1 for inr 4.9    No new diet changes affecting INR    No new medication/supplements affecting INR    Continues to tolerate warfarin with no reported s/s of bleeding or thromboembolism     Previous INR was Supratherapeutic 4.9 on 11/30 ( calendar locked)    Plan:     Spoke with nurse Marley discussed INR result and instructed:     Warfarin Dosing Instructions: skip today then continue current warfarin dose 6 mg daily on wed; 3 mg all other days     Next INR to be drawn: mon with home care visit, Victoriano Roach verbalizes understanding and agrees to warfarin dosing plan.   ?   Aurelio Duarte RN    Subjective/Objective:      Mariia Tinoco, a 85 y.o. female is established on warfarin.     Home care/care facility RN's report of Mariia INR, recent warfarin dosing, diet changes, medication changes, and symptoms is documented below.    Additional findings: none    Anticoagulation Episode Summary     Current INR goal:  2.0-3.0   TTR:  58.9 % (1 y)   Next INR check:  12/7/2020   INR from last check:  3.40 (12/2/2020)   Weekly max warfarin dose:     Target end date:     INR check location:     Preferred lab:     Send INR reminders to:  [facility]    Indications    PAF (paroxysmal atrial fibrillation) (H) (Resolved) [I48.0]           Comments:           Anticoagulation Care Providers     Provider Role Specialty Phone number    Eagle Shabazz MD Referring Internal Medicine [phone]

## 2021-06-17 NOTE — TELEPHONE ENCOUNTER
Telephone Encounter by Cassandra Banuelos RN at 4/22/2020  9:26 AM     Author: Cassandra Banuelos RN Service: -- Author Type: Registered Nurse    Filed: 4/22/2020  9:30 AM Encounter Date: 4/22/2020 Status: Attested    : Cassandra Banuelos RN (Registered Nurse) Cosigner: Eagle Shabazz MD at 4/22/2020  9:32 AM    Attestation signed by Eagle Shabazz MD at 4/22/2020  9:32 AM    gagree                Anticoagulation Annual Referral Renewal Review    Mariia Tinoco's chart reviewed for annual renewal of referral to anticoagulation monitoring.        Criteria for anticoagulation nurse and/or pharmacist renewal met   Warfarin indication: Atrial Fibrillation, persistent Yes, per indication   Current with INR monitoring/compliant Yes Yes   Date of last office visit 02/25/2020 Yes, had office visit within last year   Time in Therapeutic Range (TTR) 79.3 % Yes, TTR > 60%       Mariia Tinoco met all criteria for anticoagulation management program initiated renewal.  New INR standing orders and anticoagulation referral renewal placed.      Cassandra Banuelos RN  9:30 AM

## 2021-06-17 NOTE — TELEPHONE ENCOUNTER
Telephone Encounter by Cassandra Banuelos RN at 12/21/2020  2:24 PM     Author: Cassandra Banuelos RN Service: -- Author Type: Registered Nurse    Filed: 12/21/2020  2:28 PM Encounter Date: 12/21/2020 Status: Signed    : Cassandra Banuelos RN (Registered Nurse)       ANTICOAGULATION  MANAGEMENT- Home Care/Care Facility Result    Assessment     Today's INR result of 1.80 is Subtherapeutic (goal INR of 2.0-3.0)        Warfarin taken as previously instructed    reported eating more now, appetite improving may be affecting diet and INR    No interaction expected between Valacyclovir and warfarin    - just for 2 days; cold sores.    Continues to tolerate warfarin with no reported s/s of bleeding or thromboembolism     Previous INR was Therapeutic at 2.90 on 12/14/20.    Recovering from Covid-19 and reported slowly improving.      Will have last visit with [facility] and will be discharged on 12/23/20.  Future INR's will be in Clinic labs thereafter.    Plan:     Spoke with Payam Wallace with [facility] and Tete discussed INR result and instructed:     Warfarin Dosing Instructions:   - Change warfarin dose to 4.5 mg daily on Mondays; and 3 mg daily rest of week.   - (7.1 % change)    Next INR to be drawn:  Scheduled on 12/23/20 prior to discharging from home care.    Education provided: importance of consistent vitamin K intake, target INR goal and significance of current INR result, no interaction anticipated between warfarin and Valacyclovir and importance of notifying clinic for changes in medications    PAYAM Wallace and Tete verbalizes understanding and agrees to warfarin dosing plan.   ?   Cassandra Banuelos RN    Subjective/Objective:      Mariia Tinoco, a 85 y.o. female is established on warfarin.     Home care/care facility RN's report of Mariia INR, recent warfarin dosing, diet changes, medication changes, and symptoms is documented below.    Additional findings: verbally  confirmed home dose with PAYAM Wallace and updated on anticoagulation calendar    Anticoagulation Episode Summary     Current INR goal:  2.0-3.0   TTR:  58.1 % (1 y)   Next INR check:  12/23/2020   INR from last check:  1.80 (12/21/2020)   Weekly max warfarin dose:     Target end date:     INR check location:     Preferred lab:     Send INR reminders to:  [facility]    Indications    PAF (paroxysmal atrial fibrillation) (H) (Resolved) [I48.0]           Comments:           Anticoagulation Care Providers     Provider Role Specialty Phone number    Eagle Shabazz MD Referring Internal Medicine [phone]

## 2021-06-17 NOTE — TELEPHONE ENCOUNTER
Telephone Encounter by Cassandra Banuelos RN at 4/28/2021 10:12 AM     Author: Cassandra Banuelos RN Service: -- Author Type: Registered Nurse    Filed: 4/28/2021 11:18 AM Encounter Date: 4/28/2021 Status: Signed    : Cassandra Banuelos RN (Registered Nurse)       ANTICOAGULATION  MANAGEMENT    Assessment     Today's INR result of 3.30 is Supratherapeutic (goal INR of 2.0-3.0)        Warfarin recently held as instructed which may be affecting INR    - held warfarin dose on 4/13/21, as instructed.    No new diet changes affecting INR    No new medication/supplements affecting INR    Continues to tolerate warfarin with no reported s/s of bleeding or thromboembolism     Previous INR was Supratherapeutic at 3.50 on 4/13/21.    Plan:     Spoke on phone with Tete regarding INR result and instructed:      Warfarin Dosing Instructions:  (3mg tabs).   - today, advised one time lower dose with 1.5mg warfarin,   - then change warfarin dose to 4.5 mg daily on Mon/Wed/Fri; and 3 mg daily rest of week   - (5.6 % change)    Instructed patient to follow up no later than:  1-2 wks.   - INR scheduled on 5/19/21 @ [facility].    Education provided: importance of consistent vitamin K intake, target INR goal and significance of current INR result and monitoring for bleeding signs and symptoms    Tete verbalizes understanding and agrees to warfarin dosing plan.    Instructed to call the [facility] for any changes, questions or concerns. (#[phone])   ?   Cassandra Banuelos RN    Subjective/Objective:      Mariia Tinoco, a 85 y.o. female is on warfarin. Mariia      Mariia reports:     Home warfarin dose: as updated on anticoagulation calendar per template     Missed doses: Yes:  Held one warfarin dose, as instructed.     Medication changes:  No     S/S of bleeding or thromboembolism:  No     New Injury or illness:  No     Changes in diet or alcohol consumption:  No     Upcoming surgery, procedure or cardioversion:   No    Anticoagulation Episode Summary     Current INR goal:  2.0-3.0   TTR:  47.7 % (1 y)   Next INR check:  5/12/2021   INR from last check:  3.30 (4/28/2021)   Weekly max warfarin dose:     Target end date:     INR check location:     Preferred lab:     Send INR reminders to:  [facility]    Indications    PAF (paroxysmal atrial fibrillation) (H) (Resolved) [I48.0]           Comments:           Anticoagulation Care Providers     Provider Role Specialty Phone number    Eagle Shabazz MD Referring Internal Medicine [phone]

## 2021-06-17 NOTE — TELEPHONE ENCOUNTER
Telephone Encounter by Esther Edwards RN at 10/15/2020  2:04 PM     Author: Esther Edwards RN Service: -- Author Type: Registered Nurse    Filed: 10/15/2020  2:14 PM Encounter Date: 10/15/2020 Status: Signed    : Esther Edwards RN (Registered Nurse)       ANTICOAGULATION  MANAGEMENT    Assessment     Today's INR result of 4.5 is Supratherapeutic (goal INR of 2.0-3.0)        Warfarin taken as previously instructed    No new diet changes affecting INR    No new medication/supplements affecting INR    Continues to tolerate warfarin with no reported s/s of bleeding or thromboembolism     Previous INR was Therapeutic    Plan:     Spoke on phone with Mariia regarding INR result and instructed:     Warfarin Dosing Instructions:  hold today Thur 10/15 then change warfarin dose to    6 mg every Wed; 3 mg all other days       (11 % change)    Instructed patient to follow up no later than: 1 week    Education provided: target INR goal and significance of current INR result, monitoring for bleeding signs and symptoms and when to seek medical attention/emergency care    Mariia verbalizes understanding and agrees to warfarin dosing plan.    Instructed to call the [facility] for any changes, questions or concerns. (#[phone])   ?   Esther Edwards RN    Subjective/Objective:      Mariiaashley Tinoco, a 85 y.o. female is on warfarin.     Mariia reports:     Home warfarin dose: verbally confirmed home dose with Mariia and updated on anticoagulation calendar     Missed doses: No     Medication changes:  No     S/S of bleeding or thromboembolism:  No     New Injury or illness:  No     Changes in diet or alcohol consumption:  No     Upcoming surgery, procedure or cardioversion:  No    Anticoagulation Episode Summary     Current INR goal:  2.0-3.0   TTR:  62.6 % (1 y)   Next INR check:  10/22/2020   INR from last check:  4.50 (10/15/2020)   Weekly max warfarin dose:     Target end date:     INR check  location:     Preferred lab:     Send INR reminders to:  [facility] [place]    Indications    PAF (paroxysmal atrial fibrillation) (H) (Resolved) [I48.0]           Comments:           Anticoagulation Care Providers     Provider Role Specialty Phone number    Eagle Shabazz MD Referring Internal Medicine [phone]

## 2021-06-17 NOTE — TELEPHONE ENCOUNTER
Telephone Encounter by Cassandra Banuelos RN at 11/23/2020 12:20 PM     Author: Cassandra Banuelos RN Service: -- Author Type: Registered Nurse    Filed: 11/23/2020 12:25 PM Encounter Date: 11/19/2020 Status: Attested    : Cassandra Banuelos RN (Registered Nurse) Cosigner: Lexus Valenzuela MD at 11/23/2020  2:22 PM    Attestation signed by Lexus Valenzuela MD at 11/23/2020  2:22 PM    Agree with plan            Summary: Dr. Valenzuela - [facility]      Called and spoke with Tete Tinoco.     - advised INR check this week, before Thanksgiving Holiday. To ensure INR stability, in light of positive Covid-19, anorexia due to not able to taste or smell any food, but will try ENSURE protein shake, as recommended by Dr. Valenzuela.     - also gave [facility] outpatient lab;  Will to call for appt [phone].  Also advised her there are other standing LAB ORDERS from Dr. Valenzuela to include with INR.     - telephone visit with her Pulmonologist on 11/24 @ 11a.  (she will work on getting her labs checked after her telephone visit).

## 2021-06-18 NOTE — PROGRESS NOTES
"    ASSESSMENT:  1.  Low bone mass with past history of osteoporosis: She had a T score as low as -2.7 at L1-L2 in 2009.  Current lowest T score is -2.1 at L1-L4.  She has been on Prolia for 2 years.  She has no adverse effects.  She had used alendronate in the past, but has been off for years.  She has no history of fracture.  She does supplement with calcium and vitamin D.  Desired calcium intake was reviewed.  She is on chronic anticoagulation with warfarin due to atrial fib.  She has chronic lung disease, but is not on oral steroids.    PLAN:  1.  She would like to continue Prolia.  I would favor using this for a total of 5 years assuming insurance coverage is good.  I then would cement in the benefits achieved by Prolia with 1 injection of Reclast.  We then could begin a \"drug-free holiday \"away from the Prolia.  2.  Check vitamin D level and basic metabolic panel today.  3.  Recheck bone density after 2/14/19.  4.  Clinic follow-up 1 year.  5.  Shingrix vaccine was advised.  Check insurance    Orders Placed This Encounter   Procedures     Basic Metabolic Panel     Vitamin D, Total (25-Hydroxy)     Medications Discontinued During This Encounter   Medication Reason     zolpidem (AMBIEN) 5 MG tablet Duplicate order     VIRTUSSIN AC  mg/5 mL liquid Therapy completed     predniSONE (DELTASONE) 20 MG tablet Therapy completed     denosumab 60 mg (PROLIA 60 mg/ml)      cholecalciferol, vitamin D3, 2,000 unit cap Therapy completed     Administrations This Visit     denosumab 60 mg (PROLIA 60 mg/ml)     Admin Date Action Dose Route Administered By             05/24/2018 Given 60 mg Subcutaneous Jazlyn Monique LPN                            ASSESSED PROBLEMS:  1. Osteoporosis  Basic Metabolic Panel    Vitamin D, Total (25-Hydroxy)    denosumab 60 mg (PROLIA 60 mg/ml)    cholecalciferol, vitamin D3, 2,000 unit capsule       CHIEF COMPLAINT:  Chief Complaint   Patient presents with     Follow-up     prolia, " inr       HISTORY OF PRESENT ILLNESS:  Mariia is a 83 y.o. female presenting to the clinic today for osteoporosis follow up. She is a regular patient of Dr. Brandon. She has been taking Prolia since 2015 with no problems. She has had 4 injections so far and was due for a 5th in January. She would like the injection today and provides written proof of insurance coverage. She has also taken Fosamax many years ago. She has lost 1/4 to 1/2 inch in height. She denies any adult fractures. She is taking calcium 600mg and vitamin D 2000mg supplements. Her most recent DXA of  2/14/17 showed osteopenia with a low T-score of  -2.1 at L1-L4.     Health Maintenance: She was advised to check her insurance coverage for Shingrix.     REVIEW OF SYSTEMS:   She reports continuing pulmonary issues.   All other systems are negative.    PFSH:      TOBACCO USE:  History   Smoking Status     Never Smoker   Smokeless Tobacco     Never Used       VITALS:  Vitals:    05/24/18 0845   BP: 108/62   Patient Site: Left Arm   Patient Position: Sitting   Cuff Size: Adult Regular   Pulse: 79   Resp: 16   SpO2: 98%   Weight: 157 lb 3.2 oz (71.3 kg)     Wt Readings from Last 3 Encounters:   05/24/18 157 lb 3.2 oz (71.3 kg)   01/08/18 160 lb (72.6 kg)   11/02/17 160 lb (72.6 kg)     Body mass index is 26.98 kg/(m^2).      MEDICATIONS:  Current Outpatient Prescriptions   Medication Sig Dispense Refill     acetaminophen (TYLENOL) 500 MG tablet Take 1,000 mg by mouth every 6 (six) hours.       albuterol (PROAIR HFA;PROVENTIL HFA;VENTOLIN HFA) 90 mcg/actuation inhaler Inhale 2 puffs every 6 (six) hours as needed for wheezing. 1 each 0     calcium carbonate (OS-ZARI) 600 mg (1,500 mg) tablet Take 600 mg by mouth 2 (two) times a day with meals.        cholecalciferol, vitamin D3, 2,000 unit capsule Take 1 capsule (2,000 Units total) by mouth daily.  0     clobetasol (TEMOVATE) 0.05 % external solution Apply 1 application topically 2 (two) times a day as needed.  To scalp as needed       fluticasone-vilanterol (BREO ELLIPTA) 100-25 mcg/dose DsDv inhaler Inhale 1 puff daily.       levothyroxine (SYNTHROID) 75 MCG tablet Take 1 tablet (75 mcg total) by mouth daily. 90 tablet 1     levothyroxine (SYNTHROID, LEVOTHROID) 75 MCG tablet TAKE 1 TABLET DAILY 90 tablet 0     MELATONIN ORAL Take 500 mcg by mouth at bedtime. Take only 5 times weekly       metoprolol succinate (TOPROL XL) 25 MG Take 25 mg by mouth daily.       vitamins  A,C,E-zinc-copper (PRESERVISION AREDS) 14,320-226-200 unit-mg-unit cap Take 1 tablet by mouth 2 (two) times a day.       warfarin (JANTOVEN) 3 MG tablet Take ONE tablet (3mg) to ONE & ONE-HALF tablets (4.5mg) by mouth daily, as directed.  Adjust dose based on INR results. 110 tablet 1     zolpidem (AMBIEN) 5 MG tablet TAKE 1 TABLET AT BEDTIME ASNEEDED 30 tablet 0     ondansetron (ZOFRAN) 8 MG tablet Take 1 tablet (8 mg total) by mouth every 8 (eight) hours as needed. 20 tablet 0     warfarin (JANTOVEN) 3 MG tablet Take 1 or 1&1/2 tablets (3mg or 4.5mg) by mouth daily, as directed.  Dose adjusted based on INR results. 110 tablet 0     No current facility-administered medications for this visit.        PHYSICAL EXAM:  Constitutional:   Reveals an alert, pleasant, healthy-appearing woman. Affect appropriate. Does not seem acutely ill. She ambulates easily without assistance. No ataxia noted.  Vitals: per nursing notes.  Back:  No significant thoracic kyphosis.     ADDITIONAL HISTORY SUMMARIZED (2): Reviewed multiple past notes regarding initiation of Prolia and continuing therapy.  DECISION TO OBTAIN EXTRA INFORMATION (1): None.   RADIOLOGY TESTS (1): Reviewed most recent DXA of 2/14/17.   LABS (1): Labs ordered.   MEDICINE TESTS (1): None.  INDEPENDENT REVIEW (2 each): None.     The visit lasted a total of 17 minutes face to face with the patient. Over 50% of the time was spent counseling and educating the patient about bone density.    IGarret, am  scribing for and in the presence of, Dr. Hammond.    I, Ahmet Hammond MD, personally performed the services described in this documentation, as scribed by Garret Carver in my presence, and it is both accurate and complete.    Dragon dictation was used for this note.  Speech recognition errors are a possibility.      Total data points: 4

## 2021-06-19 NOTE — PROGRESS NOTES
ASSESSMENT AND PLAN:    1. Chronic Interstitial Lung Disease  Clinically not progressive.  No hemoptysis. She reports history of TB in youth.  MAYES is not progressed.     2. Acute bronchitis  Acute increased cough, mostly non-productive, without fever.  Suggestive of LRI infection.  Will treat with levofloxacin 250 mg po daily for 10 days.  Will skip warfarin every 4th day while on antibiotic therapy, and have INR tested in 2 weeks.  Will try benzonatate 10mg po Q6H prn, and use robitussin with codeine 5 ml po four times a day prn cough.  We discussed further evaluation such as CT chest and consideration of pulmonary evaluation. She prefers to try this treatment and follow up if fails to improve.     3. Persistent atrial fibrillation (H)  HR controlled, on warfain.      4. Cardiomyopathy (H)  No clinical indication of progressing heart failure symptoms or signs.     CHIEF COMPLAINT:  Chief Complaint   Patient presents with     Bronchitis     coughing, headache, chest pain,      HISTORY OF PRESENT ILLNESS:  Mariia Tinoco is a 83 y.o. female has had increased cough the past two weeks.  This cough is somewhat productive but no hemoptysis.  Some mucopurulent sputum.  No fever, but a sense of chronic chills.  No nausea and no increase in mild chronic dyspnea.      REVIEW OF SYSTEMS:   See HPI, all other pertinent systems on review are negative.    Active Ambulatory Problems     Diagnosis Date Noted     Macular Degeneration      Colonic Diverticulosis      Cervical Disc Degeneration      Hypothyroidism      Chronic Interstitial Lung Disease      Persistent atrial fibrillation (H)      Chronic Bronchitis      Biventricular cardiac pacemaker in situ 07/20/2015     Cardiomyopathy (H) 07/20/2015     Osteoporosis, senile 10/25/2016     Primary osteoarthritis of right knee 01/05/2017     Resolved Ambulatory Problems     Diagnosis Date Noted     Osteopenia      Edema      Acute Sinusitis      Acute bronchitis      Limb Pain       PAF (paroxysmal atrial fibrillation) (H) 11/04/2014     ILD (interstitial lung disease) (H)      Eye irritation      Shortness of breath 01/30/2017     Nausea 01/30/2017     Abnormal chest CT      Lytic bone lesions on xray 11/02/2017     Past Medical History:   Diagnosis Date     Acute sinusitis      Biventricular cardiac pacemaker in situ      Cardiomyopathy (H)      Chronic bronchitis (H)      Chronic interstitial lung disease (H)      COPD (chronic obstructive pulmonary disease) (H)      Disc disease, degenerative, cervical      Diverticulosis      Edema      Hypothyroidism      Macular degeneration      Osteoarthritis      Osteoporosis      Ovarian cancer (H)      Persistent atrial fibrillation (H)      Pulmonary fibrosis (H)      Tuberculosis        Past Surgical History:   Procedure Laterality Date     LUNG REMOVAL, PARTIAL Right     part of lobe     OR CARDIOVERSION ELECTIVE ARRHYTHMIA EXTERNAL      Description: Elective Cardioversion External;  Recorded: 08/31/2012;     OR REMOVAL OF OVARY(S)      Description: Oophorectomy;  Recorded: 08/31/2012;     OR REMOVAL OF TONSILS,<13 Y/O      Description: Tonsillectomy;  Recorded: 08/31/2012;     OR TOTAL ABDOM HYSTERECTOMY      Description: Hysterectomy;  Recorded: 08/31/2012;     OR TOTAL KNEE ARTHROPLASTY Right 1/5/2017    Procedure:  RIGHT TOTAL KNEE ARTHROPLASTY;  Surgeon: Andres Phillips MD;  Location: [facility];  Service: Orthopedics     VITALS:  Vitals:    07/02/18 1029   BP: 118/62   Patient Site: Left Arm   Patient Position: Sitting   Cuff Size: Adult Regular   Pulse: 66   Weight: 158 lb 8 oz (71.9 kg)     Wt Readings from Last 3 Encounters:   07/02/18 158 lb 8 oz (71.9 kg)   05/24/18 157 lb 3.2 oz (71.3 kg)   01/08/18 160 lb (72.6 kg)     PHYSICAL EXAM:  Constitutional:  Well appearing in NAD, alert and oriented  Neck:  Supple, no significant adenopathy.  Cardiac:  S1 S2 without murmur, rhythm regular  Lungs: Clear to auscultation, no  wheezes, or rales heard, moves air bilaterally well  Abdomen:   Soft, flat and non-tender, without hepatosplenomegaly, guarding, rebound, or mass.    Extremities: Joints without effusion or inflammation, distal pulses diminished, no significant edema, bilateral superficial varicosities    DECISION TO OBTAIN OLD RECORDS AND/OR OBTAIN HISTORY FROM SOMEONE OTHER THAN PATIENT, AND/OR ACCESSING CARE EVERYWHERE):  1  0     REVIEW AND SUMMARIZATION OF OLD RECORDS, AND/OR OBTAINING HISTORY FROM SOMEONE OTHER THAN PATIENT, AND/OR DISCUSSION OF CASE WITH ANOTHER HEALTH CARE PROVIDER:  2 reviewed chronic interstitial lung disease imaging and evaluation.      REVIEW AND/OR ORDER OF OF CLINICAL LAB TESTS: 1  0.    REVIEW AND/OR ORDER OF RADIOLOGY TESTS: 1 0.    REVIEW AND/OR ORDER OF MEDICAL TESTS (EKG/ECHO/COLONOSCOPY/EGD): 1  0    INDEPENDENT  VISUALIZATION OF IMAGE, TRACING, OR SPECIMEN ITSELF (2 EACH):  0     TOTAL: 2    Current Outpatient Prescriptions   Medication Sig Dispense Refill     acetaminophen (TYLENOL) 500 MG tablet Take 1,000 mg by mouth every 6 (six) hours.       albuterol (PROAIR HFA;PROVENTIL HFA;VENTOLIN HFA) 90 mcg/actuation inhaler Inhale 2 puffs every 6 (six) hours as needed for wheezing. 1 each 0     calcium carbonate (OS-ZARI) 600 mg (1,500 mg) tablet Take 600 mg by mouth 2 (two) times a day with meals.        cholecalciferol, vitamin D3, 2,000 unit capsule Take 1 capsule (2,000 Units total) by mouth daily.  0     clobetasol (TEMOVATE) 0.05 % external solution Apply 1 application topically 2 (two) times a day as needed. To scalp as needed       fluticasone-vilanterol (BREO ELLIPTA) 100-25 mcg/dose DsDv inhaler Inhale 1 puff daily.       levothyroxine (SYNTHROID) 75 MCG tablet Take 1 tablet (75 mcg total) by mouth daily. 90 tablet 1     levothyroxine (SYNTHROID, LEVOTHROID) 75 MCG tablet TAKE 1 TABLET DAILY 90 tablet 2     MELATONIN ORAL Take 500 mcg by mouth at bedtime. Take only 5 times weekly        metoprolol succinate (TOPROL XL) 25 MG Take 25 mg by mouth daily.       vitamins  A,C,E-zinc-copper (PRESERVISION AREDS) 14,320-226-200 unit-mg-unit cap Take 1 tablet by mouth 2 (two) times a day.       warfarin (JANTOVEN) 3 MG tablet Take ONE tablet (3mg) to ONE & ONE-HALF tablets (4.5mg) by mouth daily, as directed.  Adjust dose based on INR results. 110 tablet 1     zolpidem (AMBIEN) 5 MG tablet TAKE 1 TABLET AT BEDTIME ASNEEDED 30 tablet 0     benzonatate (TESSALON PERLES) 100 MG capsule Take 1 capsule (100 mg total) by mouth every 6 (six) hours as needed for cough. 30 capsule 0     codeine-guaiFENesin (GUAIFENESIN AC)  mg/5 mL liquid Take 5 mL by mouth 4 (four) times a day as needed for cough. 236 mL 0     levoFLOXacin (LEVAQUIN) 250 MG tablet Take 1 tablet (250 mg total) by mouth daily for 10 days. 10 tablet 0     ondansetron (ZOFRAN) 8 MG tablet Take 1 tablet (8 mg total) by mouth every 8 (eight) hours as needed. 20 tablet 0     warfarin (JANTOVEN) 3 MG tablet Take 1 or 1&1/2 tablets (3mg or 4.5mg) by mouth daily, as directed.  Dose adjusted based on INR results. 110 tablet 0     No current facility-administered medications for this visit.      Tejas Pollard MD  Internal Medicine  [facility]

## 2021-06-19 NOTE — PROGRESS NOTES
"  Office Visit - Follow Up   Mariia Tinoco   83 y.o. female    Date of Visit: 8/17/2018    Chief Complaint   Patient presents with     Cough     bronchitis f/u - feeling better      INR Check        Assessment and Plan   1. Chronic Interstitial Lung Disease  I counseled patient at length regarding her complaints regarding her clinic and the phone service she gets at [facility].  In order to try to improve the service I have asked that she try to get a hold of us on my chart which bypasses our phone system.  I also did give her a prescription for doxycycline and prednisone to have on hand if she gets ill and that way she can get on those right away given her underlying lung disease.  She was pleased with the results of the discussion    2. Persistent atrial fibrillation (H)  INR today.  - INR        Return in about 6 months (around 2/17/2019) for Recheck.     History of Present Illness   This 83 y.o. old patient comes in today for discussion of her lung disease.  She had a bronchitis earlier this summer.  She is very perturbed that I could not see her.  The care connection is scheduled her with a provider at the [facility].  Then the following week she was not getting better despite medications and she want to be seen but my clinic was full and then was on vacation.  She is very perturbed about that.  They offered her an appointment but not until today.  She is very upset about that.  She wonders how she can manage in the future if she gets sick.  They are very perturbed with all of this.    Review of Systems: A comprehensive review of systems was negative except as noted.     Medications, Allergies and Problem List   Reviewed and updated     Physical Exam   General Appearance:   Pleasant woman she looks good she is in good spirits she does not appear ill today.    /70 (Patient Site: Right Arm, Patient Position: Sitting, Cuff Size: Adult Regular)  Pulse 77  Ht 5' 4\" (1.626 m)  Wt 160 lb (72.6 kg)  SpO2 " 98%  BMI 27.46 kg/m2         Additional Information   Current Outpatient Prescriptions   Medication Sig Dispense Refill     acetaminophen (TYLENOL) 500 MG tablet Take 1,000 mg by mouth every 6 (six) hours.       albuterol (PROAIR HFA;PROVENTIL HFA;VENTOLIN HFA) 90 mcg/actuation inhaler Inhale 2 puffs every 6 (six) hours as needed for wheezing. 1 each 0     calcium carbonate (OS-ZARI) 600 mg (1,500 mg) tablet Take 600 mg by mouth 2 (two) times a day with meals.        cholecalciferol, vitamin D3, 2,000 unit capsule Take 1 capsule (2,000 Units total) by mouth daily.  0     clobetasol (TEMOVATE) 0.05 % external solution Apply 1 application topically 2 (two) times a day as needed. To scalp as needed       fluticasone-vilanterol (BREO ELLIPTA) 100-25 mcg/dose DsDv inhaler Inhale 1 puff daily.       levothyroxine (SYNTHROID, LEVOTHROID) 75 MCG tablet TAKE 1 TABLET DAILY 90 tablet 2     MELATONIN ORAL Take 500 mcg by mouth at bedtime. Take only 5 times weekly       metoprolol succinate (TOPROL XL) 25 MG Take 25 mg by mouth daily.       warfarin (JANTOVEN) 3 MG tablet Take ONE tablet (3mg) to ONE & ONE-HALF tablets (4.5mg) by mouth daily, as directed.  Adjust dose based on INR results. 110 tablet 1     zolpidem (AMBIEN) 5 MG tablet TAKE 1 TABLET AT BEDTIME ASNEEDED 30 tablet 0     doxycycline (VIBRA-TABS) 100 MG tablet Take 1 tablet (100 mg total) by mouth 2 (two) times a day for 7 days. 14 tablet 0     ondansetron (ZOFRAN) 8 MG tablet Take 1 tablet (8 mg total) by mouth every 8 (eight) hours as needed. 20 tablet 0     predniSONE (DELTASONE) 10 mg tablet 4 po daily for 2d, then 3 po daily for 2d, then 2 po daily for 2 d, then one po daily for 2 d. 20 tablet 0     warfarin (JANTOVEN) 3 MG tablet Take 1 or 1&1/2 tablets (3mg or 4.5mg) by mouth daily, as directed.  Dose adjusted based on INR results. 110 tablet 0     No current facility-administered medications for this visit.      Allergies   Allergen Reactions     Bee Venom  Protein (Honey Bee) Anaphylaxis and Hives     Ultram [Tramadol] Nausea And Vomiting     Social History   Substance Use Topics     Smoking status: Never Smoker     Smokeless tobacco: Never Used     Alcohol use 0.6 oz/week     1 Glasses of wine per week      Comment: occasional       Review and/or order of clinical lab tests:  Review and/or order of radiology tests:  Review and/or order of medicine tests:  Discussion of test results with performing physician:  Decision to obtain old records and/or obtain history from someone other than the patient:  Review and summarization of old records and/or obtaining history from someone other than the patient and.or discussion of case with another health care provider:  Independent visualization of image, tracing or specimen itself:    Time: total time spent with the patient was 15 minutes of which >50% was spent in counseling and coordination of care     Eagle Shabazz MD

## 2021-06-20 NOTE — LETTER
Letter by Eagle Shabazz MD at      Author: Eagle Shabazz MD Service: -- Author Type: --    Filed:  Encounter Date: 9/28/2020 Status: (Other)         Mariia Tinoco  [address]  [place] [address]             September 28, 2020         Dear Ms. Tinoco,    Below are the results from your recent visit:    Resulted Orders   Urinalysis-UC if Indicated   Result Value Ref Range    Color, UA Yellow Colorless, Yellow, Straw, Light Yellow    Clarity, UA Clear Clear    Glucose, UA Negative Negative    Bilirubin, UA Negative Negative    Ketones, UA Negative Negative    Specific Gravity, UA 1.020 1.005 - 1.030    Blood, UA Trace (!) Negative    pH, UA 5.5 5.0 - 8.0    Protein, UA Trace (!) Negative mg/dL    Urobilinogen, UA 0.2 E.U./dL 0.2 E.U./dL, 1.0 E.U./dL    Nitrite, UA Negative Negative    Leukocytes, UA Moderate (!) Negative    Narrative    Urine Culture ordered based on [facility] criteria   Urine,Microscopic   Result Value Ref Range    Bacteria, UA Moderate (!) None Seen hpf    RBC, UA 0-2 None Seen, 0-2 hpf    WBC, UA 5-10 (!) None Seen, 0-5 hpf    Squam Epithel, UA  (!) None Seen, 0-5 lpf    Trans Epithel, UA 10-25 (!) None Seen lpf    Mucus, UA Many (!) None Seen lpf       Poor quality specimen here Tete with a lot of skin cells seen.  I suspected that the culture will probably grow something as a result.  Unless you are having symptoms I am not going to give you anything.  Please let me know if you do develop any urinary tract symptoms.     Please call with questions or contact us using [employer].    Sincerely,        Electronically signed by Eagle Shabazz MD

## 2021-06-21 ENCOUNTER — COMMUNICATION - HEALTHEAST (OUTPATIENT)
Dept: INTERNAL MEDICINE | Facility: CLINIC | Age: 86
End: 2021-06-21

## 2021-06-21 DIAGNOSIS — E03.9 HYPOTHYROIDISM, UNSPECIFIED TYPE: ICD-10-CM

## 2021-06-21 DIAGNOSIS — I48.19 PERSISTENT ATRIAL FIBRILLATION (H): ICD-10-CM

## 2021-06-21 DIAGNOSIS — Z79.01 LONG TERM (CURRENT) USE OF ANTICOAGULANTS: ICD-10-CM

## 2021-06-21 NOTE — LETTER
Letter by Eagle Shabazz MD at      Author: Eagle Shabazz MD Service: -- Author Type: --    Filed:  Encounter Date: 3/18/2021 Status: (Other)         Mariia Tinoco  [address]  [place] [address]             March 18, 2021         Dear Ms. Tinoco,    Below are the results from your recent visit:    Resulted Orders   Mammo Diagnostic Bilateral    Narrative    EXAM: MAMMO DIAGNOSTIC 3D BILATERAL, US BREAST LEFT LIMITED 1-3 QUADRANTS  LOCATION: [facility]  DATE/TIME: 3/17/2021 3:16 PM    INDICATION: 85-year-old female presents with constant left nipple pain for approximately one year. Patient denies any significant changes in the feeling or texture of her left nipple and denies any associated nipple discharge. 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  COMPARISON: 10/01/2010, 09/29/2009, 09/26/2008, 09/25/2007.    MAMMOGRAPHIC FINDINGS: Bilateral full-field digital diagnostic mammograms performed. There are scattered areas of fibroglandular density. Images evaluated with the assistance of CAD. Breast tomosynthesis was used in interpretation.     There are no suspicious masses, calcifications, or architectural distortion within either breast. There are no significant changes within either breast compared to prior examinations. Recommend sonographic evaluation of the left retroareolar region for   further evaluation.    ULTRASOUND FINDINGS: Visual inspection of the left breast demonstrates a normal-appearing, everted left nipple, symmetric in appearance compared to the contralateral right nipple. Physical examination of this area also does not demonstrate any discrete   palpable mass or associated skin thickening. The left nipple is nonerythematous.    Sonographic evaluation of the area of constant pain involving the left nipple was performed. Normal-appearing fibroglandular tissue is  demonstrated without underlying suspicious mass or other sonographic abnormality.      Impression    1.  No imaging correlate to reported symptom of constant left nipple pain, for which management should be based clinically.  2.  No mammographic evidence of malignancy within either breast.    ACR BI-RADS Category 1: Negative.    Return to annual screening schedule is recommended.    I personally scanned and discussed the findings and recommendations with the patient at the conclusion of the examination.          Your mammogram was normal.  I am running your medicines by our pharmacist to see if she thinks any of your medicines could be a culprit.  Assuming there is not an offending agent, would you want to meet with a gynecologist?     Please call with questions or contact us using [employer].    Sincerely,        Electronically signed by Eagle Shabazz MD

## 2021-06-21 NOTE — LETTER
Letter by Lexus Valenzuela MD at      Author: Lexus Valenzuela MD Service: -- Author Type: --    Filed:  Encounter Date: 3/19/2021 Status: (Other)         Mariia Tinoco  [address]  [place] [address]             March 19, 2021         Dear Ms. Tinoco,    Below are the results from your recent visit:    Resulted Orders   Comprehensive Metabolic Panel   Result Value Ref Range    Sodium 143 136 - 145 mmol/L    Potassium 4.8 3.5 - 5.0 mmol/L    Chloride 106 98 - 107 mmol/L    CO2 27 22 - 31 mmol/L    Anion Gap, Calculation 10 5 - 18 mmol/L    Glucose 74 70 - 125 mg/dL    BUN 13 8 - 28 mg/dL    Creatinine 0.77 0.60 - 1.10 mg/dL    GFR MDRD Af Amer >60 >60 mL/min/1.73m2    GFR MDRD Non Af Amer >60 >60 mL/min/1.73m2    Bilirubin, Total 2.3 (H) 0.0 - 1.0 mg/dL    Calcium 9.1 8.5 - 10.5 mg/dL    Protein, Total 5.9 (L) 6.0 - 8.0 g/dL    Albumin 3.6 3.5 - 5.0 g/dL    Alkaline Phosphatase 115 45 - 120 U/L    AST 23 0 - 40 U/L    ALT 21 0 - 45 U/L    Narrative    Fasting Glucose reference range is 70-99 mg/dL per  American Diabetes Association (ADA) guidelines.       The electrolytes, kidney tests are all normal .   Liver tests are essentially normal .     Vitamin D, Total (25-Hydroxy)   Date Value Ref Range Status   03/16/2021 80.3 (H) 30.0 - 80.0 ng/mL Final     Your  vitamin D level is slightly high . You can cut your daily amount of vitamin D by 1000units a day     Please call with questions or contact us using [employer].    Sincerely,        Electronically signed by Lexus Valenzuela MD

## 2021-06-21 NOTE — LETTER
Letter by Eagle Shabazz MD at      Author: Eagle Shabazz MD Service: -- Author Type: --    Filed:  Encounter Date: 3/18/2021 Status: (Other)         Mariia Tinoco  [address]  [place] [address]             March 18, 2021         Dear MsJennifer Tinoco,    Below are the results from your recent visit:    Resulted Orders   Thyroid Cascade   Result Value Ref Range    TSH 4.59 0.30 - 5.00 uIU/mL   Comprehensive Metabolic Panel   Result Value Ref Range    Sodium 143 136 - 145 mmol/L    Potassium 4.8 3.5 - 5.0 mmol/L    Chloride 106 98 - 107 mmol/L    CO2 27 22 - 31 mmol/L    Anion Gap, Calculation 10 5 - 18 mmol/L    Glucose 74 70 - 125 mg/dL    BUN 13 8 - 28 mg/dL    Creatinine 0.77 0.60 - 1.10 mg/dL    GFR MDRD Af Amer >60 >60 mL/min/1.73m2    GFR MDRD Non Af Amer >60 >60 mL/min/1.73m2    Bilirubin, Total 2.3 (H) 0.0 - 1.0 mg/dL    Calcium 9.1 8.5 - 10.5 mg/dL    Protein, Total 5.9 (L) 6.0 - 8.0 g/dL    Albumin 3.6 3.5 - 5.0 g/dL    Alkaline Phosphatase 115 45 - 120 U/L    AST 23 0 - 40 U/L    ALT 21 0 - 45 U/L    Narrative    Fasting Glucose reference range is 70-99 mg/dL per  American Diabetes Association (ADA) guidelines.   HM2(CBC w/o Differential)   Result Value Ref Range    WBC 5.5 4.0 - 11.0 thou/uL    RBC 4.08 3.80 - 5.40 mill/uL    Hemoglobin 13.4 12.0 - 16.0 g/dL    Hematocrit 37.9 35.0 - 47.0 %    MCV 93 80 - 100 fL    MCH 32.8 27.0 - 34.0 pg    MCHC 35.4 32.0 - 36.0 g/dL    RDW 15.1 (H) 11.0 - 14.5 %    Platelets 182 140 - 440 thou/uL    MPV 10.1 (H) 7.0 - 10.0 fL   Vitamin D, Total (25-Hydroxy)   Result Value Ref Range    Vitamin D, Total (25-Hydroxy) 80.3 (H) 30.0 - 80.0 ng/mL    Narrative    Deficiency <10.0 ng/mL  Insufficiency 10.0-29.9 ng/mL  Sufficiency 30.0-80.0 ng/mL  Toxicity (possible) >100.0 ng/mL       Vitamin D level is borderline high.  How much are you taking currently Tete?  Thyroid looks good.  Liver and kidney tests are probably stable.  Blood counts normal.      Please call with questions or contact us using [employer].    Sincerely,        Electronically signed by Eagle Shabazz MD

## 2021-06-22 RX ORDER — LEVOTHYROXINE SODIUM 75 UG/1
TABLET ORAL
Qty: 90 TABLET | Refills: 2 | Status: SHIPPED | OUTPATIENT
Start: 2021-06-22 | End: 2022-05-24

## 2021-06-22 RX ORDER — WARFARIN SODIUM 3 MG/1
TABLET ORAL
Qty: 97 TABLET | Refills: 3 | Status: SHIPPED | OUTPATIENT
Start: 2021-06-22 | End: 2022-06-09

## 2021-06-22 NOTE — TELEPHONE ENCOUNTER
Dr. Shabazz,  Patient would like to have an ear wax cleaning done.  Is this something that you would be able to do for her?  Please advise.  Thank you.  Yoly CRAMER, LUL/CMT....................1:42 PM

## 2021-06-22 NOTE — TELEPHONE ENCOUNTER
Who is calling:  Patient   Reason for Call:  Requesting if the clinic can clean out her ear wax. States she uses Q tips and has not noticed any wax residue on these for years. Possible hearing loss but patient was unsure.   Date of last appointment with primary care: 8/17/18  Has the patient been recently seen:  No  Okay to leave a detailed message: Yes

## 2021-06-22 NOTE — TELEPHONE ENCOUNTER
Called- patient's  states she will be back around 2:30pm this afternoon. Advised will try back later.

## 2021-06-22 NOTE — PROGRESS NOTES
The following steps were completed to comply with the REMS program for Prolia:  1. Ordering provider has previously reviewed information in the Medication Guide and Patient Counseling Chart, including the serious risks of Prolia  and the symptoms of each risk and have been advised  to seek prompt medical attention if they have signs or symptoms of any of the serious risks.  2. Provided each patient a copy of the Medication Guide and Patient Brochure.  See MAR for administration details.   Indication: Prolia  (denosumab) is a prescription medicine used to treat osteoporosis in patients who:   Are at high risk for fracture, meaning patients who have had a fracture related to osteoporosis, or who have multiple risk factors for fracture; Cannot use another osteoporosis medicine or other osteoporosis medicines did not work well.   The timeline for early/late injections would be 4 weeks early and any time after the 6 month cathy. If a patient receives their injection late, then the subsequent injection would be 6 months from the date that they actually received the injection    1.  When was the last injection?  5/24/18  2.  Has insurance for this injection been verified?  Yes    3.  Did you experience any new onset achiness or rashes that lasted for over a month with your previous Prolia injection?   No    4.  Do you have a fever over 101?F or a new deep cough that is unusual for you today? No    5.  Have you started any new medications in the last 6 months that you were told could affect your immune system? These may have been prescribed by oncologist, transplant, rheumatology, or dermatology.   No    6.  In the last 6 months have you have gastric bypass or parathyroid surgery?   No    7.  Do you plan dental work requiring drilling into the bone such as implants/extractions or oral surgery in the next 2-3 months?   No

## 2021-06-22 NOTE — TELEPHONE ENCOUNTER
She is more than welcome to make an appointment for evaluation.  We will look in the ears and if they are full of wax we can attempt to clean them.  We can refer her on as necessary as well.

## 2021-06-23 NOTE — TELEPHONE ENCOUNTER
ANTICOAGULATION  MANAGEMENT    Assessment     Today's INR result of 2.40 is Therapeutic (goal INR of 2.0-3.0)        Warfarin taken as previously instructed    No new diet changes affecting INR    No new medication/supplements affecting INR    Continues to tolerate warfarin with no reported s/s of bleeding or thromboembolism     Previous INR was Therapeutic at 2.90 on 12/27/18.    Plan:     Spoke with Tete regarding INR result and instructed:     Warfarin Dosing Instructions:    - Continue current warfarin dose 4.5 mg daily on Thursdays; and 3 mg daily rest of week.    Instructed patient to follow up no later than:  4 wks.   - scheduled on 2/7/19 @ [facility].    Education provided: importance of consistent vitamin K intake, target INR goal and significance of current INR result and importance of notifying clinic for changes in medications    Tete verbalizes understanding and agrees to warfarin dosing plan.    Instructed to call the [facility] for any changes, questions or concerns. (#[phone])   ?   Cassandra Banuelos RN    Subjective/Objective:      Mariia Tinoco, a 83 y.o. female is on warfarin.     Mariia reports:     Home warfarin dose: template incorrect; verbally confirmed home dose with Tete and updated on anticoagulation calendar    - she has macular degeneration and brought a flashlight to see, however, flashlight did not work.  Filled out template as best as she can.     Missed doses: No.       Medication changes:  No     S/S of bleeding or thromboembolism:  No     New Injury or illness:  No     Changes in diet or alcohol consumption:  No     Upcoming surgery, procedure or cardioversion:  No    Anticoagulation Episode Summary     Current INR goal:   2.0-3.0   TTR:   71.6 % (4.1 y)   Next INR check:   2/7/2019   INR from last check:   2.40 (1/10/2019)   Weekly max warfarin dose:      Target end date:      INR check location:      Preferred lab:      Send INR reminders to:   ANTICOAGULATION POOL C  (DTN,VAD,CGR,GAV)    Indications    PAF (paroxysmal atrial fibrillation) (H) (Resolved) [I48.0]           Comments:            Anticoagulation Care Providers     Provider Role Specialty Phone number    Eagle Shabazz MD Referring Internal Medicine [phone]

## 2021-06-23 NOTE — PROGRESS NOTES
HPI: This patient presents to clinic today for cerumen removal. Has noticed some fullness of the ears and muffled hearing, but denies otalgia, otorrhea, vertigo, change in true hearing or tinnitus. Denies other symptoms. Uses qtips regularly.    Past medical history, surgical history, family history, social history, medications, and allergies have been reviewed and are documented above.     Review of Systems: a 10-system review was performed. Symptoms are noted in the HPI and on a scanned document in the computer chart.    Physical Examination:   GEN: no acute distress, alert and oriented  EYES: extraocular movements intact, pupils equal and round. Sclera clear.   EARS: bilateral cerumen impaction right and excess cerumen left cleared under binocular microscopy using forceps/loop. The tympanic membranes are noted to be intact and clear with no signs of infections, effusions, perforations, or retractions.  PULM: breathing comfortably on room air with no stertor or stridor. Chest expansion symmetric.  CARDS: no cyanosis or clubbing, normal carotid pulses    MEDICAL DECISION-MAKING: cerumen impactions cleared under binocular microscopy without difficulty. Hearing restored to baseline. Follow-up PRN.

## 2021-06-23 NOTE — TELEPHONE ENCOUNTER
ANTICOAGULATION  MANAGEMENT    Assessment     Today's INR result of 2.70 is Therapeutic (goal INR of 2.0-3.0)        Warfarin taken as previously instructed    No new diet changes affecting INR    No new medication/supplements affecting INR    Continues to tolerate warfarin with no reported s/s of bleeding or thromboembolism     Previous INR was Therapeutic at 2.40 on 1/10/19.    Plan:     Spoke with Tete regarding INR result and instructed:     Warfarin Dosing Instructions:  (has 3mg tabs).   - Continue current warfarin dose 4.5 mg daily on Thursdays; and 3 mg daily rest of week.    Instructed patient to follow up no later than:  4 wks.   - scheduled on 3/8/19 @ [facility].    Education provided: importance of consistent vitamin K intake and target INR goal and significance of current INR result    Tete verbalizes understanding and agrees to warfarin dosing plan.    Instructed to call the [facility] for any changes, questions or concerns. (#[phone])   ?   Cassandra Banuelos RN    Subjective/Objective:      Mariia Tinoco, a 83 y.o. female is on warfarin.     Mariia reports:     Home warfarin dose: verbally confirmed home dose with Tete and updated on anticoagulation calendar     Missed doses: No     Medication changes:  No     S/S of bleeding or thromboembolism:  No     New Injury or illness:  No.  Has macular degeneration.   - [facility]hab [facility]     Changes in diet or alcohol consumption:  No     Upcoming surgery, procedure or cardioversion:  No    Anticoagulation Episode Summary     Current INR goal:   2.0-3.0   TTR:   72.2 % (4.2 y)   Next INR check:   3/7/2019   INR from last check:   2.70 (2/7/2019)   Weekly max warfarin dose:      Target end date:      INR check location:      Preferred lab:      Send INR reminders to:   ANTICOAGULATION POOL C (DTN,VAD,CGR,GAV)    Indications    PAF (paroxysmal atrial fibrillation) (H) (Resolved) [I48.0]           Comments:             Anticoagulation Care Providers     Provider Role Specialty Phone number    Eagle Shabazz MD Referring Internal Medicine [phone]

## 2021-06-24 NOTE — TELEPHONE ENCOUNTER
ANTICOAGULATION  MANAGEMENT    Assessment     Today's INR result of 1.70 is Subtherapeutic (goal INR of 2.0-3.0)        Warfarin taken as previously instructed    No new diet changes affecting INR    Potential interaction between recent completion of Doxycycline and Prednisone  and warfarin which may affect subsequent INRs    Continues to tolerate warfarin with no reported s/s of bleeding or thromboembolism     Previous INR was Therapeutic at 2.70 on 2/7/19.    Plan:     Spoke with Tete regarding INR result and instructed:     Warfarin Dosing Instructions:  (has 3mg tabs)   - just for today, advised one time booster with 4.5mg warfarin dose,    - then continue current warfarin dose 4.5 mg daily on Thursdays; and 3 mg daily rest of week.    Instructed patient to follow up no later than:  1-2 wks.   - scheduled on 3/22/19 @ [facility].    Education provided: target INR goal and significance of current INR result, importance of notifying clinic for changes in medications and importance of notifying clinic for diarrhea, nausea/vomiting, reduced intake and/or illness    Tete verbalizes understanding and agrees to warfarin dosing plan.    Instructed to call the [facility] for any changes, questions or concerns. (#[phone])   ?   Cassandra Banuelos RN    Subjective/Objective:      Mariia Tinoco, a 83 y.o. female is on warfarin.     Mariia reports:     Home warfarin dose: as updated on anticoagulation calendar per template     Missed doses: No     Medication changes:  Yes:  Completed ABX and Predisone.   - On 3/4/19, new RX for Prednisone 20mg take TWO tablets daily with meals for 5 days, then ONE tablet daily for 5 days, from  3/4 - 14.  Also, Doxycycline 100mg one two times a day for 7 days, from 3/4 - 11.   - NOTE:  Dr. Salmon was given additional RX for these 2 medications, for any flare-ups or exacerbation.     S/S of bleeding or thromboembolism:  No     New Injury or illness:  Yes:  Exacerbation of interstitial  fibrotic lung disease.  Had f/u with pulmonologist (dr. Salmon) from [facility] on 3/4/19.     Changes in diet or alcohol consumption:  No     Upcoming surgery, procedure or cardioversion:  No    Anticoagulation Episode Summary     Current INR goal:   2.0-3.0   TTR:   72.1 % (4.2 y)   Next INR check:   3/22/2019   INR from last check:   1.70! (3/8/2019)   Weekly max warfarin dose:      Target end date:      INR check location:      Preferred lab:      Send INR reminders to:   ANTICOAGULATION POOL C (DTN,VAD,CGR,GAV)    Indications    PAF (paroxysmal atrial fibrillation) (H) (Resolved) [I48.0]           Comments:            Anticoagulation Care Providers     Provider Role Specialty Phone number    Eagle Shabazz MD Referring Internal Medicine [phone]

## 2021-06-25 NOTE — TELEPHONE ENCOUNTER
Called and spoke with Tete,     - called [facility] - does cover Eliquis and Xarelto.  However, copay is not affordable for her.     - she will stay on Jantoven.     - with her insurance change last year, her warfarin was switched to Jantoven.  Her INR's then were not stablized.  However, during this switch of her warfarin, she also tested positive for Covid-19, and still recovering.  Has good appetite, staying consistent with her usual intake of Vitamin-K foods.  The only thing is fatigue.

## 2021-06-25 NOTE — TELEPHONE ENCOUNTER
FYI - Status Update  Who is Calling: Patient  Update: Requests call back from [facility]  AFTER 3:00 today. Re: ongoing conversation about changing blood thinners, and what her insurance may cover.   Okay to leave a detailed message?:  No     Requests you call her after 3:00 today.

## 2021-06-25 NOTE — TELEPHONE ENCOUNTER
ANTICOAGULATION  MANAGEMENT    Assessment     Today's INR result of 2.40 is Therapeutic (goal INR of 2.0-3.0)        Warfarin taken as previously instructed    - reported taking higher doses on Mon/Thurs.    No new diet changes affecting INR    No new medication/supplements affecting INR    Continues to tolerate warfarin with no reported s/s of bleeding or thromboembolism     Previous INR was Supratherapeutic at 3.40 on 5/19/21.    Plan:     Spoke on phone with Tete regarding INR result and instructed:      Warfarin Dosing Instructions:    (3mg tabs)   - Continue current warfarin dose 4.5 mg daily on Mon/Thurs; and 3 mg daily rest of week.    Instructed patient to follow up no later than:  2 wks.   - INR scheduled on 6/30/21 @ [facility].    Education provided: importance of consistent vitamin K intake and target INR goal and significance of current INR result    Tete verbalizes understanding and agrees to warfarin dosing plan.    Instructed to call the [facility] for any changes, questions or concerns. (#[phone])   ?   Cassnadra Banuelos RN    Subjective/Objective:      Mariia Tinoco, a 86 y.o. female is on warfarin. Mariia Lrlyn reports:     Home warfarin dose: verbally confirmed home dose with Tete and updated on anticoagulation calendar     Missed doses: No     Medication changes:  No     S/S of bleeding or thromboembolism:  No     New Injury or illness:  No     Changes in diet or alcohol consumption:  No     Upcoming surgery, procedure or cardioversion:  No    Anticoagulation Episode Summary     Current INR goal:  2.0-3.0   TTR:  39.6 % (1 y)   Next INR check:  6/23/2021   INR from last check:  2.40 (6/9/2021)   Weekly max warfarin dose:     Target end date:     INR check location:     Preferred lab:     Send INR reminders to:  [facility]    Indications    PAF (paroxysmal atrial fibrillation) (H) (Resolved) [I48.0]           Comments:           Anticoagulation Care Providers     Provider  Role Specialty Phone number    Eagle Shabazz MD Referring Internal Medicine [phone]

## 2021-06-26 NOTE — TELEPHONE ENCOUNTER
RN cannot approve Refill Request    RN can NOT refill this medication med is not covered by policy/route to provider. Last office visit: 3/16/2021 Eagle hSabazz MD Last Physical: 9/15/2020 Last MTM visit: Visit date not found Last visit same specialty: 3/16/2021 Eagle Shabazz MD.  Next visit within 3 mo: Visit date not found  Next physical within 3 mo: Visit date not found      Tabatha Amaro, [facility] Triage/Med Refill 6/21/2021    Requested Prescriptions   Pending Prescriptions Disp Refills     levothyroxine (SYNTHROID, LEVOTHROID) 75 MCG tablet [Pharmacy Med Name: LEVOTHYROXIN TAB 0.075MG] 90 tablet 1     Sig: TAKE 1 TABLET DAILY       Thyroid Hormones Protocol Passed - 6/21/2021  9:11 AM        Passed - Provider visit in past 12 months or next 3 months     Last office visit with prescriber/PCP: 3/16/2021 Eagle Shabazz MD OR same dept: Visit date not found OR same specialty: 3/16/2021 Eagle Shabazz MD  Last physical: 9/15/2020 Last MTM visit: Visit date not found   Next visit within 3 mo: Visit date not found  Next physical within 3 mo: Visit date not found  Prescriber OR PCP: Eagle Shabazz MD  Last diagnosis associated with med order: 1. Hypothyroidism, unspecified type  - levothyroxine (SYNTHROID, LEVOTHROID) 75 MCG tablet [Pharmacy Med Name: LEVOTHYROXIN TAB 0.075MG]; TAKE 1 TABLET DAILY  Dispense: 90 tablet; Refill: 1    2. Persistent atrial fibrillation (H)  - JANTOVEN 3 mg tablet [Pharmacy Med Name: JANTOVEN TAB 3MG]; TAKE 1 TABLET DAILY AND TAKE 1 AND 1/2 TABLETS ON THURSDAY AS DIRECTED. ADJUST DOSE BASED ON INTERNATIONAL NORMALIZED RATIO RESULTS.  Dispense: 97 tablet; Refill: 1    3. Long term (current) use of anticoagulants  - JANTOVEN 3 mg tablet [Pharmacy Med Name: JANTOVEN TAB 3MG]; TAKE 1 TABLET DAILY AND TAKE 1 AND 1/2 TABLETS ON THURSDAY AS DIRECTED. ADJUST DOSE BASED ON INTERNATIONAL NORMALIZED RATIO RESULTS.  Dispense: 97 tablet; Refill: 1    If protocol passes may refill for 12  months if within 3 months of last provider visit (or a total of 15 months).             Passed - TSH on file in past 12 months for patient age 12 & older     TSH   Date Value Ref Range Status   03/16/2021 4.59 0.30 - 5.00 uIU/mL Final                      warfarin ANTICOAGULANT (JANTOVEN) 3 MG tablet [Pharmacy Med Name: JANTOVEN TAB 3MG] 97 tablet 1     Sig: TAKE 1 TABLET DAILY AND TAKE 1 AND 1/2 TABLETS ON THURSDAY AS DIRECTED. ADJUST DOSE BASED ON INTERNATIONAL NORMALIZED RATIO RESULTS.       Warfarin Refill Protocol  Failed - 6/21/2021  9:11 AM        Failed -  Route to appropriate pool/provider     Last Anticoagulation Summary:   Anticoagulation Episode Summary     Current INR goal:  2.0-3.0   TTR:  40.7 % (11.8 mo)   Next INR check:  6/23/2021   INR from last check:  2.40 (6/9/2021)   Weekly max warfarin dose:     Target end date:     INR check location:     Preferred lab:     Send INR reminders to:  [facility]    Indications    PAF (paroxysmal atrial fibrillation) (H) (Resolved) [I48.0]           Comments:           Anticoagulation Care Providers     Provider Role Specialty Phone number    Eagle Shabazz MD Referring Internal Medicine [phone]                Passed - Provider visit in last year     Last office visit with prescriber/PCP: 3/16/2021 Eagle Shabazz MD OR same dept: Visit date not found OR same specialty: 3/16/2021 Eagle Shabazz MD  Last physical: 9/15/2020 Last MTM visit: Visit date not found    Next appt within 3 mo: Visit date not found Next physical within 3 mo: Visit date not found  Prescriber OR PCP: Eagle Shabazz MD  Last diagnosis associated with med order: 1. Hypothyroidism, unspecified type  - levothyroxine (SYNTHROID, LEVOTHROID) 75 MCG tablet [Pharmacy Med Name: LEVOTHYROXIN TAB 0.075MG]; TAKE 1 TABLET DAILY  Dispense: 90 tablet; Refill: 1    2. Persistent atrial fibrillation (H)  - JANTOVEN 3 mg tablet [Pharmacy Med Name: JANTOVEN TAB 3MG]; TAKE 1 TABLET DAILY AND TAKE 1  AND 1/2 TABLETS ON THURSDAY AS DIRECTED. ADJUST DOSE BASED ON INTERNATIONAL NORMALIZED RATIO RESULTS.  Dispense: 97 tablet; Refill: 1    3. Long term (current) use of anticoagulants  - JANTOVEN 3 mg tablet [Pharmacy Med Name: JANTOVEN TAB 3MG]; TAKE 1 TABLET DAILY AND TAKE 1 AND 1/2 TABLETS ON THURSDAY AS DIRECTED. ADJUST DOSE BASED ON INTERNATIONAL NORMALIZED RATIO RESULTS.  Dispense: 97 tablet; Refill: 1    If protocol passes may refill for 6 months if within 3 months of last provider visit (or a total of 9 months).

## 2021-06-26 NOTE — TELEPHONE ENCOUNTER
Requested Prescriptions     Pending Prescriptions Disp Refills     levothyroxine (SYNTHROID, LEVOTHROID) 75 MCG tablet [Pharmacy Med Name: LEVOTHYROXIN TAB 0.075MG] 90 tablet 1     Sig: TAKE 1 TABLET DAILY     warfarin ANTICOAGULANT (JANTOVEN) 3 MG tablet [Pharmacy Med Name: JANTOVEN TAB 3MG] 97 tablet 1     Sig: TAKE 1 TABLET DAILY AND TAKE 1 AND 1/2 TABLETS ON THURSDAY AS DIRECTED. ADJUST DOSE BASED ON INTERNATIONAL NORMALIZED RATIO RESULTS.     Last TSH done 3/16/21 with result that is WNL.    Patient managed by anticoagulation team.  Current with INR checks.

## 2021-06-28 NOTE — PROGRESS NOTES
Progress Notes by Deborah Hayden OT at 3/10/2020  7:30 AM     Author: Deborah Hayden OT Service: -- Author Type: Occupational Therapist    Filed: 3/10/2020  8:41 AM Encounter Date: 3/10/2020 Status: Attested    : Deborah Hayden OT (Occupational Therapist) Cosigner: Eagle Shabazz MD at 3/10/2020  8:50 AM    Attestation signed by Eagle Shabazz MD at 3/10/2020  8:50 AM    Agree                      Rehabilitation Certification Request    March 10, 2020      Patient: Mariia Tinoco  MR Number: [id number]  YOB: 1935  Date of Visit: 3/10/2020      Dear Dr. Eagle Shabazz:    Thank you for this referral.   We are seeing Mariia Tinoco in Occupational Therapy for vertigo.    Medicare and/or Medicaid requires physician review and approval of the treatment plan. Please review the plan of care and verify that you agree with the therapy plan of care by co-signing this note.      Plan of Care  Authorization / Certification Start Date: 03/10/20  Authorization / Certification End Date: 06/08/20  Authorization / Certification Number of Visits: 12  Communication with: Referral Source  Patient Related Instruction: Nature of Condition;Treatment plan and rationale;Basis of treatment;Expected outcome;Precautions  Times per Week: 1  Number of Weeks: 12  Number of Visits: 12  Neuromuscular Reeducation: vestibular      Goals:  Patient will bend: to dress;without vertigo;without loss of balance;in 12 weeks  Patient able to perform bed mobility: without vertigo;in 12 weeks  Patient will turn head: without vertigo;for conversation;in 12 weeks  Patient will look up / down: for drinking;without vertigo;in 12 weeks        If you have any questions or concerns, please don't hesitate to call.    Sincerely,      Deborah Hayden OT        Physician recommendation:                                ___ Follow therapist's recommendation                                                                                                     ___ Modify therapy                                                                                                      Physician Signature:_____________________                                                                                                                                        Date:___________________________    *Physician co-signature indicates they certify the need for these services furnished within this plan and while under their care.         Vestibular Initial Evaluation    Patient Name: Mariia Tinoco  Date of evaluation: 3/10/2020  Referral Diagnosis: vertigo  Referring provider: Eagle Shabazz MD  Visit Diagnosis:     ICD-10-CM    1. Benign paroxysmal positional vertigo, left  H81.12 Ambulatory referral to PT/OT - request continue   2. Unsteadiness on feet  R26.81 Ambulatory referral to PT/OT - request continue   3. Decreased activities of daily living (ADL)  Z78.9    4. Nausea  R11.0        Assessment:      Patient has positive left Melissa - Hallpike for left posterior canal BPPV and was treated with left Epley maneuver today.    Goals:  Patient will bend: to dress;without vertigo;without loss of balance;in 12 weeks  Patient able to perform bed mobility: without vertigo;in 12 weeks  Patient will turn head: without vertigo;for conversation;in 12 weeks  Patient will look up / down: for drinking;without vertigo;in 12 weeks      Patient's expectations/goals are realistic.    Barriers to Learning or Achieving Goals:  No Barriers.       Plan / Patient Instructions:        Plan of Care:   Authorization / Certification Start Date: 03/10/20  Authorization / Certification End Date: 06/08/20  Authorization / Certification Number of Visits: 12  Communication with: Referral Source  Patient Related Instruction: Nature of Condition;Treatment plan and rationale;Basis of treatment;Expected outcome;Precautions  Times per Week: 1  Number of Weeks: 12  Number of Visits:  12  Neuromuscular Reeducation: vestibular         Subjective:         History of Present Illness:    Mariia is a 84 y.o. female who presents to therapy today with complaints of vertigo, nausea and unsteadiness. Patient had sudden onset of symptoms in 2019. Symptoms are not improving. She denies history of similar symptoms. Patient denies ear pain. Patient denies hearing changes. Functional limitations are described as occurring with balance, bending, bed mobility, head turns, looking up or down, stepping over curbs.    Pain Ratin         Objective:      Note: Items left blank indicates the item was not performed or not indicated at the time of the evaluation.    Patient Outcome Measures:   Dizziness Handicap Inventory  Score in %: 20       Vestibular Disorder Examination  1. Benign paroxysmal positional vertigo, left  Ambulatory referral to PT/OT - request continue   2. Unsteadiness on feet  Ambulatory referral to PT/OT - request continue   3. Decreased activities of daily living (ADL)     4. Nausea         Precautions/Restrictions: None    Posture Observation: General standing posture is normal.    ROM:  Not Tested    Strength: Not Tested    Sensation: NT    Functional Mobility: fair      Modified CTSIB: NT  Normal Surface Eyes Open-  Normal Surface Eyes Closed-  Perturbed Surface Eyes Open-  Perturbed Surface Eyes Closed-    The remainder of the tests are performed in room light.    Oculomotor Assessment: NT  Spontaneous Nystagmus with fixation:   Spontaneous Nystagmus without fixation:  Gaze-evoked nystagmus:  Smooth pursuit:  Saccades:  VOR to slow movement:   VOR Head Thrust:  VOR Cancellation:  OPK's: NT  Static Visual Acuity:  Dynamic Visual Acuity:    Positional Tests:  Hallpike Right:  NT  Hallpike Left:  Abnormal - Nystagmus left torsional, up beating, 3 second latency and fatigues in 50 seconds  Head Roll Right: NT  Head Roll Left:  NT    Plan for next visit: repeat left  [place]-Hallpike    Treatment Today: Patient treated with left Viraj-Hallpike for posterior canal BPPV. Patient very nauseated and unable to tolerate a second maneuver.  TREATMENT MINUTES COMMENTS   Evaluation 20    Self-care/ Home management     Neuromuscular Re-education     Canalith repositioning procedure 15          Total 35    Blank areas are intentional and mean the treatment did not include these items.     GOALS AND PLAN OF CARE WERE ESTABLISHED IN COOPERATION WITH THE PATIENT    OT Evaluation Code: (Please list factors)   Comorbidities:   Patient Active Problem List   Diagnosis   ? Macular Degeneration   ? Colonic Diverticulosis   ? Cervical Disc Degeneration   ? Hypothyroidism   ? Chronic Interstitial Lung Disease   ? Persistent atrial fibrillation   ? Chronic Bronchitis   ? Biventricular cardiac pacemaker in situ   ? Cardiomyopathy (H)   ? 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 Primary osteoarthritis of right knee   ? Benign paroxysmal positional vertigo, unspecified laterality      Profile/History Review: Brief    Need for eval modification: No    # Treatment options: Limited    Clinical Decision Making:  Low      Occupational Profile/ Medical and Therapy History and Comorbidities Occupational Performance Clinical Decision Making   (Complexity)   brief history with review of medical/therapy records related to the presenting problem.  No comorbidities 1-3 Performance deficits that result in activity limitations and/or participation restrictions.    No Assessment Modification  Low complexity, which includes  problem-focused assessments, and consideration of a limited number of treatment options.      expanded review of medical/therapy records and additional review of physical, cognitive and psychosocial history.    May have comorbidities 3-5 Performance deficits that result in activity limitations and/or participation restrictions.    Minimal to moderate modification of assessment Moderate complexity, which  includes analysis of data from detailed assessments, and consideration of several treatment options.         Review of medical/therapy records and extensive additional review of physical, cognitive and psychosocial history.  Comorbidities affect occupational performance 5 or more Performance deficits that result in activity limitations and/or participation restrictions.    Significant modification of assessment High complexity, analysis of  Occupational profile and data,  Comprehensive assessments, multiple treatment options.            Deborah Hayden  3/10/2020  7:37 AM

## 2021-06-30 ENCOUNTER — AMBULATORY - HEALTHEAST (OUTPATIENT)
Dept: LAB | Facility: CLINIC | Age: 86
End: 2021-06-30

## 2021-06-30 ENCOUNTER — COMMUNICATION - HEALTHEAST (OUTPATIENT)
Dept: ANTICOAGULATION | Facility: CLINIC | Age: 86
End: 2021-06-30

## 2021-06-30 DIAGNOSIS — I48.19 PERSISTENT ATRIAL FIBRILLATION (H): ICD-10-CM

## 2021-06-30 DIAGNOSIS — Z79.01 LONG TERM (CURRENT) USE OF ANTICOAGULANTS: ICD-10-CM

## 2021-06-30 LAB — INR PPP: 2.1 (ref 0.9–1.1)

## 2021-07-03 NOTE — ADDENDUM NOTE
Addendum Note by Bloch, Lisa M, CMA at 2/14/2017  2:34 PM     Author: Bloch, Lisa M, CMA Service: -- Author Type: Certified Medical Assistant    Filed: 2/14/2017  2:34 PM Encounter Date: 2/14/2017 Status: Signed    : Bloch, Lisa M, CMA (Certified Medical Assistant)    Addended by: BLOCH, LISA M on: 2/14/2017 02:34 PM        Modules accepted: Orders

## 2021-07-03 NOTE — ADDENDUM NOTE
Addendum Note by Brittni Redman at 1/30/2017  9:45 AM     Author: Brittni Redman Service: -- Author Type: --    Filed: 1/30/2017  9:45 AM Encounter Date: 1/30/2017 Status: Signed    : Brittni Redman    Addended by: BRITTNI REDMAN on: 1/30/2017 09:45 AM        Modules accepted: Orders

## 2021-07-04 NOTE — TELEPHONE ENCOUNTER
Telephone Encounter by Tasha Mireles RN at 6/30/2021 11:30 AM     Author: Tasha Mireles RN Service: -- Author Type: Registered Nurse    Filed: 6/30/2021 11:33 AM Encounter Date: 6/30/2021 Status: Signed    : Tasha Mireles RN (Registered Nurse)       ANTICOAGULATION MANAGEMENT     Mariia Tinoco 86 y.o., female is on warfarin with Therapeutic INR result (goal range 2.0-3.0)    Recent labs: (last 7 days)     06/30/21  0916   INR 2.10*       ASSESSMENT     Source: Template      Warfarin dosing taken: Warfarin taken as instructed    Diet: No new diet changes affecting INR    Illness, Injury or hospitalization: No    Medication changes: None    Signs or symptoms of bleeding or clotting: No    Previous INR: therapeutic last 2(+) visits    Additional findings: None     PLAN     Recommended plan for no diet, medication or health factor changes affecting INR:     Dosing instructions: Continue your current warfarin dose 4.5 mg daily on Mon/Thurs; and 3 mg daily rest of week (0% change)    Follow up no later than: 4 weeks     Detailed voice message left for Mariia with dosing instructions and follow up date.     Left detailed message with recommended recheck date    Education provided: importance of therapeutic range, target INR goal and significance of current INR result, importance of following up for INR monitoring at instructed interval and importance of taking warfarin as instructed    Plan made per [facility] anticoagulation protocol    Tasha Mireles  Anticoagulation Clinic   [phone]    Anticoagulation Episode Summary     Current INR goal:  2.0-3.0   TTR:  45.1 % (1 y)   Next INR check:  7/28/2021   INR from last check:  2.10 (6/30/2021)   Weekly max warfarin dose:     Target end date:     INR check location:     Preferred lab:     Send INR reminders to:  [facility]    Indications    PAF (paroxysmal atrial fibrillation) (H) (Resolved) [I48.0]           Comments:            Anticoagulation Care Providers     Provider Role Specialty Phone number    Eagle Shabazz MD Referring Internal Medicine [phone]

## 2021-07-04 NOTE — TELEPHONE ENCOUNTER
Telephone Encounter by Tasha Mireles RN at 6/30/2021  3:47 PM     Author: Tasha Mireles RN Service: -- Author Type: Registered Nurse    Filed: 6/30/2021  3:48 PM Encounter Date: 6/30/2021 Status: Signed    : Tasha Mireles RN (Registered Nurse)       Called patient to follow up regarding voice mail message left earlier in the day.  Patient states she received the message and does not have any questions and has scheduled her next INR appointment already.    Tahsa Allen RN, BSN  Anticoagulation Clinic

## 2021-07-13 ENCOUNTER — RECORDS - HEALTHEAST (OUTPATIENT)
Dept: ADMINISTRATIVE | Facility: CLINIC | Age: 86
End: 2021-07-13

## 2021-07-21 ENCOUNTER — RECORDS - HEALTHEAST (OUTPATIENT)
Dept: ADMINISTRATIVE | Facility: CLINIC | Age: 86
End: 2021-07-21

## 2021-07-27 DIAGNOSIS — I48.19 PERSISTENT ATRIAL FIBRILLATION (H): Primary | ICD-10-CM

## 2021-07-27 DIAGNOSIS — Z79.01 LONG TERM (CURRENT) USE OF ANTICOAGULANTS: ICD-10-CM

## 2021-07-28 ENCOUNTER — ANTICOAGULATION THERAPY VISIT (OUTPATIENT)
Dept: ANTICOAGULATION | Facility: CLINIC | Age: 86
End: 2021-07-28

## 2021-07-28 ENCOUNTER — LAB (OUTPATIENT)
Dept: LAB | Facility: CLINIC | Age: 86
End: 2021-07-28
Payer: MEDICARE

## 2021-07-28 DIAGNOSIS — I48.19 PERSISTENT ATRIAL FIBRILLATION (H): ICD-10-CM

## 2021-07-28 DIAGNOSIS — Z79.01 LONG TERM (CURRENT) USE OF ANTICOAGULANTS: ICD-10-CM

## 2021-07-28 LAB — INR BLD: 3.3 (ref 0.9–1.1)

## 2021-07-28 PROCEDURE — 36415 COLL VENOUS BLD VENIPUNCTURE: CPT

## 2021-07-28 PROCEDURE — 85610 PROTHROMBIN TIME: CPT

## 2021-07-28 NOTE — PROGRESS NOTES
ANTICOAGULATION MANAGEMENT     Mariia PICKARD Tinoco 86 year old female is on warfarin with supratherapeutic INR result. (Goal INR 2.0-3.0)    Recent labs: (last 7 days)     07/28/21  0924   INR 3.3*       ASSESSMENT     Source(s): Patient/Caregiver Call and Template       Warfarin doses taken: Warfarin taken as instructed    Diet: No new diet changes identified    New illness, injury, or hospitalization: No    Medication/supplement changes: None noted    Signs or symptoms of bleeding or clotting: No    Previous INR: Therapeutic last 2(+) visits    Additional findings: None     Recently return from grandson's wedding in [place] 3 days ago.     PLAN     Recommended plan for temporary change(s) affecting INR     Dosing Instructions:   - today, advised one time lower warfarin dose - 1.5mg,   - then continue your current warfarin dose with 4.5mg on Mon/Thurs and 3mg all other days.   - next INR in 2 weeks       Summary  As of 7/28/2021    Full warfarin instructions:  7/28: 1.5 mg; Otherwise 4.5 mg every Mon, Thu; 3 mg all other days   Next INR check:  8/18/2021             Telephone call with Mariia who verbalizes understanding and agrees to plan   - reported, since being on Jantoven; INR's have been unstable.  However, Tete just returned from a trip 3 days ago ([place]).     - she would like to go back on Warfarin.  (however, she just got an order for Jantoven.    Lab visit scheduled - INR on 8/20/21 @ [facility]    Education provided: Importance of consistent vitamin K intake and Target INR goal and significance of current INR result    Plan made per [facility] anticoagulation protocol    Cassandra Banuelos, RN  Anticoagulation Clinic  7/28/2021    _______________________________________________________________________     Anticoagulation Episode Summary     Current INR goal:  2.0-3.0   TTR:  48.5 % (1 y)   Target end date:     Send INR reminders to:  [facility]       Comments:           Anticoagulation Care Providers      Provider Role Specialty Phone number    Eagle Shabazz MD Referring Internal Medicine [phone]

## 2021-08-14 ENCOUNTER — OFFICE VISIT (OUTPATIENT)
Dept: URGENT CARE | Facility: URGENT CARE | Age: 86
End: 2021-08-14
Payer: MEDICARE

## 2021-08-14 VITALS
DIASTOLIC BLOOD PRESSURE: 78 MMHG | SYSTOLIC BLOOD PRESSURE: 121 MMHG | OXYGEN SATURATION: 98 % | HEART RATE: 82 BPM | BODY MASS INDEX: 25.83 KG/M2 | WEIGHT: 155 LBS | HEIGHT: 65 IN

## 2021-08-14 DIAGNOSIS — Z79.01 LONG TERM CURRENT USE OF ANTICOAGULANT THERAPY: ICD-10-CM

## 2021-08-14 DIAGNOSIS — S61.412A SKIN TEAR OF HAND WITHOUT COMPLICATION, LEFT, INITIAL ENCOUNTER: Primary | ICD-10-CM

## 2021-08-14 LAB — INR BLD: 2.8 (ref 0.9–1.1)

## 2021-08-14 PROCEDURE — 85610 PROTHROMBIN TIME: CPT | Performed by: FAMILY MEDICINE

## 2021-08-14 PROCEDURE — 36416 COLLJ CAPILLARY BLOOD SPEC: CPT | Performed by: FAMILY MEDICINE

## 2021-08-14 PROCEDURE — 99213 OFFICE O/P EST LOW 20 MIN: CPT | Performed by: FAMILY MEDICINE

## 2021-08-14 ASSESSMENT — MIFFLIN-ST. JEOR: SCORE: 1144.99

## 2021-08-14 NOTE — PROGRESS NOTES
"SUBJECTIVE: Mariia Tinoco is a 86 year old female presenting with a chief complaint of injury back of hand/skin tear.  Onset of symptoms was day(s) ago.  Course of illness is same.    Current and Associated symptoms: occasional bleed  Predisposing factors include anticoagulated.    Past Medical History:   Diagnosis Date     Acute sinusitis     past hx     Biventricular cardiac pacemaker in situ      Cardiomyopathy (H)      Chronic bronchitis (H)      Chronic interstitial lung disease (H)      COPD (chronic obstructive pulmonary disease) (H)      Disc disease, degenerative, cervical      Diverticulosis      Edema      Hypothyroidism      Macular degeneration      Osteoarthritis      Osteoporosis      Ovarian cancer (H)     age 38     Persistent atrial fibrillation (H)      Pulmonary fibrosis (H)      Skin cancer      Tuberculosis     past hx     Allergies   Allergen Reactions     Bee Venom Protein (Honey Bee) [Bee Venom] Anaphylaxis and Hives     Ultram [Tramadol] Nausea and Vomiting     Social History     Tobacco Use     Smoking status: Never Smoker     Smokeless tobacco: Never Used   Substance Use Topics     Alcohol use: Yes     Alcohol/week: 1.0 standard drinks     Comment: Alcoholic Drinks/day: occasional       ROS:  No fevers    OBJECTIVE:  /78   Pulse 82   Ht 1.645 m (5' 4.75\")   Wt 70.3 kg (155 lb)   SpO2 98%   BMI 25.99 kg/m     GENERAL APPEARANCE: healthy, alert and no distress  SKIN: avulsion skin tear without redness/bleeding    Clean, pressure dressing      ICD-10-CM    1. Skin tear of hand without complication, left, initial encounter  S61.412A INR     INR   2. Long term current use of anticoagulant therapy  Z79.01 INR     INR     Chart check INR  Fluids/Rest, f/u if worse/not any better    "

## 2021-08-16 ENCOUNTER — TELEPHONE (OUTPATIENT)
Dept: INTERNAL MEDICINE | Facility: CLINIC | Age: 86
End: 2021-08-16

## 2021-08-16 ENCOUNTER — ANTICOAGULATION THERAPY VISIT (OUTPATIENT)
Dept: ANTICOAGULATION | Facility: CLINIC | Age: 86
End: 2021-08-16

## 2021-08-16 NOTE — TELEPHONE ENCOUNTER
Reason for Call:  INR    Who is calling?  Patient    Phone number:  [phone]      Are there any other concerns:  Yes:  Patient is calling to speak to Mela.  She is asking when she needs to come in for her next INR.    Can we leave a detailed message on this number? YES    Phone number patient can be reached at: Home number on file [phone] (home)      Call taken on 8/16/2021 at 12:33 PM by Brynn Patel

## 2021-08-16 NOTE — PROGRESS NOTES
ANTICOAGULATION MANAGEMENT     Mariia Tinoco 86 year old female is on warfarin with therapeutic INR result. (Goal INR 2.0-3.0)    Recent labs: (last 7 days)     08/14/21  1001   INR 2.8*       ASSESSMENT     Source(s): Patient/Caregiver Call and Template       Warfarin doses taken: Warfarin taken as instructed    Diet: No new diet changes identified    New illness, injury, or hospitalization: No   - [facility]14/21 [facility]   - injury skin tear / laceration back of left hand.    Medication/supplement changes: None noted    Signs or symptoms of bleeding or clotting: No    Previous INR: Supratherapeutic at 3.30 on 7/28/21.    Additional findings: None     PLAN     Recommended plan for no diet, medication or health factor changes affecting INR     Dosing Instructions: Continue your current warfarin dose with next INR in 2 weeks       Summary  As of 8/16/2021    Full warfarin instructions:  4.5 mg every Mon, Thu; 3 mg all other days   Next INR check:  8/30/2021             Telephone call with Mariia who verbalizes understanding and agrees to plan    Lab visit scheduled - INR on 9/7/21 @ [facility].    Education provided: Importance of consistent vitamin K intake and Target INR goal and significance of current INR result    Plan made per [facility] anticoagulation protocol    Cassandra Banuelos RN  Anticoagulation Clinic  8/16/2021    _______________________________________________________________________     Anticoagulation Episode Summary     Current INR goal:  2.0-3.0   TTR:  50.3 % (1 y)   Target end date:     Send INR reminders to:  [facility]       Comments:           Anticoagulation Care Providers     Provider Role Specialty Phone number    Eagle Shabazz MD Referring Internal Medicine [phone]

## 2021-09-07 ENCOUNTER — ANTICOAGULATION THERAPY VISIT (OUTPATIENT)
Dept: ANTICOAGULATION | Facility: CLINIC | Age: 86
End: 2021-09-07

## 2021-09-07 ENCOUNTER — LAB (OUTPATIENT)
Dept: LAB | Facility: CLINIC | Age: 86
End: 2021-09-07
Payer: MEDICARE

## 2021-09-07 DIAGNOSIS — Z79.01 LONG TERM (CURRENT) USE OF ANTICOAGULANTS: ICD-10-CM

## 2021-09-07 DIAGNOSIS — I48.19 PERSISTENT ATRIAL FIBRILLATION (H): ICD-10-CM

## 2021-09-07 LAB — INR BLD: 2.5 (ref 0.9–1.1)

## 2021-09-07 PROCEDURE — 85610 PROTHROMBIN TIME: CPT

## 2021-09-07 PROCEDURE — 36415 COLL VENOUS BLD VENIPUNCTURE: CPT

## 2021-09-07 NOTE — PROGRESS NOTES
ANTICOAGULATION MANAGEMENT     Mariia Tinoco 86 year old female is on warfarin with therapeutic INR result. (Goal INR 2.0-3.0)    Recent labs: (last 7 days)     09/07/21  0933   INR 2.5*       ASSESSMENT     Source(s): Chart Review, Patient/Caregiver Call and Template       Warfarin doses taken: Missed dose(s) may be affecting INR   Reported missing warfarin dose over a week ago. (unsure of date).    Diet: No new diet changes identified    New illness, injury, or hospitalization: No    Medication/supplement changes: None noted    Signs or symptoms of bleeding or clotting: No    Previous INR: Therapeutic last visit; previously outside of goal range    Additional findings: None     PLAN     Recommended plan for temporary change(s) affecting INR     Dosing Instructions: Continue your current warfarin dose with next INR in 2-3 weeks     Summary  As of 9/7/2021    Full warfarin instructions:  4.5 mg every Mon, Thu; 3 mg all other days   Next INR check:  10/5/2021             Telephone call with Tete ([phone]) who verbalizes understanding and agrees to plan    Lab visit scheduled - INR on 9/28/21 during Prolia injection @ [facility]   - does not drive d/t mac. degeneration and  drives her to [place]    Education provided: Goal range and significance of current result and Importance of taking warfarin as instructed    Plan made per [facility] anticoagulation protocol    Cassandra Banuelos, RN  Anticoagulation Clinic  9/7/2021    _______________________________________________________________________     Anticoagulation Episode Summary     Current INR goal:  2.0-3.0   TTR:  54.8 % (1 y)   Target end date:     Send INR reminders to:  [facility]       Comments:           Anticoagulation Care Providers     Provider Role Specialty Phone number    Eagle Shabazz MD Referring Internal Medicine [phone]

## 2021-09-11 ENCOUNTER — OFFICE VISIT (OUTPATIENT)
Dept: URGENT CARE | Facility: URGENT CARE | Age: 86
End: 2021-09-11
Payer: MEDICARE

## 2021-09-11 VITALS
OXYGEN SATURATION: 98 % | SYSTOLIC BLOOD PRESSURE: 124 MMHG | TEMPERATURE: 97.8 F | DIASTOLIC BLOOD PRESSURE: 68 MMHG | BODY MASS INDEX: 25.99 KG/M2 | HEART RATE: 89 BPM | WEIGHT: 155 LBS

## 2021-09-11 DIAGNOSIS — M79.89 RIGHT LEG SWELLING: Primary | ICD-10-CM

## 2021-09-11 LAB
BASOPHILS # BLD AUTO: 0.1 10E3/UL (ref 0–0.2)
BASOPHILS NFR BLD AUTO: 1 %
D DIMER PPP FEU-MCNC: <0.27 UG/ML FEU (ref 0–0.5)
EOSINOPHIL # BLD AUTO: 0.2 10E3/UL (ref 0–0.7)
EOSINOPHIL NFR BLD AUTO: 3 %
ERYTHROCYTE [DISTWIDTH] IN BLOOD BY AUTOMATED COUNT: 14.7 % (ref 10–15)
HCT VFR BLD AUTO: 38.8 % (ref 35–47)
HGB BLD-MCNC: 13.7 G/DL (ref 11.7–15.7)
IMM GRANULOCYTES # BLD: 0 10E3/UL
IMM GRANULOCYTES NFR BLD: 0 %
INR PPP: 2.84 (ref 0.85–1.15)
LYMPHOCYTES # BLD AUTO: 1.6 10E3/UL (ref 0.8–5.3)
LYMPHOCYTES NFR BLD AUTO: 22 %
MCH RBC QN AUTO: 33.4 PG (ref 26.5–33)
MCHC RBC AUTO-ENTMCNC: 35.3 G/DL (ref 31.5–36.5)
MCV RBC AUTO: 95 FL (ref 78–100)
MONOCYTES # BLD AUTO: 0.6 10E3/UL (ref 0–1.3)
MONOCYTES NFR BLD AUTO: 8 %
NEUTROPHILS # BLD AUTO: 4.9 10E3/UL (ref 1.6–8.3)
NEUTROPHILS NFR BLD AUTO: 67 %
PLATELET # BLD AUTO: 187 10E3/UL (ref 150–450)
RBC # BLD AUTO: 4.1 10E6/UL (ref 3.8–5.2)
WBC # BLD AUTO: 7.3 10E3/UL (ref 4–11)

## 2021-09-11 PROCEDURE — 85379 FIBRIN DEGRADATION QUANT: CPT | Performed by: PREVENTIVE MEDICINE

## 2021-09-11 PROCEDURE — 85025 COMPLETE CBC W/AUTO DIFF WBC: CPT | Performed by: PREVENTIVE MEDICINE

## 2021-09-11 PROCEDURE — 99214 OFFICE O/P EST MOD 30 MIN: CPT | Performed by: PREVENTIVE MEDICINE

## 2021-09-11 PROCEDURE — 85610 PROTHROMBIN TIME: CPT | Performed by: PREVENTIVE MEDICINE

## 2021-09-11 PROCEDURE — 36415 COLL VENOUS BLD VENIPUNCTURE: CPT | Performed by: PREVENTIVE MEDICINE

## 2021-09-11 NOTE — PROGRESS NOTES
Assessment & Plan     1. Right leg swelling  - CBC with platelets and differential; Future  - INR; Future  - D dimer, quantitative; Future  - CBC with platelets and differential  - INR  - D dimer, quantitative    Suspect hematoma but will do labs to further evaluate  ?dvt     31 minutes spent on the date of the encounter doing chart review, review of test results, interpretation of tests, patient visit and documentation         No follow-ups on file.    Alin Nolan MD  [facility]    Subjective     Mariia Tinoco is a 86 year old year old female who presents to clinic today for the following health issues:    Patient presents with:  Urgent Care  Leg Swelling: c/o leg swollen for 2 days    This is an 85 yo female who takes coumadin for afib with last inr one week ago of 2.8.  She developed a swelling in her rle 3 days ago.  No known injury.   No cp, sob.  Otherwise feels well.    Patient Active Problem List   Diagnosis     Macular Degeneration     Colonic Diverticulosis     Cervical Disc Degeneration     Hypothyroidism     Chronic Interstitial Lung Disease     Persistent atrial fibrillation (H)     Chronic Bronchitis     Biventricular cardiac pacemaker in situ     Cardiomyopathy (H)     Osteoporosis, senile on Prolia     Primary osteoarthritis of right knee     Benign paroxysmal positional vertigo, unspecified laterality     Robert Bonnet syndrome       Current Outpatient Medications   Medication     acetaminophen (TYLENOL) 500 MG tablet     albuterol (PROAIR HFA;PROVENTIL HFA;VENTOLIN HFA) 90 mcg/actuation inhaler     calcium carbonate (OS-ZARI) 600 mg (1,500 mg) tablet     cholecalciferol, vitamin D3, 2,000 unit capsule     clobetasol (TEMOVATE) 0.05 % external solution     doxylamine succinate (SLEEP AID, DOXYLAMINE, ORAL)     fluticasone-vilanterol (BREO ELLIPTA) 100-25 mcg/dose DsDv inhaler     levothyroxine (SYNTHROID, LEVOTHROID) 75 MCG tablet     levothyroxine  (SYNTHROID, LEVOTHROID) 75 MCG tablet     melatonin 3 mg Tab tablet     metoprolol succinate (TOPROL XL) 25 MG     warfarin ANTICOAGULANT (JANTOVEN) 3 MG tablet     warfarin ANTICOAGULANT (JANTOVEN) 3 MG tablet     ondansetron (ZOFRAN) 8 MG tablet     No current facility-administered medications for this visit.       Past Medical History:   Diagnosis Date     Acute sinusitis     past hx     Biventricular cardiac pacemaker in situ      Cardiomyopathy (H)      Chronic bronchitis (H)      Chronic interstitial lung disease (H)      COPD (chronic obstructive pulmonary disease) (H)      Disc disease, degenerative, cervical      Diverticulosis      Edema      Hypothyroidism      Macular degeneration      Osteoarthritis      Osteoporosis      Ovarian cancer (H)     age 38     Persistent atrial fibrillation (H)      Pulmonary fibrosis (H)      Skin cancer      Tuberculosis     past hx       Social History   reports that she has never smoked. She has never used smokeless tobacco. She reports current alcohol use of about 1.0 standard drinks of alcohol per week. She reports that she does not use drugs.    Family History   Problem Relation Age of Onset     Breast Cancer Daughter 46.00     Breast Cancer Paternal Grandmother 76.00       Review of Systems  Constitutional, HEENT, cardiovascular, pulmonary, GI, , musculoskeletal, neuro, skin, endocrine and psych systems are negative, except as otherwise noted.      Objective    /68   Pulse 89   Temp 97.8  F (36.6  C) (Tympanic)   Wt 70.3 kg (155 lb)   SpO2 98%   BMI 25.99 kg/m    Physical Exam   GENERAL: healthy, alert and no distress  EYES: Eyes grossly normal to inspection, PERRL and conjunctivae and sclerae normal  HENT: ear canals and TM's normal, nose and mouth without ulcers or lesions  NECK: no adenopathy, no asymmetry, masses, or scars and thyroid normal to palpation  RESP: lungs clear to auscultation - no rales, rhonchi or wheezes  BREAST: normal without masses,  tenderness or nipple discharge and no palpable axillary masses or adenopathy  CV: regular rate and rhythm, normal S1 S2, no S3 or S4, no murmur, click or rub, no peripheral edema and peripheral pulses strong  ABDOMEN: soft, nontender, no hepatosplenomegaly, no masses and bowel sounds normal  MS: no gross musculoskeletal defects noted, no edema  SKIN: no suspicious lesions or rashes  NEURO: Normal strength and tone, mentation intact and speech normal  PSYCH: mentation appears normal, affect normal/bright  EXTREMITIES:  Warm, well perfused, trace edema rle, small tender hematoma noted anterior lower leg on right.  Some warmth there as well but no redness.  Nl cms ble.  No edema lle

## 2021-09-13 ENCOUNTER — ANTICOAGULATION THERAPY VISIT (OUTPATIENT)
Dept: ANTICOAGULATION | Facility: CLINIC | Age: 86
End: 2021-09-13

## 2021-09-13 NOTE — PROGRESS NOTES
ANTICOAGULATION MANAGEMENT     Mariia PICKARD Tinoco 86 year old female is on warfarin with therapeutic INR result. (Goal INR 2.0-3.0)    Recent labs: (last 7 days)     09/11/21  1324   INR 2.84*       ASSESSMENT     Source(s): Chart Review and Patient/Caregiver Call       Warfarin doses taken: Warfarin taken as instructed    Diet: No new diet changes identified    New illness, injury, or hospitalization: Yes:    On 9/11/21, Presented @  Urgent Care in [place] for right leg swelling x2 days. No known injury.   D-Dimer testing was <0.27    Medication/supplement changes: Yes.  Arthritis Tylenol 2 tabs in the morning.    Signs or symptoms of bleeding or clotting: No   Rated pain at 7 from 0-10.  Has been icing it.   Noted hematoma on right lower leg with trace of edema. Some warnth without redness. No injury incurred.   Possible blood vessel burst.    Previous INR: Therapeutic last 2(+) visits    Additional findings: None     PLAN     Recommended plan for no diet, medication or health factor changes affecting INR     Dosing Instructions: Continue your current warfarin dose with next INR in 2 weeks       Summary  As of 9/13/2021    Full warfarin instructions:  4.5 mg every Mon, Thu; 3 mg all other days   Next INR check:  9/27/2021             Telephone call with Mariia who verbalizes understanding and agrees to plan   - advised to ensure she f/u's up with Dr. Shabazz or put in a call, if pain persists at pain level of 7.     - reported on her feet a lot, so she is able to ambulate.    Lab visit scheduled - INR on 9/28/21 @ [facility] during Prolia Injection.    Education provided: Importance of consistent vitamin K intake, Goal range and significance of current result, Monitoring for bleeding signs and symptoms and When to seek medical attention/emergency care    Plan made per [facility] anticoagulation protocol    Cassandra Banuelos, RN  Anticoagulation  Clinic  9/13/2021    _______________________________________________________________________     Anticoagulation Episode Summary     Current INR goal:  2.0-3.0   TTR:  56.2 % (1 y)   Target end date:     Send INR reminders to:  [facility]       Comments:           Anticoagulation Care Providers     Provider Role Specialty Phone number    Eagle Shabazz MD Referring Internal Medicine [phone]

## 2021-09-14 ENCOUNTER — MYC MEDICAL ADVICE (OUTPATIENT)
Dept: INTERNAL MEDICINE | Facility: CLINIC | Age: 86
End: 2021-09-14

## 2021-09-14 NOTE — TELEPHONE ENCOUNTER
I called and spoke with Tete and notified her of recommendations below. She informs me that she doesn't really want to go back to ED. Her hematoma has gone down in size however there is still swelling in Rt shin. Patient has been compressing the area with ice. Today's pain level today is a 6/10 .     She is current taking Tylenol 650 mg - 2 tablets in the morning and is wondering if she should increase that dose to help reduce pain. If MD does feel that patient needs to get evaluate in the ED she may reconsider. Please advise, thank you.

## 2021-09-14 NOTE — TELEPHONE ENCOUNTER
If the size is going down and the pain is getting better, then I agree, she does not need to be seen in the emergency room.  It will take several weeks to months for this to go away.  She can continue with icing it.  She can also use gentle compression with Ace bandage.  She can also increase the Tylenol to 2 tablets twice daily to see if that helps with the discomfort.

## 2021-09-24 ENCOUNTER — TELEPHONE (OUTPATIENT)
Dept: INTERNAL MEDICINE | Facility: CLINIC | Age: 86
End: 2021-09-24

## 2021-09-24 DIAGNOSIS — M81.0 SENILE OSTEOPOROSIS: Primary | ICD-10-CM

## 2021-09-24 DIAGNOSIS — Z92.29 PERSONAL HISTORY OF OTHER DRUG THERAPY: ICD-10-CM

## 2021-09-28 ENCOUNTER — ANTICOAGULATION THERAPY VISIT (OUTPATIENT)
Dept: ANTICOAGULATION | Facility: CLINIC | Age: 86
End: 2021-09-28

## 2021-09-28 ENCOUNTER — LAB (OUTPATIENT)
Dept: LAB | Facility: CLINIC | Age: 86
End: 2021-09-28
Payer: MEDICARE

## 2021-09-28 ENCOUNTER — ALLIED HEALTH/NURSE VISIT (OUTPATIENT)
Dept: FAMILY MEDICINE | Facility: CLINIC | Age: 86
End: 2021-09-28
Payer: MEDICARE

## 2021-09-28 DIAGNOSIS — I48.19 PERSISTENT ATRIAL FIBRILLATION (H): ICD-10-CM

## 2021-09-28 DIAGNOSIS — Z79.01 LONG TERM (CURRENT) USE OF ANTICOAGULANTS: ICD-10-CM

## 2021-09-28 DIAGNOSIS — M17.11 PRIMARY OSTEOARTHRITIS OF RIGHT KNEE: Primary | ICD-10-CM

## 2021-09-28 LAB — INR BLD: 3.4 (ref 0.9–1.1)

## 2021-09-28 PROCEDURE — 85610 PROTHROMBIN TIME: CPT | Performed by: INTERNAL MEDICINE

## 2021-09-28 PROCEDURE — 96372 THER/PROPH/DIAG INJ SC/IM: CPT | Performed by: INTERNAL MEDICINE

## 2021-09-28 PROCEDURE — 99207 PR NO CHARGE NURSE ONLY: CPT

## 2021-09-28 NOTE — PROGRESS NOTES
"ANTICOAGULATION MANAGEMENT     Mariia Tinoco 86 year old female is on warfarin with supratherapeutic INR result. (Goal INR 2.0-3.0)    Recent labs: (last 7 days)     09/28/21  0943   INR 3.4*       ASSESSMENT     Source(s): Chart Review, Patient/Caregiver Call and Template       Warfarin doses taken: Warfarin taken as instructed    Diet: No new diet changes identified    New illness, injury, or hospitalization: Yes: persisting pain and hematoma.   On 9/11/21 presented in Urgent Care for painful large hematoma (golf ball size of right shin still persisting, she is\"clumsy, \"  But no known injury. Continues to ice it.   - 9/11/21, D-Dimer was <0.27 and CBC / PLT count normal.    Medication/supplement changes: Yes    Was taking Arthritis Tylenol 650mg 2 tabs daily, however, dose was increased 9/278, to BID for pain.   9/28/21 Prolia injection administered.    Signs or symptoms of bleeding or clotting: No   Reported a large hematoma on right shin.    Previous INR: Therapeutic last 3 visits    Additional findings: Yes.  appt on 9/29/21 with Orthopedic for her knee pains.     PLAN     Recommended plan for ongoing change(s) affecting INR     Dosing Instructions:   - Hold warfarin dose tonight,  (took 4.5mg warfari dose last night)   - then Decrease your warfarin dose to 3mg daily.   - (12.5% change)  - next INR in 1-2 weeks       Summary  As of 9/28/2021    Full warfarin instructions:  9/28: Hold; change to 3 mg daily.   Next INR check:  10/12/2021             Telephone call with Nicolas ([phone])  who verbalizes understanding and agrees to plan    Lab visit scheduled - INR on 10/6/21 during annual check with Dr. Shabazz @ [facility]    Education provided: Goal range and significance of current result, Potential interaction between warfarin and Arthritis Tylenol, Monitoring for bleeding signs and symptoms and When to seek medical attention/emergency care    Plan made per [facility] anticoagulation protocol    Cassandra Banuelos, " RN  Anticoagulation Clinic  9/28/2021    _______________________________________________________________________     Anticoagulation Episode Summary     Current INR goal:  2.0-3.0   TTR:  55.8 % (1 y)   Target end date:     Send INR reminders to:  [facility]       Comments:           Anticoagulation Care Providers     Provider Role Specialty Phone number    Eagle Shabazz MD Referring Internal Medicine [phone]

## 2021-09-28 NOTE — PROGRESS NOTES
Clinic Administered Medication Documentation    Administrations This Visit     denosumab (PROLIA) injection 60 mg     Admin Date  09/28/2021 Action  Given Dose  60 mg Route  Subcutaneous Site  Right Arm Administered By  Arik Carranza RN    Ordering Provider: Eagle Shabazz MD    Patient Supplied?: No                  Prolia Documentation    Prior to injection, verified patient identity using patient's name and date of birth. Medication was administered. Please see MAR and medication order for additional information. Patient instructed to stay in clinic after the injection but patient declined.    Indication: Prolia  (denosumab) is a prescription medicine used to treat osteoporosis in patients who:     Are at high risk for fracture, meaning patients who have had a fracture related to osteoporosis, or who have multiple risk factors for fracture.    Cannot use another osteoporosis medicine or other osteoporosis medicines did not work well.    The timeline for early/late injections would be 4 weeks early and any time after the 6 month cathy. If a patient receives their injection late, then the subsequent injection would be 6 months from the date that they actually received the injection.    When was the last injection?  3/30/2021  Was the last injection at least 6 months ago? Yes  Has the prior authorization been completed?  Yes  Is there an active order (within the past 365 days) in the chart?  Yes  Patient denied having dental work involving the bone in the past 6 months?  Yes  Patient denies a plan to dental work involving the bone in the next 6 months? Yes    The following steps were completed to comply with the REMS program for Prolia:    Reviewed information in the Medication Guide and Patient Counseling Chart, including the serious risks of Prolia  and the symptoms of each risk.    Advised patient to seek prompt medical attention if they have signs or symptoms of any of the serious risks.    Provided each patient  a copy of the Medication Guide and Patient Brochure.      Was entire vial of medication used? Yes  Vial/Syringe: Syringe  Expiration Date:  12/2023  Was the medication not being billed by clinic? No                Name of provider who requested the medication administration: MD Harika  Name of provider on site (faculty or community preceptor) at the time of performing the medication administration: Harika MORTON    Date of next administration: 6 months  Date of next office visit with provider to renew medication plan (must be seen annually): 10/6/2021

## 2021-10-06 ENCOUNTER — ANTICOAGULATION THERAPY VISIT (OUTPATIENT)
Dept: ANTICOAGULATION | Facility: CLINIC | Age: 86
End: 2021-10-06

## 2021-10-06 ENCOUNTER — OFFICE VISIT (OUTPATIENT)
Dept: INTERNAL MEDICINE | Facility: CLINIC | Age: 86
End: 2021-10-06
Payer: MEDICARE

## 2021-10-06 VITALS — HEIGHT: 64 IN | TEMPERATURE: 97.9 F | WEIGHT: 158 LBS | BODY MASS INDEX: 26.98 KG/M2

## 2021-10-06 DIAGNOSIS — Z79.01 LONG TERM (CURRENT) USE OF ANTICOAGULANTS: ICD-10-CM

## 2021-10-06 DIAGNOSIS — E03.9 HYPOTHYROIDISM, UNSPECIFIED TYPE: ICD-10-CM

## 2021-10-06 DIAGNOSIS — Z00.00 ENCOUNTER FOR MEDICARE ANNUAL WELLNESS EXAM: Primary | ICD-10-CM

## 2021-10-06 DIAGNOSIS — I48.19 PERSISTENT ATRIAL FIBRILLATION (H): ICD-10-CM

## 2021-10-06 DIAGNOSIS — J84.10 POSTINFLAMMATORY PULMONARY FIBROSIS (H): ICD-10-CM

## 2021-10-06 DIAGNOSIS — M17.12 PRIMARY OSTEOARTHRITIS OF LEFT KNEE: ICD-10-CM

## 2021-10-06 DIAGNOSIS — Z13.6 ENCOUNTER FOR SCREENING FOR CARDIOVASCULAR DISORDERS: ICD-10-CM

## 2021-10-06 DIAGNOSIS — I42.9 CARDIOMYOPATHY, UNSPECIFIED TYPE (H): ICD-10-CM

## 2021-10-06 DIAGNOSIS — J42 CHRONIC BRONCHITIS, UNSPECIFIED CHRONIC BRONCHITIS TYPE (H): ICD-10-CM

## 2021-10-06 DIAGNOSIS — S80.11XS LEG HEMATOMA, RIGHT, SEQUELA: ICD-10-CM

## 2021-10-06 DIAGNOSIS — H53.16 CHARLES BONNET SYNDROME: ICD-10-CM

## 2021-10-06 DIAGNOSIS — M81.0 OSTEOPOROSIS, SENILE: ICD-10-CM

## 2021-10-06 PROBLEM — H81.10 BENIGN PAROXYSMAL POSITIONAL VERTIGO, UNSPECIFIED LATERALITY: Status: RESOLVED | Noted: 2019-11-22 | Resolved: 2021-10-06

## 2021-10-06 LAB
ALBUMIN SERPL-MCNC: 4.2 G/DL (ref 3.5–5)
ALBUMIN UR-MCNC: ABNORMAL MG/DL
ALP SERPL-CCNC: 76 U/L (ref 45–120)
ALT SERPL W P-5'-P-CCNC: 15 U/L (ref 0–45)
ANION GAP SERPL CALCULATED.3IONS-SCNC: 9 MMOL/L (ref 5–18)
APPEARANCE UR: CLEAR
AST SERPL W P-5'-P-CCNC: 21 U/L (ref 0–40)
BACTERIA #/AREA URNS HPF: ABNORMAL /HPF
BASOPHILS # BLD AUTO: 0.1 10E3/UL (ref 0–0.2)
BASOPHILS NFR BLD AUTO: 1 %
BILIRUB SERPL-MCNC: 2.6 MG/DL (ref 0–1)
BILIRUB UR QL STRIP: NEGATIVE
BUN SERPL-MCNC: 14 MG/DL (ref 8–28)
CALCIUM SERPL-MCNC: 9.6 MG/DL (ref 8.5–10.5)
CHLORIDE BLD-SCNC: 107 MMOL/L (ref 98–107)
CHOLEST SERPL-MCNC: 179 MG/DL
CO2 SERPL-SCNC: 25 MMOL/L (ref 22–31)
COLOR UR AUTO: YELLOW
CREAT SERPL-MCNC: 0.66 MG/DL (ref 0.6–1.1)
EOSINOPHIL # BLD AUTO: 0.2 10E3/UL (ref 0–0.7)
EOSINOPHIL NFR BLD AUTO: 3 %
ERYTHROCYTE [DISTWIDTH] IN BLOOD BY AUTOMATED COUNT: 15.4 % (ref 10–15)
FASTING STATUS PATIENT QL REPORTED: NO
GFR SERPL CREATININE-BSD FRML MDRD: 81 ML/MIN/1.73M2
GLUCOSE BLD-MCNC: 90 MG/DL (ref 70–125)
GLUCOSE UR STRIP-MCNC: NEGATIVE MG/DL
HCT VFR BLD AUTO: 37.2 % (ref 35–47)
HDLC SERPL-MCNC: 51 MG/DL
HGB BLD-MCNC: 13.1 G/DL (ref 11.7–15.7)
HGB UR QL STRIP: NEGATIVE
IMM GRANULOCYTES # BLD: 0 10E3/UL
IMM GRANULOCYTES NFR BLD: 0 %
INR BLD: 3.3 (ref 0.9–1.1)
KETONES UR STRIP-MCNC: NEGATIVE MG/DL
LDLC SERPL CALC-MCNC: 101 MG/DL
LEUKOCYTE ESTERASE UR QL STRIP: ABNORMAL
LYMPHOCYTES # BLD AUTO: 1.7 10E3/UL (ref 0.8–5.3)
LYMPHOCYTES NFR BLD AUTO: 24 %
MCH RBC QN AUTO: 33.9 PG (ref 26.5–33)
MCHC RBC AUTO-ENTMCNC: 35.2 G/DL (ref 31.5–36.5)
MCV RBC AUTO: 96 FL (ref 78–100)
MONOCYTES # BLD AUTO: 0.5 10E3/UL (ref 0–1.3)
MONOCYTES NFR BLD AUTO: 8 %
NEUTROPHILS # BLD AUTO: 4.6 10E3/UL (ref 1.6–8.3)
NEUTROPHILS NFR BLD AUTO: 64 %
NITRATE UR QL: NEGATIVE
PH UR STRIP: 5.5 [PH] (ref 5–8)
PLATELET # BLD AUTO: 173 10E3/UL (ref 150–450)
POTASSIUM BLD-SCNC: 4.8 MMOL/L (ref 3.5–5)
PROT SERPL-MCNC: 6.4 G/DL (ref 6–8)
RBC # BLD AUTO: 3.86 10E6/UL (ref 3.8–5.2)
RBC #/AREA URNS AUTO: ABNORMAL /HPF
SODIUM SERPL-SCNC: 141 MMOL/L (ref 136–145)
SP GR UR STRIP: 1.02 (ref 1–1.03)
SQUAMOUS #/AREA URNS AUTO: ABNORMAL /LPF
TRIGL SERPL-MCNC: 136 MG/DL
TSH SERPL DL<=0.005 MIU/L-ACNC: 2.1 UIU/ML (ref 0.3–5)
UROBILINOGEN UR STRIP-ACNC: 0.2 E.U./DL
WBC # BLD AUTO: 7.1 10E3/UL (ref 4–11)
WBC #/AREA URNS AUTO: ABNORMAL /HPF

## 2021-10-06 PROCEDURE — 82306 VITAMIN D 25 HYDROXY: CPT | Performed by: INTERNAL MEDICINE

## 2021-10-06 PROCEDURE — 81001 URINALYSIS AUTO W/SCOPE: CPT | Performed by: INTERNAL MEDICINE

## 2021-10-06 PROCEDURE — 80061 LIPID PANEL: CPT | Performed by: INTERNAL MEDICINE

## 2021-10-06 PROCEDURE — 99214 OFFICE O/P EST MOD 30 MIN: CPT | Mod: 25 | Performed by: INTERNAL MEDICINE

## 2021-10-06 PROCEDURE — G0439 PPPS, SUBSEQ VISIT: HCPCS | Performed by: INTERNAL MEDICINE

## 2021-10-06 PROCEDURE — 84443 ASSAY THYROID STIM HORMONE: CPT | Performed by: INTERNAL MEDICINE

## 2021-10-06 PROCEDURE — 85025 COMPLETE CBC W/AUTO DIFF WBC: CPT | Performed by: INTERNAL MEDICINE

## 2021-10-06 PROCEDURE — 80053 COMPREHEN METABOLIC PANEL: CPT | Performed by: INTERNAL MEDICINE

## 2021-10-06 PROCEDURE — 85610 PROTHROMBIN TIME: CPT | Performed by: INTERNAL MEDICINE

## 2021-10-06 PROCEDURE — 36415 COLL VENOUS BLD VENIPUNCTURE: CPT | Performed by: INTERNAL MEDICINE

## 2021-10-06 ASSESSMENT — MIFFLIN-ST. JEOR: SCORE: 1146.68

## 2021-10-06 ASSESSMENT — ACTIVITIES OF DAILY LIVING (ADL): CURRENT_FUNCTION: TRANSPORTATION REQUIRES ASSISTANCE

## 2021-10-06 NOTE — PROGRESS NOTES
ANTICOAGULATION MANAGEMENT     Mariia Tinoco 86 year old female is on warfarin with supratherapeutic INR result. (Goal INR 2.0-3.0)    Recent labs: (last 7 days)     10/06/21  1528   INR 3.3*       ASSESSMENT     Source(s): Chart Review, Patient/Caregiver Call and Template       Warfarin doses taken: Warfarin taken as instructed    Diet: No new diet changes identified    New illness, injury, or hospitalization: No   Right leg hematoma improving; still lumpy    Medication/supplement changes: None noted    Signs or symptoms of bleeding or clotting: No    Previous INR: Supratherapeutic at 3.4 on 9/28/21.    Additional findings: None.  Annual OV today with Dr. Shabazz - no new changes with meds.     PLAN     Recommended plan for no diet, medication or health factor changes affecting INR     Dosing Instructions:  (3mg tabs)   - Hold dose then continue your current warfarin dose with next INR in 2 weeks       Summary  As of 10/6/2021    Full warfarin instructions:  10/6: Hold; Otherwise 3 mg every day   Next INR check:  10/20/2021             Telephone call with Mariia who verbalizes understanding and agrees to plan    Lab visit scheduled - INR on 10/21/21 @ [facility]    Education provided: Importance of consistent vitamin K intake and Goal range and significance of current result    Plan made per [facility] anticoagulation protocol    Cassandra Banuelos RN  Anticoagulation Clinic  10/6/2021    _______________________________________________________________________     Anticoagulation Episode Summary     Current INR goal:  2.0-3.0   TTR:  55.8 % (1 y)   Target end date:     Send INR reminders to:  [facility]       Comments:           Anticoagulation Care Providers     Provider Role Specialty Phone number    Eagle Shabazz MD Referring Internal Medicine [phone]

## 2021-10-06 NOTE — LETTER
October 8, 2021      Mariia MELLY Tinoco  [address]   [place] [address]        Dear ,    We are writing to inform you of your test results.    Your labs look excellent Nicolas.  The few abnormal flags here are not concerning.       Resulted Orders   UA Macro with Reflex to Micro and Culture - lab collect   Result Value Ref Range    Color Urine Yellow Colorless, Straw, Light Yellow, Yellow    Appearance Urine Clear Clear    Glucose Urine Negative Negative mg/dL    Bilirubin Urine Negative Negative    Ketones Urine Negative Negative mg/dL    Specific Gravity Urine 1.025 1.005 - 1.030    Blood Urine Negative Negative    pH Urine 5.5 5.0 - 8.0    Protein Albumin Urine Trace (A) Negative mg/dL    Urobilinogen Urine 0.2 0.2, 1.0 E.U./dL    Nitrite Urine Negative Negative    Leukocyte Esterase Urine Trace (A) Negative   Lipid panel reflex to direct LDL Fasting   Result Value Ref Range    Cholesterol 179 <=199 mg/dL    Triglycerides 136 <=149 mg/dL    Direct Measure HDL 51 >=50 mg/dL      Comment:      HDL Cholesterol Reference Range:     0-2 years:   No reference ranges established for patients under 2 years old  at [facility] [employer] for lipid analytes.    2-8 years:  Greater than 45 mg/dL     18 years and older:   Female: Greater than or equal to 50 mg/dL   Male:   Greater than or equal to 40 mg/dL    LDL Cholesterol Calculated 101 <=129 mg/dL    Patient Fasting > 8hrs? No    Comprehensive metabolic panel (BMP + Alb, Alk Phos, ALT, AST, Total. Bili, TP)   Result Value Ref Range    Sodium 141 136 - 145 mmol/L    Potassium 4.8 3.5 - 5.0 mmol/L    Chloride 107 98 - 107 mmol/L    Carbon Dioxide (CO2) 25 22 - 31 mmol/L    Anion Gap 9 5 - 18 mmol/L    Urea Nitrogen 14 8 - 28 mg/dL    Creatinine 0.66 0.60 - 1.10 mg/dL    Calcium 9.6 8.5 - 10.5 mg/dL    Glucose 90 70 - 125 mg/dL    Alkaline Phosphatase 76 45 - 120 U/L    AST 21 0 - 40 U/L    ALT 15 0 - 45 U/L    Protein Total 6.4 6.0 - 8.0 g/dL     Albumin 4.2 3.5 - 5.0 g/dL    Bilirubin Total 2.6 (H) 0.0 - 1.0 mg/dL    GFR Estimate 81 >60 mL/min/1.73m2      Comment:      As of July 11, 2021, eGFR is calculated by the CKD-EPI creatinine equation, without race adjustment. eGFR can be influenced by muscle mass, exercise, and diet. The reported eGFR is an estimation only and is only applicable if the renal function is stable.   Vitamin D Deficiency   Result Value Ref Range    Vitamin D, Total (25-Hydroxy) 45 30 - 80 ug/L    Narrative    Deficiency <10.0 ug/L  Insufficiency 10.0-29.9 ug/L  Sufficiency 30.0-80.0 ug/L  Toxicity (possible) >100.0 ug/L    TSH with free T4 reflex   Result Value Ref Range    TSH 2.10 0.30 - 5.00 uIU/mL   CBC with platelets and differential   Result Value Ref Range    WBC Count 7.1 4.0 - 11.0 10e3/uL    RBC Count 3.86 3.80 - 5.20 10e6/uL    Hemoglobin 13.1 11.7 - 15.7 g/dL    Hematocrit 37.2 35.0 - 47.0 %    MCV 96 78 - 100 fL    MCH 33.9 (H) 26.5 - 33.0 pg    MCHC 35.2 31.5 - 36.5 g/dL    RDW 15.4 (H) 10.0 - 15.0 %    Platelet Count 173 150 - 450 10e3/uL    % Neutrophils 64 %    % Lymphocytes 24 %    % Monocytes 8 %    % Eosinophils 3 %    % Basophils 1 %    % Immature Granulocytes 0 %    Absolute Neutrophils 4.6 1.6 - 8.3 10e3/uL    Absolute Lymphocytes 1.7 0.8 - 5.3 10e3/uL    Absolute Monocytes 0.5 0.0 - 1.3 10e3/uL    Absolute Eosinophils 0.2 0.0 - 0.7 10e3/uL    Absolute Basophils 0.1 0.0 - 0.2 10e3/uL    Absolute Immature Granulocytes 0.0 <=0.0 10e3/uL   Urine Microscopic   Result Value Ref Range    Bacteria Urine None Seen None Seen /HPF    RBC Urine None Seen 0-2 /HPF /HPF    WBC Urine 0-5 0-5 /HPF /HPF    Squamous Epithelials Urine Moderate (A) None Seen /LPF    Narrative    Urine Culture not indicated       If you have any questions or concerns, please call the clinic at the number listed above.       Sincerely,      Eagle Shabazz MD

## 2021-10-06 NOTE — PROGRESS NOTES
The patient was provided with suggestions to help her develop a healthy physical lifestyle.  The patient was counseled and encouraged to consider modifying their diet and eating habits. She was provided with information on recommended healthy diet options.  The patient reports that she has difficulty with activities of daily living. This issue was addressed at today's appointment. ***  Information on urinary incontinence and treatment options given to patient.

## 2021-10-06 NOTE — PATIENT INSTRUCTIONS
Patient Education   Personalized Prevention Plan  You are due for the preventive services outlined below.  Your care team is available to assist you in scheduling these services.  If you have already completed any of these items, please share that information with your care team to update in your medical record.  Health Maintenance Due   Topic Date Due     ANNUAL REVIEW OF HM ORDERS  Never done     COPD Action Plan  Never done     Flu Vaccine (1) 09/01/2021     FALL RISK ASSESSMENT  09/15/2021     Your Health Risk Assessment indicates you feel you are not in good health    A healthy lifestyle helps keep the body fit and the mind alert. It helps protect you from disease, helps you fight disease, and helps prevent chronic disease (disease that doesn't go away) from getting worse. This is important as you get older and begin to notice twinges in muscles and joints and a decline in the strength and stamina you once took for granted. A healthy lifestyle includes good healthcare, good nutrition, weight control, recreation, and regular exercise. Avoid harmful substances and do what you can to keep safe. Another part of a healthy lifestyle is stay mentally active and socially involved.    Good healthcare     Have a wellness visit every year.     If you have new symptoms, let us know right away. Don't wait until the next checkup.     Take medicines exactly as prescribed and keep your medicines in a safe place. Tell us if your medicine causes problems.   Healthy diet and weight control     Eat 3 or 4 small, nutritious, low-fat, high-fiber meals a day. Include a variety of fruits, vegetables, and whole-grain foods.     Make sure you get enough calcium in your diet. Calcium, vitamin D, and exercise help prevent osteoporosis (bone thinning).     If you live alone, try eating with others when you can. That way you get a good meal and have company while you eat it.     Try to keep a healthy weight. If you eat more calories than  your body uses for energy, it will be stored as fat and you will gain weight.     Recreation   Recreation is not limited to sports and team events. It includes any activity that provides relaxation, interest, enjoyment, and exercise. Recreation provides an outlet for physical, mental, and social energy. It can give a sense of worth and achievement. It can help you stay healthy.    Mental Exercise and Social Involvement  Mental and emotional health is as important as physical health. Keep in touch with friends and family. Stay as active as possible. Continue to learn and challenge yourself.   Things you can do to stay mentally active are:    Learn something new, like a foreign language or musical instrument.     Play SCRABBLE or do crossword puzzles. If you cannot find people to play these games with you at home, you can play them with others on your computer through the Internet.     Join a games club--anything from card games to chess or checkers or lawn bowling.     Start a new hobby.     Go back to school.     Volunteer.     Read.   Keep up with world events.    Understanding USDA MyPlate  The USDA has guidelines to help you make healthy food choices. These are called MyPlate. MyPlate shows the food groups that make up healthy meals using the image of a place setting. Before you eat, think about the healthiest choices for what to put on your plate or in your cup or bowl. To learn more about building a healthy plate, visit www.choosemyplate.gov.    The food groups    Fruits. Any fruit or 100% fruit juice counts as part of the Fruit Group. Fruits may be fresh, canned, frozen, or dried, and may be whole, cut-up, or pureed. Make 1/2 of your plate fruits and vegetables.    Vegetables. Any vegetable or 100% vegetable juice counts as a member of the Vegetable Group. Vegetables may be fresh, frozen, canned, or dried. They can be served raw or cooked and may be whole, cut-up, or mashed. Make 1/2 of your plate fruits and  vegetables.    Grains. All foods made from grains are part of the Grains Group. These include wheat, rice, oats, cornmeal, and barley. Grains are often used to make foods such as bread, pasta, oatmeal, cereal, tortillas, and grits. Grains should be no more than 1/4 of your plate. At least half of your grains should be whole grains.    Protein. This group includes meat, poultry, seafood, beans and peas, eggs, processed soy products (such as tofu), nuts (including nut butters), and seeds. Make protein choices no more than 1/4 of your plate. Meat and poultry choices should be lean or low fat.    Dairy. The Dairy Group includes all fluid milk products and foods made from milk that contain calcium, such as yogurt and cheese. (Foods that have little calcium, such as cream, butter, and cream cheese, are not part of this group.) Most dairy choices should be low-fat or fat-free.    Oils. Oils aren't a food group, but they do contain essential nutrients. However it's important to watch your intake of oils. These are fats that are liquid at room temperature. They include canola, corn, olive, soybean, vegetable, and sunflower oil. Foods that are mainly oil include mayonnaise, certain salad dressings, and soft margarines. You likely already get your daily oil allowance from the foods you eat.  Things to limit  Eating healthy also means limiting these things in your diet:       Salt (sodium). Many processed foods have a lot of sodium. To keep sodium intake down, eat fresh vegetables, meats, poultry, and seafood when possible. Purchase low-sodium, reduced-sodium, or no-salt-added food products at the store. And don't add salt to your meals at home. Instead, season them with herbs and spices such as dill, oregano, cumin, and paprika. Or try adding flavor with lemon or lime zest and juice.    Saturated fat. Saturated fats are most often found in animal products such as beef, pork, and chicken. They are often solid at room  temperature, such as butter. To reduce your saturated fat intake, choose leaner cuts of meat and poultry. And try healthier cooking methods such as grilling, broiling, roasting, or baking. For a simple lower-fat swap, use plain nonfat yogurt instead of mayonnaise when making potato salad or macaroni salad.    Added sugars. These are sugars added to foods. They are in foods such as ice cream, candy, soda, fruit drinks, sports drinks, energy drinks, cookies, pastries, jams, and syrups. Cut down on added sugars by sharing sweet treats with a family member or friend. You can also choose fruit for dessert, and drink water or other unsweetened beverages.     [employer] last reviewed this educational content on 6/1/2020 2000-2021 [employer]. All rights reserved. This information is not intended as a substitute for professional medical care. Always follow your healthcare professional's instructions.        Activities of Daily Living    Your Health Risk Assessment indicates you have difficulties with activities of daily living such as housework, bathing, preparing meals, taking medication, etc. Your provider addressed this with you at today's appointment.    Urinary Incontinence, Female (Adult)   Urinary incontinence means loss of bladder control. This problem affects many women, especially as they get older. If you have incontinence, you may be embarrassed to ask for help. But know that this problem can be treated.   Types of Incontinence  There are different types of incontinence. Two of the main types are described here. You can have more than one type.     Stress incontinence. With this type, urine leaks when pressure (stress) is put on the bladder. This may happen when you cough, sneeze, or laugh. Stress incontinence most often occurs because the pelvic floor muscles that support the bladder and urethra are weak. This can happen after pregnancy and vaginal childbirth or a hysterectomy. It can also be due  to excess body weight or hormone changes.    Urge incontinence (also called overactive bladder). With this type, a sudden urge to urinate is felt often. This may happen even though there may not be much urine in the bladder. The need to urinate often during the night is common. Urge incontinence most often occurs because of bladder spasms. This may be due to bladder irritation or infection. Damage to bladder nerves or pelvic muscles, constipation, and certain medicines can also lead to urge incontinence.  Treatment depends on the cause. Further evaluation is needed to find the type you have. This will likely include an exam and certain tests. Based on the results, you and your healthcare provider can then plan treatment. Until a diagnosis is made, the home care tips below can help ease symptoms.   Home care    Do pelvic floor muscle exercises, if they are prescribed. The pelvic floor muscles help support the bladder and urethra. Many women find that their symptoms improve when doing special exercises that strengthen these muscles. To do the exercises, contract the muscles you would use to stop your stream of urine. But do this when you re not urinating. Hold for 10 seconds, then relax. Repeat 10 to 20 times in a row, at least 3 times a day. Your healthcare provider may give you other instructions for how to do the exercises and how often.    Keep a bladder diary. This helps track how often and how much you urinate over a set period of time. Bring this diary with you to your next visit with the provider. The information can help your provider learn more about your bladder problem.    Lose weight, if advised to by your provider. Extra weight puts pressure on the bladder. Your provider can help you create a weight-loss plan that s right for you. This may include exercising more and making certain diet changes.    Don't have foods and drinks that may irritate the bladder. These can include alcohol and caffeinated  drinks.    Quit smoking. Smoking and other tobacco use can lead to a long-term (chronic) cough that strains the pelvic floor muscles. Smoking may also damage the bladder and urethra. Talk with your provider about treatments or methods you can use to quit smoking.    If drinking large amounts of fluid makes you have symptoms, you may be advised to limit your fluid intake. You may also be advised to drink most of your fluids during the day and to limit fluids at night.    If you re worried about urine leakage or accidents, you may wear absorbent pads to catch urine. Change the pads often. This helps reduce discomfort. It may also reduce the risk of skin or bladder infections.    Follow-up care  Follow up with your healthcare provider, or as directed. It may take some to find the right treatment for your problem. But healthy lifestyle changes can be made right away. These include such things as exercising on a regular basis, eating a healthy diet, losing weight (if needed), and quitting smoking. Your treatment plan may include special therapies or medicines. Certain procedures or surgery may also be options. Talk about any questions you have with your provider.   When to seek medical advice  Call the healthcare provider right away if any of these occur:    Fever of 100.4 F (38 C) or higher, or as directed by your provider    Bladder pain or fullness    Belly swelling    Nausea or vomiting    Back pain    Weakness, dizziness, or fainting  [employer] last reviewed this educational content on 1/1/2020 2000-2021 [employer]. All rights reserved. This information is not intended as a substitute for professional medical care. Always follow your healthcare professional's instructions.

## 2021-10-07 LAB — DEPRECATED CALCIDIOL+CALCIFEROL SERPL-MC: 45 UG/L (ref 30–80)

## 2021-10-21 ENCOUNTER — ANTICOAGULATION THERAPY VISIT (OUTPATIENT)
Dept: ANTICOAGULATION | Facility: CLINIC | Age: 86
End: 2021-10-21

## 2021-10-21 ENCOUNTER — LAB (OUTPATIENT)
Dept: LAB | Facility: CLINIC | Age: 86
End: 2021-10-21
Payer: MEDICARE

## 2021-10-21 DIAGNOSIS — I48.19 PERSISTENT ATRIAL FIBRILLATION (H): ICD-10-CM

## 2021-10-21 DIAGNOSIS — Z79.01 LONG TERM (CURRENT) USE OF ANTICOAGULANTS: ICD-10-CM

## 2021-10-21 LAB — INR BLD: 1.8 (ref 0.9–1.1)

## 2021-10-21 PROCEDURE — 85610 PROTHROMBIN TIME: CPT

## 2021-10-21 PROCEDURE — 36416 COLLJ CAPILLARY BLOOD SPEC: CPT

## 2021-10-21 NOTE — PROGRESS NOTES
ANTICOAGULATION MANAGEMENT     Mariia Tinoco 86 year old female is on warfarin with subtherapeutic INR result. (Goal INR 2.0-3.0)    Recent labs: (last 7 days)     10/21/21  0850   INR 1.8*       ASSESSMENT     Source(s): Chart Review, Patient/Caregiver Call and Template       Warfarin doses taken: Warfarin recently held for one day which may be affecting INR    Diet: No new diet changes identified    New illness, injury, or hospitalization: Yes:    - Right leg hematoma improving; still has lump.    Medication/supplement changes: Yes.    - reported she stopped taking Arthritis Tylenol.    Signs or symptoms of bleeding or clotting: No    Previous INR: Supratherapeutic on last 2 INR results.    Additional findings: None     PLAN     Recommended plan for ongoing change(s) affecting INR     Dosing Instructions:   (3mg tabs)     Increase your warfarin dose (7.1% change) with next INR in 2 weeks       Summary  As of 10/21/2021    Full warfarin instructions:  4.5 mg every Thu; 3 mg all other days   Next INR check:  11/4/2021             Telephone call with Nicoals ([phone]) who verbalizes understanding and agrees to plan    Lab visit scheduled - INR on 11/11/21 @ [facility]    Education provided: Goal range and significance of current result    Plan made per [facility] anticoagulation protocol    Cassandra Banuelos RN  Anticoagulation Clinic  10/21/2021    _______________________________________________________________________     Anticoagulation Episode Summary     Current INR goal:  2.0-3.0   TTR:  58.4 % (1 y)   Target end date:     Send INR reminders to:  [facility]       Comments:           Anticoagulation Care Providers     Provider Role Specialty Phone number    Eagle Shabazz MD Referring Internal Medicine [phone]

## 2021-11-08 ENCOUNTER — VIRTUAL VISIT (OUTPATIENT)
Dept: INTERNAL MEDICINE | Facility: CLINIC | Age: 86
End: 2021-11-08
Payer: MEDICARE

## 2021-11-08 DIAGNOSIS — J18.9 COMMUNITY ACQUIRED PNEUMONIA, UNSPECIFIED LATERALITY: Primary | ICD-10-CM

## 2021-11-08 DIAGNOSIS — R50.9 FEVER, UNSPECIFIED FEVER CAUSE: ICD-10-CM

## 2021-11-08 PROCEDURE — 99441 PR PHYSICIAN TELEPHONE EVALUATION 5-10 MIN: CPT | Mod: 95 | Performed by: INTERNAL MEDICINE

## 2021-11-08 NOTE — PROGRESS NOTES
Nicolas is a 86 year old who is being evaluated via a billable telephone visit.      What phone number would you like to be contacted at? [phone]  How would you like to obtain your AVS? Nelida        Phone call duration: 7 minutes      Tete has multiple medical problems including interstitial lung disease and persistent atrial fibrillation cardiomyopathy.  She had Covid within the last year.  She also got pretty sick with her second mode during her shot.  Last week she developed fever and rigors similar to her second shot.  She was taken to the emergency room.  There she had a thorough work-up and they did test her for Covid but it was negative.  Nothing else showed up.  They did treat her presumptively for committee acquired pneumonia with Z-Zia.  She felt better nearly immediately after leaving the emergency room.  She is feeling good now.  She wonders about getting a vaccine booster in which 1 she should get.    1. Community acquired pneumonia, unspecified laterality  Presumedly commune acquired pneumonia.  Asymptomatic at this time.    2. Fever, unspecified fever cause  I did urge patient to get her Covid booster.  She can get either the moderna or the Pfizer.  I think it is particularly important before the holidays when she will be around multiple people and she has considerable underlying comorbidities.

## 2021-11-11 ENCOUNTER — ANTICOAGULATION THERAPY VISIT (OUTPATIENT)
Dept: ANTICOAGULATION | Facility: CLINIC | Age: 86
End: 2021-11-11

## 2021-11-11 ENCOUNTER — LAB (OUTPATIENT)
Dept: LAB | Facility: CLINIC | Age: 86
End: 2021-11-11
Payer: MEDICARE

## 2021-11-11 DIAGNOSIS — Z79.01 LONG TERM (CURRENT) USE OF ANTICOAGULANTS: ICD-10-CM

## 2021-11-11 DIAGNOSIS — I48.19 PERSISTENT ATRIAL FIBRILLATION (H): ICD-10-CM

## 2021-11-11 DIAGNOSIS — I48.19 PERSISTENT ATRIAL FIBRILLATION (H): Primary | ICD-10-CM

## 2021-11-11 LAB — INR BLD: 2.7 (ref 0.9–1.1)

## 2021-11-11 PROCEDURE — 85610 PROTHROMBIN TIME: CPT

## 2021-11-11 PROCEDURE — 36416 COLLJ CAPILLARY BLOOD SPEC: CPT

## 2021-11-11 NOTE — PROGRESS NOTES
ANTICOAGULATION MANAGEMENT     Mariia Tinoco 86 year old female is on warfarin with therapeutic INR result. (Goal INR 2.0-3.0)    Recent labs: (last 7 days)     11/11/21  0855   INR 2.7*       ASSESSMENT     Source(s): Chart Review, Patient/Caregiver Call and Template       Warfarin doses taken: Warfarin taken as instructed and Template incorrect; verbally confirmed home dose with Nicolas     Diet: No new diet changes identified    New illness, injury, or hospitalization: Yes: pneumonia at the end of October, she is feeling much better now    Medication/supplement changes: finished a Z pack over a week ago    Signs or symptoms of bleeding or clotting: No    Previous INR: Subtherapeutic    Additional findings: None     PLAN     Recommended plan for no diet, medication or health factor changes affecting INR     Dosing Instructions: Continue your current warfarin dose with next INR in 2-3 weeks       Summary  As of 11/11/2021    Full warfarin instructions:  4.5 mg every Thu; 3 mg all other days   Next INR check:  12/2/2021             Telephone call with Mariia who verbalizes understanding and agrees to plan    Lab visit scheduled    Education provided: Goal range and significance of current result, Potential interaction between warfarin and abx, Importance of notifying clinic for changes in medications; a sooner lab recheck maybe needed. and Contact [phone] with any changes, questions or concerns.     Plan made per [facility] anticoagulation protocol    Esther Edwards RN  Anticoagulation Clinic  11/11/2021    _______________________________________________________________________     Anticoagulation Episode Summary     Current INR goal:  2.0-3.0   TTR:  57.1 % (1 y)   Target end date:     Send INR reminders to:  [facility]    Indications    Persistent atrial fibrillation (H) [I48.19]           Comments:           Anticoagulation Care Providers     Provider Role Specialty Phone number    Eagle Shabazz MD  [facility] Internal Medicine [phone]

## 2021-12-07 ENCOUNTER — ANTICOAGULATION THERAPY VISIT (OUTPATIENT)
Dept: ANTICOAGULATION | Facility: CLINIC | Age: 86
End: 2021-12-07

## 2021-12-07 ENCOUNTER — LAB (OUTPATIENT)
Dept: LAB | Facility: CLINIC | Age: 86
End: 2021-12-07
Payer: MEDICARE

## 2021-12-07 DIAGNOSIS — I48.19 PERSISTENT ATRIAL FIBRILLATION (H): ICD-10-CM

## 2021-12-07 DIAGNOSIS — Z79.01 LONG TERM (CURRENT) USE OF ANTICOAGULANTS: ICD-10-CM

## 2021-12-07 DIAGNOSIS — I48.19 PERSISTENT ATRIAL FIBRILLATION (H): Primary | ICD-10-CM

## 2021-12-07 LAB — INR BLD: 1.8 (ref 0.9–1.1)

## 2021-12-07 PROCEDURE — 36415 COLL VENOUS BLD VENIPUNCTURE: CPT

## 2021-12-07 PROCEDURE — 85610 PROTHROMBIN TIME: CPT

## 2021-12-07 NOTE — PROGRESS NOTES
ANTICOAGULATION MANAGEMENT     Mariia Tinoco 86 year old female is on warfarin with therapeutic INR result. (Goal INR 2.0-3.0)    Recent labs: (last 7 days)     12/07/21  0833   INR 1.8*       ASSESSMENT     Source(s): Chart Review, Patient/Caregiver Call and Template       Warfarin doses taken: Warfarin taken as instructed    Diet: No new diet changes identified    New illness, injury, or hospitalization: No.   Reported has recovered from pneumonia and feeling better.    Medication/supplement changes: Yes.    Reported she got her Covid-19 booster shot 2-3 wks ago.    Signs or symptoms of bleeding or clotting: No    Previous INR: Therapeutic last 2 INR visits    Additional findings: None     PLAN     Recommended plan for no diet, medication or health factor changes affecting INR     Dosing Instructions:  (evenings. 3mg tabs)    Booster dose then continue your current warfarin dose with next INR in 2-3 weeks       Summary  As of 12/7/2021    Full warfarin instructions:  12/7: 4.5 mg; Otherwise 4.5 mg every Thu; 3 mg all other days   Next INR check:  12/28/2021             Telephone call with  Tete ([phone]) who verbalizes understanding and agrees to plan    Lab visit scheduled - INR on 12/28/21 @ [facility] come in till after [place].    Education provided: Importance of consistent vitamin K intake and Goal range and significance of current result    Plan made per [facility] anticoagulation protocol    Cassandra Banuelos RN  Anticoagulation Clinic  12/7/2021    _______________________________________________________________________     Anticoagulation Episode Summary     Current INR goal:  2.0-3.0   TTR:  58.3 % (1 y)   Target end date:     Send INR reminders to:  [facility]    Indications    Persistent atrial fibrillation (H) [I48.19]           Comments:           Anticoagulation Care Providers     Provider Role Specialty Phone number    Eagle Shabazz MD Referring Internal Medicine [phone]

## 2021-12-07 NOTE — PROGRESS NOTES
Reason for Call:  Other call back    Detailed comments: patient returning call to Mela, no answer.    Phone Number Patient can be reached at: Home number on file [phone] (home)    Best Time: any    Can we leave a detailed message on this number? YES    Call taken on 12/7/2021 at 1:37 PM by Sylvie Saha

## 2021-12-28 ENCOUNTER — LAB (OUTPATIENT)
Dept: LAB | Facility: CLINIC | Age: 86
End: 2021-12-28
Payer: MEDICARE

## 2021-12-28 ENCOUNTER — ANTICOAGULATION THERAPY VISIT (OUTPATIENT)
Dept: ANTICOAGULATION | Facility: CLINIC | Age: 86
End: 2021-12-28

## 2021-12-28 DIAGNOSIS — I48.19 PERSISTENT ATRIAL FIBRILLATION (H): ICD-10-CM

## 2021-12-28 DIAGNOSIS — Z79.01 LONG TERM (CURRENT) USE OF ANTICOAGULANTS: ICD-10-CM

## 2021-12-28 DIAGNOSIS — I48.19 PERSISTENT ATRIAL FIBRILLATION (H): Primary | ICD-10-CM

## 2021-12-28 LAB — INR BLD: 2.5 (ref 0.9–1.1)

## 2021-12-28 PROCEDURE — 85610 PROTHROMBIN TIME: CPT

## 2021-12-28 NOTE — PROGRESS NOTES
ANTICOAGULATION MANAGEMENT     Mariia Tinoco 86 year old female is on warfarin with therapeutic INR result. (Goal INR 2.0-3.0)    Recent labs: (last 7 days)     12/28/21  0841   INR 2.5*       ASSESSMENT     Source(s): Chart Review, Patient/Caregiver Call and Template       Warfarin doses taken: Warfarin taken as instructed    Diet: No new diet changes identified    New illness, injury, or hospitalization: No    Medication/supplement changes: None noted    Signs or symptoms of bleeding or clotting: No    Previous INR: Subtherapeutic at 1.8 on 12/7/21.    Additional findings: None     PLAN     Recommended plan for no diet, medication or health factor changes affecting INR     Dosing Instructions:    Continue your current warfarin dose with next INR in 2-3 weeks       Summary  As of 12/28/2021    Full warfarin instructions:  4.5 mg every Thu; 3 mg all other days   Next INR check:  1/18/2022             Telephone call with  Tete ( [phone]) who verbalizes understanding and agrees to plan    Lab visit scheduled - INR on 1/18/22 @ [facility].    Education provided: Importance of consistent vitamin K intake and Goal range and significance of current result    Plan made per [facility] anticoagulation protocol    Cassandra Banuelos, RN  Anticoagulation Clinic  12/28/2021    _______________________________________________________________________     Anticoagulation Episode Summary     Current INR goal:  2.0-3.0   TTR:  57.3 % (1 y)   Target end date:     Send INR reminders to:  [facility]    Indications    Persistent atrial fibrillation (H) [I48.19]           Comments:           Anticoagulation Care Providers     Provider Role Specialty Phone number    Eagle Shabazz MD Referring Internal Medicine [phone]

## 2022-01-18 ENCOUNTER — LAB (OUTPATIENT)
Dept: LAB | Facility: CLINIC | Age: 87
End: 2022-01-18
Payer: MEDICARE

## 2022-01-18 ENCOUNTER — ANTICOAGULATION THERAPY VISIT (OUTPATIENT)
Dept: ANTICOAGULATION | Facility: CLINIC | Age: 87
End: 2022-01-18

## 2022-01-18 DIAGNOSIS — I48.19 PERSISTENT ATRIAL FIBRILLATION (H): Primary | ICD-10-CM

## 2022-01-18 DIAGNOSIS — Z79.01 LONG TERM (CURRENT) USE OF ANTICOAGULANTS: ICD-10-CM

## 2022-01-18 DIAGNOSIS — I48.19 PERSISTENT ATRIAL FIBRILLATION (H): ICD-10-CM

## 2022-01-18 LAB — INR BLD: 1.9 (ref 0.9–1.1)

## 2022-01-18 PROCEDURE — 85610 PROTHROMBIN TIME: CPT

## 2022-01-18 NOTE — PROGRESS NOTES
ANTICOAGULATION MANAGEMENT     Mariia Tinoco 86 year old female is on warfarin with subtherapeutic INR result. (Goal INR 2.0-3.0)    Recent labs: (last 7 days)     01/18/22  0905   INR 1.9*       ASSESSMENT     Source(s): Chart Review, Patient/Caregiver Call and Template       Warfarin doses taken: Warfarin taken as instructed    Reported not missing any warfarin doses.    Diet: No new diet changes identified    Diet has not changed and insist she is consistent with Vitamin-K intake.    New illness, injury, or hospitalization: No    Medication/supplement changes: None noted    Signs or symptoms of bleeding or clotting: No    Previous INR: Therapeutic last visit at 2.5; previously outside of goal range at 1.8    Additional findings: None     PLAN     Recommended plan for no diet, medication or health factor changes affecting INR     Dosing Instructions:   (3mg tabs)     Increase your warfarin dose (6.7% change) with next INR in 2-3 weeks       Summary  As of 1/18/2022    Full warfarin instructions:  4.5 mg every Thu; 3 mg all other days   Next INR check:  2/1/2022             Telephone call with  Tete ([phone]) who verbalizes understanding and agrees to plan    Lab visit scheduled - INR on 2/8/22 @ [facility].   - does not like coming in for frequent INRs.   - I did advised to check with her insurance if they will cover new DOAC such as Eliquis and Xarelto.  She has seen these advertised on TV.   - she will check her insurance coverage to see if copay is affordable if she switched therapy.    Education provided: Importance of consistent vitamin K intake and Goal range and significance of current result    Plan made per [facility] anticoagulation protocol    Cassandra Banuelos, RN  Anticoagulation Clinic  1/18/2022    _______________________________________________________________________     Anticoagulation Episode Summary     Current INR goal:  2.0-3.0   TTR:  61.5 % (1 y)   Target end date:     Send INR reminders to:   [facility]    Indications    Persistent atrial fibrillation (H) [I48.19]           Comments:           Anticoagulation Care Providers     Provider Role Specialty Phone number    Eagle Shabazz MD Referring Internal Medicine [phone]

## 2022-02-08 ENCOUNTER — ANTICOAGULATION THERAPY VISIT (OUTPATIENT)
Dept: ANTICOAGULATION | Facility: CLINIC | Age: 87
End: 2022-02-08

## 2022-02-08 ENCOUNTER — LAB (OUTPATIENT)
Dept: LAB | Facility: CLINIC | Age: 87
End: 2022-02-08
Payer: MEDICARE

## 2022-02-08 DIAGNOSIS — I48.19 PERSISTENT ATRIAL FIBRILLATION (H): ICD-10-CM

## 2022-02-08 DIAGNOSIS — I48.19 PERSISTENT ATRIAL FIBRILLATION (H): Primary | ICD-10-CM

## 2022-02-08 DIAGNOSIS — Z79.01 LONG TERM (CURRENT) USE OF ANTICOAGULANTS: ICD-10-CM

## 2022-02-08 LAB — INR BLD: 1.4 (ref 0.9–1.1)

## 2022-02-08 PROCEDURE — 85610 PROTHROMBIN TIME: CPT

## 2022-02-08 NOTE — PROGRESS NOTES
ANTICOAGULATION MANAGEMENT     Mariia Tinoco 86 year old female is on warfarin with subtherapeutic INR result. (Goal INR 2.0-3.0)    Recent labs: (last 7 days)     02/08/22  0901   INR 1.4*       ASSESSMENT     Source(s): Chart Review, Patient/Caregiver Call and Template       Warfarin doses taken: Missed dose(s) may be affecting INR    Reported missing 2 days warfarin doses.    Diet: No new diet changes identified    New illness, injury, or hospitalization: No    Medication/supplement changes: None noted    Signs or symptoms of bleeding or clotting: No    Previous INR: Subtherapeutic at 1.9 on 1/18/22.    Additional findings: None     PLAN     Recommended plan for temporary change(s) affecting INR     Dosing Instructions:   (3mg tabs)    Continue your current warfarin dose with next INR in 5-7 days       Summary  As of 2/8/2022    Full warfarin instructions:  2/8: 6 mg; Otherwise 4.5 mg every Mon, Thu; 3 mg all other days   Next INR check:  2/15/2022             Telephone call with  Tete ([phone]) who verbalizes understanding and agrees to plan    Lab visit scheduled - INR on 2/16/22 @ [facility].   (does not drive -  brings her to appts)    Education provided: Importance of consistent vitamin K intake, Goal range and significance of current result and Importance of taking warfarin as instructed    Plan made per [facility] anticoagulation protocol    Cassandra Banuelos, RN  Anticoagulation Clinic  2/8/2022    _______________________________________________________________________     Anticoagulation Episode Summary     Current INR goal:  2.0-3.0   TTR:  61.5 % (1 y)   Target end date:     Send INR reminders to:  [facility]    Indications    Persistent atrial fibrillation (H) [I48.19]           Comments:           Anticoagulation Care Providers     Provider Role Specialty Phone number    Eagle Shabazz MD Referring Internal Medicine [phone]

## 2022-02-16 ENCOUNTER — DOCUMENTATION ONLY (OUTPATIENT)
Dept: INTERNAL MEDICINE | Facility: CLINIC | Age: 87
End: 2022-02-16

## 2022-02-16 ENCOUNTER — ANTICOAGULATION THERAPY VISIT (OUTPATIENT)
Dept: ANTICOAGULATION | Facility: CLINIC | Age: 87
End: 2022-02-16

## 2022-02-16 ENCOUNTER — LAB (OUTPATIENT)
Dept: LAB | Facility: CLINIC | Age: 87
End: 2022-02-16
Payer: MEDICARE

## 2022-02-16 DIAGNOSIS — I48.19 PERSISTENT ATRIAL FIBRILLATION (H): Primary | ICD-10-CM

## 2022-02-16 DIAGNOSIS — I48.19 PERSISTENT ATRIAL FIBRILLATION (H): ICD-10-CM

## 2022-02-16 DIAGNOSIS — Z79.01 LONG TERM (CURRENT) USE OF ANTICOAGULANTS: ICD-10-CM

## 2022-02-16 LAB — INR BLD: 2.4 (ref 0.9–1.1)

## 2022-02-16 PROCEDURE — 85610 PROTHROMBIN TIME: CPT

## 2022-02-16 PROCEDURE — 36416 COLLJ CAPILLARY BLOOD SPEC: CPT

## 2022-02-16 NOTE — PROGRESS NOTES
ANTICOAGULATION MANAGEMENT     Mariia Tinoco 86 year old female is on warfarin with therapeutic INR result. (Goal INR 2.0-3.0)    Recent labs: (last 7 days)     02/16/22  0916   INR 2.4*       ASSESSMENT     Source(s): Chart Review, Patient/Caregiver Call and Template       Warfarin doses taken: Warfarin taken as instructed    Diet: No new diet changes identified    New illness, injury, or hospitalization: No    Medication/supplement changes: None noted    Signs or symptoms of bleeding or clotting: No    Previous INR: Subtherapeutic at 1.40 on 2/8/22 d/t 2 doses of warfarin missed.    Additional findings: None     PLAN     Recommended plan for no diet, medication or health factor changes affecting INR     Dosing Instructions:   (3mg tabs)    Continue your current warfarin dose with next INR in 2-3 weeks       Summary  As of 2/16/2022    Full warfarin instructions:  4.5 mg every Mon, Thu; 3 mg all other days   Next INR check:  3/9/2022             Telephone call with  Tete ([phone]) who verbalizes understanding and agrees to plan    Lab visit scheduled - INR on 3/9/22 @ [facility].    Education provided: Importance of consistent vitamin K intake and Goal range and significance of current result    Plan made per [facility] anticoagulation protocol    Cassandra Banuelos, RN  Anticoagulation Clinic  2/16/2022    _______________________________________________________________________     Anticoagulation Episode Summary     Current INR goal:  2.0-3.0   TTR:  61.1 % (1 y)   Target end date:  Indefinite   Send INR reminders to:  [facility]    Indications    Persistent atrial fibrillation (H) [I48.19]           Comments:           Anticoagulation Care Providers     Provider Role Specialty Phone number    Eagle Shabazz MD Referring Internal Medicine [phone]    Mariano Munoz MD Referring Internal Medicine [phone]

## 2022-02-16 NOTE — PROGRESS NOTES
ANTICOAGULATION MANAGEMENT      Mariia Tinoco due for annual renewal of referral to anticoagulation monitoring. Order pended for your review and signature.      ANTICOAGULATION SUMMARY      Warfarin indication(s)     Atrial fibrillation, persistent    Heart valve present?  NO       Current goal range   INR: 2.0-3.0     Goal appropriate for indication? Yes, INR 2-3 appropriate for hx of DVT, PE, hypercoagulable state, Afib, LVAD, or bileaflet AVR without risk factors     Current duration of therapy Indefinite/long term therapy   Time in Therapeutic Range (TTR)  (Goal > 60%) 61.5 %       Office visit with referring provider's group within last year Yes on 11/08/2021       Cassandra Banuelos RN

## 2022-02-28 ENCOUNTER — TELEPHONE (OUTPATIENT)
Dept: INTERNAL MEDICINE | Facility: CLINIC | Age: 87
End: 2022-02-28
Payer: MEDICARE

## 2022-02-28 NOTE — TELEPHONE ENCOUNTER
Reason for Call:  Other call back-requesting Lolita    Detailed comments: pt is calling in and she is wondering since she cant have her Prolia shot-per Dr Shabazz what she should do?  She thought that she was going to have an infusion maybe?      Pt is wondering what next step is please.    Phone Number Patient can be reached at: Home number on file [phone] (home)    Best Time: any    Can we leave a detailed message on this number? YES    Call taken on 2/28/2022 at 11:47 AM by Pam J. Behr

## 2022-02-28 NOTE — TELEPHONE ENCOUNTER
Need more information.  I thought we were going to set her up with Reclast after her Prolia.  That was the plan according to our last note.  Did that never occur?

## 2022-03-01 ENCOUNTER — TELEPHONE (OUTPATIENT)
Dept: INFUSION THERAPY | Facility: HOSPITAL | Age: 87
End: 2022-03-01
Payer: MEDICARE

## 2022-03-01 ENCOUNTER — TELEPHONE (OUTPATIENT)
Dept: INTERNAL MEDICINE | Facility: CLINIC | Age: 87
End: 2022-03-01
Payer: MEDICARE

## 2022-03-01 DIAGNOSIS — M81.0 OSTEOPOROSIS, SENILE: Primary | ICD-10-CM

## 2022-03-01 RX ORDER — METHYLPREDNISOLONE SODIUM SUCCINATE 125 MG/2ML
125 INJECTION, POWDER, LYOPHILIZED, FOR SOLUTION INTRAMUSCULAR; INTRAVENOUS
Status: CANCELLED
Start: 2022-03-29

## 2022-03-01 RX ORDER — NALOXONE HYDROCHLORIDE 0.4 MG/ML
0.2 INJECTION, SOLUTION INTRAMUSCULAR; INTRAVENOUS; SUBCUTANEOUS
Status: CANCELLED | OUTPATIENT
Start: 2022-03-29

## 2022-03-01 RX ORDER — MEPERIDINE HYDROCHLORIDE 25 MG/ML
25 INJECTION INTRAMUSCULAR; INTRAVENOUS; SUBCUTANEOUS EVERY 30 MIN PRN
Status: CANCELLED | OUTPATIENT
Start: 2022-03-29

## 2022-03-01 RX ORDER — ZOLEDRONIC ACID 5 MG/100ML
5 INJECTION, SOLUTION INTRAVENOUS ONCE
Status: CANCELLED
Start: 2022-03-29 | End: 2022-03-29

## 2022-03-01 RX ORDER — ALBUTEROL SULFATE 0.83 MG/ML
2.5 SOLUTION RESPIRATORY (INHALATION)
Status: CANCELLED | OUTPATIENT
Start: 2022-03-29

## 2022-03-01 RX ORDER — ALBUTEROL SULFATE 90 UG/1
1-2 AEROSOL, METERED RESPIRATORY (INHALATION)
Status: CANCELLED
Start: 2022-03-29

## 2022-03-01 RX ORDER — DIPHENHYDRAMINE HYDROCHLORIDE 50 MG/ML
50 INJECTION INTRAMUSCULAR; INTRAVENOUS
Status: CANCELLED
Start: 2022-03-29

## 2022-03-01 RX ORDER — HEPARIN SODIUM,PORCINE 10 UNIT/ML
5 VIAL (ML) INTRAVENOUS
Status: CANCELLED | OUTPATIENT
Start: 2022-03-29

## 2022-03-01 RX ORDER — HEPARIN SODIUM (PORCINE) LOCK FLUSH IV SOLN 100 UNIT/ML 100 UNIT/ML
5 SOLUTION INTRAVENOUS
Status: CANCELLED | OUTPATIENT
Start: 2022-03-29

## 2022-03-01 RX ORDER — EPINEPHRINE 1 MG/ML
0.3 INJECTION, SOLUTION, CONCENTRATE INTRAVENOUS EVERY 5 MIN PRN
Status: CANCELLED | OUTPATIENT
Start: 2022-03-29

## 2022-03-01 NOTE — TELEPHONE ENCOUNTER
----- Message from Arlin Campbell sent at 3/1/2022 11:16 AM CST -----  Regarding: Therapy Plan Order  Hello -     The above patient wasn't called in October for her infusion due to the orders not being signed. Could you please go in and update and also sign the order?    We can't schedule this patient unless the order is signed.   Sorry, I missed it this morning when you called.     Thank You,  Arlin SHEEHAN  Outpatient Infusion

## 2022-03-01 NOTE — TELEPHONE ENCOUNTER
There's an active Reclast order that I can see. Infusion dept was to contact patient/family to arrange for this.     We'll need to contact Infusion dept [phone] to clarify order.

## 2022-03-01 NOTE — TELEPHONE ENCOUNTER
Last Prolia injection completed on 9/28/21, patient is due for another one in March. She is wondering if she needs to get that injection before her Reclast infusion. Please advise, thank you.     I have spoke with Infusion, they were able to see the order placed back in October. For some reason it didn't transmit to their work queue. They will contact patient for the appt.

## 2022-03-09 ENCOUNTER — ANTICOAGULATION THERAPY VISIT (OUTPATIENT)
Dept: ANTICOAGULATION | Facility: CLINIC | Age: 87
End: 2022-03-09

## 2022-03-09 ENCOUNTER — LAB (OUTPATIENT)
Dept: LAB | Facility: CLINIC | Age: 87
End: 2022-03-09
Payer: MEDICARE

## 2022-03-09 DIAGNOSIS — I48.19 PERSISTENT ATRIAL FIBRILLATION (H): Primary | ICD-10-CM

## 2022-03-09 DIAGNOSIS — I48.19 PERSISTENT ATRIAL FIBRILLATION (H): ICD-10-CM

## 2022-03-09 LAB — INR BLD: 2.1 (ref 0.9–1.1)

## 2022-03-09 PROCEDURE — 85610 PROTHROMBIN TIME: CPT

## 2022-03-09 PROCEDURE — 36416 COLLJ CAPILLARY BLOOD SPEC: CPT

## 2022-03-09 NOTE — PROGRESS NOTES
ANTICOAGULATION MANAGEMENT     Mariia Tinoco 86 year old female is on warfarin with therapeutic INR result. (Goal INR 2.0-3.0)    Recent labs: (last 7 days)     03/09/22  1108   INR 2.1*       ASSESSMENT       Source(s): Chart Review, Patient/Caregiver Call and Template       Warfarin doses taken: Missed dose(s) may be affecting INR    Reported one missed dose on 3/8.    Diet: No new diet changes identified    New illness, injury, or hospitalization: No    Medication/supplement changes: None noted    Signs or symptoms of bleeding or clotting: No    Previous INR: Therapeutic last visit at 2.4; previously outside of goal range at 1.4 d/t missed warfarin doses.    Additional findings: None       PLAN     Recommended plan for temporary change(s) affecting INR     Dosing Instructions:    Continue your current warfarin dose with next INR in 2 weeks       Summary  As of 3/9/2022    Full warfarin instructions:  4.5 mg every Mon, Thu; 3 mg all other days   Next INR check:  3/23/2022             Telephone call with  Tete ([phone]) who verbalizes understanding and agrees to plan    Lab visit scheduled - INR on 3/23/22 @ [facility]    Education provided: Importance of consistent vitamin K intake and Goal range and significance of current result    Plan made per [facility] anticoagulation protocol    Cassandra Banuelos, RN  Anticoagulation Clinic  3/9/2022    _______________________________________________________________________     Anticoagulation Episode Summary     Current INR goal:  2.0-3.0   TTR:  61.1 % (1 y)   Target end date:  Indefinite   Send INR reminders to:  [facility]    Indications    Persistent atrial fibrillation (H) [I48.19]           Comments:           Anticoagulation Care Providers     Provider Role Specialty Phone number    Eagle Shabazz MD Referring Internal Medicine [phone]    Mariano Munoz MD Referring Internal Medicine [phone]

## 2022-03-15 ENCOUNTER — TRANSFERRED RECORDS (OUTPATIENT)
Dept: HEALTH INFORMATION MANAGEMENT | Facility: CLINIC | Age: 87
End: 2022-03-15
Payer: MEDICARE

## 2022-03-23 ENCOUNTER — ANTICOAGULATION THERAPY VISIT (OUTPATIENT)
Dept: ANTICOAGULATION | Facility: CLINIC | Age: 87
End: 2022-03-23

## 2022-03-23 ENCOUNTER — LAB (OUTPATIENT)
Dept: LAB | Facility: CLINIC | Age: 87
End: 2022-03-23
Payer: MEDICARE

## 2022-03-23 DIAGNOSIS — I48.19 PERSISTENT ATRIAL FIBRILLATION (H): Primary | ICD-10-CM

## 2022-03-23 DIAGNOSIS — I48.19 PERSISTENT ATRIAL FIBRILLATION (H): ICD-10-CM

## 2022-03-23 LAB — INR BLD: 2.5 (ref 0.9–1.1)

## 2022-03-23 PROCEDURE — 36416 COLLJ CAPILLARY BLOOD SPEC: CPT

## 2022-03-23 PROCEDURE — 85610 PROTHROMBIN TIME: CPT

## 2022-03-23 NOTE — PROGRESS NOTES
ANTICOAGULATION MANAGEMENT     Mariia Tinoco 86 year old female is on warfarin with therapeutic INR result. (Goal INR 2.0-3.0)    Recent labs: (last 7 days)     03/23/22  1101   INR 2.5*       ASSESSMENT       Source(s): Chart Review, Patient/Caregiver Call and Template       Warfarin doses taken: Warfarin taken as instructed    Diet: No new diet changes identified    New illness, injury, or hospitalization: No    Medication/supplement changes: None noted    Signs or symptoms of bleeding or clotting: No    Previous INR: Therapeutic last 2 INR results.    Additional findings:  Yes.  She is scheduled for pacemaker battery replacement and also will be checking INR on 5/12/22 @ [facility].  INR and COVID test will also be checked on 5/9/22 and [facility] will call her with warfarin instructions.       PLAN     Recommended plan for temporary change(s) affecting INR     Dosing Instructions:   (3mg tabs)    Continue your current warfarin dose with next INR in 4 weeks       Summary  As of 3/23/2022    Full warfarin instructions:  4.5 mg every Mon, Thu; 3 mg all other days   Next INR check:  4/27/2022             Telephone call with  Tete ([phone]) who verbalizes understanding and agrees to plan    Patient offered & declined to schedule next visit    - she is having pacemaker battery replacement on 5/12 and INR check on 5/9/22.    - does not drive d/t macular degeneration and  brings her to app.    Education provided: Importance of consistent vitamin K intake and Goal range and significance of current result    Plan made per [facility] anticoagulation protocol    Cassandra Banuelos, RN  Anticoagulation Clinic  3/23/2022    _______________________________________________________________________     Anticoagulation Episode Summary     Current INR goal:  2.0-3.0   TTR:  61.1 % (1 y)   Target end date:  Indefinite   Send INR reminders to:  [facility]    Indications    Persistent atrial fibrillation (H) [I48.19]            Comments:           Anticoagulation Care Providers     Provider Role Specialty Phone number    Eagle Shabazz MD Referring Internal Medicine [phone]    Mariano Munoz MD Referring Internal Medicine [phone]

## 2022-04-05 ENCOUNTER — INFUSION THERAPY VISIT (OUTPATIENT)
Dept: INFUSION THERAPY | Facility: HOSPITAL | Age: 87
End: 2022-04-05
Attending: INTERNAL MEDICINE
Payer: MEDICARE

## 2022-04-05 VITALS
DIASTOLIC BLOOD PRESSURE: 66 MMHG | TEMPERATURE: 98.2 F | WEIGHT: 157 LBS | BODY MASS INDEX: 26.8 KG/M2 | RESPIRATION RATE: 16 BRPM | OXYGEN SATURATION: 98 % | HEIGHT: 64 IN | SYSTOLIC BLOOD PRESSURE: 117 MMHG | HEART RATE: 74 BPM

## 2022-04-05 DIAGNOSIS — M81.0 OSTEOPOROSIS, SENILE: ICD-10-CM

## 2022-04-05 DIAGNOSIS — M81.0 OSTEOPOROSIS, SENILE: Primary | ICD-10-CM

## 2022-04-05 LAB
CALCIUM SERPL-MCNC: 9.4 MG/DL (ref 8.5–10.5)
CREAT SERPL-MCNC: 0.69 MG/DL (ref 0.6–1.1)
GFR SERPL CREATININE-BSD FRML MDRD: 85 ML/MIN/1.73M2

## 2022-04-05 PROCEDURE — 36415 COLL VENOUS BLD VENIPUNCTURE: CPT | Performed by: INTERNAL MEDICINE

## 2022-04-05 PROCEDURE — 258N000003 HC RX IP 258 OP 636: Performed by: INTERNAL MEDICINE

## 2022-04-05 PROCEDURE — 82565 ASSAY OF CREATININE: CPT | Performed by: INTERNAL MEDICINE

## 2022-04-05 PROCEDURE — 82310 ASSAY OF CALCIUM: CPT | Performed by: INTERNAL MEDICINE

## 2022-04-05 PROCEDURE — 96365 THER/PROPH/DIAG IV INF INIT: CPT

## 2022-04-05 PROCEDURE — 250N000011 HC RX IP 250 OP 636: Performed by: INTERNAL MEDICINE

## 2022-04-05 RX ORDER — HEPARIN SODIUM,PORCINE 10 UNIT/ML
5 VIAL (ML) INTRAVENOUS
Status: CANCELLED | OUTPATIENT
Start: 2022-04-05

## 2022-04-05 RX ORDER — HEPARIN SODIUM,PORCINE 10 UNIT/ML
5 VIAL (ML) INTRAVENOUS
Status: DISCONTINUED | OUTPATIENT
Start: 2022-04-05 | End: 2022-04-05

## 2022-04-05 RX ORDER — MEPERIDINE HYDROCHLORIDE 25 MG/ML
25 INJECTION INTRAMUSCULAR; INTRAVENOUS; SUBCUTANEOUS EVERY 30 MIN PRN
Status: DISCONTINUED | OUTPATIENT
Start: 2022-04-05 | End: 2022-04-05

## 2022-04-05 RX ORDER — NALOXONE HYDROCHLORIDE 0.4 MG/ML
0.2 INJECTION, SOLUTION INTRAMUSCULAR; INTRAVENOUS; SUBCUTANEOUS
Status: DISCONTINUED | OUTPATIENT
Start: 2022-04-05 | End: 2022-04-05

## 2022-04-05 RX ORDER — ALBUTEROL SULFATE 90 UG/1
1-2 AEROSOL, METERED RESPIRATORY (INHALATION)
Status: CANCELLED
Start: 2022-04-05

## 2022-04-05 RX ORDER — ZOLEDRONIC ACID 5 MG/100ML
5 INJECTION, SOLUTION INTRAVENOUS ONCE
Status: COMPLETED | OUTPATIENT
Start: 2022-04-05 | End: 2022-04-05

## 2022-04-05 RX ORDER — METHYLPREDNISOLONE SODIUM SUCCINATE 125 MG/2ML
125 INJECTION, POWDER, LYOPHILIZED, FOR SOLUTION INTRAMUSCULAR; INTRAVENOUS
Status: DISCONTINUED | OUTPATIENT
Start: 2022-04-05 | End: 2022-04-05

## 2022-04-05 RX ORDER — ZOLEDRONIC ACID 5 MG/100ML
5 INJECTION, SOLUTION INTRAVENOUS ONCE
Status: CANCELLED
Start: 2022-04-05 | End: 2022-04-05

## 2022-04-05 RX ORDER — METHYLPREDNISOLONE SODIUM SUCCINATE 125 MG/2ML
125 INJECTION, POWDER, LYOPHILIZED, FOR SOLUTION INTRAMUSCULAR; INTRAVENOUS
Status: CANCELLED
Start: 2022-04-05

## 2022-04-05 RX ORDER — HEPARIN SODIUM (PORCINE) LOCK FLUSH IV SOLN 100 UNIT/ML 100 UNIT/ML
5 SOLUTION INTRAVENOUS
Status: DISCONTINUED | OUTPATIENT
Start: 2022-04-05 | End: 2022-04-05

## 2022-04-05 RX ORDER — NALOXONE HYDROCHLORIDE 0.4 MG/ML
0.2 INJECTION, SOLUTION INTRAMUSCULAR; INTRAVENOUS; SUBCUTANEOUS
Status: CANCELLED | OUTPATIENT
Start: 2022-04-05

## 2022-04-05 RX ORDER — EPINEPHRINE 1 MG/ML
0.3 INJECTION, SOLUTION INTRAMUSCULAR; SUBCUTANEOUS EVERY 5 MIN PRN
Status: CANCELLED | OUTPATIENT
Start: 2022-04-05

## 2022-04-05 RX ORDER — ALBUTEROL SULFATE 90 UG/1
1-2 AEROSOL, METERED RESPIRATORY (INHALATION)
Status: DISCONTINUED | OUTPATIENT
Start: 2022-04-05 | End: 2022-04-05

## 2022-04-05 RX ORDER — HEPARIN SODIUM (PORCINE) LOCK FLUSH IV SOLN 100 UNIT/ML 100 UNIT/ML
5 SOLUTION INTRAVENOUS
Status: CANCELLED | OUTPATIENT
Start: 2022-04-05

## 2022-04-05 RX ORDER — DIPHENHYDRAMINE HYDROCHLORIDE 50 MG/ML
50 INJECTION INTRAMUSCULAR; INTRAVENOUS
Status: DISCONTINUED | OUTPATIENT
Start: 2022-04-05 | End: 2022-04-05

## 2022-04-05 RX ORDER — ALBUTEROL SULFATE 0.83 MG/ML
2.5 SOLUTION RESPIRATORY (INHALATION)
Status: CANCELLED | OUTPATIENT
Start: 2022-04-05

## 2022-04-05 RX ORDER — DIPHENHYDRAMINE HYDROCHLORIDE 50 MG/ML
50 INJECTION INTRAMUSCULAR; INTRAVENOUS
Status: CANCELLED
Start: 2022-04-05

## 2022-04-05 RX ORDER — EPINEPHRINE 1 MG/ML
0.3 INJECTION, SOLUTION INTRAMUSCULAR; SUBCUTANEOUS EVERY 5 MIN PRN
Status: DISCONTINUED | OUTPATIENT
Start: 2022-04-05 | End: 2022-04-05

## 2022-04-05 RX ORDER — MEPERIDINE HYDROCHLORIDE 25 MG/ML
25 INJECTION INTRAMUSCULAR; INTRAVENOUS; SUBCUTANEOUS EVERY 30 MIN PRN
Status: CANCELLED | OUTPATIENT
Start: 2022-04-05

## 2022-04-05 RX ORDER — ALBUTEROL SULFATE 0.83 MG/ML
2.5 SOLUTION RESPIRATORY (INHALATION)
Status: DISCONTINUED | OUTPATIENT
Start: 2022-04-05 | End: 2022-04-05

## 2022-04-05 RX ADMIN — SODIUM CHLORIDE 250 ML: 9 INJECTION, SOLUTION INTRAVENOUS at 10:51

## 2022-04-05 RX ADMIN — ZOLEDRONIC ACID 5 MG: 5 INJECTION, SOLUTION INTRAVENOUS at 11:39

## 2022-04-05 ASSESSMENT — PAIN SCALES - GENERAL: PAINLEVEL: NO PAIN (0)

## 2022-04-05 NOTE — PATIENT INSTRUCTIONS
Patient Education     Reclast Injection 0.05 mg/mL  Uses  This medicine is used for the following purposes:    bone disease    bone strength  Instructions  This is an IV medicine. It is given through a sterile tube directly into the vein by a healthcare provider.  This medicine is given gradually through the IV line.  Please ask your doctor, nurse, or pharmacist how to discard unused medicines safely.  This medicine should be given by a trained health care provider.  Drink plenty of water while on this medicine.  Drink at least 2 glasses of water before treatment, unless instructed otherwise.  It may take several weeks for this medicine to fully work.  It is important that you keep taking each dose of this medicine on time even if you are feeling well.  If you miss a dose, contact your doctor for instructions.  Please tell your doctor and pharmacist about all the medicines you take. Include both prescription and over-the-counter medicines. Also tell them about any vitamins, herbal medicines, or anything else you take for your health.  Talk to your doctor before taking other medicines, including aspirins and ibuprofen containing products. Speak to your doctor about which medicines are safe to use while you are on this medicine.  Do not suddenly stop taking this medicine. Check with your doctor before stopping.  This medicine may affect the strength of your bones. If you have or are at increased risk for developing osteoporosis (weakening of the bones), your doctor may recommend adding foods containing calcium and vitamin D while on this medicine. Please talk to your doctor for more information.  You may need vitamin and mineral supplements while on this medicine. Please speak with your doctor or pharmacist.  Visit your dentist regularly. Proper care of your teeth is very important while taking this medicine. Brush your teeth and floss regularly.  It is very important that you follow your doctor's instructions for all  blood tests.  It is very important that you keep all appointments for medical exams and tests while on this medicine.  Cautions  Tell your doctor and pharmacist if you ever had an allergic reaction to a medicine. Symptoms of an allergic reaction can include trouble breathing, skin rash, itching, swelling, or severe dizziness.  Some patients taking this medicine have experienced serious side effects. Please speak with your doctor to understand the risks and benefits associated with this medicine.  Do not use the medication any more than instructed.  This medicine may cause dizziness or fainting, especially after exercising or in hot weather. Be very careful when standing or sitting up quickly.  Your ability to stay alert or to react quickly may be impaired by this medicine. Do not drive or operate machinery until you know how this medicine will affect you.  Please check with your doctor before drinking alcohol while on this medicine.  Avoid smoking while on this medicine. Smoking may increase your risk for bone fractures.  If possible, avoid using with marijuana or other medicines that can cause dizziness or drowsiness. These include allergy/cold products, muscle relaxers, sleep aids, and pain relievers.  Contact your doctor if you notice a change in the amount or darkening of your urine.  Tell the doctor or pharmacist if you are pregnant, planning to be pregnant, or breastfeeding.  Do not use this medicine if you are pregnant. If you become pregnant while on this medicine, contact your doctor immediately.  This medicine can hurt a new baby in the womb. If you become pregnant while on this medicine, tell your doctor immediately. Your doctor may switch you to a different medicine.  Ask your pharmacist if this medicine can interact with any of your other medicines. Be sure to tell them about all the medicines you take.  Please tell all your doctors and dentists that you are on this medicine before they provide  care.  Do not start or stop any other medicines without first speaking to your doctor or pharmacist.  This medicine can cause serious side effects in some patients. Important information from the [place] Food and Drug Administration (FDA) is available from your pharmacist. Please review it carefully with your pharmacist to understand the risks associated with this medicine.  Side Effects  The following is a list of some common side effects from this medicine. Please speak with your doctor about what you should do if you experience these or other side effects.    dizziness    lack of energy and tiredness    flu-like symptoms    headaches    reaction at the area of the injection (pain, redness, swelling)    nausea  Call your doctor or get medical help right away if you notice any of these more serious side effects:    bone pain    fever or chills    high fever    fast or irregular heart beats    jaw pain    pain in the joints    kidney problems    mouth sores or irritation    muscle cramps    muscle pain    tight or rigid muscles    muscle weakness    feeling of numbness or tingling in your hands and feet    eye pain or swelling    skin irritation such as redness, itching, rash, or burning    redness of eyes    seizures    sensitivity to light    shortness of breath    skin tingling, burning or prickly feeling    unusual or unexplained tiredness or weakness    urinating less often    dark urine    blurring or changes of vision    weakness  A few people may have an allergic reactions to this medicine. Symptoms can include difficulty breathing, skin rash, itching, swelling, or severe dizziness. If you notice any of these symptoms, seek medical help quickly.  Extra  Please speak with your doctor, nurse, or pharmacist if you have any questions about this medicine.  [email or web address]  IMPORTANT NOTE: This document tells you briefly how to take your medicine, but it does not tell you all there is to  know about it.Your doctor or pharmacist may give you other documents about your medicine. Please talk to them if you have any questions.Always follow their advice. There is a more complete description of this medicine available in English.Scan this code on your smartphone or tablet or use the web address below. You can also ask your pharmacist for a printout. If you have any questions, please ask your pharmacist.     2021 [employer].

## 2022-04-05 NOTE — PROGRESS NOTES
Infusion Nursing Note:  Mariia MELLY Tinoco presents today for Labs and Reclast    Patient seen by provider today: No   present during visit today: Not Applicable.    Note: Nicolas arrived ambulatory in a stable condition for Reclast infusion, VSS. Plan of care reviewed with Nicolas. She verbalized this is a first time of getting reclast. Action effect and side effect of reclast explained and she verbalize understanding of plan of care.    Intravenous Access:  Labs drawn without difficulty.  Peripheral IV placed.    Treatment Conditions:  Lab Results   Component Value Date     10/06/2021    POTASSIUM 4.8 10/06/2021    CR 0.69 04/05/2022    ZARI 9.4 04/05/2022    BILITOTAL 2.6 (H) 10/06/2021    ALBUMIN 4.2 10/06/2021    ALT 15 10/06/2021    AST 21 10/06/2021     Results reviewed, labs MET treatment parameters, ok to proceed with treatment.  Biological Infusion Checklist:  ~~~ NOTE: If the patient answers yes to any of the questions below, hold the infusion and contact ordering provider or on-call provider.    1. Have you recently had an elevated temperature, fever, chills, productive cough, coughing for 3 weeks or longer or hemoptysis, abnormal vital signs, night sweats,  chest pain or have you noticed a decrease in your appetite, unexplained weight loss or fatigue? No  2. Do you have any open wounds or new incisions? No  3. Do you have any recent or upcoming hospitalizations, surgeries or dental procedures? No  4. Do you currently have or recently have had any signs of illness or infection or are you on any antibiotics? No  5. Have you had any new, sudden or worsening abdominal pain? No  6. Have you or anyone in your household received a live vaccination in the past 4 weeks? Please note:  No live vaccines while on biologic/chemotherapy until 6 months after the last treatment.  Patient can receive the flu vaccine (shot only) and the pneumovax.  It is optimal for the patient to get these vaccines mid cycle,  but they can be given at any time as long as it is not on the day of the infusion. No  7. Have you recently been diagnosed with any new nervous system diseases (ie. Multiple sclerosis, Guillain [place], seizures, neurological changes) or cancer diagnosis? No  8. Are you on any form of radiation or chemotherapy? No  9. Are you pregnant or breast feeding or do you have plans of pregnancy in the future? No  10. Have you been having any signs of worsening depression or suicidal ideations?  (benlysta only) No  11. Have there been any other new onset medical symptoms? No    Post Infusion Assessment:  Patient tolerated infusion without incident.  Patient observed for 30 minutes post Reclast per protocol.  Site patent and intact, free from redness, edema or discomfort.  No evidence of extravasations.  Access discontinued per protocol.  Biologic Infusion Post Education: Call the triage nurse at your clinic or seek medical attention if you have chills and/or temperature greater than or equal to 100.5, uncontrolled nausea/vomiting, diarrhea, constipation, dizziness, shortness of breath, chest pain, heart palpitations, weakness or any other new or concerning symptoms, questions or concerns.  You cannot have any live virus vaccines prior to or during treatment or up to 6 months post infusion.  If you have an upcoming surgery, medical procedure or dental procedure during treatment, this should be discussed with your ordering physician and your surgeon/dentist.  If you are having any concerning symptom, if you are unsure if you should get your next infusion or wish to speak to a provider before your next infusion, please call your care coordinator or triage nurse at your clinic to notify them so we can adequately serve you.     Discharge Plan:   Discharge instructions reviewed with: Patient.  Patient and/or family verbalized understanding of discharge instructions and all questions answered.  Patient discharged in stable condition  accompanied by: self.  Departure Mode: Ambulatory.      Zonia Warren RN

## 2022-05-04 ENCOUNTER — TRANSFERRED RECORDS (OUTPATIENT)
Dept: HEALTH INFORMATION MANAGEMENT | Facility: CLINIC | Age: 87
End: 2022-05-04
Payer: MEDICARE

## 2022-05-04 ENCOUNTER — DOCUMENTATION ONLY (OUTPATIENT)
Dept: ANTICOAGULATION | Facility: CLINIC | Age: 87
End: 2022-05-04
Payer: MEDICARE

## 2022-05-04 NOTE — PROGRESS NOTES
ANTICOAGULATION     Mariia Tinoco is overdue for INR check.      No call made to patient.    - scheduled for pacemaker replacement on 5/12/22 w/ [facility].   - warfarin instructions provided to patient.   - INR and Covid test on 5/9/22 @ [facility].    Cassandra Banuelos RN

## 2022-05-09 ENCOUNTER — LAB (OUTPATIENT)
Dept: LAB | Facility: CLINIC | Age: 87
End: 2022-05-09
Payer: MEDICARE

## 2022-05-09 ENCOUNTER — TELEPHONE (OUTPATIENT)
Dept: INTERNAL MEDICINE | Facility: CLINIC | Age: 87
End: 2022-05-09

## 2022-05-09 DIAGNOSIS — I48.19 PERSISTENT ATRIAL FIBRILLATION (H): Primary | ICD-10-CM

## 2022-05-09 DIAGNOSIS — I48.19 PERSISTENT ATRIAL FIBRILLATION (H): ICD-10-CM

## 2022-05-09 LAB — INR BLD: 2.8 (ref 0.9–1.1)

## 2022-05-09 PROCEDURE — 36416 COLLJ CAPILLARY BLOOD SPEC: CPT

## 2022-05-09 PROCEDURE — 85610 PROTHROMBIN TIME: CPT

## 2022-05-09 NOTE — TELEPHONE ENCOUNTER
[employer] message sent to patient regarding [facility] dosing and to check INR 1 week after restarting warfarin.      ANTICOAGULATION MANAGEMENT     Mariia Tinoco 86 year old female is on warfarin with therapeutic INR result. (Goal INR )    Recent labs: (last 7 days)     05/09/22  1116   INR 2.8*       ASSESSMENT       Source(s): Chart Review and Patient/Caregiver Call       Warfarin doses taken: Warfarin taken as instructed    Diet: No new diet changes identified    New illness, injury, or hospitalization: No    Medication/supplement changes: None noted    Signs or symptoms of bleeding or clotting: No    Previous INR: Therapeutic last 2(+) visits    Additional findings: Pacemaker replacement 5/12/22 with [facility]       PLAN     Recommended plan for temporary change(s) affecting INR     Dosing Instructions: Hold per [facility] directions with next INR in 1 week  After restarting warfarin    Summary  As of 5/9/2022    Full warfarin instructions:  4.5 mg every Mon, Thu; 3 mg all other days   Next INR check:  5/19/2022             Sent [employer] message with dosing and follow up instructions    Contact [phone] to schedule and with any changes, questions or concerns.     Education provided: Please call back if any changes to your diet, medications or how you've been taking warfarin    Plan made per [facility] anticoagulation protocol    Sandie Castro RN  Anticoagulation Clinic  5/9/2022    _______________________________________________________________________     Anticoagulation Episode Summary     Current INR goal:  2.0-3.0   TTR:  70.8 % (1 y)   Target end date:  Indefinite   Send INR reminders to:  [facility]    Indications    Persistent atrial fibrillation (H) [I48.19]           Comments:           Anticoagulation Care Providers     Provider Role Specialty Phone number    Eagle Shabazz MD Referring Internal Medicine [phone]    Mariano Munoz MD Referring Internal Medicine [phone]

## 2022-05-09 NOTE — TELEPHONE ENCOUNTER
Call to patient    She had COVID test done at [facility] but INR done at [facility]. Says she needs to notify [facility] of INR by 3pm today to find out about directions for holding warfarin before procedure this week.    Is having pacemaker replacement 5/12/22. Has not taken warfarin today.    [facility] number: [phone]    Spoke with implant nurse, Viry. Verified they got INR result of 2.8 from today. They will handle the warfarin dosing for procedure.

## 2022-05-09 NOTE — TELEPHONE ENCOUNTER
Reason for Call:  Patient call back    Detailed comments: The patient is calling [facility] regarding today's INR. Patient expressed an upcoming surgery happening and the surgeon wants to know before 3:00 pm today 5/9 more information from patient about INR. Please advise at the number provided.     Phone Number Patient can be reached at: Home number on file [phone] (home)    Best Time: as soon as possible    Can we leave a detailed message on this number? YES    Call taken on 5/9/2022 at 1:50 PM by Trudy Alberts

## 2022-05-19 ENCOUNTER — TELEPHONE (OUTPATIENT)
Dept: INTERNAL MEDICINE | Facility: CLINIC | Age: 87
End: 2022-05-19
Payer: MEDICARE

## 2022-05-19 NOTE — TELEPHONE ENCOUNTER
Tete called back,     - reported she is continuing with 3mg warfarin 5 days and 4.5mg 2 days, as previous and current dose.     - called Mail order and renew [facility] for 3mg fpr 90 day supply and 3 refills (Mail order RX)      - [facility] wrote wrong warfarin tablet size with 1mg      Called Tete - WAITING CALL BACK     - need to clarify warfarin tablet size - is she requesting 1mg or 3mg tabs?

## 2022-05-19 NOTE — TELEPHONE ENCOUNTER
Reason for Call:  Other call back    Detailed comments: [facility] calling to clarify:  Warfarin 3mg   Rx was sent in for Warfarin 1mg with same directions as before with Warfarin 3mg    Need to clarify which is correct    Ref#:  [id number]    Phone Number Patient can be reached at: Other phone number:  [phone]    Best Time: anytime    Can we leave a detailed message on this number? YES    Call taken on 5/19/2022 at 8:39 AM by Leslie Gray

## 2022-05-20 ENCOUNTER — OFFICE VISIT (OUTPATIENT)
Dept: INTERNAL MEDICINE | Facility: CLINIC | Age: 87
End: 2022-05-20
Payer: MEDICARE

## 2022-05-20 ENCOUNTER — ANTICOAGULATION THERAPY VISIT (OUTPATIENT)
Dept: ANTICOAGULATION | Facility: CLINIC | Age: 87
End: 2022-05-20

## 2022-05-20 VITALS
OXYGEN SATURATION: 98 % | WEIGHT: 155 LBS | DIASTOLIC BLOOD PRESSURE: 60 MMHG | BODY MASS INDEX: 26.61 KG/M2 | SYSTOLIC BLOOD PRESSURE: 110 MMHG | HEART RATE: 80 BPM

## 2022-05-20 DIAGNOSIS — H92.01 EARLOBE PAIN, RIGHT: ICD-10-CM

## 2022-05-20 DIAGNOSIS — I48.19 PERSISTENT ATRIAL FIBRILLATION (H): Primary | ICD-10-CM

## 2022-05-20 DIAGNOSIS — I48.19 PERSISTENT ATRIAL FIBRILLATION (H): ICD-10-CM

## 2022-05-20 DIAGNOSIS — Z95.0 CARDIAC PACEMAKER IN SITU: Primary | ICD-10-CM

## 2022-05-20 LAB — INR BLD: 2.2 (ref 0.9–1.1)

## 2022-05-20 PROCEDURE — 85610 PROTHROMBIN TIME: CPT | Performed by: INTERNAL MEDICINE

## 2022-05-20 PROCEDURE — 99213 OFFICE O/P EST LOW 20 MIN: CPT | Performed by: INTERNAL MEDICINE

## 2022-05-20 PROCEDURE — 36415 COLL VENOUS BLD VENIPUNCTURE: CPT | Performed by: INTERNAL MEDICINE

## 2022-05-20 NOTE — PROGRESS NOTES
ANTICOAGULATION MANAGEMENT     Mariia Tinoco 87 year old female is on warfarin with therapeutic INR result. (Goal INR 2.0-3.0)    Recent labs: (last 7 days)     05/20/22  1022   INR 2.2*       ASSESSMENT       Source(s): Chart Review, Patient/Caregiver Call and Template       Warfarin doses taken: Warfarin taken as instructed    Diet: No new diet changes identified    New illness, injury, or hospitalization: No    Medication/supplement changes: None noted    Signs or symptoms of bleeding or clotting: No    Previous INR: Supratherapeutic at 3.2 on 5/12/22.    Additional findings: None       PLAN     Recommended plan for no diet, medication or health factor changes affecting INR     Dosing Instructions:    continue your current warfarin dose with next INR in 1-2 weeks       Summary  As of 5/20/2022    Full warfarin instructions:  4.5 mg every Mon, Thu; 3 mg all other days   Next INR check:  6/3/2022             Telephone call with  Tete ([phone]) who verbalizes understanding and agrees to plan    Lab visit scheduled - INR on 6/8/22 @ [facility].    Education provided: Goal range and significance of current result    Plan made per [facility] anticoagulation protocol    Cassandra Banuelos, RN  Anticoagulation Clinic  5/20/2022    _______________________________________________________________________     Anticoagulation Episode Summary     Current INR goal:  2.0-3.0   TTR:  73.8 % (1 y)   Target end date:  Indefinite   Send INR reminders to:  [facility]    Indications    Persistent atrial fibrillation (H) [I48.19]           Comments:           Anticoagulation Care Providers     Provider Role Specialty Phone number    Eagle Shabazz MD Referring Internal Medicine [phone]    Mariano Munoz MD Referring Internal Medicine [phone]

## 2022-05-20 NOTE — PROGRESS NOTES
[facility] Clinic Follow Up Note    Assessment/Plan:    1. Cardiac pacemaker in situ  She has pacemaker placed on May 12.  Today she had to have dressing removed at a doctor's office to make sure there is no infection since she is legally blind.  Dressing was removed, area looks well-healed with no cellulitis, there is still mild residual swelling which will improve in the next couple of weeks.    2. Persistent atrial fibrillation (H)  She was in sinus today.  INR today is normal  - INR point of care    3. Earlobe pain, right  She has had a cyst removed a month ago.  Currently earlobe is still sensitive only when she touches it.  On exam I do not appreciate any cellulitis or abscess.      There are no Patient Instructions on file for this visit.    Sanam Cortez MD, MD    Chief Complaint:    Chief Complaint   Patient presents with     Follow Up     Had surgery, needs stiches removed       History of Present Illness:  Mariia is a 87 year old female with history of pulmonary fibrosis, legal blindness, atrial fibrillation (on Coumadin, pacemaker, hypothyroidism, osteoporosis, history of ovarian cancer, chronic edema, cardiomyopathy and history of TB.  She is currently here for visit to have the dressing removed from her recent pacemaker surgery.    Patient had a new pacemaker put in on May 12 at [facility].  Procedure went uneventful.  She was instructed to remove the dressing on the 19th.  Because she is legally blind they recommended that she comes to the cardiologist office to have it done but it was too far away and she schedule it at our office today.  Dressing was removed and incision is well-healed without drainage or cellulitis.    She also wanted me to take a look at her right earlobe.  She has had sebaceous cyst which was removed a month ago and currently when she touches her earlobe she still feels discomfort.  It does not hurt if she does not touch it.  On exam today there is no cellulitis  or redness, there is some scarring.    Review of Systems:  A comprehensive review of systems was performed and was otherwise negative    PFSH:  Social History: Reviewed  History   Smoking Status     Never Smoker   Smokeless Tobacco     Never Used     Social History     Social History Narrative     Not on file       Past History: Reviewed  Current Outpatient Medications   Medication Sig Dispense Refill     acetaminophen (TYLENOL) 500 MG tablet [ACETAMINOPHEN (TYLENOL) 500 MG TABLET] Take 1,000 mg by mouth every 6 (six) hours.       albuterol (PROAIR HFA;PROVENTIL HFA;VENTOLIN HFA) 90 mcg/actuation inhaler [ALBUTEROL (PROAIR HFA;PROVENTIL HFA;VENTOLIN HFA) 90 MCG/ACTUATION INHALER] Inhale 2 puffs every 6 (six) hours as needed for wheezing. 1 each 0     calcium carbonate (OS-ZARI) 600 mg (1,500 mg) tablet [CALCIUM CARBONATE (OS-ZARI) 600 MG (1,500 MG) TABLET] Take 600 mg by mouth 2 (two) times a day with meals.        clobetasol (TEMOVATE) 0.05 % external solution [CLOBETASOL (TEMOVATE) 0.05 % EXTERNAL SOLUTION] Apply 1 application topically 2 (two) times a day as needed. To scalp as needed       doxylamine succinate (SLEEP AID, DOXYLAMINE, ORAL) [DOXYLAMINE SUCCINATE (SLEEP AID, DOXYLAMINE, ORAL)] Take 0.5 tablets by mouth at bedtime as needed.       fluticasone-vilanterol (BREO ELLIPTA) 100-25 mcg/dose DsDv inhaler [FLUTICASONE-VILANTEROL (BREO ELLIPTA) 100-25 MCG/DOSE DSDV INHALER] Inhale 1 puff daily.       levothyroxine (SYNTHROID, LEVOTHROID) 75 MCG tablet [LEVOTHYROXINE (SYNTHROID, LEVOTHROID) 75 MCG TABLET] TAKE 1 TABLET DAILY 90 tablet 2     melatonin 3 mg Tab tablet [MELATONIN 3 MG TAB TABLET] Take 3 mg by mouth at bedtime as needed.       metoprolol succinate (TOPROL XL) 25 MG [METOPROLOL SUCCINATE (TOPROL XL) 25 MG] Take 25 mg by mouth daily.       Propylene Glycol (SYSTANE BALANCE OP) 1-2 drops qid       warfarin ANTICOAGULANT (JANTOVEN) 3 MG tablet [WARFARIN ANTICOAGULANT (JANTOVEN) 3 MG TABLET] TAKE 1  TABLET DAILY AND TAKE 1 AND 1/2 TABLETS ON THURSDAY AS DIRECTED. ADJUST DOSE BASED ON INTERNATIONAL NORMALIZED RATIO RESULTS. 97 tablet 3       Family History: Reviewed    Physical Exam:    Vitals:    05/20/22 1040   BP: 110/60   Pulse: 80   SpO2: 98%   Weight: 70.3 kg (155 lb)     Wt Readings from Last 3 Encounters:   05/20/22 70.3 kg (155 lb)   04/05/22 71.2 kg (157 lb)   10/06/21 71.7 kg (158 lb)     Body mass index is 26.61 kg/m .    Constitutional:  Reveals a pleasant female.  Vitals:  Per nursing notes.  HEENT:No cervical LAD,conjunctiva is pink, no scleral icterus, right earlobe appears to be normal in color, scar is palpated with no evidence of abscess or cellulitis.  Cardiac:  Regular rate and rhythm,no murmurs, rubs, or gallops.  Legs without edema. Palpation of the radial pulse regular.  Lungs: Clear to auscultation bl.  Respiratory effort normal.  Neurologic:  Cranial nerves II-XII intact.     Psychiatric: affect appropriate, memory intact.     Data Review:    Analysis and Summary of Old Records (2): yes      Records Requested (1): no      Other History Summarized (from other people in the room) (2): no    Radiology Tests Summarized (XRAY/CT/MRI/DXA) (1): no    Labs Reviewed (1): yes    Medicine Tests Reviewed (EKG/ECHO/COLONOSCOPY/EGD) (1): no    Independent Review of EKG or X-RAY (2): no

## 2022-05-24 DIAGNOSIS — E03.9 HYPOTHYROIDISM, UNSPECIFIED TYPE: ICD-10-CM

## 2022-05-24 RX ORDER — LEVOTHYROXINE SODIUM 75 UG/1
TABLET ORAL
Qty: 90 TABLET | Refills: 0 | Status: SHIPPED | OUTPATIENT
Start: 2022-05-24 | End: 2022-08-22

## 2022-05-25 NOTE — TELEPHONE ENCOUNTER
"Last Written Prescription Date:  6/22/21  Last Fill Quantity: 90,  # refills: 2   Last office visit provider:  5/20/22     Requested Prescriptions   Pending Prescriptions Disp Refills     levothyroxine (SYNTHROID/LEVOTHROID) 75 MCG tablet [Pharmacy Med Name: LEVOTHYROXIN TAB 75MCG] 90 tablet 2     Sig: TAKE 1 TABLET DAILY       Thyroid Protocol Passed - 5/24/2022  7:13 AM        Passed - Patient is 12 years or older        Passed - Recent (12 mo) or future (30 days) visit within the authorizing provider's specialty     Patient has had an office visit with the authorizing provider or a provider within the authorizing providers department within the previous 12 mos or has a future within next 30 days. See \"Patient Info\" tab in inbasket, or \"Choose Columns\" in Meds & Orders section of the refill encounter.              Passed - Medication is active on med list        Passed - Normal TSH on file in past 12 months     Recent Labs   Lab Test 10/06/21  1537   TSH 2.10              Passed - No active pregnancy on record     If patient is pregnant or has had a positive pregnancy test, please check TSH.          Passed - No positive pregnancy test in past 12 months     If patient is pregnant or has had a positive pregnancy test, please check TSH.               Micheline Mas RN 05/24/22 7:46 PM  "

## 2022-06-08 ENCOUNTER — ANTICOAGULATION THERAPY VISIT (OUTPATIENT)
Dept: ANTICOAGULATION | Facility: CLINIC | Age: 87
End: 2022-06-08

## 2022-06-08 ENCOUNTER — LAB (OUTPATIENT)
Dept: LAB | Facility: CLINIC | Age: 87
End: 2022-06-08
Payer: MEDICARE

## 2022-06-08 DIAGNOSIS — I48.19 PERSISTENT ATRIAL FIBRILLATION (H): ICD-10-CM

## 2022-06-08 DIAGNOSIS — I48.19 PERSISTENT ATRIAL FIBRILLATION (H): Primary | ICD-10-CM

## 2022-06-08 LAB — INR BLD: 2.8 (ref 0.9–1.1)

## 2022-06-08 PROCEDURE — 36416 COLLJ CAPILLARY BLOOD SPEC: CPT

## 2022-06-08 PROCEDURE — 85610 PROTHROMBIN TIME: CPT

## 2022-06-08 NOTE — PROGRESS NOTES
ANTICOAGULATION MANAGEMENT     Mariia Tinoco 87 year old female is on warfarin with therapeutic INR result. (Goal INR 2.0-3.0)    Recent labs: (last 7 days)     06/08/22  1418   INR 2.8*       ASSESSMENT       Source(s): Chart Review, Patient/Caregiver Call and Template       Warfarin doses taken: Warfarin taken as instructed    Diet: No new diet changes identified    New illness, injury, or hospitalization: No    Medication/supplement changes: None noted    Signs or symptoms of bleeding or clotting: No    Previous INR: Therapeutic last visit at 2.2; previously outside of goal range at 3.2    Additional findings: None       PLAN     Recommended plan for no diet, medication or health factor changes affecting INR     Dosing Instructions:    continue your current warfarin dose with next INR in 3 weeks       Summary  As of 6/8/2022    Full warfarin instructions:  4.5 mg every Mon, Thu; 3 mg all other days   Next INR check:  6/29/2022             Telephone call with  Tete ([phone]) who verbalizes understanding and agrees to plan    Lab visit scheduled - INR on 7/6/22 @ [facility].    Education provided: Importance of consistent vitamin K intake and Goal range and significance of current result    Plan made per [facility] anticoagulation protocol    Cassandra Banuelos, RN  Anticoagulation Clinic  6/8/2022    _______________________________________________________________________     Anticoagulation Episode Summary     Current INR goal:  2.0-3.0   TTR:  75.9 % (1 y)   Target end date:  Indefinite   Send INR reminders to:  [facility]    Indications    Persistent atrial fibrillation (H) [I48.19]           Comments:           Anticoagulation Care Providers     Provider Role Specialty Phone number    Eagle Shabazz MD Referring Internal Medicine [phone]    Mariano Munoz MD Referring Internal Medicine [phone]

## 2022-07-06 ENCOUNTER — LAB (OUTPATIENT)
Dept: LAB | Facility: CLINIC | Age: 87
End: 2022-07-06
Payer: MEDICARE

## 2022-07-06 ENCOUNTER — ANTICOAGULATION THERAPY VISIT (OUTPATIENT)
Dept: ANTICOAGULATION | Facility: CLINIC | Age: 87
End: 2022-07-06

## 2022-07-06 DIAGNOSIS — I48.19 PERSISTENT ATRIAL FIBRILLATION (H): ICD-10-CM

## 2022-07-06 DIAGNOSIS — I48.19 PERSISTENT ATRIAL FIBRILLATION (H): Primary | ICD-10-CM

## 2022-07-06 LAB — INR BLD: 3.1 (ref 0.9–1.1)

## 2022-07-06 PROCEDURE — 36416 COLLJ CAPILLARY BLOOD SPEC: CPT

## 2022-07-06 PROCEDURE — 85610 PROTHROMBIN TIME: CPT

## 2022-07-06 NOTE — PROGRESS NOTES
ANTICOAGULATION MANAGEMENT     Mariia Tinoco 87 year old female is on warfarin with supratherapeutic INR result. (Goal INR 2.0-3.0)    Recent labs: (last 7 days)     07/06/22  0917   INR 3.1*       ASSESSMENT       Source(s): Chart Review and Template       Warfarin doses taken: Warfarin taken as instructed    Diet: No new diet changes identified    New illness, injury, or hospitalization: No    Medication/supplement changes: None noted    Signs or symptoms of bleeding or clotting: No    Previous INR: Therapeutic last 2(+) visits    Additional findings: None       PLAN     Recommended plan for no diet, medication or health factor changes affecting INR     Dosing Instructions: continue your current warfarin dose with next INR in 2 weeks   Stay mindful of greens,  Have an extra serving this week    Summary  As of 7/6/2022    Full warfarin instructions:  4.5 mg every Mon, Thu; 3 mg all other days   Next INR check:  7/20/2022             Detailed voice message left for Nicolas with dosing instructions and follow up date.     Contact [phone] to schedule and with any changes, questions or concerns.     Education provided: None required    Plan made per [facility] anticoagulation protocol    Aurelio Duarte RN  Anticoagulation Clinic  7/6/2022    _______________________________________________________________________     Anticoagulation Episode Summary     Current INR goal:  2.0-3.0   TTR:  73.3 % (1 y)   Target end date:  Indefinite   Send INR reminders to:  [facility]    Indications    Persistent atrial fibrillation (H) [I48.19]           Comments:           Anticoagulation Care Providers     Provider Role Specialty Phone number    Eagle Shabazz MD Referring Internal Medicine [phone]    Mariano Munoz MD Referring Internal Medicine [phone]

## 2022-07-07 ENCOUNTER — TELEPHONE (OUTPATIENT)
Dept: INTERNAL MEDICINE | Facility: CLINIC | Age: 87
End: 2022-07-07

## 2022-07-07 NOTE — PROGRESS NOTES
"413pm- Patient returning a call to [facility] requesting a call back. Left voicemail on [facility] care line. Her VM reports \"I have not heard anything on my INR for 2 days, please call me back.\"  It appears detailed VM was left yesterday with dosing. Please have nurse call patient to discuss at [phone].    Thank you,  Zamzam Arias RN    "

## 2022-07-07 NOTE — TELEPHONE ENCOUNTER
Reason for Call:  Other call back    Detailed comments: Never receiving call back about INR on 7/6/22    Phone Number Patient can be reached at: Home number on file [phone] (home)    Best Time: anytime    Can we leave a detailed message on this number? YES    Call taken on 7/7/2022 at 11:14 AM by Ruthie Zapien

## 2022-07-20 ENCOUNTER — LAB (OUTPATIENT)
Dept: LAB | Facility: CLINIC | Age: 87
End: 2022-07-20
Payer: MEDICARE

## 2022-07-20 ENCOUNTER — ANTICOAGULATION THERAPY VISIT (OUTPATIENT)
Dept: ANTICOAGULATION | Facility: CLINIC | Age: 87
End: 2022-07-20

## 2022-07-20 DIAGNOSIS — Z53.9 ERRONEOUS ENCOUNTER--DISREGARD: ICD-10-CM

## 2022-07-20 DIAGNOSIS — I48.19 PERSISTENT ATRIAL FIBRILLATION (H): Primary | ICD-10-CM

## 2022-07-20 DIAGNOSIS — I48.19 PERSISTENT ATRIAL FIBRILLATION (H): ICD-10-CM

## 2022-07-20 LAB — INR BLD: 3.1 (ref 0.9–1.1)

## 2022-07-20 PROCEDURE — 85610 PROTHROMBIN TIME: CPT

## 2022-07-20 PROCEDURE — 36416 COLLJ CAPILLARY BLOOD SPEC: CPT

## 2022-07-20 NOTE — PROGRESS NOTES
ANTICOAGULATION MANAGEMENT     Mariia Tinoco 87 year old female is on warfarin with supratherapeutic INR result. (Goal INR 2.0-3.0)    Recent labs: (last 7 days)     07/20/22  0833   INR 3.1*       ASSESSMENT       Source(s): Chart Review, Patient/Caregiver Call and Template       Warfarin doses taken: Warfarin taken as instructed    Diet: No new diet changes identified    New illness, injury, or hospitalization: No    Medication/supplement changes: None noted    Signs or symptoms of bleeding or clotting: No    Previous INR: Supratherapeutic at 3.1 on 7/6/22.    Additional findings: None       PLAN     Recommended plan for no diet, medication or health factor changes affecting INR     Dosing Instructions:    decrease your warfarin dose (6.2% change) with next INR in 2 weeks       Summary  As of 7/20/2022    Full warfarin instructions:  4.5 mg every Mon; 3 mg all other days   Next INR check:  8/3/2022             Telephone call with  Nicolas ([phone]) who verbalizes understanding and agrees to plan    Lab visit scheduled - INR on 8/10/22 @ [facility].    Education provided: Importance of consistent vitamin K intake, Goal range and significance of current result and Monitoring for bleeding signs and symptoms    Plan made per [facility] anticoagulation protocol    Cassandra Banuelos, RN  Anticoagulation Clinic  7/20/2022    _______________________________________________________________________     Anticoagulation Episode Summary     Current INR goal:  2.0-3.0   TTR:  69.5 % (1 y)   Target end date:  Indefinite   Send INR reminders to:  [facility]    Indications    Persistent atrial fibrillation (H) [I48.19]           Comments:           Anticoagulation Care Providers     Provider Role Specialty Phone number    Eagle Shabazz MD Referring Internal Medicine [phone]    Mariano Munoz MD Referring Internal Medicine [phone]

## 2022-08-10 ENCOUNTER — ANTICOAGULATION THERAPY VISIT (OUTPATIENT)
Dept: ANTICOAGULATION | Facility: CLINIC | Age: 87
End: 2022-08-10

## 2022-08-10 ENCOUNTER — LAB (OUTPATIENT)
Dept: LAB | Facility: CLINIC | Age: 87
End: 2022-08-10
Payer: MEDICARE

## 2022-08-10 DIAGNOSIS — I48.19 PERSISTENT ATRIAL FIBRILLATION (H): ICD-10-CM

## 2022-08-10 DIAGNOSIS — I48.19 PERSISTENT ATRIAL FIBRILLATION (H): Primary | ICD-10-CM

## 2022-08-10 LAB — INR BLD: 2.1 (ref 0.9–1.1)

## 2022-08-10 PROCEDURE — 36416 COLLJ CAPILLARY BLOOD SPEC: CPT

## 2022-08-10 PROCEDURE — 85610 PROTHROMBIN TIME: CPT

## 2022-08-10 NOTE — PROGRESS NOTES
ANTICOAGULATION MANAGEMENT     Mariia Tinoco 87 year old female is on warfarin with therapeutic INR result. (Goal INR 2.0-3.0)    Recent labs: (last 7 days)     08/10/22  0859   INR 2.1*       ASSESSMENT       Source(s): Chart Review, Patient/Caregiver Call and Template       Warfarin doses taken: Warfarin taken differently, but did not change total weekly dose    Diet: No new diet changes identified    New illness, injury, or hospitalization: No    Medication/supplement changes: None noted    Signs or symptoms of bleeding or clotting: No    Previous INR: Supratherapeutic last 2 INR results.    Additional findings: None       PLAN     Recommended plan for no diet, medication or health factor changes affecting INR     Dosing Instructions:   (3mg tabs)    Continue your current warfarin dose with next INR in 2 weeks       Summary  As of 8/10/2022    Full warfarin instructions:  4.5 mg every Mon; 3 mg all other days   Next INR check:  8/24/2022             Telephone call with  Nicolas ([phone]) who verbalizes understanding and agrees to plan    Lab visit scheduled - INR on 8/31/22 @ [facility]    Education provided: Importance of consistent vitamin K intake and Goal range and significance of current result    Plan made per [facility] anticoagulation protocol    Cassandra Banuelos, RN  Anticoagulation Clinic  8/10/2022    _______________________________________________________________________     Anticoagulation Episode Summary     Current INR goal:  2.0-3.0   TTR:  73.7 % (1 y)   Target end date:  Indefinite   Send INR reminders to:  [facility]    Indications    Persistent atrial fibrillation (H) [I48.19]           Comments:           Anticoagulation Care Providers     Provider Role Specialty Phone number    Eagle Shabazz MD Referring Internal Medicine [phone]    Mariano Munoz MD Referring Internal Medicine [phone]

## 2022-08-22 DIAGNOSIS — E03.9 HYPOTHYROIDISM, UNSPECIFIED TYPE: ICD-10-CM

## 2022-08-22 RX ORDER — LEVOTHYROXINE SODIUM 75 UG/1
75 TABLET ORAL DAILY
Qty: 90 TABLET | Refills: 0 | Status: SHIPPED | OUTPATIENT
Start: 2022-08-22 | End: 2022-11-21

## 2022-08-22 NOTE — TELEPHONE ENCOUNTER
"Last Written Prescription Date:  5/24/22  Last Fill Quantity: 90,  # refills: 0   Last office visit provider:  5/20/22     Requested Prescriptions   Pending Prescriptions Disp Refills     levothyroxine (SYNTHROID/LEVOTHROID) 75 MCG tablet [Pharmacy Med Name: LEVOTHYROXIN TAB 75MCG] 90 tablet 0     Sig: TAKE 1 TABLET DAILY       Thyroid Protocol Passed - 8/22/2022  2:33 PM        Passed - Patient is 12 years or older        Passed - Recent (12 mo) or future (30 days) visit within the authorizing provider's specialty     Patient has had an office visit with the authorizing provider or a provider within the authorizing providers department within the previous 12 mos or has a future within next 30 days. See \"Patient Info\" tab in inbasket, or \"Choose Columns\" in Meds & Orders section of the refill encounter.              Passed - Medication is active on med list        Passed - Normal TSH on file in past 12 months     Recent Labs   Lab Test 10/06/21  1537   TSH 2.10              Passed - No active pregnancy on record     If patient is pregnant or has had a positive pregnancy test, please check TSH.          Passed - No positive pregnancy test in past 12 months     If patient is pregnant or has had a positive pregnancy test, please check TSH.               Jourdan Moreira RN 08/22/22 2:34 PM  "

## 2022-08-31 ENCOUNTER — ANTICOAGULATION THERAPY VISIT (OUTPATIENT)
Dept: ANTICOAGULATION | Facility: CLINIC | Age: 87
End: 2022-08-31

## 2022-08-31 ENCOUNTER — LAB (OUTPATIENT)
Dept: LAB | Facility: CLINIC | Age: 87
End: 2022-08-31
Payer: MEDICARE

## 2022-08-31 DIAGNOSIS — I48.19 PERSISTENT ATRIAL FIBRILLATION (H): ICD-10-CM

## 2022-08-31 DIAGNOSIS — I48.19 PERSISTENT ATRIAL FIBRILLATION (H): Primary | ICD-10-CM

## 2022-08-31 LAB — INR BLD: 1.8 (ref 0.9–1.1)

## 2022-08-31 PROCEDURE — 85610 PROTHROMBIN TIME: CPT

## 2022-08-31 PROCEDURE — 36416 COLLJ CAPILLARY BLOOD SPEC: CPT

## 2022-08-31 NOTE — PROGRESS NOTES
ANTICOAGULATION MANAGEMENT     Mariia Tinoco 87 year old female is on warfarin with subtherapeutic INR result. (Goal INR 2.0-3.0)    Recent labs: (last 7 days)     08/31/22  0908   INR 1.8*       ASSESSMENT       Source(s): Chart Review, Patient/Caregiver Call and Template       Warfarin doses taken: Warfarin taken differently, but did not change total weekly dose  Has been taking 4.5mg dose on Thursdays  (will amend anticoag. Calendar).    Diet: No new diet changes identified    New illness, injury, or hospitalization: No    Medication/supplement changes:  Yes.    Reported had taken Tylenol for joint aches.    Signs or symptoms of bleeding or clotting: No    Previous INR: Therapeutic last visit at 2.1; previously outside of goal range at 3.1    Additional findings: None       PLAN     Recommended plan for temporary change(s) affecting INR     Dosing Instructions: booster dose then continue your current warfarin dose with next INR in 2 weeks       Summary  As of 8/31/2022    Full warfarin instructions:  8/31: 4.5 mg; Otherwise 4.5 mg every Thu; 3 mg all other days   Next INR check:  9/14/2022             Telephone call with  Nicolas ([phone]) who verbalizes understanding and agrees to plan    Lab visit scheduled - INR on 9/21/22 @ [facility].    Education provided: Importance of consistent vitamin K intake, Goal range and significance of current result and Potential interaction between warfarin and Tylenol    Plan made per [facility] anticoagulation protocol    Cassandra Banuelos RN  Anticoagulation Clinic  8/31/2022    _______________________________________________________________________     Anticoagulation Episode Summary     Current INR goal:  2.0-3.0   TTR:  69.8 % (1 y)   Target end date:  Indefinite   Send INR reminders to:  [facility]    Indications    Persistent atrial fibrillation (H) [I48.19]           Comments:           Anticoagulation Care Providers     Provider Role Specialty Phone number    Harika  Eagle GONZALEZ MD Referring Internal Medicine [phone]    Mariano Munoz MD Referring Internal Medicine [phone]

## 2022-09-01 ENCOUNTER — TRANSFERRED RECORDS (OUTPATIENT)
Dept: HEALTH INFORMATION MANAGEMENT | Facility: CLINIC | Age: 87
End: 2022-09-01

## 2022-09-21 ENCOUNTER — LAB (OUTPATIENT)
Dept: LAB | Facility: CLINIC | Age: 87
End: 2022-09-21
Payer: MEDICARE

## 2022-09-21 ENCOUNTER — ANTICOAGULATION THERAPY VISIT (OUTPATIENT)
Dept: ANTICOAGULATION | Facility: CLINIC | Age: 87
End: 2022-09-21

## 2022-09-21 DIAGNOSIS — I48.19 PERSISTENT ATRIAL FIBRILLATION (H): Primary | ICD-10-CM

## 2022-09-21 DIAGNOSIS — I48.19 PERSISTENT ATRIAL FIBRILLATION (H): ICD-10-CM

## 2022-09-21 LAB — INR BLD: 1.9 (ref 0.9–1.1)

## 2022-09-21 PROCEDURE — 36415 COLL VENOUS BLD VENIPUNCTURE: CPT

## 2022-09-21 PROCEDURE — 85610 PROTHROMBIN TIME: CPT

## 2022-09-21 NOTE — PROGRESS NOTES
ANTICOAGULATION MANAGEMENT     Mariia Tinoco 87 year old female is on warfarin with subtherapeutic INR result. (Goal INR 2.0-3.0)    Recent labs: (last 7 days)     09/21/22  0858   INR 1.9*       ASSESSMENT       Source(s): Chart Review and Template       Warfarin doses taken: Warfarin taken as instructed    Diet: No new diet changes identified    New illness, injury, or hospitalization: No    Medication/supplement changes: None noted    Signs or symptoms of bleeding or clotting: No    Previous INR: Subtherapeutic at 1.8 on 8/31/22.    Additional findings: None       PLAN     Recommended plan for no diet, medication or health factor changes affecting INR     Dosing Instructions: Increase your warfarin dose (6.7% change) with next INR in 2 weeks       Summary  As of 9/21/2022    Full warfarin instructions:  4.5 mg every Mon, Thu; 3 mg all other days   Next INR check:  10/5/2022             Detailed voice message left for Nicolas with dosing instructions and follow up date.     Contact [phone] to schedule and with any changes, questions or concerns.     Education provided: None required    Plan made per [facility] anticoagulation protocol    Aurelio Duarte RN  Anticoagulation Clinic  9/21/2022    _______________________________________________________________________     Anticoagulation Episode Summary     Current INR goal:  2.0-3.0   TTR:  65.4 % (1 y)   Target end date:  Indefinite   Send INR reminders to:  [facility]    Indications    Persistent atrial fibrillation (H) [I48.19]           Comments:           Anticoagulation Care Providers     Provider Role Specialty Phone number    Eagle Shabazz MD Referring Internal Medicine [phone]    Mariano Munoz MD Referring Internal Medicine [phone]

## 2022-10-04 ENCOUNTER — ANTICOAGULATION THERAPY VISIT (OUTPATIENT)
Dept: ANTICOAGULATION | Facility: CLINIC | Age: 87
End: 2022-10-04

## 2022-10-04 ENCOUNTER — LAB (OUTPATIENT)
Dept: LAB | Facility: CLINIC | Age: 87
End: 2022-10-04
Payer: MEDICARE

## 2022-10-04 DIAGNOSIS — I48.19 PERSISTENT ATRIAL FIBRILLATION (H): Primary | ICD-10-CM

## 2022-10-04 DIAGNOSIS — I48.19 PERSISTENT ATRIAL FIBRILLATION (H): ICD-10-CM

## 2022-10-04 LAB — INR BLD: 1.6 (ref 0.9–1.1)

## 2022-10-04 PROCEDURE — 36415 COLL VENOUS BLD VENIPUNCTURE: CPT

## 2022-10-04 PROCEDURE — 85610 PROTHROMBIN TIME: CPT

## 2022-10-04 NOTE — PROGRESS NOTES
ANTICOAGULATION MANAGEMENT     Mariia Tinoco 87 year old female is on warfarin with therapeutic INR result. (Goal INR 2.0-3.0)    Recent labs: (last 7 days)     10/04/22  1533   INR 1.6*       ASSESSMENT       Source(s): Chart Review and Patient/Caregiver Call       Warfarin doses taken: Warfarin taken as instructed    Diet: No new diet changes identified discussed consistency in greens and etoh    New illness, injury, or hospitalization: No    Medication/supplement changes: None noted    Signs or symptoms of bleeding or clotting: No    Previous INR: Subtherapeutic    Additional findings: None       PLAN     Recommended plan for no diet, medication or health factor changes affecting INR     Dosing Instructions: Increase your warfarin dose (12.5% change) with next INR in 2 weeks       Summary  As of 10/4/2022    Full warfarin instructions:  10/4: 4.5 mg; Otherwise 3 mg every Sun, Wed, Fri; 4.5 mg all other days   Next INR check:  10/18/2022             Telephone call with Nicolas who verbalizes understanding and agrees to plan    Lab visit scheduled    Education provided: None required    Plan made per [facility] anticoagulation protocol    Aurelio Duarte RN  Anticoagulation Clinic  10/4/2022    _______________________________________________________________________     Anticoagulation Episode Summary     Current INR goal:  2.0-3.0   TTR:  65.3 % (1 y)   Target end date:  Indefinite   Send INR reminders to:  [facility]    Indications    Persistent atrial fibrillation (H) [I48.19]           Comments:           Anticoagulation Care Providers     Provider Role Specialty Phone number    Eagle Shabazz MD Referring Internal Medicine [phone]    Mariano Munoz MD Referring Internal Medicine [phone]

## 2022-10-11 ENCOUNTER — OFFICE VISIT (OUTPATIENT)
Dept: PHARMACY | Facility: CLINIC | Age: 87
End: 2022-10-11
Payer: COMMERCIAL

## 2022-10-11 DIAGNOSIS — I48.19 PERSISTENT ATRIAL FIBRILLATION (H): ICD-10-CM

## 2022-10-11 DIAGNOSIS — J42 UNSPECIFIED CHRONIC BRONCHITIS (H): ICD-10-CM

## 2022-10-11 DIAGNOSIS — M81.0 OSTEOPOROSIS, SENILE: ICD-10-CM

## 2022-10-11 DIAGNOSIS — E03.9 HYPOTHYROIDISM, UNSPECIFIED TYPE: ICD-10-CM

## 2022-10-11 DIAGNOSIS — M17.11 PRIMARY OSTEOARTHRITIS OF RIGHT KNEE: ICD-10-CM

## 2022-10-11 DIAGNOSIS — G47.00 INSOMNIA, UNSPECIFIED TYPE: Primary | ICD-10-CM

## 2022-10-11 PROCEDURE — 99605 MTMS BY PHARM NP 15 MIN: CPT | Performed by: PHARMACIST

## 2022-10-11 PROCEDURE — 99607 MTMS BY PHARM ADDL 15 MIN: CPT | Performed by: PHARMACIST

## 2022-10-11 NOTE — PROGRESS NOTES
Medication Therapy Management (MTM) Encounter    ASSESSMENT:                            Medication Adherence/Access: No issues identified    1. Insomnia, unspecified type  Chronic issue, with previous history of multiple medication failures including Tylenol PM, Benadryl, doxylamine, trazodone.  Discontinue doxylamine as this is contributing to vivid dreams.  Reviewed previous positive effects of Ambien which she had tolerated without adverse effects.  Most recently she has started to use hemp derived delta 9 Gummies, with benefit.  Extensive discussion on low abuse potential and negative neurologic effects with this low THC product.  Due to morning somnolence recommend to dose 5 mg delta 9 Gummie +3 mg melatonin 30 to 60 minutes earlier in the evening to prevent morning somnolence.  Acknowledged positive effects on her being able to relax in the evening, as we have had previous discussions around anxiety symptoms.  If she continues to experience morning of fatigue consider decreasing dose of delta 9 Gummies to 2.5 mg with melatonin.  May also consider decreasing dose of melatonin.  If these administration adjustments are poorly effective or poorly tolerated, consider discussion of resuming low-dose Ambien.    2. Persistent atrial fibrillation (H)  Following with cardiology, due for follow-up with pacemaker clinic.  INR subtherapeutic, working with anticoagulation RN team.  Ideally she would be able to switch to a DOAC, however cost is prohibitive.  Reviewed patient assistance program for Eliquis, which she would likely not qualify for.  We will investigate cost of Eliquis today with her pharmacy, however she demonstrates understanding that she will not start this medication without direction, we are simply looking into cost at this time.  Continues on low-dose beta-blocker, tolerating without adverse effects.  Recommended to continue.    3. Chronic Bronchitis  Stable per pulmonology, continues on Breo Ellipta daily  with very infrequent use of albuterol.  Recommend to continue.    4. 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  Recommended continue calcium supplementation, previous vitamin D level therapeutic.    5. Primary osteoarthritis of right knee  Stable with as needed use of acetaminophen, recommended continue.  Provided education that she may find additional benefit if taking acetaminophen prior to activity.    6. Hypothyroidism, unspecified type  Previous level therapeutic, due for updated labs, to be addressed at follow-up next week with PCP.  I recommended continue current regimen.    PLAN:                            1. Trial of delta9 5mg + 3mg melatonin taken at 9-930pm - monitor for better sleep and less drowsy the next morning   2. If too drowsy the next morning - try less melatonin OR cut CBD in half to 2.5mg   3. Stop doxylamine     4. Check on cost of Eliquis - as an option to transition warfarin to Eliquis     Follow-up: Return in about 9 days (around 10/20/2022) for Follow up with Dr. Shabazz .    SUBJECTIVE/OBJECTIVE:                          Mariia Tinoco is a 87 year old female coming in for an initial visit. She was referred to me from Self. Patient was accompanied by her  Adam and son Heriberto. She is legally blind.     Reason for visit: Medication management initial.    Allergies/ADRs: Reviewed in chart - declines prescription for Epi-Pen  Past Medical History: Reviewed in chart  Tobacco: She reports that she has never smoked. She has never used smokeless tobacco.  Alcohol: Less than 1 beverages / week    Medication Adherence/Access: no issues reported.  She does utilize a pillbox, and feels comfortable with this.    Insomnia: She has longstanding difficulties with sleep.  Notes that as she lost her vision this has not had any worsening in negative effects in her sleep.  Her sleep is intermittent and she may get a  few hours at a time, getting up to go to the bathroom.  Out of desperation over the last 2 weeks her children had purchased her a CBD product, and she has been taking 5 mg Delta 9 CBD gummy +3 mg melatonin at bedtime.  This allowed her to relax and get to sleep within about 45 minutes after taking these 2 products, and when she can did get up to go to the bathroom she was able to fall back to sleep readily during the night.  However she is a little groggy and fatigued the next day while using CBD.  She is currently taking her dose at 10 PM.  For 2 nights over the last 2 weeks she skipped the melatonin, and found that she was not able to sleep.  Prior to this that she has been using a 12.5 mg doxylamine at night, finds that this causes her vivid dreams.  She has previously tried and failed multiple medication regimens including Tylenol PM, Benadryl, trazodone, however found that Ambien was very effective.  She had previously been meant on Ambien at 10 mg, which had subsequently been decreased to 5 mg.  When this was discontinued her sleeping did worsen.  She was seen by the sleep clinic who is not able to find any conclusive issue.  When she had been on Ambien she had not experienced any adverse effects.  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  Since she knows what they are now, these do not bother her.  She does feel that her lack of sleep is negatively impacting her quality of life during the day.    Persistent atrial fibrillation: Earlier this year in May she underwent a biventricular pacemaker generator change.  She continues on metoprolol 25 mg daily, in addition to chronic anticoagulation with warfarin.  She continues to be frustrated by frequent INR appointments and dose adjustments in her warfarin.  She would like to switch to one of the newer oral anticoagulants, however cost is a factor, and  would not qualify for patient assistance program which we reviewed today.  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  Lab Results   Component Value Date    INR 1.6 (H) 10/04/2022    INR 1.9 (H) 09/21/2022    INR 1.8 (H) 08/31/2022     COPD: Follows with her pulmonologist, Dr. Rich.  Noted to have COPD/obstructive lung disease as well as interstitial lung disease/scarring.  She continues on Breo elliptica, 1 inhalation daily, and rinses her mouth out thereafter.  Feels that her breathing is stable and very rarely uses albuterol.    Osteoporosis: Not discussed in depth today. Follows with Dr Hammond for osteoporosis care. She continues on her calcium supplement, 1 tablet by mouth twice daily. She had received 5 years of Prolia, and subsequently received a dose of Reclast on April 5, 2022.    Lab Results   Component Value Date    ZARI 9.4 04/05/2022      Lab Results   Component Value Date    VITDT 45 10/06/2021     Osteoarthritis: Due to arthritis, if needed she will use Tylenol.  She typically takes 2 tablets as needed when she is in pain.    Hypothyroidism: Continues on daily levothyroxine.  TSH   Date Value Ref Range Status   10/06/2021 2.10 0.30 - 5.00 uIU/mL Final     Today's Vitals: There were no vitals taken for this visit. - declined   BP Readings from Last 3 Encounters:   05/20/22 110/60   04/05/22 117/66   09/11/21 124/68     ----------------    I spent 60 minutes with this patient today. All changes were made via collaborative practice agreement with PHILIPP ALCALA MD. A copy of the visit note was provided to the patient's provider(s).    The patient was given a summary of these recommendations.     Garret Tripp, PharmD, BCACP  Medication Management (MTM) Pharmacist  [facility]     Medication Therapy Recommendations  Insomnia, unspecified type    Current Medication: melatonin 3 mg Tab tablet   Rationale: Does not understand instructions - Adherence - Adherence    Recommendation: Change Administration Time   Status: Patient Agreed - Adherence/Education

## 2022-10-11 NOTE — PATIENT INSTRUCTIONS
PLAN:                            1. Trial of delta9 5mg + 3mg melatonin taken at 9-930pm - monitor for better sleep and less drowsy the next morning     2. If too drowsy the next morning - try less melatonin OR cut CBD in half to 2.5mg     3. Stop doxylamine     Follow-up: Return in about 1 week (around 10/18/2022) for Follow up with Dr. Shabazz .

## 2022-10-13 ENCOUNTER — TELEPHONE (OUTPATIENT)
Dept: INTERNAL MEDICINE | Facility: CLINIC | Age: 87
End: 2022-10-13

## 2022-10-13 NOTE — TELEPHONE ENCOUNTER
Edwige let her know that the cost of Eliquis would be $46 for 1 month supply.  She is not quite sure she is ready to spend that amount of money when she only pays $2 for warfarin.  The goal of sending in this prescription was to identify the most recent cost of a direct oral anticoagulant for her due to difficulties with maintaining therapeutic INRs on warfarin.  For now we have discontinued the Eliquis, she will make sure that the pharmacy does not get this ready for her.  She will think about this and discuss further with her primary doctor at follow-up in about 2 weeks.

## 2022-10-13 NOTE — TELEPHONE ENCOUNTER
Nicolas Banda called to tell you the cost of the medicine you prescribed. Please call her back.    ANNA Benz

## 2022-10-18 ENCOUNTER — ANTICOAGULATION THERAPY VISIT (OUTPATIENT)
Dept: ANTICOAGULATION | Facility: CLINIC | Age: 87
End: 2022-10-18

## 2022-10-18 ENCOUNTER — TELEPHONE (OUTPATIENT)
Dept: INTERNAL MEDICINE | Facility: CLINIC | Age: 87
End: 2022-10-18

## 2022-10-18 ENCOUNTER — LAB (OUTPATIENT)
Dept: LAB | Facility: CLINIC | Age: 87
End: 2022-10-18
Payer: MEDICARE

## 2022-10-18 DIAGNOSIS — I48.19 PERSISTENT ATRIAL FIBRILLATION (H): ICD-10-CM

## 2022-10-18 DIAGNOSIS — I48.19 PERSISTENT ATRIAL FIBRILLATION (H): Primary | ICD-10-CM

## 2022-10-18 LAB — INR BLD: 3.5 (ref 0.9–1.1)

## 2022-10-18 PROCEDURE — 85610 PROTHROMBIN TIME: CPT

## 2022-10-18 PROCEDURE — 36415 COLL VENOUS BLD VENIPUNCTURE: CPT

## 2022-10-18 NOTE — TELEPHONE ENCOUNTER
Dr. Shabazz,     - I will be sending prior auth for Tete on Eliquis or Xarelto    - please advise on dosing.    - DX:  Persistent atrial fibrillation -  DX code I148.19    - NPI: [id number]  Meeta Poe, Cassandra M, RN  Caller: Unspecified (Today,  2:09 PM)  Hello,   We need current orders in the patients medication list for whichever medication you are requesting a PA for. Drug, strength, sig, provider/npi and diagnosis codes are all needed.

## 2022-10-18 NOTE — TELEPHONE ENCOUNTER
Nicolas Banda called needing to talk to you about a sleep medication you are helping her with.  Could you call her back?    ANNA Benz

## 2022-10-18 NOTE — PROGRESS NOTES
ANTICOAGULATION MANAGEMENT     Mariia Tinoco 87 year old female is on warfarin with supratherapeutic INR result. (Goal INR 2.0-3.0)    Recent labs: (last 7 days)     10/18/22  0856   INR 3.5*       ASSESSMENT       Source(s): Chart Review, Patient/Caregiver Call and Template       Warfarin doses taken: Warfarin taken as instructed    Diet: No new diet changes identified    New illness, injury, or hospitalization: Yes:    Insomnia.  Having a hard time sleeping and only getting 4 hrs of sleep.    Medication/supplement changes:  Yes.    10/11/22 trial with hemp derived delta-9 Gummies 5mg.             (can increase risk for bleeding in conjunction with warfarin).   Continues Melatonin 3mg at bedtime.    OTC Sleep-aid gave her bad dreams.   Will decrease Gummies / Melatonin if experiencing fatigue / morning somnulence.   Checking with pharmacy cost of Eliquis to transition from warfarin.    Signs or symptoms of bleeding or clotting: No    Previous INR: Subtherapeutic last 3 INR results.    Additional findings: Nicolas did meet with pharmacist for medication therapy management on 10/11/22.    Sent prior auth group @ [facility] for Eliquis and Xarelto.       PLAN     Recommended plan for ongoing change(s) affecting INR     Dosing Instructions: hold dose then decrease your warfarin dose (11.1% change) with next INR in 1-2 weeks       Summary  As of 10/18/2022    Full warfarin instructions:  10/18: Hold; Otherwise 4.5 mg every Mon, Thu; 3 mg all other days   Next INR check:  11/1/2022             Telephone call with  Nicolas ([phone]) who verbalizes understanding and agrees to plan    Patient offered & declined to schedule next visit    - will await prior auth for DOAC.   - then will call patient back to schedule INR in 1-2 wks.    Education provided: Importance of consistent vitamin K intake, Goal range and significance of current result, Potential interaction between warfarin and CBD gummies with Melatonin and  Monitoring for bleeding signs and symptoms    Plan made per [facility] anticoagulation protocol    Cassandra Banuelos RN  Anticoagulation Clinic  10/18/2022    _______________________________________________________________________     Anticoagulation Episode Summary     Current INR goal:  2.0-3.0   TTR:  65.0 % (1 y)   Target end date:  Indefinite   Send INR reminders to:  [facility]    Indications    Persistent atrial fibrillation (H) [I48.19]           Comments:           Anticoagulation Care Providers     Provider Role Specialty Phone number    Eagle Shabazz MD Referring Internal Medicine [phone]    Mariano Munoz MD Referring Internal Medicine [phone]

## 2022-10-18 NOTE — TELEPHONE ENCOUNTER
Please check coverage for patient     - Prior auhorization coverage for Eliquis or Xarelto,     - patient's warfarin therapy has not been able to be maintained within therapeutic range.     - patient not happy in checking INR's so often.

## 2022-10-18 NOTE — TELEPHONE ENCOUNTER
Please clarify what is necessary or needed here.  This is something that we need to discuss as well.  I think she has an appointment soon.

## 2022-10-18 NOTE — TELEPHONE ENCOUNTER
"I called Nicolas back, unfortunately her sleep continues to be very poor.  We do lengthy discussion on October 11 reviewing her history of insomnia and medication she has previously tried and failed.  Most recently she was doing a trial of new delta 9 Gummies, however despite taking 3 mg of melatonin +5 mg gummy, she is continue to have very poor sleep.  Over the last 2 weeks she has only gotten 3 good nights of sleep, however they were very good nights of sleep.  They were not consecutive.  In reviewing benefits versus risks of potential medication therapy and lack of sleep, she may benefit from resuming Ambien which she had previously tolerated and was very effective.  Her lack of sleep is very negatively impacting her quality of life.  Unfortunately the Gummies have now also impacted her INR.  With warfarin dose being recently adjusted this morning recommend to continue current dose of her Gummies plus higher dose of melatonin 6 mg until follow-up with PCP.    In my previous discussions with Dr. Deluna, we had reviewed the best efficacy data with \"Z\" drugs and lack of concern around dependence as well as likely improved safety as compared to alternatives that are recommended in the elderly.      I will discuss with Dr. Shabazz in anticipation of her follow-up appointment with him later this week.  "

## 2022-10-19 NOTE — TELEPHONE ENCOUNTER
Abdullahi Shabazz,     - Nicolas just not happy having to come in for INRs all the time.  Her INR's have not been stable with varying sub to supra therapeutic..  She would like to check on the DOAC agents.     - she did check before for coverage.  I informed her we can have pior auth group check on coverage and approval to see what copay she would be responsible for.     - if you approve of Eliquis or Xarelto, please recommend dose.

## 2022-10-20 ENCOUNTER — OFFICE VISIT (OUTPATIENT)
Dept: INTERNAL MEDICINE | Facility: CLINIC | Age: 87
End: 2022-10-20
Payer: MEDICARE

## 2022-10-20 ENCOUNTER — TELEPHONE (OUTPATIENT)
Dept: INTERNAL MEDICINE | Facility: CLINIC | Age: 87
End: 2022-10-20

## 2022-10-20 VITALS
DIASTOLIC BLOOD PRESSURE: 76 MMHG | TEMPERATURE: 97.3 F | WEIGHT: 149.4 LBS | SYSTOLIC BLOOD PRESSURE: 120 MMHG | BODY MASS INDEX: 25.51 KG/M2 | HEIGHT: 64 IN | HEART RATE: 78 BPM | RESPIRATION RATE: 14 BRPM | OXYGEN SATURATION: 98 %

## 2022-10-20 DIAGNOSIS — N30.00 ACUTE CYSTITIS WITHOUT HEMATURIA: ICD-10-CM

## 2022-10-20 DIAGNOSIS — R32 URINARY INCONTINENCE, UNSPECIFIED TYPE: ICD-10-CM

## 2022-10-20 DIAGNOSIS — M79.645 PAIN OF LEFT THUMB: ICD-10-CM

## 2022-10-20 DIAGNOSIS — Z00.00 ENCOUNTER FOR MEDICARE ANNUAL WELLNESS EXAM: Primary | ICD-10-CM

## 2022-10-20 DIAGNOSIS — I48.19 PERSISTENT ATRIAL FIBRILLATION (H): ICD-10-CM

## 2022-10-20 DIAGNOSIS — I42.9 CARDIOMYOPATHY, UNSPECIFIED TYPE (H): ICD-10-CM

## 2022-10-20 DIAGNOSIS — J84.10 POSTINFLAMMATORY PULMONARY FIBROSIS (H): ICD-10-CM

## 2022-10-20 DIAGNOSIS — H53.16 CHARLES BONNET SYNDROME: ICD-10-CM

## 2022-10-20 DIAGNOSIS — E03.9 HYPOTHYROIDISM, UNSPECIFIED TYPE: ICD-10-CM

## 2022-10-20 DIAGNOSIS — F51.04 CHRONIC INSOMNIA: ICD-10-CM

## 2022-10-20 DIAGNOSIS — M81.0 OSTEOPOROSIS, SENILE: ICD-10-CM

## 2022-10-20 LAB
ALBUMIN SERPL BCG-MCNC: 4.5 G/DL (ref 3.5–5.2)
ALBUMIN UR-MCNC: NEGATIVE MG/DL
ALP SERPL-CCNC: 88 U/L (ref 35–104)
ALT SERPL W P-5'-P-CCNC: 10 U/L (ref 10–35)
ANION GAP SERPL CALCULATED.3IONS-SCNC: 14 MMOL/L (ref 7–15)
APPEARANCE UR: CLEAR
AST SERPL W P-5'-P-CCNC: 27 U/L (ref 10–35)
BACTERIA #/AREA URNS HPF: ABNORMAL /HPF
BILIRUB SERPL-MCNC: 2.5 MG/DL
BILIRUB UR QL STRIP: NEGATIVE
BUN SERPL-MCNC: 16.4 MG/DL (ref 8–23)
CALCIUM SERPL-MCNC: 10.3 MG/DL (ref 8.8–10.2)
CHLORIDE SERPL-SCNC: 105 MMOL/L (ref 98–107)
CHOLEST SERPL-MCNC: 203 MG/DL
COLOR UR AUTO: YELLOW
CREAT SERPL-MCNC: 0.79 MG/DL (ref 0.51–0.95)
DEPRECATED HCO3 PLAS-SCNC: 23 MMOL/L (ref 22–29)
ERYTHROCYTE [DISTWIDTH] IN BLOOD BY AUTOMATED COUNT: 14.5 % (ref 10–15)
GFR SERPL CREATININE-BSD FRML MDRD: 72 ML/MIN/1.73M2
GLUCOSE SERPL-MCNC: 88 MG/DL (ref 70–99)
GLUCOSE UR STRIP-MCNC: NEGATIVE MG/DL
HCT VFR BLD AUTO: 40 % (ref 35–47)
HDLC SERPL-MCNC: 64 MG/DL
HGB BLD-MCNC: 14.1 G/DL (ref 11.7–15.7)
HGB UR QL STRIP: ABNORMAL
KETONES UR STRIP-MCNC: NEGATIVE MG/DL
LDLC SERPL CALC-MCNC: 123 MG/DL
LEUKOCYTE ESTERASE UR QL STRIP: ABNORMAL
MCH RBC QN AUTO: 33.3 PG (ref 26.5–33)
MCHC RBC AUTO-ENTMCNC: 35.3 G/DL (ref 31.5–36.5)
MCV RBC AUTO: 95 FL (ref 78–100)
NITRATE UR QL: POSITIVE
NONHDLC SERPL-MCNC: 139 MG/DL
PH UR STRIP: 5.5 [PH] (ref 5–8)
PLATELET # BLD AUTO: 199 10E3/UL (ref 150–450)
POTASSIUM SERPL-SCNC: 4.5 MMOL/L (ref 3.4–5.3)
PROT SERPL-MCNC: 6.7 G/DL (ref 6.4–8.3)
RBC # BLD AUTO: 4.23 10E6/UL (ref 3.8–5.2)
RBC #/AREA URNS AUTO: ABNORMAL /HPF
SODIUM SERPL-SCNC: 142 MMOL/L (ref 136–145)
SP GR UR STRIP: 1.02 (ref 1–1.03)
SQUAMOUS #/AREA URNS AUTO: ABNORMAL /LPF
T4 FREE SERPL-MCNC: 1.44 NG/DL (ref 0.9–1.7)
TRIGL SERPL-MCNC: 82 MG/DL
TSH SERPL DL<=0.005 MIU/L-ACNC: 5.24 UIU/ML (ref 0.3–4.2)
UROBILINOGEN UR STRIP-ACNC: 0.2 E.U./DL
WBC # BLD AUTO: 6.7 10E3/UL (ref 4–11)
WBC #/AREA URNS AUTO: ABNORMAL /HPF

## 2022-10-20 PROCEDURE — G0008 ADMIN INFLUENZA VIRUS VAC: HCPCS | Performed by: INTERNAL MEDICINE

## 2022-10-20 PROCEDURE — 36415 COLL VENOUS BLD VENIPUNCTURE: CPT | Performed by: INTERNAL MEDICINE

## 2022-10-20 PROCEDURE — 80061 LIPID PANEL: CPT | Performed by: INTERNAL MEDICINE

## 2022-10-20 PROCEDURE — 99214 OFFICE O/P EST MOD 30 MIN: CPT | Mod: 25 | Performed by: INTERNAL MEDICINE

## 2022-10-20 PROCEDURE — G0439 PPPS, SUBSEQ VISIT: HCPCS | Performed by: INTERNAL MEDICINE

## 2022-10-20 PROCEDURE — 85027 COMPLETE CBC AUTOMATED: CPT | Performed by: INTERNAL MEDICINE

## 2022-10-20 PROCEDURE — 80053 COMPREHEN METABOLIC PANEL: CPT | Performed by: INTERNAL MEDICINE

## 2022-10-20 PROCEDURE — 90662 IIV NO PRSV INCREASED AG IM: CPT | Performed by: INTERNAL MEDICINE

## 2022-10-20 PROCEDURE — 87086 URINE CULTURE/COLONY COUNT: CPT | Performed by: INTERNAL MEDICINE

## 2022-10-20 PROCEDURE — 87186 SC STD MICRODIL/AGAR DIL: CPT | Performed by: INTERNAL MEDICINE

## 2022-10-20 PROCEDURE — 84443 ASSAY THYROID STIM HORMONE: CPT | Performed by: INTERNAL MEDICINE

## 2022-10-20 PROCEDURE — 81001 URINALYSIS AUTO W/SCOPE: CPT | Performed by: INTERNAL MEDICINE

## 2022-10-20 PROCEDURE — 84439 ASSAY OF FREE THYROXINE: CPT | Performed by: INTERNAL MEDICINE

## 2022-10-20 RX ORDER — ZOLPIDEM TARTRATE 5 MG/1
2.5-5 TABLET ORAL
Qty: 30 TABLET | Refills: 0 | Status: SHIPPED | OUTPATIENT
Start: 2022-10-20 | End: 2022-12-21

## 2022-10-20 ASSESSMENT — ENCOUNTER SYMPTOMS
PALPITATIONS: 0
JOINT SWELLING: 0
DYSURIA: 0
BREAST MASS: 0
NERVOUS/ANXIOUS: 0
DIARRHEA: 0
DIZZINESS: 0
HEMATOCHEZIA: 0
SHORTNESS OF BREATH: 1
COUGH: 0
WEAKNESS: 0
FEVER: 0
ABDOMINAL PAIN: 0
SORE THROAT: 0
PARESTHESIAS: 0
CHILLS: 0
MYALGIAS: 0
HEARTBURN: 0
EYE PAIN: 0
CONSTIPATION: 1
NAUSEA: 0
HEMATURIA: 0
ARTHRALGIAS: 1
FREQUENCY: 1
HEADACHES: 0

## 2022-10-20 ASSESSMENT — ACTIVITIES OF DAILY LIVING (ADL)
CURRENT_FUNCTION: TRANSPORTATION REQUIRES ASSISTANCE
CURRENT_FUNCTION: SHOPPING REQUIRES ASSISTANCE

## 2022-10-20 NOTE — LETTER
[facility]  10/20/22  Patient: Mariia Tinoco  YOB: 1935  Medical Record Number: [id number]                                                                                  Non-Opioid Controlled Substance Agreement    This is an agreement between you and your provider regarding safe and appropriate use of controlled substances prescribed by your care team. Controlled substances are?medicines that can cause physical and mental dependence (abuse).     There are strict laws about having and using these medicines. We here at [facility] are  committed to working with you in your efforts to get better. To support you in this work, we'll help you schedule regular office appointments for medicine refills. If we must cancel or change your appointment for any reason, we'll make sure you have enough medicine to last until your next appointment.     As a Provider, I will:     Listen carefully to your concerns while treating you with respect.     Recommend a treatment plan that I believe is in your best interest and may involve therapies other than medicine.      Talk with you often about the possible benefits and the risk of harm of any medicine that we prescribe for you.    Assess the safety of this medicine and check how well it works.      Provide a plan on how to taper (discontinue or go off) using this medicine if the decision is made to stop its use.      ::  As a Patient, I understand controlled substances:       Are prescribed by my care provider to help me function or work and manage my condition(s).?    Are strong medicines and can cause serious side effects.       Need to be taken exactly as prescribed.?Combining controlled substances with certain medicines or chemicals (such as illegal drugs, alcohol, sedatives, sleeping pills, and benzodiazepines) can be dangerous or even fatal.? If I stop taking my medicines suddenly, I may have severe withdrawal symptoms.     The risks,  benefits, and side effects of these medicine(s) were explained to me. I agree that:    1. I will take part in other treatments as advised by my care team. This may be psychiatry or counseling, physical therapy, behavioral therapy, group treatment or a referral to specialist.    2. I will keep all my appointments and understand this is part of the monitoring of controlled substances.?My care team may require an office visit for EVERY controlled substance refill. If I miss appointments or don t follow instructions, my care team may stop my medicine    3. I will take my medicines as prescribed. I will not change the dose or schedule unless my care team tells me to. There will be no refills if I run out early.      4. I may be asked to come to the clinic and complete a urine drug test or complete a pill count. If I don t give a urine sample or participate in a pill count, the care team may stop my medicine.    5. I will only receive controlled substance prescriptions from this clinic. If I am treated by another provider, I will tell them that I am taking controlled substances and that I have a treatment agreement with this provider. I will inform my [facility] care team within one business day if I am given a prescription for any controlled substance by another healthcare provider. My [facility] care team can contact other providers and pharmacists about my use of any medicines.    6. It is up to me to make sure that I don't run out of my medicines on weekends or holidays.?If my care team is willing to refill my prescription without a visit, I must request refills only during office hours. Refills may take up to 3 business days to process. I will use one pharmacy to fill all my controlled substance prescriptions. I will notify the clinic about any changes to my insurance or medicine availability.    7. I am responsible for my prescriptions. If the medicine/prescription is lost, stolen or destroyed, it will  not be replaced.?I also agree not to share controlled substance medicines with anyone.     8. I am aware I should not use any illegal or recreational drugs. I agree not to drink alcohol unless my care team says I can.     9. If I enroll in the [place] Medical Cannabis program, I will tell my care team before my next refill.    10. I will tell my care team right away if I become pregnant, have a new medical problem treated outside of my regular clinic, or have a change in my medicines.     11. I understand that this medicine can affect my thinking, judgment and reaction time.? Alcohol and drugs affect the brain and body, which can affect the safety of my driving. Being under the influence of alcohol or drugs can affect my decision-making, behaviors, personal safety and the safety of others. Driving while impaired (DWI) can occur if a person is driving, operating or in physical control of a car, motorcycle, boat, snowmobile, ATV, motorbike, off-road vehicle or any other motor vehicle ([place] Statute 169A.20). I understand the risk if I choose to drive or operate any vehicle or machinery.    I understand that if I do not follow any of the conditions above, my prescriptions or treatment may be stopped or changed.   I agree that my provider, clinic care team and pharmacy may work with any city, state or federal law enforcement agency that investigates the misuse, sale or other diversion of my controlled medicine. I will allow my provider to discuss my care with, or share a copy of, this agreement with any other treating provider, pharmacy or emergency room where I receive care.     I have read this agreement and have asked questions about anything I did not understand.    ________________________________________________________  Patient Signature - Mariia Tinoco     ___________________                   Date     ________________________________________________________  Provider Signature - PHILIPP ALCALA MD        ___________________                   Date     ________________________________________________________  Witness Signature (required if provider not present while patient signing)          ___________________                   Date

## 2022-10-20 NOTE — TELEPHONE ENCOUNTER
Reason for Call:  INR follow up    Detailed comments: Patient would like for Mela to call her back. Thank you    Phone Number Patient can be reached at: Other phone number:  [phone]    Best Time: any    Can we leave a detailed message on this number? No    Call taken on 10/20/2022 at 9:49 AM by Beata Gross

## 2022-10-20 NOTE — PROGRESS NOTES
"SUBJECTIVE:   Nicolas is a 87 year old who presents for Preventive Visit.    Tete comes in today for follow-up of her multiple medical problems and for annual wellness.  Extensive visit today.  A lot of things to discuss.  Delightful woman.  She has A. fib and is hoping to switch to a direct oral anticoagulant.  She is having a lot of issues with her INR staying therapeutic.  She does not know about coverage however.    Her sleep is terrible.  She has longstanding insomnia.  She has been trying some CBD Gummies and this is causing her INR to go offkilter.  Our clinical pharmacist recommends that she go back on low-dose Ambien which she tolerated in the past.  Some medications because her hallucinations to be worse.  She has Robert Peggy syndrome.    She is due for her bone density.    She is off of Prolia and had a dose of Reclast.  She is on a drug holiday now for 2 years.    Her pulmonary fibrosis and chronic pulmonary issues seem to be stable.    She has a lot of urinary incontinence but this is not new.    She has pain at the base of her left thumb which is bothersome.    Patient has been advised of split billing requirements and indicates understanding: Yes  Are you in the first 12 months of your Medicare coverage?  No    Healthy Habits:     In general, how would you rate your overall health?  Fair    Frequency of exercise:  2-3 days/week    Duration of exercise:  15-30 minutes    Do you usually eat at least 4 servings of fruit and vegetables a day, include whole grains    & fiber and avoid regularly eating high fat or \"junk\" foods?  Yes    Taking medications regularly:  Yes    Ability to successfully perform activities of daily living:  Transportation requires assistance and shopping requires assistance    Home Safety:  Lack of grab bars in the bathroom    Hearing Impairment:  No hearing concerns    In the past 6 months, have you been bothered by leaking of urine? Yes    In general, how would you rate your " overall mental or emotional health?  Good      PHQ-2 Total Score: 0    Additional concerns today:  No    Do you feel safe in your environment? Yes    Have you ever done Advance Care Planning? (For example, a Health Directive, POLST, or a discussion with a medical provider or your loved ones about your wishes): Yes, advance care planning is on file.       Fall risk  Fallen 2 or more times in the past year?: No  Any fall with injury in the past year?: No  click delete button to remove this line now  Cognitive Screening   1) Repeat 3 items (Leader, Season, Table)    2) Clock draw: Unable to complete as is visually impaired  3) 3 item recall: Recalls 3 objects  Results: 3 items recalled: COGNITIVE IMPAIRMENT LESS LIKELY    Mini-CogTM Copyright ADDY Dutton. Licensed by the author for use in [facility] [employer]; reprinted with permission (papa@[facility]). All rights reserved.      Do you have sleep apnea, excessive snoring or daytime drowsiness?: no    Reviewed and updated as needed this visit by clinical staff   Tobacco  Allergies               Reviewed and updated as needed this visit by Provider                 Social History     Tobacco Use     Smoking status: Never     Smokeless tobacco: Never   Substance Use Topics     Alcohol use: Yes     Alcohol/week: 1.0 standard drink     Types: 1 Standard drinks or equivalent per week     Comment: Alcoholic Drinks/day: occasional - 1 drink per week     If you drink alcohol do you typically have >3 drinks per day or >7 drinks per week? No    Alcohol Use 10/20/2022   Prescreen: >3 drinks/day or >7 drinks/week? No   Prescreen: >3 drinks/day or >7 drinks/week? -   No flowsheet data found.            Current providers sharing in care for this patient include:     Patient Care Team:  Eagle Shabazz MD as PCP - Patti Guerra OT (Inactive) as Occupational Therapist (Occupational Therapy)  Joseluis Call MD as Referring Physician (Ophthalmology)  Eagle Shabazz MD as  Assigned PCP  Ryan Johnson as Kanu Espinal MD as Physician  Garret Tripp, PharmD as Pharmacist (Pharmacist)    The following health maintenance items are reviewed in Epic and correct as of today:  Health Maintenance   Topic Date Due     COPD ACTION PLAN  Never done     HEPATITIS B IMMUNIZATION (1 of 3 - 3-dose series) Never done     COVID-19 Vaccine (5 - Booster) 06/06/2022     INFLUENZA VACCINE (1) 09/01/2022     ANNUAL REVIEW OF HM ORDERS  10/06/2022     MEDICARE ANNUAL WELLNESS VISIT  10/06/2022     FALL RISK ASSESSMENT  10/20/2023     DTAP/TDAP/TD IMMUNIZATION (2 - Td or Tdap) 11/28/2024     ADVANCE CARE PLANNING  10/06/2026     SPIROMETRY  Completed     PHQ-2 (once per calendar year)  Completed     Pneumococcal Vaccine: 65+ Years  Completed     ZOSTER IMMUNIZATION  Addressed     IPV IMMUNIZATION  Aged Out     MENINGITIS IMMUNIZATION  Aged Out             Pertinent mammograms are reviewed under the imaging tab.    Review of Systems   Constitutional: Negative for chills and fever.   HENT: Negative for congestion, ear pain, hearing loss and sore throat.    Eyes: Negative for pain and visual disturbance.   Respiratory: Positive for shortness of breath. Negative for cough.    Cardiovascular: Negative for chest pain, palpitations and peripheral edema.   Gastrointestinal: Positive for constipation. Negative for abdominal pain, diarrhea, heartburn, hematochezia and nausea.   Breasts:  Negative for tenderness, breast mass and discharge.   Genitourinary: Positive for frequency and urgency. Negative for dysuria, genital sores, hematuria, pelvic pain, vaginal bleeding and vaginal discharge.   Musculoskeletal: Positive for arthralgias. Negative for joint swelling and myalgias.   Skin: Negative for rash.   Neurological: Negative for dizziness, weakness, headaches and paresthesias.   Psychiatric/Behavioral: Negative for mood changes. The patient is not nervous/anxious.          OBJECTIVE:   /76 (BP  "Location: Left arm, Patient Position: Sitting, Cuff Size: Adult Regular)   Pulse 78   Temp 97.3  F (36.3  C)   Resp 14   Ht 1.626 m (5' 4\")   Wt 67.8 kg (149 lb 6.4 oz)   SpO2 98%   BMI 25.64 kg/m   Estimated body mass index is 25.64 kg/m  as calculated from the following:    Height as of this encounter: 1.626 m (5' 4\").    Weight as of this encounter: 67.8 kg (149 lb 6.4 oz).  Physical Exam  Delightful woman.  Appears younger than her age.  A few dry crackles at the bases bilaterally.  Otherwise clear lungs.  Heart seems regular today.  Abdomen soft and nontender.  EXTR without edema.  No focal neurologic deficits per        ASSESSMENT / PLAN:   1. Encounter for Medicare annual wellness exam  Flu shot today.  Bone density is due.  She will get her COVID booster later.    2. Persistent atrial fibrillation (H)  I have sent prescriptions for both Eliquis and Xarelto.  We will see which one is covered if either.  If both are too expensive we will need to continue with the warfarin.  - apixaban ANTICOAGULANT (ELIQUIS) 5 MG tablet; Take 1 tablet (5 mg) by mouth 2 times daily  Dispense: 60 tablet; Refill: 1  - rivaroxaban ANTICOAGULANT (XARELTO) 20 MG TABS tablet; Take 1 tablet (20 mg) by mouth daily (with dinner)  Dispense: 30 tablet; Refill: 1  - CBC with platelets; Future  - Comprehensive metabolic panel (BMP + Alb, Alk Phos, ALT, AST, Total. Bili, TP); Future  - CBC with platelets  - Comprehensive metabolic panel (BMP + Alb, Alk Phos, ALT, AST, Total. Bili, TP)    3. Robert Bonnet syndrome  Stable.  Would try to avoid things that can make mental status changes worse.  See below regarding her insomnia.  She does have particular issues with antihistamines and I have urged her to avoid these.    4. Chronic Interstitial Lung Disease  Seems stable.  Continue follow-up with her pulmonologist.    5. Hypothyroidism, unspecified type  Due for TSH.  Seems euthyroid  - TSH with free T4 reflex; Future  - Lipid panel " "reflex to direct LDL Fasting; Future  - TSH with free T4 reflex  - Lipid panel reflex to direct LDL Fasting    6. Chronic insomnia  Continue with the melatonin.  Resume very low-dose Ambien which she is tolerating the past.  She has worsening mental status changes will need to rethink this.  We discussed the issues with memory loss with these medications as well as fall risk at [facility].  She wants to take these risks.  - zolpidem (AMBIEN) 5 MG tablet; Take 0.5-1 tablets (2.5-5 mg) by mouth nightly as needed for sleep  Dispense: 30 tablet; Refill: 0    7. Osteoporosis  Currently on drug holiday.  - DX Hip/Pelvis/Spine; Future    8. 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  We discussed trial of Voltaren.  She would prefer to get an injection.  Refer to hand surgeon  - Orthopedic  Referral; Future    9. Urinary incontinence, unspecified type  Check urinalysis.  Likely age-related  - UA Macro with Reflex to Micro and Culture - lab collect; Future  - UA Macro with Reflex to Micro and Culture - lab collect  - Urine Microscopic Exam  - Urine Culture    10. Cardiomyopathy, unspecified type (H)  Stable and euvolemic.  Continue medications per cardiology.          COUNSELING:      Estimated body mass index is 25.64 kg/m  as calculated from the following:    Height as of this encounter: 1.626 m (5' 4\").    Weight as of this encounter: 67.8 kg (149 lb 6.4 oz).        She reports that she has never smoked. She has never used smokeless tobacco.      Appropriate preventive services were discussed with this patient, including applicable screening as appropriate for cardiovascular disease, diabetes, osteopenia/osteoporosis, and glaucoma.  As appropriate for age/gender, discussed screening for colorectal cancer, prostate cancer, breast cancer, and cervical cancer. Checklist reviewing preventive services available has been given to the patient.    Reviewed patients plan of care and provided an AVS. The  kiran Chiang meets the Care Plan " requirement. This Care Plan has been established and reviewed with the .    Counseling Resources:  ATP IV Guidelines  Pooled Cohorts Equation Calculator  Breast Cancer Risk Calculator  Breast Cancer: Medication to Reduce Risk  FRAX Risk Assessment  ICSI Preventive Guidelines  Dietary Guidelines for Americans, 2010  USDA's MyPlate  ASA Prophylaxis  Lung CA Screening    PHILIPP ALCALA MD  [facility]    Identified Health Risks:    The patient was provided with suggestions to help her develop a healthy physical lifestyle.  The patient reports that she has difficulty with activities of daily living.  She gets care from   Information on urinary incontinence and treatment options given to patient.

## 2022-10-20 NOTE — PATIENT INSTRUCTIONS
Patient Education   Personalized Prevention Plan  You are due for the preventive services outlined below.  Your care team is available to assist you in scheduling these services.  If you have already completed any of these items, please share that information with your care team to update in your medical record.  Health Maintenance Due   Topic Date Due     COPD Action Plan  Never done     Hepatitis B Vaccine (1 of 3 - 3-dose series) Never done     COVID-19 Vaccine (5 - Booster) 06/06/2022     Flu Vaccine (1) 09/01/2022     Annual Wellness Visit  10/06/2022     Your Health Risk Assessment indicates you feel you are not in good health    A healthy lifestyle helps keep the body fit and the mind alert. It helps protect you from disease, helps you fight disease, and helps prevent chronic disease (disease that doesn't go away) from getting worse. This is important as you get older and begin to notice twinges in muscles and joints and a decline in the strength and stamina you once took for granted. A healthy lifestyle includes good healthcare, good nutrition, weight control, recreation, and regular exercise. Avoid harmful substances and do what you can to keep safe. Another part of a healthy lifestyle is stay mentally active and socially involved.    Good healthcare     Have a wellness visit every year.     If you have new symptoms, let us know right away. Don't wait until the next checkup.     Take medicines exactly as prescribed and keep your medicines in a safe place. Tell us if your medicine causes problems.   Healthy diet and weight control     Eat 3 or 4 small, nutritious, low-fat, high-fiber meals a day. Include a variety of fruits, vegetables, and whole-grain foods.     Make sure you get enough calcium in your diet. Calcium, vitamin D, and exercise help prevent osteoporosis (bone thinning).     If you live alone, try eating with others when you can. That way you get a good meal and have company while you eat it.      Try to keep a healthy weight. If you eat more calories than your body uses for energy, it will be stored as fat and you will gain weight.     Recreation   Recreation is not limited to sports and team events. It includes any activity that provides relaxation, interest, enjoyment, and exercise. Recreation provides an outlet for physical, mental, and social energy. It can give a sense of worth and achievement. It can help you stay healthy.    Mental Exercise and Social Involvement  Mental and emotional health is as important as physical health. Keep in touch with friends and family. Stay as active as possible. Continue to learn and challenge yourself.   Things you can do to stay mentally active are:    Learn something new, like a foreign language or musical instrument.     Play SCRABBLE or do crossword puzzles. If you cannot find people to play these games with you at home, you can play them with others on your computer through the Internet.     Join a games club--anything from card games to chess or checkers or lawn bowling.     Start a new hobby.     Go back to school.     Volunteer.     Read.   Keep up with world events.  Activities of Daily Living    Your Health Risk Assessment indicates you have difficulties with activities of daily living such as housework, bathing, preparing meals, taking medication, etc. Please make a follow up appointment for us to address this issue in more detail.    Urinary Incontinence, Female (Adult)   Urinary incontinence means loss of bladder control. This problem affects many women, especially as they get older. If you have incontinence, you may be embarrassed to ask for help. But know that this problem can be treated.   Types of Incontinence  There are different types of incontinence. Two of the main types are described here. You can have more than one type.     Stress incontinence. With this type, urine leaks when pressure (stress) is put on the bladder. This may happen when you  cough, sneeze, or laugh. Stress incontinence most often occurs because the pelvic floor muscles that support the bladder and urethra are weak. This can happen after pregnancy and vaginal childbirth or a hysterectomy. It can also be due to excess body weight or hormone changes.    Urge incontinence (also called overactive bladder). With this type, a sudden urge to urinate is felt often. This may happen even though there may not be much urine in the bladder. The need to urinate often during the night is common. Urge incontinence most often occurs because of bladder spasms. This may be due to bladder irritation or infection. Damage to bladder nerves or pelvic muscles, constipation, and certain medicines can also lead to urge incontinence.  Treatment depends on the cause. Further evaluation is needed to find the type you have. This will likely include an exam and certain tests. Based on the results, you and your healthcare provider can then plan treatment. Until a diagnosis is made, the home care tips below can help ease symptoms.   Home care    Do pelvic floor muscle exercises, if they are prescribed. The pelvic floor muscles help support the bladder and urethra. Many women find that their symptoms improve when doing special exercises that strengthen these muscles. To do the exercises, contract the muscles you would use to stop your stream of urine. But do this when you re not urinating. Hold for 10 seconds, then relax. Repeat 10 to 20 times in a row, at least 3 times a day. Your healthcare provider may give you other instructions for how to do the exercises and how often.    Keep a bladder diary. This helps track how often and how much you urinate over a set period of time. Bring this diary with you to your next visit with the provider. The information can help your provider learn more about your bladder problem.    Lose weight, if advised to by your provider. Extra weight puts pressure on the bladder. Your provider  can help you create a weight-loss plan that s right for you. This may include exercising more and making certain diet changes.    Don't have foods and drinks that may irritate the bladder. These can include alcohol and caffeinated drinks.    Quit smoking. Smoking and other tobacco use can lead to a long-term (chronic) cough that strains the pelvic floor muscles. Smoking may also damage the bladder and urethra. Talk with your provider about treatments or methods you can use to quit smoking.    If drinking large amounts of fluid makes you have symptoms, you may be advised to limit your fluid intake. You may also be advised to drink most of your fluids during the day and to limit fluids at night.    If you re worried about urine leakage or accidents, you may wear absorbent pads to catch urine. Change the pads often. This helps reduce discomfort. It may also reduce the risk of skin or bladder infections.    Follow-up care  Follow up with your healthcare provider, or as directed. It may take some to find the right treatment for your problem. But healthy lifestyle changes can be made right away. These include such things as exercising on a regular basis, eating a healthy diet, losing weight (if needed), and quitting smoking. Your treatment plan may include special therapies or medicines. Certain procedures or surgery may also be options. Talk about any questions you have with your provider.   When to seek medical advice  Call the healthcare provider right away if any of these occur:    Fever of 100.4 F (38 C) or higher, or as directed by your provider    Bladder pain or fullness    Belly swelling    Nausea or vomiting    Back pain    Weakness, dizziness, or fainting  [employer] last reviewed this educational content on 1/1/2020 2000-2021 [employer]. All rights reserved. This information is not intended as a substitute for professional medical care. Always follow your healthcare professional's  instructions.

## 2022-10-21 LAB — BACTERIA UR CULT: ABNORMAL

## 2022-10-24 ENCOUNTER — TELEPHONE (OUTPATIENT)
Dept: INTERNAL MEDICINE | Facility: CLINIC | Age: 87
End: 2022-10-24

## 2022-10-24 RX ORDER — CEFPODOXIME PROXETIL 100 MG/1
100 TABLET, FILM COATED ORAL 2 TIMES DAILY
Qty: 10 TABLET | Refills: 0 | Status: SHIPPED | OUTPATIENT
Start: 2022-10-24 | End: 2022-11-22

## 2022-10-24 NOTE — TELEPHONE ENCOUNTER
Spoke with pt's daughter, daughter tried to three way in call for pt to also be on line however this was unsuccessful. Pt's daughter stated that she is the point of contact however this daughter was not on consent to communicate. Daughter said that pt was waiting for clinic call. Message was left with daughter to give to pt that a prescription was sent to her pharmacy and further details regarding labs could be given when patient calls back clinic tomorrow.

## 2022-10-24 NOTE — TELEPHONE ENCOUNTER
----- Message from Eagle Shabazz MD sent at 10/24/2022  4:14 PM CDT -----  Team - please call patient with results.    Labs look okay but your urine did grow out a bug.  I am going to recommend that we treat you for a UTI.  I will send prescription to your pharmacy.    Vantin 100 twice daily for 5 days sent to pharmacy.

## 2022-10-24 NOTE — TELEPHONE ENCOUNTER
Nicolas called back.     - reported she will stay on Jantoven.     - reported DOAC agents will be a financial burden for her.

## 2022-10-25 NOTE — TELEPHONE ENCOUNTER
Attempted to call pt, no answer (2/3). Left message requesting pt to call back clinic. When patient calls back clinic please confirm with her that she is taking the antibiotic as prescribed and she should call back clinic if her symptoms do not resolve.

## 2022-10-25 NOTE — TELEPHONE ENCOUNTER
Patient called back, relayed message. Patient stated she is taking her antibiotic as prescribed and has not have any symptoms since the day she saw Dr. Shabazz. Patient stated when she come in for her INR she will come do a new form for consent to communicate to have her daughter added on.

## 2022-10-26 ENCOUNTER — TRANSFERRED RECORDS (OUTPATIENT)
Dept: HEALTH INFORMATION MANAGEMENT | Facility: CLINIC | Age: 87
End: 2022-10-26

## 2022-11-01 ENCOUNTER — LAB (OUTPATIENT)
Dept: LAB | Facility: CLINIC | Age: 87
End: 2022-11-01
Payer: MEDICARE

## 2022-11-01 ENCOUNTER — ANTICOAGULATION THERAPY VISIT (OUTPATIENT)
Dept: ANTICOAGULATION | Facility: CLINIC | Age: 87
End: 2022-11-01

## 2022-11-01 DIAGNOSIS — I48.19 PERSISTENT ATRIAL FIBRILLATION (H): Primary | ICD-10-CM

## 2022-11-01 DIAGNOSIS — I48.19 PERSISTENT ATRIAL FIBRILLATION (H): ICD-10-CM

## 2022-11-01 LAB — INR BLD: 2.5 (ref 0.9–1.1)

## 2022-11-01 PROCEDURE — 85610 PROTHROMBIN TIME: CPT

## 2022-11-01 PROCEDURE — 36415 COLL VENOUS BLD VENIPUNCTURE: CPT

## 2022-11-01 NOTE — PROGRESS NOTES
"ANTICOAGULATION MANAGEMENT     Mariia Tinoco 87 year old female is on warfarin with therapeutic INR result. (Goal INR 2.0-3.0)    Recent labs: (last 7 days)     11/01/22  0923   INR 2.5*       ASSESSMENT       Source(s): Chart Review, Patient/Caregiver Call and Template       Warfarin doses taken: Warfarin taken as instructed    Diet: No new diet changes identified    New illness, injury, or hospitalization: {No/Yes:003435::\"No\"   Urine culture did show bacterial growth on 10/20/22 d/t UTI.    Medication/supplement changes:  Yes,    Back on Ambien 5mg and has been taking 1/2 tablet 2-3x per week.   (reported, she was finally able to get non-interrupted sleep).  Melatonin and CBD gummies were not effective at all)    Cefodoxime (Vantin) 100mg 2x/day for 5 days started on 10/24-29 subsequent INRs may increased. Closer INR monitoring recommended.    Signs or symptoms of bleeding or clotting: No    Previous INR: Supratherapeutic at 3.5 on 10/18/22.    Additional findings: on 11/1/22 a Consent to Communicate form signed by Nicolas Tinoco to give information to daughter, Janny Bear.       PLAN     Recommended plan for no diet, medication or health factor changes affecting INR     Dosing Instructions: Continue your current warfarin dose with next INR in 2 weeks       Summary  As of 11/1/2022    Full warfarin instructions:  4.5 mg every Mon, Thu; 3 mg all other days; Starting 11/1/2022   Next INR check:               Telephone call with  Nicolas ([phone]) who verbalizes understanding and agrees to plan    Lab visit scheduled - INR on 11/15/22 @ [facility].    Education provided:     Taking warfarin: Importance of taking warfarin as instructed    Goal range and lab monitoring: goal range and significance of current result    Interaction IS NOT anticipated between warfarin and Ambien    Plan made per [facility] anticoagulation protocol    Cassandra Banuelos RN  Anticoagulation " Clinic  11/1/2022    _______________________________________________________________________     Anticoagulation Episode Summary     Current INR goal:  2.0-3.0   TTR:  64.9 % (1 y)   Target end date:  Indefinite   Send INR reminders to:  [facility]    Indications    Persistent atrial fibrillation (H) [I48.19]           Comments:           Anticoagulation Care Providers     Provider Role Specialty Phone number    Eagle Shabazz MD Referring Internal Medicine [phone]    Mariano Munoz MD Referring Internal Medicine [phone]

## 2022-11-02 ENCOUNTER — ANCILLARY PROCEDURE (OUTPATIENT)
Dept: BONE DENSITY | Facility: CLINIC | Age: 87
End: 2022-11-02
Attending: INTERNAL MEDICINE
Payer: MEDICARE

## 2022-11-02 DIAGNOSIS — M81.0 OSTEOPOROSIS, SENILE: ICD-10-CM

## 2022-11-02 PROCEDURE — 77080 DXA BONE DENSITY AXIAL: CPT | Performed by: INTERNAL MEDICINE

## 2022-11-15 ENCOUNTER — ANTICOAGULATION THERAPY VISIT (OUTPATIENT)
Dept: ANTICOAGULATION | Facility: CLINIC | Age: 87
End: 2022-11-15

## 2022-11-15 ENCOUNTER — LAB (OUTPATIENT)
Dept: LAB | Facility: CLINIC | Age: 87
End: 2022-11-15
Payer: MEDICARE

## 2022-11-15 DIAGNOSIS — I48.19 PERSISTENT ATRIAL FIBRILLATION (H): Primary | ICD-10-CM

## 2022-11-15 DIAGNOSIS — I48.19 PERSISTENT ATRIAL FIBRILLATION (H): ICD-10-CM

## 2022-11-15 LAB
INR BLD: 2.9 (ref 0.9–1.1)
INR BLD: 2.9 (ref 0.9–1.1)

## 2022-11-15 PROCEDURE — 36416 COLLJ CAPILLARY BLOOD SPEC: CPT

## 2022-11-15 PROCEDURE — 85610 PROTHROMBIN TIME: CPT

## 2022-11-15 NOTE — PROGRESS NOTES
ANTICOAGULATION MANAGEMENT     Mariia Tinoco 87 year old female is on warfarin with therapeutic INR result. (Goal INR 2.0-3.0)    Recent labs: (last 7 days)     11/15/22  0907   INR 2.9*       ASSESSMENT       Source(s): Chart Review, Patient/Caregiver Call and Template       Warfarin doses taken: Warfarin taken as instructed    Diet: No new diet changes identified    New illness, injury, or hospitalization: No    Medication/supplement changes: None noted    Continues with Ambien and reported being very disciplined in taking it as instructed by Dr. Shabazz.    Signs or symptoms of bleeding or clotting: No    Previous INR: Therapeutic last visit at 2.5; previously outside of goal range sy 3.5    Additional findings: None       PLAN     Recommended plan for no diet, medication or health factor changes affecting INR     Dosing Instructions: Continue your current warfarin dose with next INR in 3 weeks       Summary  As of 11/15/2022    Full warfarin instructions:  4.5 mg every Mon, Thu; 3 mg all other days; Starting 11/15/2022   Next INR check:  12/6/2022             Telephone call with  Nicolas ([phone]) who verbalizes understanding and agrees to plan    - preferred to come in 4 wks.    Lab visit scheduled - INR on 12/13/22 @ [facility].    Education provided:     Taking warfarin: Importance of taking warfarin as instructed    Goal range and lab monitoring: goal range and significance of current result    Dietary considerations: importance of consistent vitamin K intake and impact of vitamin K foods on INR    Symptom monitoring: monitoring for bleeding signs and symptoms    Plan made per [facility] anticoagulation protocol    Cassandra Banuelos RN  Anticoagulation Clinic  11/15/2022    _______________________________________________________________________     Anticoagulation Episode Summary     Current INR goal:  2.0-3.0   TTR:  64.9 % (1 y)   Target end date:  Indefinite   Send INR reminders to:  [facility]     Indications    Persistent atrial fibrillation (H) [I48.19]           Comments:           Anticoagulation Care Providers     Provider Role Specialty Phone number    Eagle Shabazz MD Referring Internal Medicine [phone]    Mariano Munoz MD Referring Internal Medicine [phone]

## 2022-11-20 DIAGNOSIS — E03.9 HYPOTHYROIDISM, UNSPECIFIED TYPE: ICD-10-CM

## 2022-11-21 RX ORDER — LEVOTHYROXINE SODIUM 75 UG/1
TABLET ORAL
Qty: 90 TABLET | Refills: 0 | Status: SHIPPED | OUTPATIENT
Start: 2022-11-21 | End: 2023-01-30

## 2022-11-21 NOTE — TELEPHONE ENCOUNTER
"Routing refill request to provider for review/approval because:  Labs out of range:  tsh    Last Written Prescription Date:  8/22/22  Last Fill Quantity: 90,  # refills: 0   Last office visit provider:  10/20/22     Requested Prescriptions   Pending Prescriptions Disp Refills     levothyroxine (SYNTHROID/LEVOTHROID) 75 MCG tablet [Pharmacy Med Name: LEVOTHYROXIN TAB 75MCG] 90 tablet 0     Sig: TAKE 1 TABLET DAILY       Thyroid Protocol Failed - 11/20/2022  7:05 PM        Failed - Normal TSH on file in past 12 months     Recent Labs   Lab Test 10/20/22  0905   TSH 5.24*              Passed - Patient is 12 years or older        Passed - Recent (12 mo) or future (30 days) visit within the authorizing provider's specialty     Patient has had an office visit with the authorizing provider or a provider within the authorizing providers department within the previous 12 mos or has a future within next 30 days. See \"Patient Info\" tab in inbasket, or \"Choose Columns\" in Meds & Orders section of the refill encounter.              Passed - Medication is active on med list        Passed - No active pregnancy on record     If patient is pregnant or has had a positive pregnancy test, please check TSH.          Passed - No positive pregnancy test in past 12 months     If patient is pregnant or has had a positive pregnancy test, please check TSH.               Micheline Mas RN 11/21/22 10:51 AM  "

## 2022-11-22 ENCOUNTER — VIRTUAL VISIT (OUTPATIENT)
Dept: INTERNAL MEDICINE | Facility: CLINIC | Age: 87
End: 2022-11-22
Payer: MEDICARE

## 2022-11-22 DIAGNOSIS — Z92.29 PERSONAL HISTORY OF OTHER DRUG THERAPY: ICD-10-CM

## 2022-11-22 DIAGNOSIS — M81.0 OSTEOPOROSIS, SENILE: Primary | ICD-10-CM

## 2022-11-22 PROCEDURE — 99441 PR PHYSICIAN TELEPHONE EVALUATION 5-10 MIN: CPT | Mod: 95 | Performed by: INTERNAL MEDICINE

## 2022-11-22 NOTE — Clinical Note
I have reordered Prolia to start every 6 months.  I have placed order.  Please contact to schedule once we have insurance authorization.  Thank you so much.

## 2022-11-22 NOTE — PROGRESS NOTES
Nicolas is a 87 year old who is being evaluated via a billable telephone visit.      What phone number would you like to be contacted at? [phone]  How would you like to obtain your AVS? Mail a copy    1. Osteoporosis  She did well with Prolia.  Recent labs look fine.  No hypocalcemia.  Resume Prolia.  She will remain on this lifelong at this point.  Continue calcium and vitamin D as well.  I have counseled her on the risks involved with this including atypical femur fractures, jaw necrosis, infections, and hypocalcemia.  - denosumab (PROLIA) injection 60 mg    2.  She has been on bisphosphonates and will transition back to Prolia    Subjective   Nicolas is a 87 year old accompanied by her alone, presenting for the following health issues:  discuss bone density and Recheck Medication (Pt is following up to discuss bone density concern)      HPI     Pt is having follow up for bone density discussion.  Patient has a history of osteoporosis.  She was on Prolia and then got a dose of Reclast and now is on drug holiday.  She had bone density which showed decline in her bone density at the hips especially and with high fracture risk of over 3%.  She is fearful of fractures and would like to get back on the Prolia which she tolerated without difficulty    Review of Systems         Objective           Vitals:  No vitals were obtained today due to virtual visit.    Physical Exam     Phone call duration: 8 minutes

## 2022-12-13 ENCOUNTER — ANTICOAGULATION THERAPY VISIT (OUTPATIENT)
Dept: ANTICOAGULATION | Facility: CLINIC | Age: 87
End: 2022-12-13

## 2022-12-13 ENCOUNTER — ALLIED HEALTH/NURSE VISIT (OUTPATIENT)
Dept: FAMILY MEDICINE | Facility: CLINIC | Age: 87
End: 2022-12-13
Payer: MEDICARE

## 2022-12-13 ENCOUNTER — LAB (OUTPATIENT)
Dept: LAB | Facility: CLINIC | Age: 87
End: 2022-12-13
Payer: MEDICARE

## 2022-12-13 DIAGNOSIS — I48.19 PERSISTENT ATRIAL FIBRILLATION (H): Primary | ICD-10-CM

## 2022-12-13 DIAGNOSIS — M81.0 OSTEOPOROSIS, SENILE: Primary | ICD-10-CM

## 2022-12-13 DIAGNOSIS — I48.19 PERSISTENT ATRIAL FIBRILLATION (H): ICD-10-CM

## 2022-12-13 LAB — INR BLD: 4 (ref 0.9–1.1)

## 2022-12-13 PROCEDURE — 36416 COLLJ CAPILLARY BLOOD SPEC: CPT

## 2022-12-13 PROCEDURE — 85610 PROTHROMBIN TIME: CPT

## 2022-12-13 PROCEDURE — 96372 THER/PROPH/DIAG INJ SC/IM: CPT | Performed by: INTERNAL MEDICINE

## 2022-12-13 PROCEDURE — 99207 PR NO CHARGE NURSE ONLY: CPT

## 2022-12-13 NOTE — PROGRESS NOTES
"Prolia Injection Phone Screen      Screening questions have been asked 2-3 days prior to administration visit for Prolia. If any questions are answered with \"Yes,\" this phone encounter were will routed to ordering provider for further evaluation.     1.  When was the last injection?  Initial Injection     2.  Has insurance for this injection been verified?  Yes    3.  Did you experience any new onset achiness or rashes that lasted for over a month with your previous Prolia injection?   No    4.  Do you have a fever over 101?F or a new deep cough that is unusual for you today? No    5.  Have you started any new medications in the last 6 months that you were told could affect your immune system? These may have been prescribed by oncologist, transplant, rheumatology, or dermatology.   No    6.  In the last 6 months have you have gastric bypass or parathyroid surgery?   No    7.  Do you plan dental work requiring drilling into the bone such as implants/extractions or oral surgery in the next 2-3 months?   No    Patient informed if symptoms discussed above present prior to their administration appointment, they are to notify clinic immediately.     Patient was educated on safety of Prolia utilizing Patient Counseling Chart for Healthcare Providers, as outlined by the Prolia REMS progam.             "

## 2022-12-13 NOTE — PROGRESS NOTES
ANTICOAGULATION MANAGEMENT     Mariia Tinoco 87 year old female is on warfarin with supratherapeutic INR result. (Goal INR 2.0-3.0)    Recent labs: (last 7 days)     12/13/22  0925   INR 4.0*       ASSESSMENT       Source(s): Chart Review, Patient/Caregiver Call and Template       Warfarin doses taken: Warfarin taken as instructed    Diet: had poor appetite during the flu may be affecting diet and INR   Appetite has improved.    New illness, injury, or hospitalization: Yes:    Reported she had the Flu and just started to feel better 3 days ago.    Medication/supplement changes:  Yes.    Prolia injection today every 6 months.   Continues with Zolpidem 5mg 1/2 to 1 tab PRN for sleep.    Signs or symptoms of bleeding or clotting: No    Previous INR: Therapeutic last 3 visits    Additional findings: None       PLAN     Recommended plan for temporary change(s) affecting INR     Dosing Instructions:   - hold warfarin dose tonight,   - take partial warfarin dose of 1.5mg 12/14,   - then continue your current warfarin dose with next INR in 7-10 days.    Summary  As of 12/13/2022    Full warfarin instructions:  12/13: Hold; 12/14: 1.5 mg; Otherwise 4.5 mg every Mon, Thu; 3 mg all other days; Starting 12/13/2022   Next INR check:  12/23/2022             Telephone call with  Nicolas ([phone]) who verbalizes understanding and agrees to plan    Lab visit scheduled - INR on 12/27/22 @ [facility].   (Patient does not drive)    Education provided:     Taking warfarin: Importance of taking warfarin as instructed    Goal range and lab monitoring: goal range and significance of current result    Dietary considerations: importance of consistent vitamin K intake and impact of vitamin K foods on INR    Symptom monitoring: monitoring for bleeding signs and symptoms    Plan made per [facility] anticoagulation protocol    Cassandra Banuelos, RN  Anticoagulation  Clinic  12/13/2022    _______________________________________________________________________     Anticoagulation Episode Summary     Current INR goal:  2.0-3.0   TTR:  61.1 % (1 y)   Target end date:  Indefinite   Send INR reminders to:  [facility]    Indications    Persistent atrial fibrillation (H) [I48.19]           Comments:           Anticoagulation Care Providers     Provider Role Specialty Phone number    Eagle Shabazz MD Referring Internal Medicine [phone]    Mariano Munoz MD Referring Internal Medicine [phone]

## 2022-12-20 DIAGNOSIS — F51.04 CHRONIC INSOMNIA: ICD-10-CM

## 2022-12-21 RX ORDER — ZOLPIDEM TARTRATE 5 MG/1
TABLET ORAL
Qty: 30 TABLET | Refills: 0 | Status: SHIPPED | OUTPATIENT
Start: 2022-12-21 | End: 2023-01-23

## 2022-12-28 ENCOUNTER — TELEPHONE (OUTPATIENT)
Dept: ANTICOAGULATION | Facility: CLINIC | Age: 87
End: 2022-12-28

## 2022-12-28 NOTE — TELEPHONE ENCOUNTER
ANTICOAGULATION     Mariia Tinoco is overdue for INR check.      Spoke with Nicolas and scheduled lab appointment on 12/29/22    - no show on 12/27/22.    Cassandra Banuelos RN

## 2022-12-29 ENCOUNTER — LAB (OUTPATIENT)
Dept: LAB | Facility: CLINIC | Age: 87
End: 2022-12-29
Payer: MEDICARE

## 2022-12-29 ENCOUNTER — ANTICOAGULATION THERAPY VISIT (OUTPATIENT)
Dept: ANTICOAGULATION | Facility: CLINIC | Age: 87
End: 2022-12-29

## 2022-12-29 DIAGNOSIS — I48.19 PERSISTENT ATRIAL FIBRILLATION (H): ICD-10-CM

## 2022-12-29 DIAGNOSIS — I48.19 PERSISTENT ATRIAL FIBRILLATION (H): Primary | ICD-10-CM

## 2022-12-29 LAB — INR BLD: 3.5 (ref 0.9–1.1)

## 2022-12-29 PROCEDURE — 85610 PROTHROMBIN TIME: CPT

## 2022-12-29 PROCEDURE — 36416 COLLJ CAPILLARY BLOOD SPEC: CPT

## 2022-12-29 NOTE — PROGRESS NOTES
ANTICOAGULATION MANAGEMENT     Mariia Tinoco 87 year old female is on warfarin with supratherapeutic INR result. (Goal INR 2.0-3.0)    Recent labs: (last 7 days)     12/29/22  1155   INR 3.5*       ASSESSMENT       Source(s): Chart Review, Patient/Caregiver Call and Template       Warfarin doses taken: Held for 1 day  recently which may be affecting INR    Held warfarin on 12/13 and partial dose on 12/14, as instructed.    Diet: No new diet changes identified    New illness, injury, or hospitalization: No    Medication/supplement changes: None noted    Signs or symptoms of bleeding or clotting: No    Previous INR: Supratherapeutic at 4.0 on 12/13/22.    Additional findings: None       PLAN     Recommended plan for no diet, medication or health factor changes affecting INR     Dosing Instructions: hold dose then decrease your warfarin dose (6.2% change) with next INR in 2 weeks       Summary  As of 12/29/2022    Full warfarin instructions:  12/29: Hold; Otherwise 4.5 mg every Thu; 3 mg all other days   Next INR check:  1/12/2023             Telephone call with  Nicolas ([phone]) who verbalizes understanding and agrees to plan    Lab visit scheduled - INR on 1/19/23 @ [facility].   - does not drive and  brings her appts.    Education provided:     Taking warfarin: Importance of taking warfarin as instructed    Goal range and lab monitoring: goal range and significance of current result    Symptom monitoring: monitoring for bleeding signs and symptoms    Plan made per [facility] anticoagulation protocol    Cassandra Banuelos RN  Anticoagulation Clinic  12/29/2022    _______________________________________________________________________     Anticoagulation Episode Summary     Current INR goal:  2.0-3.0   TTR:  56.8 % (1 y)   Target end date:  Indefinite   Send INR reminders to:  [facility]    Indications    Persistent atrial fibrillation (H) [I48.19]           Comments:           Anticoagulation Care Providers      Provider Role Specialty Phone number    Eagle Shabazz MD Referring Internal Medicine [phone]    Mariano Munoz MD Referring Internal Medicine [phone]

## 2023-01-18 ENCOUNTER — DOCUMENTATION ONLY (OUTPATIENT)
Dept: INTERNAL MEDICINE | Facility: CLINIC | Age: 88
End: 2023-01-18
Payer: MEDICARE

## 2023-01-18 DIAGNOSIS — Z79.01 LONG TERM (CURRENT) USE OF ANTICOAGULANTS: ICD-10-CM

## 2023-01-18 DIAGNOSIS — I48.19 PERSISTENT ATRIAL FIBRILLATION (H): Primary | ICD-10-CM

## 2023-01-18 NOTE — PROGRESS NOTES
ANTICOAGULATION CLINIC REFERRAL RENEWAL REQUEST       An annual renewal order is required for all patients referred to [facility].?  Please review and sign the pended referral order for Mariia Tinoco.       ANTICOAGULATION SUMMARY      Warfarin indication(s)   Atrial Fibrillation, persistent    Mechanical heart valve present?  NO       Current goal range   INR: 2.0-3.0     Goal appropriate for indication? Goal INR 2-3, standard for indication(s) above     Time in Therapeutic Range (TTR)  (Goal > 60%) 56.8 %       Office visit with referring provider's group within last year Yes on 11/22/2022.       Cassandra Banuelos RN  [facility]

## 2023-01-19 ENCOUNTER — ANTICOAGULATION THERAPY VISIT (OUTPATIENT)
Dept: ANTICOAGULATION | Facility: CLINIC | Age: 88
End: 2023-01-19

## 2023-01-19 ENCOUNTER — LAB (OUTPATIENT)
Dept: LAB | Facility: CLINIC | Age: 88
End: 2023-01-19
Payer: MEDICARE

## 2023-01-19 DIAGNOSIS — Z79.01 LONG TERM (CURRENT) USE OF ANTICOAGULANTS: ICD-10-CM

## 2023-01-19 DIAGNOSIS — I48.19 PERSISTENT ATRIAL FIBRILLATION (H): Primary | ICD-10-CM

## 2023-01-19 DIAGNOSIS — I48.19 PERSISTENT ATRIAL FIBRILLATION (H): ICD-10-CM

## 2023-01-19 LAB — INR BLD: 1.8 (ref 0.9–1.1)

## 2023-01-19 PROCEDURE — 85610 PROTHROMBIN TIME: CPT

## 2023-01-19 PROCEDURE — 36416 COLLJ CAPILLARY BLOOD SPEC: CPT

## 2023-01-19 NOTE — PROGRESS NOTES
ANTICOAGULATION MANAGEMENT     Mariia PICKARD Tinoco 87 year old female is on warfarin with subtherapeutic INR result. (Goal INR 2.0-3.0)    Recent labs: (last 7 days)     01/19/23  1158   INR 1.8*       ASSESSMENT       Source(s): Chart Review, Patient/Caregiver Call and Template       Warfarin doses taken: Held for 1 day  recently which may be affecting INR    Held warfarin dose on 12/29, as instructed and wkly dose was decreased.    Diet: No new diet changes identified    New illness, injury, or hospitalization: No.   Reported feeling better now.    Medication/supplement changes: None noted    Signs or symptoms of bleeding or clotting: No    Previous INR: Supratherapeutic last 2 INR results  (3.5 / 4.0)    Additional findings: None       PLAN     Recommended plan for no diet, medication or health factor changes affecting INR     Dosing Instructions: Continue your current warfarin dose with next INR in 2 weeks       Summary  As of 1/19/2023    Full warfarin instructions:  4.5 mg every Thu; 3 mg all other days   Next INR check:  2/2/2023             Telephone call with  Nicolas ([phone]) who verbalizes understanding and agrees to plan    - no factors that affected INR.  Will continue same warfarin dose since she is due to take higher dose - 4.5mg;  Last 2 INR were supratherapeutic.    [facility]   - does not drive and  brings her to appts.    Education provided:     Taking warfarin: Importance of taking warfarin as instructed    Goal range and lab monitoring: goal range and significance of current result    Plan made per [facility] anticoagulation protocol    Cassandra Banuelos RN  Anticoagulation Clinic  1/19/2023    _______________________________________________________________________     Anticoagulation Episode Summary     Current INR goal:  2.0-3.0   TTR:  55.6 % (1 y)   Target end date:  Indefinite   Send INR reminders to:  [facility]    Indications    Persistent atrial  fibrillation (H) [I48.19]  Long term (current) use of anticoagulants [Z79.01]           Comments:           Anticoagulation Care Providers     Provider Role Specialty Phone number    Eagle Shabazz MD Referring Internal Medicine [phone]    Mariano Munoz MD Referring Internal Medicine [phone]

## 2023-01-22 DIAGNOSIS — F51.04 CHRONIC INSOMNIA: ICD-10-CM

## 2023-01-23 RX ORDER — ZOLPIDEM TARTRATE 5 MG/1
TABLET ORAL
Qty: 30 TABLET | Refills: 0 | Status: SHIPPED | OUTPATIENT
Start: 2023-01-23 | End: 2023-02-16

## 2023-01-30 DIAGNOSIS — E03.9 HYPOTHYROIDISM, UNSPECIFIED TYPE: ICD-10-CM

## 2023-01-30 DIAGNOSIS — I48.19 PERSISTENT ATRIAL FIBRILLATION (H): ICD-10-CM

## 2023-01-30 DIAGNOSIS — Z79.01 LONG TERM (CURRENT) USE OF ANTICOAGULANTS: ICD-10-CM

## 2023-01-30 RX ORDER — LEVOTHYROXINE SODIUM 75 UG/1
75 TABLET ORAL DAILY
Qty: 90 TABLET | Refills: 3 | Status: SHIPPED | OUTPATIENT
Start: 2023-01-30 | End: 2024-01-11

## 2023-01-30 RX ORDER — WARFARIN SODIUM 3 MG/1
TABLET ORAL
Qty: 115 TABLET | Refills: 3 | Status: SHIPPED | OUTPATIENT
Start: 2023-01-30 | End: 2024-02-02

## 2023-01-31 NOTE — TELEPHONE ENCOUNTER
"Routing refill request to provider for review/approval because:  Labs out of range:  tsh    Last Written Prescription Date:  11/21/22  Last Fill Quantity: 90,  # refills: 0   Last office visit provider:  11/22/22     [facility]    Requested Prescriptions   Pending Prescriptions Disp Refills     levothyroxine (SYNTHROID/LEVOTHROID) 75 MCG tablet 90 tablet 0     Sig: Take 1 tablet (75 mcg) by mouth daily       Thyroid Protocol Failed - 1/30/2023  8:58 AM        Failed - Normal TSH on file in past 12 months     Recent Labs   Lab Test 10/20/22  0905   TSH 5.24*              Passed - Patient is 12 years or older        Passed - Recent (12 mo) or future (30 days) visit within the authorizing provider's specialty     Patient has had an office visit with the authorizing provider or a provider within the authorizing providers department within the previous 12 mos or has a future within next 30 days. See \"Patient Info\" tab in inbasket, or \"Choose Columns\" in Meds & Orders section of the refill encounter.              Passed - Medication is active on med list        Passed - No active pregnancy on record     If patient is pregnant or has had a positive pregnancy test, please check TSH.          Passed - No positive pregnancy test in past 12 months     If patient is pregnant or has had a positive pregnancy test, please check TSH.             warfarin ANTICOAGULANT (JANTOVEN ANTICOAGULANT) 3 MG tablet 115 tablet 3     Sig: Take 1 tablet (3mg) to 1 &1/2 tablets (4.5mg) on Monday/Thursday by mouth as directed.  Adjust dose based on INR results.       Vitamin K Antagonists Failed - 1/30/2023  8:58 AM        Failed - INR is within goal in the past 6 weeks     Confirm INR is within goal in the past 6 weeks.     Recent Labs   Lab Test 01/19/23  1158   INR 1.8*                       Passed - Recent (12 mo) or future (30 days) visit within the authorizing provider's specialty     Patient has had an office visit with the authorizing " "provider or a provider within the authorizing providers department within the previous 12 mos or has a future within next 30 days. See \"Patient Info\" tab in inbasket, or \"Choose Columns\" in Meds & Orders section of the refill encounter.              Passed - Medication is active on med list        Passed - Patient is 18 years of age or older        Passed - Patient is not pregnant        Passed - No positive pregnancy on file in past 12 months             Micheline Mas RN 01/30/23 7:31 PM  "

## 2023-02-07 ENCOUNTER — ANTICOAGULATION THERAPY VISIT (OUTPATIENT)
Dept: ANTICOAGULATION | Facility: CLINIC | Age: 88
End: 2023-02-07

## 2023-02-07 ENCOUNTER — LAB (OUTPATIENT)
Dept: LAB | Facility: CLINIC | Age: 88
End: 2023-02-07
Payer: MEDICARE

## 2023-02-07 DIAGNOSIS — I48.19 PERSISTENT ATRIAL FIBRILLATION (H): ICD-10-CM

## 2023-02-07 DIAGNOSIS — Z79.01 LONG TERM (CURRENT) USE OF ANTICOAGULANTS: ICD-10-CM

## 2023-02-07 DIAGNOSIS — I48.19 PERSISTENT ATRIAL FIBRILLATION (H): Primary | ICD-10-CM

## 2023-02-07 LAB — INR BLD: 1.9 (ref 0.9–1.1)

## 2023-02-07 PROCEDURE — 85610 PROTHROMBIN TIME: CPT

## 2023-02-07 PROCEDURE — 36416 COLLJ CAPILLARY BLOOD SPEC: CPT

## 2023-02-07 NOTE — PROGRESS NOTES
ANTICOAGULATION MANAGEMENT     Mariia Tinoco 87 year old female is on warfarin with subtherapeutic INR result. (Goal INR 2.0-3.0)    Recent labs: (last 7 days)     02/07/23  1450   INR 1.9*       ASSESSMENT       Source(s): Chart Review and Patient/Caregiver Call       Warfarin doses taken: Warfarin taken as instructed    Diet: No new diet changes identified    New illness, injury, or hospitalization: No    Medication/supplement changes: None noted    Signs or symptoms of bleeding or clotting: No    Previous INR: Subtherapeutic    Additional findings: will keep track of tylenol use. maybe that is causing highs now and then       PLAN     Recommended plan for no diet, medication or health factor changes affecting INR     Dosing Instructions: Increase your warfarin dose (6.7% change) with next INR in 2 weeks       Summary  As of 2/7/2023    Full warfarin instructions:  4.5 mg every Tue, Thu; 3 mg all other days   Next INR check:  2/21/2023             Telephone call with Nicolas who verbalizes understanding and agrees to plan    Lab visit scheduled    Education provided:     Contact [phone] with any changes, questions or concerns.     Plan made per [facility] anticoagulation protocol    Aurelio Duarte RN  Anticoagulation Clinic  2/7/2023    _______________________________________________________________________     Anticoagulation Episode Summary     Current INR goal:  2.0-3.0   TTR:  55.5 % (1 y)   Target end date:  Indefinite   Send INR reminders to:  [facility]    Indications    Persistent atrial fibrillation (H) [I48.19]  Long term (current) use of anticoagulants [Z79.01]           Comments:           Anticoagulation Care Providers     Provider Role Specialty Phone number    Eagle Shabazz MD Referring Internal Medicine [phone]    Mariano Munoz MD Referring Internal Medicine [phone]

## 2023-02-16 DIAGNOSIS — F51.04 CHRONIC INSOMNIA: ICD-10-CM

## 2023-02-17 RX ORDER — ZOLPIDEM TARTRATE 5 MG/1
TABLET ORAL
Qty: 30 TABLET | Refills: 0 | Status: SHIPPED | OUTPATIENT
Start: 2023-02-17 | End: 2023-03-21

## 2023-02-28 ENCOUNTER — LAB (OUTPATIENT)
Dept: LAB | Facility: CLINIC | Age: 88
End: 2023-02-28
Payer: MEDICARE

## 2023-02-28 ENCOUNTER — ANTICOAGULATION THERAPY VISIT (OUTPATIENT)
Dept: ANTICOAGULATION | Facility: CLINIC | Age: 88
End: 2023-02-28

## 2023-02-28 DIAGNOSIS — Z79.01 LONG TERM (CURRENT) USE OF ANTICOAGULANTS: ICD-10-CM

## 2023-02-28 DIAGNOSIS — I48.19 PERSISTENT ATRIAL FIBRILLATION (H): ICD-10-CM

## 2023-02-28 DIAGNOSIS — I48.19 PERSISTENT ATRIAL FIBRILLATION (H): Primary | ICD-10-CM

## 2023-02-28 LAB — INR BLD: 2.4 (ref 0.9–1.1)

## 2023-02-28 PROCEDURE — 85610 PROTHROMBIN TIME: CPT

## 2023-02-28 PROCEDURE — 36416 COLLJ CAPILLARY BLOOD SPEC: CPT

## 2023-02-28 NOTE — PROGRESS NOTES
ANTICOAGULATION MANAGEMENT     Mariia Tinoco 87 year old female is on warfarin with therapeutic INR result. (Goal INR 2.0-3.0)    Recent labs: (last 7 days)     02/28/23  1331   INR 2.4*       ASSESSMENT       Source(s): Chart Review and Patient/Caregiver Call       Warfarin doses taken: Warfarin taken as instructed    Diet: No new diet changes identified    New illness, injury, or hospitalization: No    Medication/supplement changes: None noted    Signs or symptoms of bleeding or clotting: No    Previous INR: Subtherapeutic last 2 INR results.    Additional findings: None         PLAN     Recommended plan for no diet, medication or health factor changes affecting INR     Dosing Instructions: Continue your current warfarin dose with next INR in 2-3 weeks       Summary  As of 2/28/2023    Full warfarin instructions:  4.5 mg every Tue, Thu; 3 mg all other days   Next INR check:  3/21/2023             Telephone call with  Nicolas ([phone]) who verbalizes understanding and agrees to plan    Lab visit scheduled - INR on 3/28/23 @ [facility]   - patient does not drive and  drives her to appts.  (INR appt depends on his schedule).    Education provided:     Taking warfarin: Importance of taking warfarin as instructed    Goal range and lab monitoring: goal range and significance of current result    Dietary considerations: importance of consistent vitamin K intake    Plan made per [facility] anticoagulation protocol    Cassandra Banuelos RN  Anticoagulation Clinic  2/28/2023    _______________________________________________________________________     Anticoagulation Episode Summary     Current INR goal:  2.0-3.0   TTR:  56.0 % (1 y)   Target end date:  Indefinite   Send INR reminders to:  [facility]    Indications    Persistent atrial fibrillation (H) [I48.19]  Long term (current) use of anticoagulants [Z79.01]           Comments:           Anticoagulation Care Providers     Provider Role Specialty Phone number     Eagle Shabazz MD Referring Internal Medicine [phone]    Mariano Munoz MD Referring Internal Medicine [phone]

## 2023-03-13 ENCOUNTER — TELEPHONE (OUTPATIENT)
Dept: INTERNAL MEDICINE | Facility: CLINIC | Age: 88
End: 2023-03-13
Payer: MEDICARE

## 2023-03-13 NOTE — TELEPHONE ENCOUNTER
Order/Referral Request    Who is requesting: Patient    Orders being requested: Prolia    Reason service is needed/diagnosis: Per Provider patient was to go back on prolia injection    When are orders needed by: [facility]    Has this been discussed with Provider: Yes    Does patient have a preference on a Group/Provider/Facility? N/A    Does patient have an appointment scheduled?: No    Where to send orders: Place orders within Epic    Could we send this information to you in [facility] or would you prefer to receive a phone call?:   Patient would prefer a phone call   Okay to leave a detailed message?: Yes at Home number on file [phone] (home)

## 2023-03-14 NOTE — TELEPHONE ENCOUNTER
Spoke with patient and relayed message.   Patient ok with plan to cont Prolia and stated she will call and schedule her next prolia injection after 6/13/2023. Patient had her last prolia injection on 12/13/2022

## 2023-03-16 ENCOUNTER — TRANSFERRED RECORDS (OUTPATIENT)
Dept: HEALTH INFORMATION MANAGEMENT | Facility: CLINIC | Age: 88
End: 2023-03-16

## 2023-03-21 DIAGNOSIS — F51.04 CHRONIC INSOMNIA: ICD-10-CM

## 2023-03-21 RX ORDER — ZOLPIDEM TARTRATE 5 MG/1
TABLET ORAL
Qty: 30 TABLET | Refills: 0 | Status: SHIPPED | OUTPATIENT
Start: 2023-03-21 | End: 2023-12-28

## 2023-03-28 ENCOUNTER — ANTICOAGULATION THERAPY VISIT (OUTPATIENT)
Dept: ANTICOAGULATION | Facility: CLINIC | Age: 88
End: 2023-03-28

## 2023-03-28 ENCOUNTER — LAB (OUTPATIENT)
Dept: LAB | Facility: CLINIC | Age: 88
End: 2023-03-28
Payer: MEDICARE

## 2023-03-28 DIAGNOSIS — I48.19 PERSISTENT ATRIAL FIBRILLATION (H): ICD-10-CM

## 2023-03-28 DIAGNOSIS — Z79.01 LONG TERM (CURRENT) USE OF ANTICOAGULANTS: ICD-10-CM

## 2023-03-28 DIAGNOSIS — I48.19 PERSISTENT ATRIAL FIBRILLATION (H): Primary | ICD-10-CM

## 2023-03-28 LAB — INR BLD: 2.1 (ref 0.9–1.1)

## 2023-03-28 PROCEDURE — 85610 PROTHROMBIN TIME: CPT

## 2023-03-28 PROCEDURE — 36416 COLLJ CAPILLARY BLOOD SPEC: CPT

## 2023-03-28 NOTE — PROGRESS NOTES
ANTICOAGULATION MANAGEMENT     Mariia Tinoco 87 year old female is on warfarin with therapeutic INR result. (Goal INR 2.0-3.0)    Recent labs: (last 7 days)     03/28/23  1339   INR 2.1*       ASSESSMENT       Source(s): Chart Review and Patient/Caregiver Call       Warfarin doses taken: Warfarin taken as instructed    Diet: No new diet changes identified    New illness, injury, or hospitalization: No    Medication/supplement changes: None noted    Signs or symptoms of bleeding or clotting: No    Previous INR: Therapeutic last visit at 2.4; previously outside of goal range at 1.9    Additional findings: None         PLAN     Recommended plan for no diet, medication or health factor changes affecting INR     Dosing Instructions: Continue your current warfarin dose with next INR in 3 weeks       Summary  As of 3/28/2023    Full warfarin instructions:  4.5 mg every Tue, Thu; 3 mg all other days   Next INR check:  4/18/2023             Telephone call with  Nicolas ([phone]) who verbalizes understanding and agrees to plan    Lab visit scheduled - INR on 4/25/23 @ [facility]    Education provided:     Taking warfarin: Importance of taking warfarin as instructed    Goal range and lab monitoring: goal range and significance of current result    Plan made per [facility] anticoagulation protocol    Cassandra Banuelos, RN  Anticoagulation Clinic  3/28/2023    _______________________________________________________________________     Anticoagulation Episode Summary     Current INR goal:  2.0-3.0   TTR:  56.0 % (1 y)   Target end date:  Indefinite   Send INR reminders to:  [facility]    Indications    Persistent atrial fibrillation (H) [I48.19]  Long term (current) use of anticoagulants [Z79.01]           Comments:           Anticoagulation Care Providers     Provider Role Specialty Phone number    Eagle Shabazz MD Referring Internal Medicine [phone]    Mariano Munoz MD Referring Internal Medicine [phone]

## 2023-04-18 DIAGNOSIS — F51.04 CHRONIC INSOMNIA: Primary | ICD-10-CM

## 2023-04-18 RX ORDER — ZOLPIDEM TARTRATE 5 MG/1
TABLET ORAL
Qty: 30 TABLET | Refills: 0 | Status: SHIPPED | OUTPATIENT
Start: 2023-04-18 | End: 2023-05-18

## 2023-04-24 ENCOUNTER — NURSE TRIAGE (OUTPATIENT)
Dept: NURSING | Facility: CLINIC | Age: 88
End: 2023-04-24

## 2023-04-24 ENCOUNTER — LAB (OUTPATIENT)
Dept: LAB | Facility: CLINIC | Age: 88
End: 2023-04-24
Payer: MEDICARE

## 2023-04-24 ENCOUNTER — ANTICOAGULATION THERAPY VISIT (OUTPATIENT)
Dept: ANTICOAGULATION | Facility: CLINIC | Age: 88
End: 2023-04-24

## 2023-04-24 DIAGNOSIS — I48.19 PERSISTENT ATRIAL FIBRILLATION (H): Primary | ICD-10-CM

## 2023-04-24 DIAGNOSIS — Z79.01 LONG TERM (CURRENT) USE OF ANTICOAGULANTS: ICD-10-CM

## 2023-04-24 DIAGNOSIS — I48.19 PERSISTENT ATRIAL FIBRILLATION (H): ICD-10-CM

## 2023-04-24 LAB
INR BLD: >8 (ref 0.9–1.1)
INR PPP: >10 (ref 0.85–1.15)

## 2023-04-24 PROCEDURE — 36416 COLLJ CAPILLARY BLOOD SPEC: CPT

## 2023-04-24 PROCEDURE — 85610 PROTHROMBIN TIME: CPT

## 2023-04-24 RX ORDER — PHYTONADIONE 5 MG/1
5 TABLET ORAL ONCE
Qty: 1 TABLET | Refills: 0 | Status: SHIPPED | OUTPATIENT
Start: 2023-04-24 | End: 2023-04-24

## 2023-04-24 NOTE — TELEPHONE ENCOUNTER
Nurse Triage SBAR    Situation: Critical result    Background: Christi from [facility] is calling with Critical labs.     Assessment:   Lab: INR - >10  Collected at: 4:48pm  Ordering Provider: Dr Shabazz    Recommendation: Paging on call Dr Robert Atwood. Vitamin K 5mg to her pharmacy. Recheck INR tomorrow.  Monitor for bleeding - if she does then ED. Hold warfarin.     Provider Recommendation Follow Up:   Reached patient/caregiver. Informed of provider's recommendations. Patient verbalized understanding and agrees with the plan.     Ting Gandara RN Nursing Advisor 4/24/2023 6:34 PM      Reason for Disposition    Lab or radiology calling with CRITICAL test results    Additional Information    Negative: Call about patient who is currently hospitalized    Negative: Doctor (or NP/PA) call to PCP    Negative: ED call to PCP (i.e., primary care provider; doctor, NP, or PA)    Negative: Lab calling with strep throat test results and triager can call in prescription    Negative: Lab calling with urinalysis test results and triager can call in prescription    Negative: Medication questions    Negative: Medication renewal and refill questions    Negative: Pre-operative or pre-procedural questions    Protocols used: PCP CALL - NO TRIAGE-A-

## 2023-04-24 NOTE — PROGRESS NOTES
Received call regarding patient INR over 10.0.  She does not report any bleeding.  We will have her hold warfarin, give her 5 mg of vitamin K and have her recheck her INR in the morning

## 2023-04-24 NOTE — PROGRESS NOTES
ANTICOAGULATION MANAGEMENT     Mariia PICKARD Tinoco 87 year old female is on warfarin with supratherapeutic INR result. (Goal INR 2.0-3.0)    Recent labs: (last 7 days)     04/24/23  1648   INR >10.00*       ASSESSMENT     Warfarin Lab Questionnaire    Warfarin Doses Last 7 Days      4/24/2023     3:52 PM   Dose in Tablet or Mg   TAB or MG? tablet (tab)     Pt Rptd Dose SUNDAY MONDAY TUESDAY WED THURS FRIDAY SATURDAY 4/24/2023   3:52 PM 3 3 4.5 3 4.5 3 3         4/24/2023   Warfarin Lab Questionnaire   Missed doses within past 14 days? No   Changes in diet or alcohol within past 14 days? No   Medication changes since last result? No   Injuries or illness since last result? No   New shortness of breath, severe headaches or sudden changes in vision since last result? No   Abnormal bleeding since last result? No   Upcoming surgery, procedure? Yes   Please explain, date scheduled? tongue surgery April 25,2023        Previous result: Therapeutic last 2(+) visits  Additional findings: Upcoming surgery/procedure tongue biopsy. has swollen and intermittantly oozing lesion on her tongue. unable to eat solids currently.  Venous result was sent to on-call MD last night and Nicolas was able to  rx for vit k 5 mg per daughter Janny. Janny reports she did take the vit k and has follow up inr today  Confirmed with Nicolas vit k was available at [facility]. inr is scheduled at 2pm       PLAN     Recommended plan for ongoing change(s) affecting INR     Dosing Instructions: no warfarin until told to restart with next INR in 1 day   Nicolas did take vit k 5 mg (rx through Dr Velez last night)    Summary  As of 4/24/2023    Full warfarin instructions:  4/24: Hold; Otherwise 4.5 mg every Tue, Thu; 3 mg all other days   Next INR check:  4/26/2023             Telephone call with Nicolas who verbalizes understanding and agrees to plan    Lab visit scheduled    Education provided:     Contact [phone] with any changes, questions  "or concerns.     Plan made per [facility] anticoagulation protocol    Aurelio Duarte RN  Anticoagulation Clinic  4/25/2023    _______________________________________________________________________     Anticoagulation Episode Summary     Current INR goal:  2.0-3.0   TTR:  52.4 % (11.2 mo)   Target end date:  Indefinite   Send INR reminders to:  [facility]    Indications    Persistent atrial fibrillation (H) [I48.19]  Long term (current) use of anticoagulants [Z79.01]           Comments:           Anticoagulation Care Providers     Provider Role Specialty Phone number    Eagle Shabazz MD Referring Internal Medicine [phone]    Mariano Munoz MD Referring Internal Medicine [phone]        ANTICOAGULATION  MANAGEMENT    Mariia Tinoco is on warfarin and had a point of care INR result > 5.5 or an \"error message\" on point of care meter today.    Afternoon INR; STAT venous INR processing and STAT  requested from simone Valenzuela via phone while at the lab and reviewed triage questions for potential signs and symptoms of bleeding.      Patient Response     Have you had any bleeding in the last week?   She has a lesion on her tongue which oozes blood intermittently and is swollen to the extent she can't eat solids. Needs biopsy asap she said.    Have you passed any red, black, or tarry stools in last week?   NO   Have you vomited/spit up any red or coffee ground material in last week?   NO   Have you had any new, severe abdominal pain or bloating develop in last week?   NO   Have you fallen or had any injuries in last week?   NO   Do you currently have a severe, sudden onset headache?   NO   Do you currently have any severe sudden changes in your vision?   NO   Do you currently have any new onset numbness, weakness/paralysis?   NO     Assessment/Plan:     Mariia's responses were negative for signs and symptoms of bleeding; may discharge from clinic    Mariia instructed to:       Hold warfarin today " until venous INR result returns and receives follow up call from [facility]    Seek medical attention for new signs and symptoms of bleeding or a fall with injury.

## 2023-04-25 ENCOUNTER — LAB (OUTPATIENT)
Dept: LAB | Facility: CLINIC | Age: 88
End: 2023-04-25
Payer: MEDICARE

## 2023-04-25 ENCOUNTER — ANTICOAGULATION THERAPY VISIT (OUTPATIENT)
Dept: ANTICOAGULATION | Facility: CLINIC | Age: 88
End: 2023-04-25

## 2023-04-25 ENCOUNTER — TELEPHONE (OUTPATIENT)
Dept: INTERNAL MEDICINE | Facility: CLINIC | Age: 88
End: 2023-04-25

## 2023-04-25 DIAGNOSIS — I48.19 PERSISTENT ATRIAL FIBRILLATION (H): Primary | ICD-10-CM

## 2023-04-25 DIAGNOSIS — Z79.01 LONG TERM (CURRENT) USE OF ANTICOAGULANTS: ICD-10-CM

## 2023-04-25 DIAGNOSIS — I48.19 PERSISTENT ATRIAL FIBRILLATION (H): ICD-10-CM

## 2023-04-25 LAB — INR BLD: 2.7 (ref 0.9–1.1)

## 2023-04-25 PROCEDURE — 85610 PROTHROMBIN TIME: CPT

## 2023-04-25 PROCEDURE — 36416 COLLJ CAPILLARY BLOOD SPEC: CPT

## 2023-04-25 NOTE — TELEPHONE ENCOUNTER
Patient Returning Call    Reason for call:  Patient is calling to see if she should be taking warfarin or not     Information relayed to patient:  I will send this to the INR team and ask for a callback     Patient has additional questions:  Yes    What are your questions/concerns:  Patient would like a call back before one to see if she needs an INR done today     Could we send this information to you in [facility] or would you prefer to receive a phone call?:   Patient would prefer a phone call   Okay to leave a detailed message?: Yes at Home number on file [phone] (home)

## 2023-04-25 NOTE — TELEPHONE ENCOUNTER
Talked with Nicolas who told me she did take her vit k 5 mg last night which they picked up at [facility].    We reviewed that since we are reversing her inr she will not take any warfarin until told to restart. She expresses understanding, inr is scheduled for 2pm today

## 2023-04-25 NOTE — PROGRESS NOTES
ANTICOAGULATION MANAGEMENT     Mariia PICKARD Tinoco 87 year old female is on warfarin with therapeutic INR result. (Goal INR 2.0-3.0)    Recent labs: (last 7 days)     04/25/23  1401   INR 2.7*       ASSESSMENT     Warfarin Lab Questionnaire    Warfarin Doses Last 7 Days      4/25/2023     1:54 PM   Dose in Tablet or Mg   TAB or MG? milligram (mg)     Pt Rptd Dose SUNDAY MONDAY TUESDAY WED THURS FRIDAY SATURDAY 4/24/2023   3:52 PM 3 3 4.5 3 4.5 3 3         4/24/2023   Warfarin Lab Questionnaire   Missed doses within past 14 days? No   Changes in diet or alcohol within past 14 days? No   Medication changes since last result? No   Injuries or illness since last result? No   New shortness of breath, severe headaches or sudden changes in vision since last result? No   Abnormal bleeding since last result? No   Upcoming surgery, procedure? Yes   Please explain, date scheduled? tongue surgery April 25,2023        Previous result: Supratherapeutic  Additional findings: Upcoming surgery/procedure Nicolas reports that she will have biopsy of the tongue tomorrow with Dr Del Cid who told her inr of 2.7 is acceptable for the procedure.  She will continue to hold warfarin, she is unable to eat solids but will have scrambled egg for dinner and ensure she said.  Left an additional message for nicolas after we talked. She should e having an inr tomorrow and we will want to know the result as well as her post op instructions with regard to diet etc so we can best plan next steps.     PLAN     Recommended plan for ongoing change(s) affecting INR     Dosing Instructions: no warfarin until instructed to resume. we will want inr results done tomorrow at biopsy and discharge recommendations for planning next steps. with next INR in 1 day       Summary  As of 4/25/2023    Full warfarin instructions:  4/25: Hold; 4/26: Hold; Otherwise 4.5 mg every Tue, Thu; 3 mg all other days   Next INR check:  4/26/2023             Telephone call with Nicolas who  verbalizes understanding and agrees to plan    Contact [phone] to schedule and with any changes, questions or concerns.     Education provided:     Please call back if any changes to your diet, medications or how you've been taking warfarin    pls call with inr result at biopsy    Plan made with [facility] Pharmacist Dorene Duarte, RN  Anticoagulation Clinic  4/25/2023    _______________________________________________________________________     Anticoagulation Episode Summary     Current INR goal:  2.0-3.0   TTR:  56.0 % (1 y)   Target end date:  Indefinite   Send INR reminders to:  [facility]    Indications    Persistent atrial fibrillation (H) [I48.19]  Long term (current) use of anticoagulants [Z79.01]           Comments:           Anticoagulation Care Providers     Provider Role Specialty Phone number    Eagle Shabazz MD Referring Internal Medicine [phone]    Mariano Munoz MD Referring Internal Medicine [phone]

## 2023-04-26 ENCOUNTER — TELEPHONE (OUTPATIENT)
Dept: ANTICOAGULATION | Facility: CLINIC | Age: 88
End: 2023-04-26
Payer: MEDICARE

## 2023-04-26 DIAGNOSIS — Z79.01 LONG TERM (CURRENT) USE OF ANTICOAGULANTS: ICD-10-CM

## 2023-04-26 DIAGNOSIS — I48.19 PERSISTENT ATRIAL FIBRILLATION (H): Primary | ICD-10-CM

## 2023-04-26 NOTE — TELEPHONE ENCOUNTER
Talked with Nicolas who did not have a tongue biopsy today.  She reports that her tongue improved so much overnight that her surgeon advised a watchful waiting approach.   We will still want to do an inr tomorrow before resuming warfarin which we scheduled.

## 2023-04-28 ENCOUNTER — TELEPHONE (OUTPATIENT)
Dept: INTERNAL MEDICINE | Facility: CLINIC | Age: 88
End: 2023-04-28

## 2023-04-28 ENCOUNTER — LAB (OUTPATIENT)
Dept: LAB | Facility: CLINIC | Age: 88
End: 2023-04-28
Payer: MEDICARE

## 2023-04-28 ENCOUNTER — ANTICOAGULATION THERAPY VISIT (OUTPATIENT)
Dept: ANTICOAGULATION | Facility: CLINIC | Age: 88
End: 2023-04-28

## 2023-04-28 DIAGNOSIS — I48.19 PERSISTENT ATRIAL FIBRILLATION (H): ICD-10-CM

## 2023-04-28 DIAGNOSIS — I48.19 PERSISTENT ATRIAL FIBRILLATION (H): Primary | ICD-10-CM

## 2023-04-28 DIAGNOSIS — Z79.01 LONG TERM (CURRENT) USE OF ANTICOAGULANTS: ICD-10-CM

## 2023-04-28 LAB — INR BLD: 2.4 (ref 0.9–1.1)

## 2023-04-28 PROCEDURE — 85610 PROTHROMBIN TIME: CPT

## 2023-04-28 PROCEDURE — 36416 COLLJ CAPILLARY BLOOD SPEC: CPT

## 2023-04-28 NOTE — PROGRESS NOTES
ANTICOAGULATION MANAGEMENT     Mariia Tinoco 87 year old female is on warfarin with therapeutic INR result. (Goal INR 2.0-3.0)    Recent labs: (last 7 days)     04/28/23  0955   INR 2.4*       ASSESSMENT     Warfarin Lab Questionnaire    Warfarin Doses Last 7 Days      4/28/2023    10:03 AM   Dose in Tablet or Mg   TAB or MG? milligram (mg)     Pt Rptd Dose SUNDAY MONDAY TUESDAY WED THURS FRIDAY SATURDAY 4/28/2023  10:03 AM 3 3 4.5 3 4.5 3 3         4/28/2023   Warfarin Lab Questionnaire   Missed doses within past 14 days? Yes   If yes; please list when: Have not taken for the past five days   Changes in diet or alcohol within past 14 days? No   Medication changes since last result? No   Injuries or illness since last result? No   New shortness of breath, severe headaches or sudden changes in vision since last result? No   Abnormal bleeding since last result? No   Upcoming surgery, procedure? No   Best number to call with results? [phone]        Previous result: Therapeutic last visit; previously outside of goal range  Additional findings: tongue swelling has gone down, she is eating soft solids.       PLAN     Recommended plan for ongoing change(s) affecting INR     Dosing Instructions: decrease your warfarin dose (18% change) with next INR in 3 days       Summary  As of 4/28/2023    Full warfarin instructions:  4/28: 1.5 mg; 4/29: 1.5 mg; 4/30: 1.5 mg; Otherwise 4.5 mg every Tue, Thu; 3 mg all other days   Next INR check:  5/1/2023             Telephone call with Nicolas who verbalizes understanding and agrees to plan    Lab visit scheduled    Education provided:     Contact [phone] with any changes, questions or concerns.     Plan made with [facility] Pharmacist Dorene Duarte, PAYAM  Anticoagulation Clinic  4/28/2023    _______________________________________________________________________     Anticoagulation Episode Summary     Current INR goal:  2.0-3.0   TTR:  56.0 % (1 y)   Target end date:   Indefinite   Send INR reminders to:  [facility]    Indications    Persistent atrial fibrillation (H) [I48.19]  Long term (current) use of anticoagulants [Z79.01]           Comments:           Anticoagulation Care Providers     Provider Role Specialty Phone number    Eagle Shabazz MD Referring Internal Medicine [phone]    Mariano Munoz MD Referring Internal Medicine [phone]

## 2023-04-28 NOTE — TELEPHONE ENCOUNTER
Reason for call:  Other   Patient called regarding (reason for call): call back  Additional comments:   URGENT Anticoag  Please reach out to patient    Phone number to reach patient:  Home number on file [phone] (home) or Other phone number: [phone] Mobile after 930 am use Mobile only (spouse is in hospital)    Best Time:  Only use home number before 930 am. After 930 am ONLY use [phone]    Can we leave a detailed message on this number?  YES    Travel screening: Not Applicable

## 2023-05-01 ENCOUNTER — ANTICOAGULATION THERAPY VISIT (OUTPATIENT)
Dept: ANTICOAGULATION | Facility: CLINIC | Age: 88
End: 2023-05-01

## 2023-05-01 ENCOUNTER — LAB (OUTPATIENT)
Dept: LAB | Facility: CLINIC | Age: 88
End: 2023-05-01
Payer: MEDICARE

## 2023-05-01 DIAGNOSIS — I48.19 PERSISTENT ATRIAL FIBRILLATION (H): Primary | ICD-10-CM

## 2023-05-01 DIAGNOSIS — Z79.01 LONG TERM (CURRENT) USE OF ANTICOAGULANTS: ICD-10-CM

## 2023-05-01 DIAGNOSIS — I48.19 PERSISTENT ATRIAL FIBRILLATION (H): ICD-10-CM

## 2023-05-01 LAB — INR BLD: 3.1 (ref 0.9–1.1)

## 2023-05-01 PROCEDURE — 36416 COLLJ CAPILLARY BLOOD SPEC: CPT

## 2023-05-01 PROCEDURE — 85610 PROTHROMBIN TIME: CPT

## 2023-05-01 NOTE — PROGRESS NOTES
ANTICOAGULATION MANAGEMENT     Mariia Tinoco 87 year old female is on warfarin with supratherapeutic INR result. (Goal INR 2.0-3.0)    Recent labs: (last 7 days)     05/01/23  0955   INR 3.1*       ASSESSMENT     Warfarin Lab Questionnaire    Warfarin Doses Last 7 Days      5/1/2023     9:45 AM   Dose in Tablet or Mg   TAB or MG? - warfarin 3mg tablets milligram (mg)     Pt Rptd Dose SUNDAY FRIDAY SATURDAY 5/1/2023   9:45 AM 1.5 1.5 1.5         5/1/2023   Warfarin Lab Questionnaire   Missed doses within past 14 days? No   Changes in diet or alcohol within past 14 days? No - ASSESS EATING? STILL FOLLOWING SOFT DIET?   Medication changes since last result? No - NEED TO ORDER NEW WARFARIN 1MG TABS.   Injuries or illness since last result? No   New shortness of breath, severe headaches or sudden changes in vision since last result? No   Abnormal bleeding since last result? No   Upcoming surgery, procedure? No   Best number to call with results? [phone]        Previous result: Therapeutic last 2 visits.   (on 4/24/23 INR was greater then 10.0 / 8.0  Was given oral Vitamin-K 5mg to reverse supra INR.  She had been unable to eat solid foods, d/t oozing lesion and swollen tongue. Warfarin was held from 4/24-27.)    Additional findings:  Tongue biopsy of lesion was cancelled on 4/25/23, since swelling improved.  It was recommended per surgeon and advised watchful waiting for next approach.       PLAN     Unable to reach Nicolas today, after discussing dose with Dorene Oconnell, [facility].    Left message to hold warfarin tonight. Request call back for assessment.    Follow up required to assess for changes     Cassandra Banuelos RN  Anticoagulation Clinic  5/1/2023

## 2023-05-02 ENCOUNTER — TELEPHONE (OUTPATIENT)
Dept: INTERNAL MEDICINE | Facility: CLINIC | Age: 88
End: 2023-05-02
Payer: MEDICARE

## 2023-05-02 DIAGNOSIS — I48.19 PERSISTENT ATRIAL FIBRILLATION (H): Primary | ICD-10-CM

## 2023-05-02 DIAGNOSIS — Z79.01 LONG TERM (CURRENT) USE OF ANTICOAGULANTS: ICD-10-CM

## 2023-05-02 RX ORDER — WARFARIN SODIUM 1 MG/1
TABLET ORAL
Qty: 90 TABLET | Refills: 1 | Status: SHIPPED | OUTPATIENT
Start: 2023-05-02 | End: 2023-06-13

## 2023-05-02 NOTE — TELEPHONE ENCOUNTER
Reason for Call:  Other call back    Detailed comments: Patient return call, patient is at the hospital with spouse. Please call patient back on cell [phone] for dosing instruction     Phone Number Patient can be reached at: Other phone number:  [phone]    Best Time: n/a    Can we leave a detailed message on this number? Not Applicable    Call taken on 5/2/2023 at 8:59 AM by Johana Josue

## 2023-05-02 NOTE — PROGRESS NOTES
"ANTICOAGULATION MANAGEMENT     Mariia Tinoco 87 year old female is on warfarin with supratherapeutic INR result. (Goal INR 2.0-3.0)    Recent labs: (last 7 days)     05/01/23  0955   INR 3.1*       ASSESSMENT     Warfarin Lab Questionnaire    Warfarin Doses Last 7 Days      5/1/2023     9:45 AM   Dose in Tablet or Mg   TAB or MG? - warfarin 3mg tablets milligram (mg)     Pt Rptd Dose SUNDAY FRIDAY SATURDAY 5/1/2023   9:45 AM 1.5 1.5 1.5         5/1/2023   Warfarin Lab Questionnaire   Missed doses within past 14 days? No - HELD warfarin dose last night, as instructed.   Changes in diet or alcohol within past 14 days? No - Reported she is now able to eat solid foods   Medication changes since last result? No - Ordered new warfarin 1mg tablets.   Injuries or illness since last result? No - Feeling \"very stressed out\" with  currently hospitalized and very sick.   New shortness of breath, severe headaches or sudden changes in vision since last result? No   Abnormal bleeding since last result? No   Upcoming surgery, procedure? No   Best number to call with results? [phone]      Previous result: Therapeutic last 2 visits.   (on 4/24/23 INR was greater then 10.0 / 8.0  Was given oral Vitamin-K 5mg to reverse supra INR.  She had been unable to eat solid foods, d/t oozing lesion and swollen tongue. Warfarin was held from 4/24-27.)    Additional findings:  Tongue biopsy of lesion was cancelled on 4/25/23, since swelling improved.  It was recommended per surgeon and advised watchful waiting for next approach.       PLAN     Recommended plan for temporary change(s) affecting INR     Dosing Instructions: hold dose then decrease your warfarin dose (64.1% change) with next INR in 5 days       Summary  As of 5/1/2023    Full warfarin instructions:  5/1: Hold; Otherwise 1 mg every day   Next INR check:  5/4/2023             Telephone call with  Nicolas ([phone]) who verbalizes understanding and agrees to plan    - " ordered warfarin 1mg tablets and sent to [facility] on [address].    Lab visit scheduled - INR on 5/8/23 @ [facility].    Education provided:     Taking warfarin: Importance of taking warfarin as instructed    Goal range and lab monitoring: goal range and significance of current result    Dietary considerations: importance of consistent vitamin K intake    Symptom monitoring: monitoring for bleeding signs and symptoms    Plan made with [facility] Pharmacist Dorene Banuelos, RN  Anticoagulation Clinic  5/2/2023    _______________________________________________________________________     Anticoagulation Episode Summary     Current INR goal:  2.0-3.0   TTR:  55.8 % (1 y)   Target end date:  Indefinite   Send INR reminders to:  [facility]    Indications    Persistent atrial fibrillation (H) [I48.19]  Long term (current) use of anticoagulants [Z79.01]           Comments:           Anticoagulation Care Providers     Provider Role Specialty Phone number    Eagle Shabazz MD Referring Internal Medicine [phone]    Mariano Munoz MD Referring Internal Medicine [phone]

## 2023-05-09 ENCOUNTER — LAB (OUTPATIENT)
Dept: LAB | Facility: CLINIC | Age: 88
End: 2023-05-09
Payer: MEDICARE

## 2023-05-09 ENCOUNTER — ANTICOAGULATION THERAPY VISIT (OUTPATIENT)
Dept: ANTICOAGULATION | Facility: CLINIC | Age: 88
End: 2023-05-09

## 2023-05-09 DIAGNOSIS — I48.19 PERSISTENT ATRIAL FIBRILLATION (H): Primary | ICD-10-CM

## 2023-05-09 DIAGNOSIS — Z79.01 LONG TERM (CURRENT) USE OF ANTICOAGULANTS: ICD-10-CM

## 2023-05-09 DIAGNOSIS — I48.19 PERSISTENT ATRIAL FIBRILLATION (H): ICD-10-CM

## 2023-05-09 LAB — INR BLD: 1.9 (ref 0.9–1.1)

## 2023-05-09 PROCEDURE — 85610 PROTHROMBIN TIME: CPT

## 2023-05-09 PROCEDURE — 36415 COLL VENOUS BLD VENIPUNCTURE: CPT

## 2023-05-09 NOTE — PROGRESS NOTES
ANTICOAGULATION MANAGEMENT     Mariia Tinoco 87 year old female is on warfarin with subtherapeutic INR result. (Goal INR 2.0-3.0)    Recent labs: (last 7 days)     05/09/23  1546   INR 1.9*       ASSESSMENT       Source(s): Chart Review and Patient/Caregiver Call       Warfarin doses taken: Warfarin taken as instructed    Diet: No new diet changes identified    Medication/supplement changes: None noted    New illness, injury, or hospitalization: No    Signs or symptoms of bleeding or clotting: No    Previous result: Supratherapeutic    Additional findings: None         PLAN     Recommended plan for no diet, medication or health factor changes affecting INR     Dosing Instructions: booster dose then continue your current warfarin dose with next INR in 1 week       Summary  As of 5/9/2023    Full warfarin instructions:  5/9: 1.5 mg; Otherwise 1 mg every day   Next INR check:  5/16/2023             Telephone call with Janny who verbalizes understanding and agrees to plan    Contact [phone] to schedule and with any changes, questions or concerns.     Education provided:     Please call back if any changes to your diet, medications or how you've been taking warfarin    Plan made per [facility] anticoagulation protocol    Aurelio Duarte RN  Anticoagulation Clinic  5/9/2023    _______________________________________________________________________     Anticoagulation Episode Summary     Current INR goal:  2.0-3.0   TTR:  55.6 % (1 y)   Target end date:  Indefinite   Send INR reminders to:  [facility]    Indications    Persistent atrial fibrillation (H) [I48.19]  Long term (current) use of anticoagulants [Z79.01]           Comments:           Anticoagulation Care Providers     Provider Role Specialty Phone number    Eagle Shabazz MD Referring Internal Medicine [phone]    Mariano Munoz MD Referring Internal Medicine [phone]

## 2023-05-16 ENCOUNTER — TELEPHONE (OUTPATIENT)
Dept: INTERNAL MEDICINE | Facility: CLINIC | Age: 88
End: 2023-05-16
Payer: MEDICARE

## 2023-05-16 NOTE — TELEPHONE ENCOUNTER
Called and spoke with Nicolas,     - she needed INR appt - scheduled on Thurs. 5/18/23.     - she will call back, if she has transportation problems.

## 2023-05-16 NOTE — TELEPHONE ENCOUNTER
General Call    Contacts       Type Contact Phone/Fax    05/16/2023 09:46 AM CDT Phone (Incoming) Nicolas Tinoco MELLY (Self) [phone] (H)        Reason for Call:  Patient is wanting a return call in regards to next route of care/per patient INR's are always different. Please return call either Jamil or Mela    What are your questions or concerns:  Return call for next route of care    Date of last appointment with provider: 5/9/23    Could we send this information to you in [employer] [place] [place] or would you prefer to receive a phone call?:   Patient would prefer a phone call   Okay to leave a detailed message?: Yes at Home number on file [phone] (home)

## 2023-05-17 ENCOUNTER — TRANSFERRED RECORDS (OUTPATIENT)
Dept: HEALTH INFORMATION MANAGEMENT | Facility: CLINIC | Age: 88
End: 2023-05-17
Payer: MEDICARE

## 2023-05-18 ENCOUNTER — LAB (OUTPATIENT)
Dept: LAB | Facility: CLINIC | Age: 88
End: 2023-05-18
Payer: MEDICARE

## 2023-05-18 ENCOUNTER — ANTICOAGULATION THERAPY VISIT (OUTPATIENT)
Dept: ANTICOAGULATION | Facility: CLINIC | Age: 88
End: 2023-05-18

## 2023-05-18 DIAGNOSIS — I48.19 PERSISTENT ATRIAL FIBRILLATION (H): ICD-10-CM

## 2023-05-18 DIAGNOSIS — Z79.01 LONG TERM (CURRENT) USE OF ANTICOAGULANTS: ICD-10-CM

## 2023-05-18 DIAGNOSIS — F51.04 CHRONIC INSOMNIA: ICD-10-CM

## 2023-05-18 DIAGNOSIS — I48.19 PERSISTENT ATRIAL FIBRILLATION (H): Primary | ICD-10-CM

## 2023-05-18 LAB — INR BLD: 1.2 (ref 0.9–1.1)

## 2023-05-18 PROCEDURE — 36415 COLL VENOUS BLD VENIPUNCTURE: CPT

## 2023-05-18 PROCEDURE — 85610 PROTHROMBIN TIME: CPT

## 2023-05-18 RX ORDER — ZOLPIDEM TARTRATE 5 MG/1
TABLET ORAL
Qty: 30 TABLET | Refills: 0 | OUTPATIENT
Start: 2023-05-18

## 2023-05-18 RX ORDER — ZOLPIDEM TARTRATE 5 MG/1
TABLET ORAL
Qty: 30 TABLET | Refills: 0 | Status: SHIPPED | OUTPATIENT
Start: 2023-05-18 | End: 2023-06-13

## 2023-05-18 NOTE — PROGRESS NOTES
ANTICOAGULATION MANAGEMENT     Mariia PICKARD Tinoco 88 year old female is on warfarin with subtherapeutic INR result. (Goal INR 2.0-3.0)    Recent labs: (last 7 days)     05/18/23  1252   INR 1.2*       ASSESSMENT     Warfarin Lab Questionnaire    Warfarin Doses Last 7 Days      5/18/2023    12:51 PM   Dose in Tablet or Mg   TAB or MG? - warfarin 1mg tablets milligram (mg)     Pt Rptd Dose SUNDAY MONDAY TUESDAY WED THURS FRIDAY SATURDAY 5/18/2023  12:51 PM 1 1 1 1 1 1 1         5/18/2023   Warfarin Lab Questionnaire   Missed doses within past 14 days? No - booster dose was taken on 5/9/23.  She verified taking 1mg warfarin daily, as instructed.     Changes in diet or alcohol within past 14 days? No - reported, drinking Ensure every 3-4,days or once a wk, started on 4/27/23 and using up th last 7 cans, then will not continue thereafter.   Medication changes since last result? No   Injuries or illness since last result? No   New shortness of breath, severe headaches or sudden changes in vision since last result? No   Abnormal bleeding since last result? No   Upcoming surgery, procedure? No   Best number to call with results? [phone]        Previous result: Subtherapeutic ate 1.9 on 5/9/23.   - On 4/24/23 INR was >10  and >8.0 due to tongue lesion and poor daily intake.    Additional findings:  Continues to feel stressed out with her 's health conditions and finding transportation for her own appts.  (her  is the one who usually provides transportations).  She does not drive d/t macular degeneration (she is legally blind).       PLAN     Recommended plan for ongoing change(s) affecting INR     Dosing Instructions: Increase your warfarin dose (100% change) with next INR in 3-4 days       Summary  As of 5/18/2023    Full warfarin instructions:  2 mg every day   Next INR check:  5/23/2023             Telephone call with  Nicolas ([phone]) who verbalizes understanding and agrees to plan    Lab visit  scheduled - INR on 5/25/23 @ [facility]   - not able to come on 5/22 or 5/23.    Education provided:     Taking warfarin: Importance of taking warfarin as instructed    Goal range and lab monitoring: goal range and significance of current result    Dietary considerations: importance of consistent vitamin K intake and Impact of protein intake on INR     Symptom monitoring: monitoring for clotting signs and symptoms    Plan made with [facility] Pharmacist Dorene Banuelos, RN  Anticoagulation Clinic  5/18/2023    _______________________________________________________________________     Anticoagulation Episode Summary     Current INR goal:  2.0-3.0   TTR:  53.1 % (1 y)   Target end date:  Indefinite   Send INR reminders to:  [facility]    Indications    Persistent atrial fibrillation (H) [I48.19]  Long term (current) use of anticoagulants [Z79.01]           Comments:           Anticoagulation Care Providers     Provider Role Specialty Phone number    Eagle Shabazz MD Referring Internal Medicine [phone]    Mariano Munoz MD Referring Internal Medicine [phone]

## 2023-05-25 ENCOUNTER — LAB (OUTPATIENT)
Dept: LAB | Facility: CLINIC | Age: 88
End: 2023-05-25
Payer: MEDICARE

## 2023-05-25 ENCOUNTER — ANTICOAGULATION THERAPY VISIT (OUTPATIENT)
Dept: ANTICOAGULATION | Facility: CLINIC | Age: 88
End: 2023-05-25

## 2023-05-25 DIAGNOSIS — I48.19 PERSISTENT ATRIAL FIBRILLATION (H): Primary | ICD-10-CM

## 2023-05-25 DIAGNOSIS — I48.19 PERSISTENT ATRIAL FIBRILLATION (H): ICD-10-CM

## 2023-05-25 DIAGNOSIS — Z79.01 LONG TERM (CURRENT) USE OF ANTICOAGULANTS: ICD-10-CM

## 2023-05-25 LAB — INR BLD: 1.2 (ref 0.9–1.1)

## 2023-05-25 PROCEDURE — 36415 COLL VENOUS BLD VENIPUNCTURE: CPT

## 2023-05-25 PROCEDURE — 85610 PROTHROMBIN TIME: CPT

## 2023-05-25 NOTE — PROGRESS NOTES
ANTICOAGULATION MANAGEMENT     Mariia PICKARD Tinoco 88 year old female is on warfarin with subtherapeutic INR result. (Goal INR 2.0-3.0)    Recent labs: (last 7 days)     05/25/23  1507   INR 1.2*       ASSESSMENT     Warfarin Lab Questionnaire    Warfarin Doses Last 7 Days      5/25/2023     3:00 PM   Dose in Tablet or Mg   TAB or MG? milligram (mg)     Pt Rptd Dose SUNDAY MONDAY TUESDAY WED THURS FRIDAY SATURDAY 5/25/2023   3:00 PM 2 2 2 2 2 2 2         5/25/2023   Warfarin Lab Questionnaire   Missed doses within past 14 days? No - wkly warfarin dose was increased on 5/18/23.     Changes in diet or alcohol within past 14 days? No   Medication changes since last result? No - on 5/2/23 ordered from Jantoven 3mg to Warfarin 1mg tablets.     Injuries or illness since last result? No   New shortness of breath, severe headaches or sudden changes in vision since last result? No   Abnormal bleeding since last result? No   Upcoming surgery, procedure? No   Best number to call with results? no        Previous result: Subtherapeutic at 1.2 on 5/18/23.    Additional findings: on 4/24/23, she was previously supra INR greater than 10. due to swelling and oozing lesion of her tongue and was not able to eat any solid foods.  She was scheduled for a tongue biopsy, however, lesion improved and biopsy was cancelled and just observed for any new changes.       PLAN     Recommended plan for no diet, medication or health factor changes affecting INR     Dosing Instructions: booster dose then Increase your warfarin dose (78.6% change) with next INR in 5-7 days       Summary  As of 5/25/2023    Full warfarin instructions:  5/25: 6 mg; Otherwise 3 mg every Sun, Tue, Thu; 4 mg all other days   Next INR check:  6/1/2023             Telephone call with  Nicolas ([phone]) who verbalizes understanding and agrees to plan    Lab visit scheduled - INR opn 6/13 @ [facility]    Education provided:     Taking warfarin: Importance of taking warfarin as  instructed    Goal range and lab monitoring: goal range and significance of current result    Plan made with [facility] Pharmacist Dorene Banuelos, RN  Anticoagulation Clinic  5/25/2023    _______________________________________________________________________     Anticoagulation Episode Summary     Current INR goal:  2.0-3.0   TTR:  51.2 % (1 y)   Target end date:  Indefinite   Send INR reminders to:  [facility]    Indications    Persistent atrial fibrillation (H) [I48.19]  Long term (current) use of anticoagulants [Z79.01]           Comments:           Anticoagulation Care Providers     Provider Role Specialty Phone number    Eagle Shabazz MD Referring Internal Medicine [phone]    Mariano Munoz MD Referring Internal Medicine [phone]

## 2023-06-02 ENCOUNTER — ANTICOAGULATION THERAPY VISIT (OUTPATIENT)
Dept: ANTICOAGULATION | Facility: CLINIC | Age: 88
End: 2023-06-02

## 2023-06-02 ENCOUNTER — TELEPHONE (OUTPATIENT)
Dept: INTERNAL MEDICINE | Facility: CLINIC | Age: 88
End: 2023-06-02

## 2023-06-02 ENCOUNTER — LAB (OUTPATIENT)
Dept: LAB | Facility: CLINIC | Age: 88
End: 2023-06-02
Payer: MEDICARE

## 2023-06-02 DIAGNOSIS — I48.19 PERSISTENT ATRIAL FIBRILLATION (H): ICD-10-CM

## 2023-06-02 DIAGNOSIS — Z79.01 LONG TERM (CURRENT) USE OF ANTICOAGULANTS: ICD-10-CM

## 2023-06-02 DIAGNOSIS — I48.19 PERSISTENT ATRIAL FIBRILLATION (H): Primary | ICD-10-CM

## 2023-06-02 LAB — INR BLD: 2.2 (ref 0.9–1.1)

## 2023-06-02 PROCEDURE — 85610 PROTHROMBIN TIME: CPT

## 2023-06-02 PROCEDURE — 36416 COLLJ CAPILLARY BLOOD SPEC: CPT

## 2023-06-02 NOTE — TELEPHONE ENCOUNTER
Patient Returning Call    Reason for call:  Anticoagulation call. Please call patient with dosing.    Information relayed to patient:  Message placed, await call back    Patient has additional questions:  No      Could we send this information to you in [place] or would you prefer to receive a phone call?:   Patient would prefer a phone call   Okay to leave a detailed message?: Yes at Home number on file [phone] (home)

## 2023-06-02 NOTE — PROGRESS NOTES
ANTICOAGULATION MANAGEMENT     Mariia Tinoco 88 year old female is on warfarin with therapeutic INR result. (Goal INR 2.0-3.0)    Recent labs: (last 7 days)     06/02/23  1323   INR 2.2*       ASSESSMENT     Warfarin Lab Questionnaire    Warfarin Doses Last 7 Days      6/2/2023     1:19 PM   Dose in Tablet or Mg   TAB or MG? - warfarin 1mg tablets milligram (mg)     Pt Rptd Dose SUNDAY MONDAY TUESDAY WED THURS FRIDAY SATURDAY 6/2/2023   1:19 PM 4 3 4 3 4 4 3         6/2/2023   Warfarin Lab Questionnaire   Missed doses within past 14 days? No - taken differently, but same wkly dose.        - warfarin booster dose advised and increased wkly warfarin dose.  (will update anticoag. Calendar).     Changes in diet or alcohol within past 14 days? No   Medication changes since last result? No   Injuries or illness since last result? No   New shortness of breath, severe headaches or sudden changes in vision since last result? No   Abnormal bleeding since last result? No   Upcoming surgery, procedure? No   Best number to call with results? [phone]        Previous result: Subtherapeutic on last 3 INR results.    Additional findings: None       PLAN     Recommended plan for no diet, medication or health factor changes affecting INR     Dosing Instructions: Continue your current warfarin dose with next INR in 2 weeks       Summary  As of 6/2/2023    Full warfarin instructions:  3 mg every Mon, Wed, Sat; 4 mg all other days   Next INR check:  6/16/2023             Telephone call with  , Adam ([phone]) who verbalizes understanding and agrees to plan    Patient offered & declined to schedule next visit    Education provided:     Taking warfarin: Importance of taking warfarin as instructed    Goal range and lab monitoring: goal range and significance of current result    Plan made per [facility] anticoagulation protocol    Cassandra Banuelos, RN  Anticoagulation  Clinic  6/2/2023    _______________________________________________________________________     Anticoagulation Episode Summary     Current INR goal:  2.0-3.0   TTR:  49.5 % (1 y)   Target end date:  Indefinite   Send INR reminders to:  [facility]    Indications    Persistent atrial fibrillation (H) [I48.19]  Long term (current) use of anticoagulants [Z79.01]           Comments:           Anticoagulation Care Providers     Provider Role Specialty Phone number    Eagle Shabazz MD Referring Internal Medicine [phone]    Mariano Munoz MD Referring Internal Medicine [phone]

## 2023-06-02 NOTE — TELEPHONE ENCOUNTER
Called and talked with Adam,     - Adam will relay message to Tete.  (she stepped out for a little bit)     - see telephone encounter with INR results.

## 2023-06-05 ENCOUNTER — TELEPHONE (OUTPATIENT)
Dept: INTERNAL MEDICINE | Facility: CLINIC | Age: 88
End: 2023-06-05
Payer: MEDICARE

## 2023-06-05 NOTE — TELEPHONE ENCOUNTER
General Call      Reason for Call:  Please return call to pt    What are your questions or concerns: date for INR  Date of last appointment with provider: n/a    Could we send this information to you in [employer] or would you prefer to receive a phone call?:   Call please leave detailed vm

## 2023-06-13 DIAGNOSIS — F51.04 CHRONIC INSOMNIA: ICD-10-CM

## 2023-06-13 DIAGNOSIS — I48.19 PERSISTENT ATRIAL FIBRILLATION (H): ICD-10-CM

## 2023-06-13 DIAGNOSIS — Z79.01 LONG TERM (CURRENT) USE OF ANTICOAGULANTS: ICD-10-CM

## 2023-06-13 RX ORDER — ZOLPIDEM TARTRATE 5 MG/1
TABLET ORAL
Qty: 30 TABLET | Refills: 0 | Status: SHIPPED | OUTPATIENT
Start: 2023-06-13 | End: 2023-07-19

## 2023-06-13 RX ORDER — WARFARIN SODIUM 1 MG/1
TABLET ORAL
Qty: 90 TABLET | Refills: 1 | Status: SHIPPED | OUTPATIENT
Start: 2023-06-13 | End: 2023-06-14

## 2023-06-14 ENCOUNTER — ANTICOAGULATION THERAPY VISIT (OUTPATIENT)
Dept: ANTICOAGULATION | Facility: CLINIC | Age: 88
End: 2023-06-14

## 2023-06-14 ENCOUNTER — LAB (OUTPATIENT)
Dept: LAB | Facility: CLINIC | Age: 88
End: 2023-06-14
Payer: MEDICARE

## 2023-06-14 DIAGNOSIS — Z79.01 LONG TERM (CURRENT) USE OF ANTICOAGULANTS: ICD-10-CM

## 2023-06-14 DIAGNOSIS — I48.19 PERSISTENT ATRIAL FIBRILLATION (H): ICD-10-CM

## 2023-06-14 DIAGNOSIS — I48.19 PERSISTENT ATRIAL FIBRILLATION (H): Primary | ICD-10-CM

## 2023-06-14 LAB — INR BLD: 2.8 (ref 0.9–1.1)

## 2023-06-14 PROCEDURE — 85610 PROTHROMBIN TIME: CPT

## 2023-06-14 PROCEDURE — 36416 COLLJ CAPILLARY BLOOD SPEC: CPT

## 2023-06-14 NOTE — PROGRESS NOTES
ANTICOAGULATION MANAGEMENT     Mariia PICKARD Tinoco 88 year old female is on warfarin with therapeutic INR result. (Goal INR 2.0-3.0)    Recent labs: (last 7 days)     06/14/23  1332   INR 2.8*       ASSESSMENT     Warfarin Lab Questionnaire    Warfarin Doses Last 7 Days      6/14/2023     1:25 PM   Dose in Tablet or Mg   TAB or MG? milligram (mg)     Pt Rptd Dose SUNDAY MONDAY TUESDAY WED THURS FRIDAY SATURDAY 6/14/2023   1:25 PM 4 3 4 3 4 4 3         6/14/2023   Warfarin Lab Questionnaire   Missed doses within past 14 days? No   Changes in diet or alcohol within past 14 days? No   Medication changes since last result? No   Injuries or illness since last result? No   New shortness of breath, severe headaches or sudden changes in vision since last result? No   Abnormal bleeding since last result? No   Upcoming surgery, procedure? No   Best number to call with results? [phone]        Previous result: Therapeutic last visit at 2.2; previously outside of goal range at 1.2    Additional findings:  as per instructions from Pharmacy - need exact dosing on Nicolas's warfarini RX for 1mg.       PLAN     Recommended plan for no diet, medication or health factor changes affecting INR     Dosing Instructions: Continue your current warfarin dose with next INR in 2 weeks       Summary  As of 6/14/2023    Full warfarin instructions:  3 mg every Mon, Wed, Sat; 4 mg all other days   Next INR check:  6/28/2023             Telephone call with  Nicolas ([phone]) who verbalizes understanding and agrees to plan    - preferred the warfarin RX vs. her previous Jantoven.    Lab visit scheduled - INR on 6/21/23 at Prolia injection appt @ [facility].   - hard to get transportation and will coincide INR at prolia injection appt.    Education provided:     Taking warfarin: Importance of taking warfarin as instructed    Goal range and lab monitoring: goal range and significance of current result    Plan made per [facility] anticoagulation  protocol    Cassandra Banuelos RN  Anticoagulation Clinic  6/14/2023    _______________________________________________________________________     Anticoagulation Episode Summary     Current INR goal:  2.0-3.0   TTR:  49.5 % (1 y)   Target end date:  Indefinite   Send INR reminders to:  [facility]    Indications    Persistent atrial fibrillation (H) [I48.19]  Long term (current) use of anticoagulants [Z79.01]           Comments:           Anticoagulation Care Providers     Provider Role Specialty Phone number    Eagle Shabazz MD Referring Internal Medicine [phone]    Mariano Munoz MD Referring Internal Medicine [phone]

## 2023-06-21 ENCOUNTER — ALLIED HEALTH/NURSE VISIT (OUTPATIENT)
Dept: FAMILY MEDICINE | Facility: CLINIC | Age: 88
End: 2023-06-21
Payer: MEDICARE

## 2023-06-21 ENCOUNTER — ANTICOAGULATION THERAPY VISIT (OUTPATIENT)
Dept: ANTICOAGULATION | Facility: CLINIC | Age: 88
End: 2023-06-21

## 2023-06-21 ENCOUNTER — LAB (OUTPATIENT)
Dept: LAB | Facility: CLINIC | Age: 88
End: 2023-06-21
Payer: MEDICARE

## 2023-06-21 DIAGNOSIS — M81.0 OSTEOPOROSIS, SENILE: Primary | ICD-10-CM

## 2023-06-21 DIAGNOSIS — M81.0 SENILE OSTEOPOROSIS: ICD-10-CM

## 2023-06-21 DIAGNOSIS — Z79.01 LONG TERM (CURRENT) USE OF ANTICOAGULANTS: ICD-10-CM

## 2023-06-21 DIAGNOSIS — I48.19 PERSISTENT ATRIAL FIBRILLATION (H): ICD-10-CM

## 2023-06-21 DIAGNOSIS — I48.19 PERSISTENT ATRIAL FIBRILLATION (H): Primary | ICD-10-CM

## 2023-06-21 DIAGNOSIS — M17.11 PRIMARY OSTEOARTHRITIS OF RIGHT KNEE: ICD-10-CM

## 2023-06-21 DIAGNOSIS — Z92.29 PERSONAL HISTORY OF OTHER DRUG THERAPY: ICD-10-CM

## 2023-06-21 LAB — INR BLD: 2.6 (ref 0.9–1.1)

## 2023-06-21 PROCEDURE — 85610 PROTHROMBIN TIME: CPT

## 2023-06-21 PROCEDURE — 36416 COLLJ CAPILLARY BLOOD SPEC: CPT

## 2023-06-21 PROCEDURE — 96372 THER/PROPH/DIAG INJ SC/IM: CPT | Mod: JZ | Performed by: INTERNAL MEDICINE

## 2023-06-21 PROCEDURE — 99207 PR NO CHARGE NURSE ONLY: CPT

## 2023-06-21 NOTE — PROGRESS NOTES
ANTICOAGULATION MANAGEMENT     Mariia Tinoco 88 year old female is on warfarin with therapeutic INR result. (Goal INR 2.0-3.0)    Recent labs: (last 7 days)     06/21/23  1318   INR 2.6*       ASSESSMENT     Warfarin Lab Questionnaire    Warfarin Doses Last 7 Days      6/21/2023     1:08 PM   Dose in Tablet or Mg   TAB or MG? - warfarin 1mg tablets milligram (mg)     Pt Rptd Dose SUNDAY MONDAY TUESDAY WED THURS FRIDAY SATURDAY 6/21/2023   1:08 PM 4 3 4 3 4 4 3         6/21/2023   Warfarin Lab Questionnaire   Missed doses within past 14 days? No   Changes in diet or alcohol within past 14 days? No   Medication changes since last result? No - Prolia injection today.     Injuries or illness since last result? No   New shortness of breath, severe headaches or sudden changes in vision since last result? No   Abnormal bleeding since last result? No   Upcoming surgery, procedure? No   Best number to call with results? [phone]     Previous result: Therapeutic last 2 visits.    Additional findings: None       PLAN     Recommended plan for no diet, medication or health factor changes affecting INR     Dosing Instructions: Continue your current warfarin dose with next INR in 4 weeks       Summary  As of 6/21/2023    Full warfarin instructions:  3 mg every Mon, Wed, Sat; 4 mg all other days   Next INR check:  7/19/2023             Telephone call with  Nicolas ([phone]) who verbalizes understanding and agrees to plan    Lab visit scheduled - INR on 7/19/23 @ [facility]    Education provided:     Taking warfarin: Importance of taking warfarin as instructed    Goal range and lab monitoring: goal range and significance of current result    Plan made per [facility] anticoagulation protocol    Cassandra Banuelos, RN  Anticoagulation Clinic  6/21/2023    _______________________________________________________________________     Anticoagulation Episode Summary     Current INR goal:  2.0-3.0   TTR:  49.5 % (1 y)   Target end date:   Indefinite   Send INR reminders to:  [facility]    Indications    Persistent atrial fibrillation (H) [I48.19]  Long term (current) use of anticoagulants [Z79.01]           Comments:           Anticoagulation Care Providers     Provider Role Specialty Phone number    Eagle Shabazz MD Referring Internal Medicine [phone]    Mariano Munoz MD Referring Internal Medicine [phone]

## 2023-06-21 NOTE — PROGRESS NOTES
Patient's last prolia was 23 pt will schedule next Prolia for in 6 months. Pt stated she would like to schedule Annual Wellness for same date. Future appts made for 23.     If this patient is to continue with Prolia injection therapy a new, active prolia order is needed for the upcoming administration.     A Prolia order has been pended with the previously administered order's associated diagnoses; signing provider to review diagnoses to ensure these still apply to patient.    Have previous orders been discontinued due to being ? Yes    Patient's most recent labs within the past year (Calcium, Albumin, Creatinine):      Latest Reference Range & Units 10/20/22 09:05   Creatinine 0.51 - 0.95 mg/dL 0.79      Latest Reference Range & Units 10/20/22 09:05   Calcium 8.8 - 10.2 mg/dL 10.3 (H)   (H): Data is abnormally high   Latest Reference Range & Units 10/20/22 09:05   Albumin 3.5 - 5.2 g/dL 4.5         Labs were completed more than 6 months ago , labs are pended if provider would like values checked prior to administration..     Prolia provider information with link to prescribing information: [email or web address]  Note: Per Prolia ordering smartset, an albumin level is recommended if Calcium level is < 8.5.     Provider action needed: please review/sign prolia order, review associated diagnoses, review/sign recent labs (if needed); please remove lab orders if not needed.  If labs are ordered, please route to Scheduling so that patient can be scheduled for lab draw and labs can be followed for results prior to prolia administration.

## 2023-06-21 NOTE — PROGRESS NOTES
Clinic Administered Medication Documentation      Prolia Documentation    Indication: Prolia  (denosumab) is a prescription medicine used to treat osteoporosis in patients who:     Are at high risk for fracture, meaning patients who have had a fracture related to osteoporosis, or who have multiple risk factors for fracture.    Cannot use another osteoporosis medicine or other osteoporosis medicines did not work well.    The timeline for early/late injections would be 4 weeks early and any time after the 6 month cathy. If a patient receives their injection late, then the subsequent injection would be 6 months from the date that they actually received the injection.    When was the last injection?  22  Was the last injection at least 6 months ago? Yes  Has the prior authorization been completed?  Yes  Is there an active order (written within the past 365 days, with administrations remaining, not ) in the chart?  Yes  Patient denies any dental work involving the bone (e.g. tooth extraction or dental implants) in the past 4 weeks?  Yes  Patient denies plans for any dental work involving the bone (e.g. tooth extraction or dental implants) in the next 4 weeks? Yes    The following steps were completed to comply with the REMS program for Prolia:    Reviewed information in the Medication Guide and Patient Counseling Chart, including the serious risks of Prolia  and the symptoms of each risk.    Advised patient to seek prompt medical attention if they have signs or symptoms of any of the serious risks.    Provided each patient a copy of the Medication Guide and Patient Brochure.      Prior to injection, verified patient identity using patient's name and date of birth. Medication was administered. Please see MAR and medication order for additional information. Patient instructed to remain in clinic for 15 minutes and report any adverse reaction to staff immediately.    Vial/Syringe: Syringe  Was this medication  supplied by the patient? No  Patient has no refills remaining. Refill encounter opened, order pended and Routed to the provider    Pt tolerated injection (Prolia). Site (right upper arm) was cleansed with alcohol prior to injection. No pain, burning, swelling or redness at the site of the injection.

## 2023-07-19 ENCOUNTER — LAB (OUTPATIENT)
Dept: LAB | Facility: CLINIC | Age: 88
End: 2023-07-19
Payer: MEDICARE

## 2023-07-19 ENCOUNTER — ANTICOAGULATION THERAPY VISIT (OUTPATIENT)
Dept: ANTICOAGULATION | Facility: CLINIC | Age: 88
End: 2023-07-19

## 2023-07-19 DIAGNOSIS — F51.04 CHRONIC INSOMNIA: ICD-10-CM

## 2023-07-19 DIAGNOSIS — Z79.01 LONG TERM (CURRENT) USE OF ANTICOAGULANTS: ICD-10-CM

## 2023-07-19 DIAGNOSIS — I48.19 PERSISTENT ATRIAL FIBRILLATION (H): ICD-10-CM

## 2023-07-19 DIAGNOSIS — I48.19 PERSISTENT ATRIAL FIBRILLATION (H): Primary | ICD-10-CM

## 2023-07-19 LAB — INR BLD: 3.1 (ref 0.9–1.1)

## 2023-07-19 PROCEDURE — 36416 COLLJ CAPILLARY BLOOD SPEC: CPT

## 2023-07-19 PROCEDURE — 85610 PROTHROMBIN TIME: CPT

## 2023-07-19 RX ORDER — ZOLPIDEM TARTRATE 5 MG/1
TABLET ORAL
Qty: 30 TABLET | Refills: 0 | Status: SHIPPED | OUTPATIENT
Start: 2023-07-19 | End: 2023-08-15

## 2023-07-19 NOTE — PROGRESS NOTES
ANTICOAGULATION MANAGEMENT     Mariia Tinoco 88 year old female is on warfarin with supratherapeutic INR result. (Goal INR 2.0-3.0)    Recent labs: (last 7 days)     07/19/23  1339   INR 3.1*       ASSESSMENT     Warfarin Lab Questionnaire    Warfarin Doses Last 7 Days      7/19/2023     1:35 PM   Dose in Tablet or Mg   TAB or MG? milligram (mg)     Pt Rptd Dose SUNDAY MONDAY TUESDAY WED THURS FRIDAY SATURDAY 7/19/2023   1:35 PM 4 3 4 3 4 4 3         7/19/2023   Warfarin Lab Questionnaire   Missed doses within past 14 days? No   Changes in diet or alcohol within past 14 days? No   Medication changes since last result? No   Injuries or illness since last result? No   New shortness of breath, severe headaches or sudden changes in vision since last result? No   Abnormal bleeding since last result? No   Upcoming surgery, procedure? No   Best number to call with results? [phone]     Previous result: Therapeutic last 3 INR visits.    Additional findings: Reported no new changes with diet or meds       PLAN     Recommended plan for no diet, medication or health factor changes affecting INR     Dosing Instructions: Continue your current warfarin dose with next INR in 2-3 weeks       Summary  As of 7/19/2023      Full warfarin instructions:  3 mg every Mon, Wed, Sat; 4 mg all other days   Next INR check:  8/2/2023               Telephone call with Nicolas who verbalizes understanding and agrees to plan    Lab visit scheduled - INR on 8/9/23 @ [facility]    Education provided:   Taking warfarin: Importance of taking warfarin as instructed  Goal range and lab monitoring: goal range and significance of current result  Dietary considerations: importance of consistent vitamin K intake    Plan made per [facility] anticoagulation protocol    Cassandra Banuelos, RN  Anticoagulation Clinic  7/19/2023    _______________________________________________________________________     Anticoagulation Episode Summary       Current INR goal:   2.0-3.0   TTR:  53.9 % (1 y)   Target end date:  Indefinite   Send INR reminders to:  [facility]    Indications    Persistent atrial fibrillation (H) [I48.19]  Long term (current) use of anticoagulants [Z79.01]             Comments:               Anticoagulation Care Providers       Provider Role Specialty Phone number    Eagle Shabazz MD Referring Internal Medicine [phone]    Mariano Munoz MD Referring Internal Medicine [phone]

## 2023-07-20 ENCOUNTER — TRANSFERRED RECORDS (OUTPATIENT)
Dept: HEALTH INFORMATION MANAGEMENT | Facility: CLINIC | Age: 88
End: 2023-07-20
Payer: MEDICARE

## 2023-08-08 ENCOUNTER — ANTICOAGULATION THERAPY VISIT (OUTPATIENT)
Dept: ANTICOAGULATION | Facility: CLINIC | Age: 88
End: 2023-08-08

## 2023-08-08 ENCOUNTER — LAB (OUTPATIENT)
Dept: LAB | Facility: CLINIC | Age: 88
End: 2023-08-08
Payer: MEDICARE

## 2023-08-08 DIAGNOSIS — Z79.01 LONG TERM (CURRENT) USE OF ANTICOAGULANTS: ICD-10-CM

## 2023-08-08 DIAGNOSIS — I48.19 PERSISTENT ATRIAL FIBRILLATION (H): Primary | ICD-10-CM

## 2023-08-08 DIAGNOSIS — I48.19 PERSISTENT ATRIAL FIBRILLATION (H): ICD-10-CM

## 2023-08-08 LAB — INR BLD: 2.2 (ref 0.9–1.1)

## 2023-08-08 PROCEDURE — 85610 PROTHROMBIN TIME: CPT

## 2023-08-08 PROCEDURE — 36416 COLLJ CAPILLARY BLOOD SPEC: CPT

## 2023-08-08 NOTE — PROGRESS NOTES
ANTICOAGULATION MANAGEMENT     Mariia Tinoco 88 year old female is on warfarin with therapeutic INR result. (Goal INR 2.0-3.0)    Recent labs: (last 7 days)     08/08/23  0916   INR 2.2*       ASSESSMENT     Warfarin Lab Questionnaire    Warfarin Doses Last 7 Days      8/8/2023     9:12 AM   Dose in Tablet or Mg   TAB or MG? - 1mg warfarin tables (evenings). milligram (mg)     Pt Rptd Dose SUNDAY MONDAY TUESDAY WED THURS FRIDAY SATURDAY 8/8/2023   9:12 AM 3 4 3 4 3 3 4         8/8/2023   Warfarin Lab Questionnaire   Missed doses within past 14 days? No   Changes in diet or alcohol within past 14 days? No   Medication changes since last result? No   Injuries or illness since last result? No - pain flare up left thumb.     New shortness of breath, severe headaches or sudden changes in vision since last result? No   Abnormal bleeding since last result? No   Upcoming surgery, procedure? No - reported scheduled on 8/9/23 for left hand Thumb cortisone injection.     Best number to call with results? [phone]     Previous result: Supratherapeutic INR at 3.1 on 7/19/23.    Additional findings: None       PLAN     Recommended plan for temporary change(s) affecting INR     Dosing Instructions: Continue your current warfarin dose with next INR in 1-2 weeks       Summary  As of 8/8/2023      Full warfarin instructions:  3 mg every Mon, Wed, Sat; 4 mg all other days   Next INR check:  8/22/2023               Telephone call with Nicolas who verbalizes understanding and agrees to plan    Lab visit scheduled - INR on 8/22/23 @ [facility].    Education provided:   Taking warfarin: Importance of taking warfarin as instructed  Goal range and lab monitoring: goal range and significance of current result    Plan made per [facility] anticoagulation protocol    Cassandra Banuelos, RN  Anticoagulation Clinic  8/8/2023    _______________________________________________________________________     Anticoagulation Episode Summary       Current INR  goal:  2.0-3.0   TTR:  54.2 % (1 y)   Target end date:  Indefinite   Send INR reminders to:  [facility]    Indications    Persistent atrial fibrillation (H) [I48.19]  Long term (current) use of anticoagulants [Z79.01]             Comments:               Anticoagulation Care Providers       Provider Role Specialty Phone number    Eagle Shabazz MD Referring Internal Medicine [phone]    Mariano Munoz MD Referring Internal Medicine [phone]

## 2023-08-09 ENCOUNTER — TRANSFERRED RECORDS (OUTPATIENT)
Dept: HEALTH INFORMATION MANAGEMENT | Facility: CLINIC | Age: 88
End: 2023-08-09

## 2023-08-15 DIAGNOSIS — F51.04 CHRONIC INSOMNIA: ICD-10-CM

## 2023-08-15 RX ORDER — ZOLPIDEM TARTRATE 5 MG/1
TABLET ORAL
Qty: 30 TABLET | Refills: 0 | Status: SHIPPED | OUTPATIENT
Start: 2023-08-15 | End: 2023-09-13

## 2023-08-22 ENCOUNTER — LAB (OUTPATIENT)
Dept: LAB | Facility: CLINIC | Age: 88
End: 2023-08-22
Payer: MEDICARE

## 2023-08-22 ENCOUNTER — ANTICOAGULATION THERAPY VISIT (OUTPATIENT)
Dept: ANTICOAGULATION | Facility: CLINIC | Age: 88
End: 2023-08-22

## 2023-08-22 DIAGNOSIS — I48.19 PERSISTENT ATRIAL FIBRILLATION (H): ICD-10-CM

## 2023-08-22 DIAGNOSIS — Z79.01 LONG TERM (CURRENT) USE OF ANTICOAGULANTS: ICD-10-CM

## 2023-08-22 DIAGNOSIS — I48.19 PERSISTENT ATRIAL FIBRILLATION (H): Primary | ICD-10-CM

## 2023-08-22 LAB — INR BLD: 2.2 (ref 0.9–1.1)

## 2023-08-22 PROCEDURE — 36416 COLLJ CAPILLARY BLOOD SPEC: CPT

## 2023-08-22 PROCEDURE — 85610 PROTHROMBIN TIME: CPT

## 2023-08-22 NOTE — PROGRESS NOTES
ANTICOAGULATION MANAGEMENT     Mariia Tinoco 88 year old female is on warfarin with therapeutic INR result. (Goal INR 2.0-3.0)    Recent labs: (last 7 days)     08/22/23  0913   INR 2.2*       ASSESSMENT     Warfarin Lab Questionnaire    Warfarin Doses Last 7 Days      8/22/2023     9:13 AM   Dose in Tablet or Mg   TAB or MG? - 1mg warfarin tablets  milligram (mg)     Pt Rptd Dose SUNDAY MONDAY TUESDAY WED THURS FRIDAY SATURDAY 8/22/2023   9:13 AM 3 4 3 4 3 3 4         8/22/2023   Warfarin Lab Questionnaire   Missed doses within past 14 days? No - Template above was wrong.  However, Nicolas verified back taking correct warfarin dose with 3mg on M/W/Sa and 4mg all other days.     Changes in diet or alcohol within past 14 days? No   Medication changes since last result? Yes - 2 wks ago left thumb.   Please list: cortisone shot     Injuries or illness since last result? No   New shortness of breath, severe headaches or sudden changes in vision since last result? No   Abnormal bleeding since last result? No   Upcoming surgery, procedure? No   Best number to call with results? [phone]     Previous result: Therapeutic last visit at 2.2; previously outside of goal range at 3.1    Additional findings: None       PLAN     Recommended plan for no diet, medication or health factor changes affecting INR     Dosing Instructions: Continue your current warfarin dose with next INR in 3-4 weeks       Summary  As of 8/22/2023      Full warfarin instructions:  3 mg every Mon, Wed, Sat; 4 mg all other days   Next INR check:  9/19/2023               Telephone call with Nicolas who verbalizes understanding and agrees to plan    Lab visit scheduled - INR on 9/19/23 @ [facility]    Education provided:   Taking warfarin: Importance of taking warfarin as instructed  Goal range and lab monitoring: goal range and significance of current result    Plan made per [facility] anticoagulation protocol    Cassandra Banuelos, RN  Anticoagulation  Clinic  8/22/2023    _______________________________________________________________________     Anticoagulation Episode Summary       Current INR goal:  2.0-3.0   TTR:  55.5 % (1 y)   Target end date:  Indefinite   Send INR reminders to:  [facility]    Indications    Persistent atrial fibrillation (H) [I48.19]  Long term (current) use of anticoagulants [Z79.01]             Comments:               Anticoagulation Care Providers       Provider Role Specialty Phone number    Eagle Shabazz MD Referring Internal Medicine [phone]    Mariano Munoz MD Referring Internal Medicine [phone]

## 2023-09-13 DIAGNOSIS — I48.19 PERSISTENT ATRIAL FIBRILLATION (H): ICD-10-CM

## 2023-09-13 DIAGNOSIS — F51.04 CHRONIC INSOMNIA: ICD-10-CM

## 2023-09-13 DIAGNOSIS — Z79.01 LONG TERM (CURRENT) USE OF ANTICOAGULANTS: ICD-10-CM

## 2023-09-13 RX ORDER — WARFARIN SODIUM 1 MG/1
TABLET ORAL
Qty: 300 TABLET | Refills: 1 | Status: SHIPPED | OUTPATIENT
Start: 2023-09-13 | End: 2024-07-22

## 2023-09-13 RX ORDER — ZOLPIDEM TARTRATE 5 MG/1
TABLET ORAL
Qty: 30 TABLET | Refills: 0 | Status: SHIPPED | OUTPATIENT
Start: 2023-09-13 | End: 2023-10-11

## 2023-09-18 ENCOUNTER — TELEPHONE (OUTPATIENT)
Dept: INTERNAL MEDICINE | Facility: CLINIC | Age: 88
End: 2023-09-18
Payer: MEDICARE

## 2023-09-18 NOTE — TELEPHONE ENCOUNTER
Reason for call:  Other     Patient called regarding (reason for call): call back    Additional comments: Patient is calling and asking if she should get a Covid shot. Patient states she gets really sick when she get these shots. Please advise and call patient back please and thank you.    Phone number to reach patient:  Home number on file [phone] (home)    Best Time:  any    Can we leave a detailed message on this number?  YES

## 2023-09-18 NOTE — TELEPHONE ENCOUNTER
Spoke to pt regarding most recent Covid booster. Informed pt that Covid booster is not yet available to be given, encouraged pt to call the clinic in early to mid October to check if vaccine is available at that time. Pt verbalized understanding, had no additional questions at this time.

## 2023-09-19 ENCOUNTER — TELEPHONE (OUTPATIENT)
Dept: PHARMACY | Facility: CLINIC | Age: 88
End: 2023-09-19

## 2023-09-19 ENCOUNTER — ANTICOAGULATION THERAPY VISIT (OUTPATIENT)
Dept: ANTICOAGULATION | Facility: CLINIC | Age: 88
End: 2023-09-19

## 2023-09-19 ENCOUNTER — LAB (OUTPATIENT)
Dept: LAB | Facility: CLINIC | Age: 88
End: 2023-09-19
Payer: MEDICARE

## 2023-09-19 DIAGNOSIS — Z79.01 LONG TERM (CURRENT) USE OF ANTICOAGULANTS: ICD-10-CM

## 2023-09-19 DIAGNOSIS — I48.19 PERSISTENT ATRIAL FIBRILLATION (H): ICD-10-CM

## 2023-09-19 DIAGNOSIS — I48.19 PERSISTENT ATRIAL FIBRILLATION (H): Primary | ICD-10-CM

## 2023-09-19 LAB — INR BLD: 3.1 (ref 0.9–1.1)

## 2023-09-19 PROCEDURE — 36416 COLLJ CAPILLARY BLOOD SPEC: CPT

## 2023-09-19 PROCEDURE — 85610 PROTHROMBIN TIME: CPT

## 2023-09-19 NOTE — TELEPHONE ENCOUNTER
MTM referral from: [facility] visit (referral by provider)    MTM referral outreach attempt #3 on September 19, 2023 at 2:12 PM      Outcome: Spoke with patient, and she said she might not need MTM services at this time, but she will communicate with writer when needed.       Thanks,    Kang Smith, PharmD     Medication Therapy Management (MTM) Pharmacist     [phone]     [email or web address]

## 2023-09-19 NOTE — PROGRESS NOTES
ANTICOAGULATION MANAGEMENT     Mariia Tinoco 88 year old female is on warfarin with supratherapeutic INR result. (Goal INR 2.0-3.0)    Recent labs: (last 7 days)     09/19/23  1017   INR 3.1*       ASSESSMENT     Warfarin Lab Questionnaire    Warfarin Doses Last 7 Days      9/19/2023     9:59 AM   Dose in Tablet or Mg   TAB or MG? - 1mg warfarin tablets (evenings). milligram (mg)     Pt Rptd Dose SUNDAY MONDAY TUESDAY WED THURS FRIDAY SATURDAY 9/19/2023   9:59 AM 4 3 4 3 4 4 3         9/19/2023   Warfarin Lab Questionnaire   Missed doses within past 14 days? No   Changes in diet or alcohol within past 14 days? No   Medication changes since last result? No - reported she got her RSV vaccination 4 days ago, and did have a reaction that lasted 48 hours.  She is better now and symptoms have resolved.     Injuries or illness since last result? No   New shortness of breath, severe headaches or sudden changes in vision since last result? No   Abnormal bleeding since last result? No   Upcoming surgery, procedure? No   Best number to call with results? [phone]     Previous result: Therapeutic last 2 INR visits.    Additional findings: None       PLAN     Recommended plan for temporary change(s) affecting INR     Dosing Instructions: partial hold then continue your current warfarin dose with next INR in 3 weeks       Summary  As of 9/19/2023      Full warfarin instructions:  9/19: 3 mg; Otherwise 3 mg every Mon, Wed, Sat; 4 mg all other days   Next INR check:  10/10/2023               Telephone call with Nicolas who verbalizes understanding and agrees to plan    Lab visit scheduled - INR on 10/17/23  [facility].   - transportation issues.    Education provided:   Taking warfarin: Importance of taking warfarin as instructed  Goal range and lab monitoring: goal range and significance of current result  Interaction IS anticipated between warfarin and RSV vaccination 4 days ago.    Plan made per [facility] anticoagulation  protocol    Cassandra Banuelos RN  Anticoagulation Clinic  9/19/2023    _______________________________________________________________________     Anticoagulation Episode Summary       Current INR goal:  2.0-3.0   TTR:  62.3 % (1 y)   Target end date:  Indefinite   Send INR reminders to:  [facility]    Indications    Persistent atrial fibrillation (H) [I48.19]  Long term (current) use of anticoagulants [Z79.01]             Comments:               Anticoagulation Care Providers       Provider Role Specialty Phone number    Eagle Shabazz MD Referring Internal Medicine [phone]    Mariano Munoz MD Referring Internal Medicine [phone]

## 2023-10-02 ENCOUNTER — TELEPHONE (OUTPATIENT)
Dept: INTERNAL MEDICINE | Facility: CLINIC | Age: 88
End: 2023-10-02

## 2023-10-02 NOTE — TELEPHONE ENCOUNTER
General Call    Contacts         Type Contact Phone/Fax    10/02/2023 09:54 AM CDT Phone (Incoming) Nicolas Tinoco (Self) [phone] (H)          Reason for Call:  covid shot -protein based injection Novawax    What are your questions or concerns:  She was reading in the [employer] Journal and wonders if she can get that shot as she have had issues with the covid shots she has had.  She is out of it for 48 hours after her shot.    Have you heard of that?  And where can she get it if an option.    Date of last appointment with provider: na    Could we send this information to you in [facility] or would you prefer to receive a phone call?:   Patient would prefer a phone call   Okay to leave a detailed message?: Yes at Home number on file [phone] -ok for (home)     or Cell number on file:    Telephone Information:   Mobile [phone]-no vn

## 2023-10-11 DIAGNOSIS — F51.04 CHRONIC INSOMNIA: ICD-10-CM

## 2023-10-11 RX ORDER — ZOLPIDEM TARTRATE 5 MG/1
TABLET ORAL
Qty: 30 TABLET | Refills: 0 | Status: SHIPPED | OUTPATIENT
Start: 2023-10-11 | End: 2023-11-13

## 2023-10-17 ENCOUNTER — ANTICOAGULATION THERAPY VISIT (OUTPATIENT)
Dept: ANTICOAGULATION | Facility: CLINIC | Age: 88
End: 2023-10-17

## 2023-10-17 ENCOUNTER — LAB (OUTPATIENT)
Dept: LAB | Facility: CLINIC | Age: 88
End: 2023-10-17
Payer: MEDICARE

## 2023-10-17 DIAGNOSIS — I48.19 PERSISTENT ATRIAL FIBRILLATION (H): Primary | ICD-10-CM

## 2023-10-17 DIAGNOSIS — Z79.01 LONG TERM (CURRENT) USE OF ANTICOAGULANTS: ICD-10-CM

## 2023-10-17 DIAGNOSIS — I48.19 PERSISTENT ATRIAL FIBRILLATION (H): ICD-10-CM

## 2023-10-17 LAB — INR BLD: 2.4 (ref 0.9–1.1)

## 2023-10-17 PROCEDURE — 36416 COLLJ CAPILLARY BLOOD SPEC: CPT

## 2023-10-17 PROCEDURE — 85610 PROTHROMBIN TIME: CPT

## 2023-11-09 ENCOUNTER — ANTICOAGULATION THERAPY VISIT (OUTPATIENT)
Dept: ANTICOAGULATION | Facility: CLINIC | Age: 88
End: 2023-11-09

## 2023-11-09 ENCOUNTER — LAB (OUTPATIENT)
Dept: LAB | Facility: CLINIC | Age: 88
End: 2023-11-09
Payer: MEDICARE

## 2023-11-09 DIAGNOSIS — Z79.01 LONG TERM (CURRENT) USE OF ANTICOAGULANTS: ICD-10-CM

## 2023-11-09 DIAGNOSIS — I48.19 PERSISTENT ATRIAL FIBRILLATION (H): Primary | ICD-10-CM

## 2023-11-09 DIAGNOSIS — I48.19 PERSISTENT ATRIAL FIBRILLATION (H): ICD-10-CM

## 2023-11-09 LAB — INR BLD: 2.1 (ref 0.9–1.1)

## 2023-11-09 PROCEDURE — 85610 PROTHROMBIN TIME: CPT

## 2023-11-09 PROCEDURE — 36416 COLLJ CAPILLARY BLOOD SPEC: CPT

## 2023-11-09 NOTE — PROGRESS NOTES
ANTICOAGULATION MANAGEMENT     Mariia Tinoco 88 year old female is on warfarin with therapeutic INR result. (Goal INR 2.0-3.0)    Recent labs: (last 7 days)     11/09/23  0924   INR 2.1*       ASSESSMENT     Warfarin Lab Questionnaire    Warfarin Doses Last 7 Days      11/9/2023     9:15 AM   Dose in Tablet or Mg   TAB or MG? milligram (mg)     Pt Rptd Dose SUNDAY MONDAY TUESDAY WED THURS FRIDAY SATURDAY 11/9/2023   9:15 AM 4 3 4 3 4 4 3         11/9/2023   Warfarin Lab Questionnaire   Missed doses within past 14 days? Yes   If yes; please list when: missed on tues   Changes in diet or alcohol within past 14 days? No   Medication changes since last result? No   Injuries or illness since last result? No   New shortness of breath, severe headaches or sudden changes in vision since last result? No   Abnormal bleeding since last result? No   Upcoming surgery, procedure? No   Best number to call with results? [phone]     Previous result: Therapeutic last visit at 2.4; previously outside of goal range at 3.1    Additional findings: None       PLAN     Recommended plan for temporary change(s) affecting INR     Dosing Instructions: booster dose then continue your current warfarin dose with next INR in 2-3 weeks       Summary  As of 11/9/2023      Full warfarin instructions:  11/9: 5 mg; Otherwise 3 mg every Mon, Wed, Sat; 4 mg all other days   Next INR check:  11/30/2023               Telephone call with Nicolas who verbalizes understanding and agrees to plan    Lab visit scheduled - INR on 11/30/23 @ [facility]    Education provided:   Taking warfarin: take warfarin at same time each day; preferably in the evening and Importance of taking warfarin as instructed  Goal range and lab monitoring: goal range and significance of current result    Plan made per [facility] anticoagulation protocol    Cassandra Banuelos RN  Anticoagulation Clinic  11/9/2023    _______________________________________________________________________      Anticoagulation Episode Summary       Current INR goal:  2.0-3.0   TTR:  69.2% (1 y)   Target end date:  Indefinite   Send INR reminders to:  [facility]    Indications    Persistent atrial fibrillation (H) [I48.19]  Long term (current) use of anticoagulants [Z79.01]             Comments:               Anticoagulation Care Providers       Provider Role Specialty Phone number    Eagle Shabazz MD Referring Internal Medicine [phone]    Mariano Munoz MD Referring Internal Medicine [phone]

## 2023-11-13 DIAGNOSIS — F51.04 CHRONIC INSOMNIA: ICD-10-CM

## 2023-11-13 RX ORDER — ZOLPIDEM TARTRATE 5 MG/1
TABLET ORAL
Qty: 30 TABLET | Refills: 0 | Status: SHIPPED | OUTPATIENT
Start: 2023-11-13 | End: 2023-12-12

## 2023-11-30 ENCOUNTER — TELEPHONE (OUTPATIENT)
Dept: ANTICOAGULATION | Facility: CLINIC | Age: 88
End: 2023-11-30

## 2023-11-30 ENCOUNTER — ANTICOAGULATION THERAPY VISIT (OUTPATIENT)
Dept: ANTICOAGULATION | Facility: CLINIC | Age: 88
End: 2023-11-30

## 2023-11-30 ENCOUNTER — LAB (OUTPATIENT)
Dept: LAB | Facility: CLINIC | Age: 88
End: 2023-11-30
Payer: MEDICARE

## 2023-11-30 DIAGNOSIS — I48.19 PERSISTENT ATRIAL FIBRILLATION (H): ICD-10-CM

## 2023-11-30 DIAGNOSIS — Z79.01 LONG TERM (CURRENT) USE OF ANTICOAGULANTS: ICD-10-CM

## 2023-11-30 DIAGNOSIS — I48.19 PERSISTENT ATRIAL FIBRILLATION (H): Primary | ICD-10-CM

## 2023-11-30 LAB — INR BLD: 2.7 (ref 0.9–1.1)

## 2023-11-30 PROCEDURE — 36416 COLLJ CAPILLARY BLOOD SPEC: CPT

## 2023-11-30 PROCEDURE — 85610 PROTHROMBIN TIME: CPT

## 2023-11-30 NOTE — PROGRESS NOTES
ANTICOAGULATION MANAGEMENT     Mariia Tinoco 88 year old female is on warfarin with therapeutic INR result. (Goal INR 2.0-3.0)    Recent labs: (last 7 days)     11/30/23  0922   INR 2.7*       ASSESSMENT     Warfarin Lab Questionnaire    Warfarin Doses Last 7 Days      11/30/2023     9:16 AM   Dose in Tablet or Mg   TAB or MG? - 1mg warfarin tablets  milligram (mg)     Pt Rptd Dose SUNDAY MONDAY TUESDAY WED THURS FRIDAY SATURDAY 11/30/2023   9:16 AM 4 3 4 3 4 4 3         11/30/2023   Warfarin Lab Questionnaire   Missed doses within past 14 days? No   Changes in diet or alcohol within past 14 days? No   Medication changes since last result? No   Injuries or illness since last result? No   New shortness of breath, severe headaches or sudden changes in vision since last result? No   Abnormal bleeding since last result? No   Upcoming surgery, procedure? No   Best number to call with results? [phone]     Previous result: Therapeutic last 2 INR visits.    Additional findings: None       PLAN     Recommended plan for no diet, medication or health factor changes affecting INR     Dosing Instructions: Continue your current warfarin dose with next INR in 4 weeks       Summary  As of 11/30/2023      Full warfarin instructions:  3 mg every Mon, Wed, Sat; 4 mg all other days   Next INR check:  12/28/2023               Telephone call with Nicolas who verbalizes understanding and agrees to plan    Check at provider office visit - INR on 12/28/23 at annual check with Dr. Shabazz.    Education provided:   Taking warfarin: Importance of taking warfarin as instructed  Goal range and lab monitoring: goal range and significance of current result    Plan made per [facility] anticoagulation protocol    Cassandra Banuelos, RN  Anticoagulation Clinic  11/30/2023    _______________________________________________________________________     Anticoagulation Episode Summary       Current INR goal:  2.0-3.0   TTR:  72.5% (1 y)   Target end date:   Indefinite   Send INR reminders to:  [facility]    Indications    Persistent atrial fibrillation (H) [I48.19]  Long term (current) use of anticoagulants [Z79.01]             Comments:               Anticoagulation Care Providers       Provider Role Specialty Phone number    Eagle Shabazz MD Referring Internal Medicine [phone]    Mariano Munoz MD Referring Internal Medicine [phone]

## 2023-11-30 NOTE — TELEPHONE ENCOUNTER
Talked with Nicolas,     - she reported, she is due for her 6 months Prolia injection in December, however, nobody has called yet.   - last Prolia injection was in 6/21/23.     - please call patient to schedule Prolia injection for 12/28/23.     - she has annual check with Dr. Shabazz and INR check.  She would like to get her prolia injection on the same day as well, since she does not drive and  will be bringing her to appt.

## 2023-12-12 DIAGNOSIS — E03.9 HYPOTHYROIDISM, UNSPECIFIED TYPE: ICD-10-CM

## 2023-12-12 DIAGNOSIS — F51.04 CHRONIC INSOMNIA: ICD-10-CM

## 2023-12-12 RX ORDER — ZOLPIDEM TARTRATE 5 MG/1
TABLET ORAL
Qty: 30 TABLET | Refills: 0 | Status: SHIPPED | OUTPATIENT
Start: 2023-12-12 | End: 2024-01-11

## 2023-12-12 RX ORDER — LEVOTHYROXINE SODIUM 75 UG/1
75 TABLET ORAL DAILY
Qty: 90 TABLET | Refills: 3 | OUTPATIENT
Start: 2023-12-12

## 2023-12-21 ENCOUNTER — DOCUMENTATION ONLY (OUTPATIENT)
Dept: INTERNAL MEDICINE | Facility: CLINIC | Age: 88
End: 2023-12-21
Payer: MEDICARE

## 2023-12-21 DIAGNOSIS — Z79.01 LONG TERM (CURRENT) USE OF ANTICOAGULANTS: ICD-10-CM

## 2023-12-21 DIAGNOSIS — I48.19 PERSISTENT ATRIAL FIBRILLATION (H): Primary | ICD-10-CM

## 2023-12-21 NOTE — PROGRESS NOTES
ANTICOAGULATION CLINIC REFERRAL RENEWAL REQUEST       An annual renewal order is required for all patients referred to [facility].?  Please review and sign the pended referral order for Mariia Tinoco.       ANTICOAGULATION SUMMARY      Warfarin indication(s)   Atrial Fibrillation, persistent    Mechanical heart valve present?  NO       Current goal range   INR: 2.0-3.0     Goal appropriate for indication? Goal INR 2-3, standard for indication(s) above     Time in Therapeutic Range (TTR)  (Goal > 60%) 72.5%       Office visit with referring provider's group within last year No on 11/22/22  Scheduled wellness check with Dr. Shabazz on 12/28/23.       Cassandra Banuelos RN  [facility]

## 2023-12-28 ENCOUNTER — ALLIED HEALTH/NURSE VISIT (OUTPATIENT)
Dept: FAMILY MEDICINE | Facility: CLINIC | Age: 88
End: 2023-12-28
Payer: MEDICARE

## 2023-12-28 ENCOUNTER — OFFICE VISIT (OUTPATIENT)
Dept: INTERNAL MEDICINE | Facility: CLINIC | Age: 88
End: 2023-12-28
Payer: MEDICARE

## 2023-12-28 ENCOUNTER — ANTICOAGULATION THERAPY VISIT (OUTPATIENT)
Dept: ANTICOAGULATION | Facility: CLINIC | Age: 88
End: 2023-12-28

## 2023-12-28 VITALS
SYSTOLIC BLOOD PRESSURE: 118 MMHG | HEIGHT: 64 IN | DIASTOLIC BLOOD PRESSURE: 62 MMHG | BODY MASS INDEX: 25.27 KG/M2 | WEIGHT: 148 LBS | TEMPERATURE: 97.9 F | OXYGEN SATURATION: 98 % | HEART RATE: 89 BPM | RESPIRATION RATE: 20 BRPM

## 2023-12-28 DIAGNOSIS — Z00.00 ENCOUNTER FOR MEDICARE ANNUAL WELLNESS EXAM: Primary | ICD-10-CM

## 2023-12-28 DIAGNOSIS — I42.9 CARDIOMYOPATHY, UNSPECIFIED TYPE (H): ICD-10-CM

## 2023-12-28 DIAGNOSIS — I48.19 PERSISTENT ATRIAL FIBRILLATION (H): ICD-10-CM

## 2023-12-28 DIAGNOSIS — M81.0 OSTEOPOROSIS, SENILE: ICD-10-CM

## 2023-12-28 DIAGNOSIS — Z79.01 LONG TERM (CURRENT) USE OF ANTICOAGULANTS: ICD-10-CM

## 2023-12-28 DIAGNOSIS — J84.10 POSTINFLAMMATORY PULMONARY FIBROSIS (H): ICD-10-CM

## 2023-12-28 DIAGNOSIS — I48.19 PERSISTENT ATRIAL FIBRILLATION (H): Primary | ICD-10-CM

## 2023-12-28 DIAGNOSIS — J41.0 SIMPLE CHRONIC BRONCHITIS (H): ICD-10-CM

## 2023-12-28 DIAGNOSIS — M81.0 OSTEOPOROSIS, SENILE: Primary | ICD-10-CM

## 2023-12-28 DIAGNOSIS — E03.9 HYPOTHYROIDISM, UNSPECIFIED TYPE: ICD-10-CM

## 2023-12-28 DIAGNOSIS — Z92.29 PERSONAL HISTORY OF OTHER DRUG THERAPY: ICD-10-CM

## 2023-12-28 DIAGNOSIS — H53.16 CHARLES BONNET SYNDROME: ICD-10-CM

## 2023-12-28 DIAGNOSIS — N39.41 URGE INCONTINENCE OF URINE: ICD-10-CM

## 2023-12-28 LAB
ALBUMIN SERPL BCG-MCNC: 4.1 G/DL (ref 3.5–5.2)
ALBUMIN UR-MCNC: ABNORMAL MG/DL
ALP SERPL-CCNC: 74 U/L (ref 40–150)
ALT SERPL W P-5'-P-CCNC: 13 U/L (ref 0–50)
ANION GAP SERPL CALCULATED.3IONS-SCNC: 9 MMOL/L (ref 7–15)
APPEARANCE UR: CLEAR
AST SERPL W P-5'-P-CCNC: 25 U/L (ref 0–45)
BACTERIA #/AREA URNS HPF: ABNORMAL /HPF
BILIRUB SERPL-MCNC: 1.8 MG/DL
BILIRUB UR QL STRIP: NEGATIVE
BUN SERPL-MCNC: 18.1 MG/DL (ref 8–23)
CALCIUM SERPL-MCNC: 9.6 MG/DL (ref 8.8–10.2)
CHLORIDE SERPL-SCNC: 104 MMOL/L (ref 98–107)
CHOLEST SERPL-MCNC: 188 MG/DL
COLOR UR AUTO: YELLOW
CREAT SERPL-MCNC: 0.92 MG/DL (ref 0.51–0.95)
DEPRECATED HCO3 PLAS-SCNC: 27 MMOL/L (ref 22–29)
EGFRCR SERPLBLD CKD-EPI 2021: 60 ML/MIN/1.73M2
ERYTHROCYTE [DISTWIDTH] IN BLOOD BY AUTOMATED COUNT: 15 % (ref 10–15)
FASTING STATUS PATIENT QL REPORTED: ABNORMAL
GLUCOSE SERPL-MCNC: 79 MG/DL (ref 70–99)
GLUCOSE UR STRIP-MCNC: NEGATIVE MG/DL
HCT VFR BLD AUTO: 37.8 % (ref 35–47)
HDLC SERPL-MCNC: 57 MG/DL
HGB BLD-MCNC: 13 G/DL (ref 11.7–15.7)
HGB UR QL STRIP: NEGATIVE
INR BLD: 2.8 (ref 0.9–1.1)
KETONES UR STRIP-MCNC: NEGATIVE MG/DL
LDLC SERPL CALC-MCNC: 110 MG/DL
LEUKOCYTE ESTERASE UR QL STRIP: ABNORMAL
MCH RBC QN AUTO: 33.2 PG (ref 26.5–33)
MCHC RBC AUTO-ENTMCNC: 34.4 G/DL (ref 31.5–36.5)
MCV RBC AUTO: 97 FL (ref 78–100)
MUCOUS THREADS #/AREA URNS LPF: PRESENT /LPF
NITRATE UR QL: NEGATIVE
NONHDLC SERPL-MCNC: 131 MG/DL
PH UR STRIP: 5.5 [PH] (ref 5–8)
PLATELET # BLD AUTO: 177 10E3/UL (ref 150–450)
POTASSIUM SERPL-SCNC: 4.2 MMOL/L (ref 3.4–5.3)
PROT SERPL-MCNC: 6.4 G/DL (ref 6.4–8.3)
RBC # BLD AUTO: 3.91 10E6/UL (ref 3.8–5.2)
RBC #/AREA URNS AUTO: ABNORMAL /HPF
SODIUM SERPL-SCNC: 140 MMOL/L (ref 135–145)
SP GR UR STRIP: 1.02 (ref 1–1.03)
SQUAMOUS #/AREA URNS AUTO: ABNORMAL /LPF
TRIGL SERPL-MCNC: 103 MG/DL
TSH SERPL DL<=0.005 MIU/L-ACNC: 3.67 UIU/ML (ref 0.3–4.2)
UROBILINOGEN UR STRIP-ACNC: 0.2 E.U./DL
VIT D+METAB SERPL-MCNC: 35 NG/ML (ref 20–50)
WBC # BLD AUTO: 6.3 10E3/UL (ref 4–11)
WBC #/AREA URNS AUTO: ABNORMAL /HPF

## 2023-12-28 PROCEDURE — 80061 LIPID PANEL: CPT | Performed by: INTERNAL MEDICINE

## 2023-12-28 PROCEDURE — 99214 OFFICE O/P EST MOD 30 MIN: CPT | Mod: 25 | Performed by: INTERNAL MEDICINE

## 2023-12-28 PROCEDURE — 85027 COMPLETE CBC AUTOMATED: CPT | Performed by: INTERNAL MEDICINE

## 2023-12-28 PROCEDURE — 96372 THER/PROPH/DIAG INJ SC/IM: CPT | Performed by: INTERNAL MEDICINE

## 2023-12-28 PROCEDURE — 36415 COLL VENOUS BLD VENIPUNCTURE: CPT | Performed by: INTERNAL MEDICINE

## 2023-12-28 PROCEDURE — 80053 COMPREHEN METABOLIC PANEL: CPT | Performed by: INTERNAL MEDICINE

## 2023-12-28 PROCEDURE — 84443 ASSAY THYROID STIM HORMONE: CPT | Performed by: INTERNAL MEDICINE

## 2023-12-28 PROCEDURE — 85610 PROTHROMBIN TIME: CPT | Performed by: INTERNAL MEDICINE

## 2023-12-28 PROCEDURE — G0439 PPPS, SUBSEQ VISIT: HCPCS | Performed by: INTERNAL MEDICINE

## 2023-12-28 PROCEDURE — 81001 URINALYSIS AUTO W/SCOPE: CPT | Performed by: INTERNAL MEDICINE

## 2023-12-28 PROCEDURE — 82306 VITAMIN D 25 HYDROXY: CPT | Performed by: INTERNAL MEDICINE

## 2023-12-28 PROCEDURE — 99207 PR NO CHARGE NURSE ONLY: CPT

## 2023-12-28 ASSESSMENT — ENCOUNTER SYMPTOMS
HEMATOCHEZIA: 0
FEVER: 0
MYALGIAS: 0
HEARTBURN: 0
LEG PAIN: 1
HEADACHES: 0
PALPITATIONS: 0
ARTHRALGIAS: 1
WEAKNESS: 0
DIZZINESS: 1
CHILLS: 0
SHORTNESS OF BREATH: 0
PARESTHESIAS: 0
JOINT SWELLING: 0
CONSTIPATION: 0
EYE PAIN: 0
FREQUENCY: 0
DYSURIA: 0
DIARRHEA: 0
NAUSEA: 0
SORE THROAT: 0
BREAST MASS: 0
COUGH: 0
ABDOMINAL PAIN: 0
NERVOUS/ANXIOUS: 0
HEMATURIA: 0

## 2023-12-28 ASSESSMENT — ACTIVITIES OF DAILY LIVING (ADL): CURRENT_FUNCTION: TRANSPORTATION REQUIRES ASSISTANCE

## 2023-12-28 NOTE — PROGRESS NOTES
"SUBJECTIVE:   Nicolas is a 88 year old, presenting for the following:  Wellness Visit (Needs INR today; due Prolia injection ) and Leg Pain (Dull right leg pain )      Tete comes in today for her annual wellness.  I have not seen her in over a year.  Delightful woman who has a chronic lung disease as well as A-fib and a cardiomyopathy.  Despite this she has been remarkably stable and doing well.  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  This has been stable.  It does much worse if she does not sleep so she is on Ambien.  Tolerates without difficulty.  We have tried other options and she does not tolerate or they do not work.  We have decided to opt to continue with the Ambien.  She understands the risks involved.  She is back on Prolia for her osteoporosis.  Tolerating without difficulty.  She is in good spirits.  Kids and grandkids are doing well.  Are you in the first 12 months of your Medicare coverage?  No    Leg Pain  Associated symptoms include arthralgias. Pertinent negatives include no abdominal pain, chest pain, chills, congestion, coughing, fever, headaches, joint swelling, myalgias, nausea, rash, sore throat or weakness.   Healthy Habits:     In general, how would you rate your overall health?  Good    Frequency of exercise:  4-5 days/week    Duration of exercise:  30-45 minutes    Do you usually eat at least 4 servings of fruit and vegetables a day, include whole grains    & fiber and avoid regularly eating high fat or \"junk\" foods?  Yes    Taking medications regularly:  Yes    Medication side effects:  None    Ability to successfully perform activities of daily living:  Transportation requires assistance    Home Safety:  No safety concerns identified    Hearing Impairment:  No hearing concerns    In the past 6 months, have you been bothered by leaking of urine? Yes    In general, how would you rate your overall mental or emotional health?  Good    Additional concerns today:  " No      Today's PHQ-2 Score:       12/28/2023    10:07 AM   PHQ-2 ( 1999 [employer])   Q1: Little interest or pleasure in doing things 0   Q2: Feeling down, depressed or hopeless 0   PHQ-2 Score 0   Q1: Little interest or pleasure in doing things Not at all   Q2: Feeling down, depressed or hopeless Not at all   PHQ-2 Score 0           Have you ever done Advance Care Planning? (For example, a Health Directive, POLST, or a discussion with a medical provider or your loved ones about your wishes): Yes, advance care planning is on file.       Fall risk  Fallen 2 or more times in the past year?: No  Any fall with injury in the past year?: No    Cognitive Screening   1) Repeat 3 items (Leader, Season, Table)    2) Clock draw: NORMAL  3) 3 item recall: Recalls 3 objects  Results:  NORMAL CLOCK     Mini-CogTM Copyright S [employer]. Licensed by the author for use in [facility]; reprinted with permission (papa@[facility]). All rights reserved.      Do you have sleep apnea, excessive snoring or daytime drowsiness? : no    Reviewed and updated as needed this visit by clinical staff   Tobacco  Allergies               Reviewed and updated as needed this visit by Provider                 Social History     Tobacco Use    Smoking status: Never    Smokeless tobacco: Never   Substance Use Topics    Alcohol use: Yes     Alcohol/week: 1.0 standard drink of alcohol     Types: 1 Standard drinks or equivalent per week     Comment: Alcoholic Drinks/day: occasional - 1 drink per week             12/28/2023    10:10 AM   Alcohol Use   Prescreen: >3 drinks/day or >7 drinks/week? No     Do you have a current opioid prescription? No  Do you use any other controlled substances or medications that are not prescribed by a provider? None      Current providers sharing in care for this patient include:   Patient Care Team:  Eagle Shabazz MD as PCP - General  Patti Petersen OT (Inactive) as Occupational Therapist (Occupational Therapy)  Jakub  Joseluis Cristina MD as Referring Physician (Ophthalmology)  Eagle Shabazz MD as Assigned PCP  Ryan Johnson MD as MD  Kanu Corrigan MD as Physician  Garret Tripp, Juan Francisco as Assigned MTM Pharmacist  Kang Smith RPH as Pharmacist  Kang Smith RPH as Pharmacist (Pharmacist)    The following health maintenance items are reviewed in Epic and correct as of today:  Health Maintenance   Topic Date Due    COPD ACTION PLAN  Never done    MEDICARE ANNUAL WELLNESS VISIT  10/20/2023    TSH W/FREE T4 REFLEX  10/20/2023    ANNUAL REVIEW OF HM ORDERS  10/20/2023    DTAP/TDAP/TD IMMUNIZATION (2 - Td or Tdap) 11/28/2024    FALL RISK ASSESSMENT  12/28/2024    ADVANCE CARE PLANNING  10/20/2027    SPIROMETRY  Completed    PHQ-2 (once per calendar year)  Completed    INFLUENZA VACCINE  Completed    Pneumococcal Vaccine: 65+ Years  Completed    RSV VACCINE (Pregnancy & 60+)  Completed    COVID-19 Vaccine  Completed    ZOSTER IMMUNIZATION  Addressed    IPV IMMUNIZATION  Aged Out    HPV IMMUNIZATION  Aged Out    MENINGITIS IMMUNIZATION  Aged Out    RSV MONOCLONAL ANTIBODY  Aged Out             Pertinent mammograms are reviewed under the imaging tab.    Review of Systems   Constitutional:  Negative for chills and fever.   HENT:  Negative for congestion, ear pain, hearing loss and sore throat.    Eyes:  Negative for pain and visual disturbance.   Respiratory:  Negative for cough and shortness of breath.    Cardiovascular:  Negative for chest pain, palpitations and peripheral edema.   Gastrointestinal:  Negative for abdominal pain, constipation, diarrhea, heartburn, hematochezia and nausea.   Breasts:  Negative for tenderness, breast mass and discharge.   Genitourinary:  Negative for dysuria, frequency, genital sores, hematuria, pelvic pain, urgency, vaginal bleeding and vaginal discharge.   Musculoskeletal:  Positive for arthralgias. Negative for joint swelling and myalgias.   Skin:  Negative for rash.  "  Neurological:  Positive for dizziness. Negative for weakness, headaches and paresthesias.   Psychiatric/Behavioral:  Negative for mood changes. The patient is not nervous/anxious.          OBJECTIVE:   /62 (BP Location: Left arm, Patient Position: Sitting, Cuff Size: Adult Regular)   Pulse 89   Temp 97.9  F (36.6  C) (Tympanic)   Resp 20   Ht 1.626 m (5' 4\")   Wt 67.1 kg (148 lb)   LMP  (LMP Unknown)   SpO2 98%   BMI 25.40 kg/m   Estimated body mass index is 25.4 kg/m  as calculated from the following:    Height as of this encounter: 1.626 m (5' 4\").    Weight as of this encounter: 67.1 kg (148 lb).  Physical Exam  Life woman who appears well.  HEENT is unremarkable.  Neck supple.  Lungs with few dry crackles at the bases bilaterally.  Otherwise clear.  Heart regular.  Abdomen soft nontender.  Extremities without edema.  She has a indentation of her right shin that she is concerned about.  Does not appear concerning looks like an old injury she may have had some point        ASSESSMENT / PLAN:   1. Encounter for Medicare annual wellness exam  She is up-to-date on her health maintenance.  She lives an active and healthy lifestyle.  Continue same.    2. Persistent atrial fibrillation (H)  Continue warfarin.  We have discussed alternative options for anticoagulation in the past and she has not been wishing to change due to cost concerns with DOACs.    3. Long term (current) use of anticoagulants  As above.    4. Cardiomyopathy, unspecified type (H)  Stable.  Euvolemic.    5. Chronic Interstitial Lung Disease  Stable.  Up-to-date on her immunizations.  Continue close follow-up with her pulmonologist.    6. Hypothyroidism, unspecified type  Seems euthyroid.  Check TSH.  - TSH WITH FREE T4 REFLEX; Future    7. Simple chronic bronchitis (H)  Continue inhalers per pulmonary stable.    8. Robert Bonnet syndrome  Stable.  Continue Ambien.  She is aware of the risks and willing to accept risks.    9. " "Osteoporosis  Continue Prolia lifelong  - denosumab (PROLIA) injection 60 mg     Patient has been advised of split billing requirements and indicates understanding: Yes      COUNSELING:  Reviewed preventive health counseling, as reflected in patient instructions      BMI:   Estimated body mass index is 25.4 kg/m  as calculated from the following:    Height as of this encounter: 1.626 m (5' 4\").    Weight as of this encounter: 67.1 kg (148 lb).         She reports that she has never smoked. She has never used smokeless tobacco.      Appropriate preventive services were discussed with this patient, including applicable screening as appropriate for fall prevention, nutrition, physical activity, Tobacco-use cessation, weight loss and cognition.  Checklist reviewing preventive services available has been given to the patient.    Reviewed patients plan of care and provided an AVS. The Basic Care Plan (routine screening as documented in Health Maintenance) for Mariia meets the Care Plan requirement. This Care Plan has been established and reviewed with the Patient.          PHILIPP ALCALA MD  [facility]    Identified Health Risks:  I have reviewed Opioid Use Disorder and Substance Use Disorder risk factors and made any needed referrals.   The patient reports that she has difficulty with activities of daily living. This issue was addressed at today's appointment.   Information on urinary incontinence and treatment options given to patient.  "

## 2023-12-28 NOTE — PROGRESS NOTES
Clinic Administered Medication Documentation      Prolia Documentation    Indication: Prolia  (denosumab) is a prescription medicine used to treat osteoporosis in patients who:   Are at high risk for fracture, meaning patients who have had a fracture related to osteoporosis, or who have multiple risk factors for fracture.  Cannot use another osteoporosis medicine or other osteoporosis medicines did not work well.  The timeline for early/late injections would be 4 weeks early and any time after the 6 month cathy. If a patient receives their injection late, then the subsequent injection would be 6 months from the date that they actually received the injection.    When was the last injection?  23  Was the last injection at least 6 months ago? Yes  Has the prior authorization been completed?  Yes  Is there an active order (written within the past 365 days, with administrations remaining, not ) in the chart?  Yes  Patient denies any dental work involving the bone (e.g. tooth extraction or dental implants) in the past 4 weeks?  Yes  Patient denies plans for any dental work involving the bone (e.g. tooth extraction or dental implants) in the next 4 weeks? Yes    The following steps were completed to comply with the REMS program for Prolia:  Reviewed information in the Medication Guide and Patient Counseling Chart, including the serious risks of Prolia  and the symptoms of each risk.  Advised patient to seek prompt medical attention if they have signs or symptoms of any of the serious risks.  Provided each patient a copy of the Medication Guide and Patient Brochure.    Prior to injection, verified patient identity using patient's name and date of birth. Medication was administered. Please see MAR and medication order for additional information. Patient instructed to remain in clinic for 15 minutes and report any adverse reaction to staff immediately.    Vial/Syringe: Syringe  Was this medication supplied by the  patient? No  Verified that the patient has refills remaining in their prescription.

## 2023-12-28 NOTE — PROGRESS NOTES
The patient reports that she has difficulty with activities of daily living. This issue was addressed at today's appointment. ***  Information on urinary incontinence and treatment options given to patient.

## 2023-12-28 NOTE — PATIENT INSTRUCTIONS
Patient Education   Personalized Prevention Plan  You are due for the preventive services outlined below.  Your care team is available to assist you in scheduling these services.  If you have already completed any of these items, please share that information with your care team to update in your medical record.  Health Maintenance Due   Topic Date Due     COPD Action Plan  Never done     Annual Wellness Visit  10/20/2023     Thyroid Function Lab  10/20/2023     ANNUAL REVIEW OF HM ORDERS  10/20/2023     Activities of Daily Living    Your Health Risk Assessment indicates you have difficulties with activities of daily living such as housework, bathing, preparing meals, taking medication, etc. Your provider addressed this with you at today's appointment.  Bladder Training: Care Instructions  Your Care Instructions     Bladder training is used to treat urge incontinence and stress incontinence. Urge incontinence means that the need to urinate comes on so fast that you can't get to a toilet in time. Stress incontinence means that you leak urine because of pressure on your bladder. For example, it may happen when you laugh, cough, or lift something heavy.  Bladder training can increase how long you can wait before you have to urinate. It can also help your bladder hold more urine. And it can give you better control over the urge to urinate.  It is important to remember that bladder training takes a few weeks to a few months to make a difference. You may not see results right away, but don't give up.  Follow-up care is a key part of your treatment and safety. Be sure to make and go to all appointments, and call your doctor if you are having problems. It's also a good idea to know your test results and keep a list of the medicines you take.  How can you care for yourself at home?  Work with your doctor to come up with a bladder training program that is right for you. You may use one or more of the following  "methods.  Delayed urination  In the beginning, try to keep from urinating for 5 minutes after you first feel the need to go.  While you wait, take deep, slow breaths to relax. Kegel exercises can also help you delay the need to go to the bathroom.  After some practice, when you can easily wait 5 minutes to urinate, try to wait 10 minutes before you urinate.  Slowly increase the waiting period until you are able to control when you have to urinate.  Scheduled urination  Empty your bladder when you first wake up in the morning.  Schedule times throughout the day when you will urinate.  Start by going to the bathroom every hour, even if you don't need to go.  Slowly increase the time between trips to the bathroom.  When you have found a schedule that works well for you, keep doing it.  If you wake up during the night and have to urinate, do it. Apply your schedule to waking hours only.  Kegel exercises  These tighten and strengthen pelvic muscles, which can help you control the flow of urine. (If doing these exercises causes pain, stop doing them and talk with your doctor.) To do Kegel exercises:  Squeeze your muscles as if you were trying not to pass gas. Or squeeze your muscles as if you were stopping the flow of urine. Your belly, legs, and buttocks shouldn't move.  Hold the squeeze for 3 seconds, then relax for 5 to 10 seconds.  Start with 3 seconds, then add 1 second each week until you are able to squeeze for 10 seconds.  Repeat the exercise 10 times a session. Do 3 to 8 sessions a day.  When should you call for help?  Watch closely for changes in your health, and be sure to contact your doctor if:    Your incontinence is getting worse.     You do not get better as expected.   Where can you learn more?  Go to https://www.healthwise.net/patiented  Enter V684 in the search box to learn more about \"Bladder Training: Care Instructions.\"  Current as of: February 28, 2023               Content Version: 13.8    " 8609-1031 [employer].   Care instructions adapted under license by your healthcare professional. If you have questions about a medical condition or this instruction, always ask your healthcare professional. Healthwise, [employer] disclaims any warranty or liability for your use of this information.

## 2023-12-28 NOTE — PROGRESS NOTES
ANTICOAGULATION MANAGEMENT     Mariia Tinoco 88 year old female is on warfarin with therapeutic INR result. (Goal INR 2.0-3.0)    Recent labs: (last 7 days)     12/28/23  1107   INR 2.8*       ASSESSMENT     Source(s): Chart Review and Patient/Caregiver Call     Warfarin doses taken: Warfarin taken as instructed  Diet: No new diet changes identified  Medication/supplement changes: None noted   Prolia injection administered.  New illness, injury, or hospitalization: No   12/28/23 annual wellness check with Dr Shabazz - doing well.  Signs or symptoms of bleeding or clotting: No  Previous result: Therapeutic last 3 INR visits  Additional findings:  currently will stay on warfarin, since DOAC not cost-effective for her.       PLAN     Recommended plan for no diet, medication or health factor changes affecting INR     Dosing Instructions: Continue your current warfarin dose with next INR in 5 weeks       Summary  As of 12/28/2023      Full warfarin instructions:  3 mg every Mon, Wed, Sat; 4 mg all other days   Next INR check:  2/1/2024               Telephone call with Nicolas who verbalizes understanding and agrees to plan    Lab visit scheduled - INR on 2/1/24 @ [facility]/    Education provided:   Taking warfarin: Importance of taking warfarin as instructed  Goal range and lab monitoring: goal range and significance of current result    Plan made per [facility] anticoagulation protocol    Cassandra Banuelos RN  Anticoagulation Clinic  12/28/2023    _______________________________________________________________________     Anticoagulation Episode Summary       Current INR goal:  2.0-3.0   TTR:  80.2% (1 y)   Target end date:  Indefinite   Send INR reminders to:  [facility]    Indications    Persistent atrial fibrillation (H) [I48.19]  Long term (current) use of anticoagulants [Z79.01]             Comments:               Anticoagulation Care Providers       Provider Role Specialty Phone number    Eagle Shabazz MD Referring  Internal Medicine [phone]    Mariano Munoz MD Referring Internal Medicine [phone]

## 2024-01-11 DIAGNOSIS — F51.04 CHRONIC INSOMNIA: ICD-10-CM

## 2024-01-11 DIAGNOSIS — E03.9 HYPOTHYROIDISM, UNSPECIFIED TYPE: ICD-10-CM

## 2024-01-11 RX ORDER — ZOLPIDEM TARTRATE 5 MG/1
TABLET ORAL
Qty: 30 TABLET | Refills: 0 | Status: SHIPPED | OUTPATIENT
Start: 2024-01-11 | End: 2024-02-12

## 2024-01-11 RX ORDER — LEVOTHYROXINE SODIUM 75 UG/1
75 TABLET ORAL DAILY
Qty: 90 TABLET | Refills: 2 | Status: SHIPPED | OUTPATIENT
Start: 2024-01-11 | End: 2024-09-09

## 2024-02-01 ENCOUNTER — TELEPHONE (OUTPATIENT)
Dept: INTERNAL MEDICINE | Facility: CLINIC | Age: 89
End: 2024-02-01

## 2024-02-01 ENCOUNTER — ANTICOAGULATION THERAPY VISIT (OUTPATIENT)
Dept: ANTICOAGULATION | Facility: CLINIC | Age: 89
End: 2024-02-01

## 2024-02-01 ENCOUNTER — LAB (OUTPATIENT)
Dept: LAB | Facility: CLINIC | Age: 89
End: 2024-02-01
Payer: MEDICARE

## 2024-02-01 ENCOUNTER — TRANSFERRED RECORDS (OUTPATIENT)
Dept: HEALTH INFORMATION MANAGEMENT | Facility: CLINIC | Age: 89
End: 2024-02-01

## 2024-02-01 DIAGNOSIS — Z79.01 LONG TERM (CURRENT) USE OF ANTICOAGULANTS: ICD-10-CM

## 2024-02-01 DIAGNOSIS — I48.19 PERSISTENT ATRIAL FIBRILLATION (H): ICD-10-CM

## 2024-02-01 DIAGNOSIS — I48.19 PERSISTENT ATRIAL FIBRILLATION (H): Primary | ICD-10-CM

## 2024-02-01 LAB — INR BLD: 3.1 (ref 0.9–1.1)

## 2024-02-01 PROCEDURE — 85610 PROTHROMBIN TIME: CPT

## 2024-02-01 PROCEDURE — 36416 COLLJ CAPILLARY BLOOD SPEC: CPT

## 2024-02-01 NOTE — PROGRESS NOTES
ANTICOAGULATION MANAGEMENT     Mariia Tinoco 88 year old female is on warfarin with supratherapeutic INR result. (Goal INR 2.0-3.0)    Recent labs: (last 7 days)     02/01/24  0928   INR 3.1*       ASSESSMENT     Warfarin Lab Questionnaire    Warfarin Doses Last 7 Days      2/1/2024     9:28 AM   Dose in Tablet or Mg   TAB or MG? - 1mg warfarin tablets  milligram (mg)     Pt Rptd Dose SUNDAY MONDAY TUESDAY WED THURS FRIDAY SATURDAY 2/1/2024   9:28 AM 4 3 4 3 4 4 3         2/1/2024   Warfarin Lab Questionnaire   Missed doses within past 14 days? Yes - missed warfarin dose more than 3 wks ago.   If yes; please list when: early January     Changes in diet or alcohol within past 14 days? No   Medication changes since last result? No   Injuries or illness since last result? No   New shortness of breath, severe headaches or sudden changes in vision since last result? No   Abnormal bleeding since last result? No   Upcoming surgery, procedure? No     Best number to call with results? [phone]     Previous result: Therapeutic last 4 INR visits.    Additional findings: No factors to affect INR.       PLAN     Recommended plan for no diet, medication or health factor changes affecting INR     Dosing Instructions: Continue your current warfarin dose with next INR in 2 weeks       Summary  As of 2/1/2024      Full warfarin instructions:  3 mg every Mon, Wed, Sat; 4 mg all other days   Next INR check:  2/15/2024               Telephone call with Nicolas who verbalizes understanding and agrees to plan    Lab visit scheduled - INR on 2/15/24 @ [facility]    Education provided:   Taking warfarin: Importance of taking warfarin as instructed  Goal range and lab monitoring: goal range and significance of current result  Dietary considerations: importance of consistent vitamin K intake    Plan made per [facility] anticoagulation protocol    Cassandra Banuelos, RN  Anticoagulation  Clinic  2/1/2024    _______________________________________________________________________     Anticoagulation Episode Summary       Current INR goal:  2.0-3.0   TTR:  83.2% (1 y)   Target end date:  Indefinite   Send INR reminders to:  [facility]    Indications    Persistent atrial fibrillation (H) [I48.19]  Long term (current) use of anticoagulants [Z79.01]             Comments:               Anticoagulation Care Providers       Provider Role Specialty Phone number    Eagle Shabazz MD Referring Internal Medicine [phone]    Mariano Munoz MD Referring Internal Medicine [phone]

## 2024-02-01 NOTE — TELEPHONE ENCOUNTER
General Call      Reason for Call:  INR    What are your questions or concerns:  returning call    Date of last appointment with provider: 02-01    Could we send this information to you in [place] or would you prefer to receive a phone call?:   Patient would prefer a phone call   Okay to leave a detailed message?: Yes at Cell number on file:    Telephone Information:   Mobile [phone]

## 2024-02-02 DIAGNOSIS — Z79.01 LONG TERM (CURRENT) USE OF ANTICOAGULANTS: ICD-10-CM

## 2024-02-02 DIAGNOSIS — I48.19 PERSISTENT ATRIAL FIBRILLATION (H): ICD-10-CM

## 2024-02-02 RX ORDER — WARFARIN SODIUM 1 MG/1
TABLET ORAL
Qty: 310 TABLET | Refills: 1 | Status: SHIPPED | OUTPATIENT
Start: 2024-02-02

## 2024-02-11 DIAGNOSIS — F51.04 CHRONIC INSOMNIA: ICD-10-CM

## 2024-02-12 RX ORDER — ZOLPIDEM TARTRATE 5 MG/1
TABLET ORAL
Qty: 30 TABLET | Refills: 0 | Status: SHIPPED | OUTPATIENT
Start: 2024-02-12 | End: 2024-03-13

## 2024-02-15 ENCOUNTER — ANTICOAGULATION THERAPY VISIT (OUTPATIENT)
Dept: ANTICOAGULATION | Facility: CLINIC | Age: 89
End: 2024-02-15

## 2024-02-15 ENCOUNTER — LAB (OUTPATIENT)
Dept: LAB | Facility: CLINIC | Age: 89
End: 2024-02-15
Payer: MEDICARE

## 2024-02-15 DIAGNOSIS — I48.19 PERSISTENT ATRIAL FIBRILLATION (H): Primary | ICD-10-CM

## 2024-02-15 DIAGNOSIS — Z79.01 LONG TERM (CURRENT) USE OF ANTICOAGULANTS: ICD-10-CM

## 2024-02-15 DIAGNOSIS — I48.19 PERSISTENT ATRIAL FIBRILLATION (H): ICD-10-CM

## 2024-02-15 LAB — INR BLD: 3.4 (ref 0.9–1.1)

## 2024-02-15 PROCEDURE — 85610 PROTHROMBIN TIME: CPT

## 2024-02-15 PROCEDURE — 36416 COLLJ CAPILLARY BLOOD SPEC: CPT

## 2024-02-15 NOTE — PROGRESS NOTES
ANTICOAGULATION MANAGEMENT     Mariia Tinoco 88 year old female is on warfarin with supratherapeutic INR result. (Goal INR 2.0-3.0)    Recent labs: (last 7 days)     02/15/24  0931   INR 3.4*       ASSESSMENT     Warfarin Lab Questionnaire    Warfarin Doses Last 7 Days      2/15/2024     9:29 AM   Dose in Tablet or Mg   TAB or MG? milligram (mg)     Pt Rptd Dose MIKEL MONDAY TUESDAY WED THURS FRIDAY SATURDAY   2/15/2024   9:29 AM 4 3 4 3 4 3 4         2/15/2024   Warfarin Lab Questionnaire   Missed doses within past 14 days? No   Changes in diet or alcohol within past 14 days? No   Medication changes since last result? No   Injuries or illness since last result? No   New shortness of breath, severe headaches or sudden changes in vision since last result? No   Abnormal bleeding since last result? No   Upcoming surgery, procedure? No   Best number to call with results? [phone]     Previous result: Supratherapeutic at 3.1 on 2/1/24.    Additional findings:  INR trended high with no factors.       PLAN     Recommended plan for no diet, medication or health factor changes affecting INR     Dosing Instructions: decrease your warfarin dose (4% change) with next INR in 2 weeks       Summary  As of 2/15/2024      Full warfarin instructions:  4 mg every Mon, Wed, Fri; 3 mg all other days   Next INR check:  2/29/2024               Telephone call with Nicolas who verbalizes understanding and agrees to plan    Lab visit scheduled - INRo n 3/7/24 @ [facility]   - Nicolas does not drive  d/t legally blind and  [place] [facility]    Education provided:   Taking warfarin: Importance of taking warfarin as instructed  Goal range and lab monitoring: goal range and significance of current result    Plan made per [facility] anticoagulation protocol    Cassandra Banuelos, RN  Anticoagulation Clinic  2/15/2024    _______________________________________________________________________     Anticoagulation Episode Summary       Current INR  goal:  2.0-3.0   TTR:  82.3% (1 y)   Target end date:  Indefinite   Send INR reminders to:  [facility]    Indications    Persistent atrial fibrillation (H) [I48.19]  Long term (current) use of anticoagulants [Z79.01]             Comments:               Anticoagulation Care Providers       Provider Role Specialty Phone number    Eagle Shabazz MD Referring Internal Medicine [phone]    Mariano Munoz MD Referring Internal Medicine [phone]

## 2024-03-07 ENCOUNTER — ANTICOAGULATION THERAPY VISIT (OUTPATIENT)
Dept: ANTICOAGULATION | Facility: CLINIC | Age: 89
End: 2024-03-07

## 2024-03-07 ENCOUNTER — LAB (OUTPATIENT)
Dept: LAB | Facility: CLINIC | Age: 89
End: 2024-03-07
Payer: MEDICARE

## 2024-03-07 DIAGNOSIS — Z79.01 LONG TERM (CURRENT) USE OF ANTICOAGULANTS: ICD-10-CM

## 2024-03-07 DIAGNOSIS — I48.19 PERSISTENT ATRIAL FIBRILLATION (H): ICD-10-CM

## 2024-03-07 DIAGNOSIS — I48.19 PERSISTENT ATRIAL FIBRILLATION (H): Primary | ICD-10-CM

## 2024-03-07 LAB — INR BLD: 3.3 (ref 0.9–1.1)

## 2024-03-07 PROCEDURE — 85610 PROTHROMBIN TIME: CPT

## 2024-03-07 PROCEDURE — 36416 COLLJ CAPILLARY BLOOD SPEC: CPT

## 2024-03-07 NOTE — PROGRESS NOTES
ANTICOAGULATION MANAGEMENT     Mariia PICKARD Tinoco 88 year old female is on warfarin with supratherapeutic INR result. (Goal INR 2.0-3.0)    Recent labs: (last 7 days)     03/07/24  0919   INR 3.3*       ASSESSMENT     Warfarin Lab Questionnaire    Warfarin Doses Last 7 Days      3/7/2024     9:16 AM   Dose in Tablet or Mg   TAB or MG? - 1mg warfarin tablets  milligram (mg)     Pt Rptd Dose MIKEL MONDAY TUESDAY WED THURS FRIDAY SATURDAY   3/7/2024   9:16 AM 3 4 3 4 3 3 4         3/7/2024   Warfarin Lab Questionnaire   Missed doses within past 14 days? No - weekly dose decreased on 2/16/24.     Changes in diet or alcohol within past 14 days? No   Medication changes since last result? No   Injuries or illness since last result? No   New shortness of breath, severe headaches or sudden changes in vision since last result? No   Abnormal bleeding since last result? No   Upcoming surgery, procedure? No   Best number to call with results? [phone]     Previous result: Supratherapeutic last 2 INR results.    Additional findings: None       PLAN     Recommended plan for no diet, medication or health factor changes affecting INR     Dosing Instructions: decrease your warfarin dose (8.3% change) with next INR in 2-3 weeks       Summary  As of 3/7/2024      Full warfarin instructions:  4 mg every Wed; 3 mg all other days   Next INR check:  3/21/2024               Telephone call with Nicolas who verbalizes understanding and agrees to plan    Lab visit scheduled - INR on 4/4/24 @ [facility]   - dependent on a ride.  She does not drive      Education provided:   Taking warfarin: Importance of taking warfarin as instructed  Goal range and lab monitoring: goal range and significance of current result    Plan made per [facility] anticoagulation protocol    Cassandra Banuelos, RN  Anticoagulation Clinic  3/7/2024    _______________________________________________________________________     Anticoagulation Episode Summary       Current INR goal:   2.0-3.0   TTR:  76.6% (1 y)   Target end date:  Indefinite   Send INR reminders to:  [facility]    Indications    Persistent atrial fibrillation (H) [I48.19]  Long term (current) use of anticoagulants [Z79.01]             Comments:               Anticoagulation Care Providers       Provider Role Specialty Phone number    Eagle Shabazz MD Referring Internal Medicine [phone]    Mariano Munoz MD Referring Internal Medicine [phone]

## 2024-03-13 DIAGNOSIS — F51.04 CHRONIC INSOMNIA: ICD-10-CM

## 2024-03-13 RX ORDER — ZOLPIDEM TARTRATE 5 MG/1
TABLET ORAL
Qty: 30 TABLET | Refills: 0 | Status: SHIPPED | OUTPATIENT
Start: 2024-03-13 | End: 2024-04-12

## 2024-03-22 ENCOUNTER — NURSE TRIAGE (OUTPATIENT)
Dept: INTERNAL MEDICINE | Facility: CLINIC | Age: 89
End: 2024-03-22
Payer: MEDICARE

## 2024-03-22 NOTE — TELEPHONE ENCOUNTER
"Nurse Triage SBAR    Is this a 2nd Level Triage? YES, LICENSED PRACTITIONER REVIEW IS REQUIRED    Situation: Patient fall March 10th    Background: taking Coumadin, Legally blind per patient report.     Assessment: Fell March 10th down 12 steps and hit head and left forearm. Reports it was a \"bad wound\"  Reports wound is 2-3 inches above elbow and to elbow.     Reports when taking band aids off in the morning there is blood on there. Reports blood is dried on arm however there is blood on bandaid. Denies any knowledge of drainage. Denies fevers. Patient reports being legally blind and unable to report at time of call status or wound. Patient wondering if she should put ointment on wound.     Reports full range of motion of arm. Reports skin that is healing is tight and does not want to move arm due to pain of skin wound.     Reports hitting head at least once during fall. Patient on Coumadin. Denies confusion, loss of consciousness, active bleeding, bumps or bruising on head.       Protocol Recommended Disposition:   See in Office Today, Go To ED/UCC Now (Or To Office With PCP Approval)    Recommendation: Advised to be seen urgently for imaging in ER or UC. Gigi declined. Requesting appt in clinic. Advised if active bleeding to apply direct pressure to area.      Routed to provider    Does the patient meet one of the following criteria for ADS visit consideration? 16+ years old, with an [facility] PCP     TIP  Providers, please consider if this condition is appropriate for management at one of our Acute and Diagnostic Services sites.     If patient is a good candidate, please use dotphrase <dot>triageresponse and select Refer to ADS to document.    Reason for Disposition   Taking Coumadin (warfarin) or other strong blood thinner, or known bleeding disorder (e.g., thrombocytopenia)   Patient wants to be seen    Additional Information   Negative: Major bleeding (actively dripping or spurting) that can't be stopped   " Negative: Amputation   Negative: Shock suspected (e.g., cold/pale/clammy skin, too weak to stand, low BP, rapid pulse)   Negative: Knife wound (or other possibly deep cut) to chest, abdomen, back, neck, or head   Negative: Sounds like a life-threatening emergency to the triager   Negative: Animal bite   Negative: Burn   Negative: Foreign body in skin (e.g., splinter, sliver)   Negative: Human bite   Negative: Puncture wound   Negative: Wound infection suspected (cut or other wound now looks infected)   Negative: High pressure injection injury (e.g., from grease gun or paint gun, usually work-related)   Negative: Skin loss goes very deep (e.g., can see bones or tendons)   Negative: Skin loss involves more than 10% of body surface area   Negative: ACUTE NEUROLOGIC SYMPTOM and symptom present now   Negative: Knocked out (unconscious) > 1 minute   Negative: Seizure (convulsion) occurred  (Exception: Prior history of seizures and now alert and without Acute Neurologic Symptoms.)   Negative: Neck pain after dangerous injury (e.g., MVA, diving, trampoline, contact sports, fall > 10 feet or 3 meters)  (Exception: Neck pain began > 1 hour after injury.)   Negative: Major bleeding (actively dripping or spurting) that can't be stopped   Negative: Penetrating head injury (e.g., knife, gunshot wound, metal object)   Negative: Sounds like a life-threatening emergency to the triager   Negative: Diagnosed with a concussion within last 14 days   Negative: Can't remember what happened (amnesia)   Negative: Vomiting once or more   Negative: Watery or blood-tinged fluid dripping from the nose or ears   Negative: Skin is split open or gaping (length > 1/2 inch or 12 mm)   Negative: One or two 'black eyes' (bruising, purple color of eyelids), and onset within 24 hours of head injury   Negative: Large swelling or bruise (> 2 inches or 5 cm)   Negative: Bleeding won't stop after 10 minutes of direct pressure (using correct technique)    Negative: SEVERE headache   Negative: Dangerous injury (e.g., MVA, diving, trampoline, contact sports, fall > 10 feet or 3 meters) or severe blow from hard object (e.g., golf club or baseball bat)   Negative: ACUTE NEUROLOGIC SYMPTOM and now fine   Negative: Knocked out (unconscious) < 1 minute and now fine   Negative: Bleeding won't stop after 10 minutes of direct pressure (using correct technique)   Negative: Skin is split open or gaping (length > 1/2 inch or 12 mm on the skin, 1/4 inch or 6 mm on the face)     Unknown   Negative: Deep cut and can see bone or tendons   Negative: Dirt in the wound and not removed after 15 minutes of scrubbing   Negative: Wound causes numbness (i.e., loss of sensation)   Negative: Wound causes weakness (i.e., decreased ability to move hand, finger, toe)   Negative: Sounds like a serious injury to the triager   Negative: SEVERE pain (e.g., excruciating)   Negative: Looks infected (spreading redness, red streak, pus) and fever   Negative: Looks infected and large red area (> 2 inches or 5 cm)   Negative: Raised bruise with size > 2 inches (5 cm) that is getting bigger     Unknown   Negative: Looks infected (spreading redness, pus) and no fever   Negative: No prior tetanus shots (or is not fully vaccinated) and any wound (e.g., cut or scrape)   Negative: HIV positive or severe immunodeficiency (severely weak immune system) and DIRTY cut or scrape    Protocols used: Skin Injury-A-OH, Head Injury-A-OH

## 2024-03-22 NOTE — TELEPHONE ENCOUNTER
"Contacted patient to schedule appointment with ADS, patient stated she was unavailable to come today. Recommended patient go to walk in care for further evaluation as I am not able to see the wound to give recommendations. Patient stated she might go to walk in care or else will \"call her doctor again Monday\".  "

## 2024-03-23 ENCOUNTER — OFFICE VISIT (OUTPATIENT)
Dept: URGENT CARE | Facility: URGENT CARE | Age: 89
End: 2024-03-23
Payer: MEDICARE

## 2024-03-23 ENCOUNTER — ANCILLARY PROCEDURE (OUTPATIENT)
Dept: GENERAL RADIOLOGY | Facility: CLINIC | Age: 89
End: 2024-03-23
Attending: NURSE PRACTITIONER
Payer: MEDICARE

## 2024-03-23 VITALS
SYSTOLIC BLOOD PRESSURE: 135 MMHG | DIASTOLIC BLOOD PRESSURE: 82 MMHG | RESPIRATION RATE: 16 BRPM | OXYGEN SATURATION: 99 % | TEMPERATURE: 97.8 F | WEIGHT: 148 LBS | BODY MASS INDEX: 25.4 KG/M2 | HEART RATE: 81 BPM
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
## 2024-03-23 DIAGNOSIS — S59.902A ELBOW INJURY, LEFT, INITIAL ENCOUNTER: Primary | ICD-10-CM

## 2024-03-23 DIAGNOSIS — S59.902A ELBOW INJURY, LEFT, INITIAL ENCOUNTER: ICD-10-CM

## 2024-03-23 PROCEDURE — 99214 OFFICE O/P EST MOD 30 MIN: CPT | Performed by: NURSE PRACTITIONER

## 2024-03-23 PROCEDURE — 73070 X-RAY EXAM OF ELBOW: CPT | Mod: TC | Performed by: RADIOLOGY

## 2024-03-23 NOTE — PROGRESS NOTES
"Chief Complaint   Patient presents with    Fall     Fell March 10th down 12 steps and hit head and left forearm. Reports it was a \"bad wound\"  Reports wound is 2-3 inches above elbow and to elbow.           SUBJECTIVE:  Mariia Tinoco is a 88 year old female who presents to the clinic today after sustaining a fall 2 weeks ago.  She was at [employer] and tumbled down a flight of stairs mostly injuring her left elbow.  She is not exactly sure if she hit her head or not.  Her  witnessed it and says most of the fall was to her elbow.  Declines headache loss of consciousness vomiting bruising bleeding to face  or scalp, no confusion weakness agitation somnolence.  Baseline blind.  She is on Coumadin.  She is most concerned about her left elbow open abrasion.  There is slight erythema edema tenderness scant bleeding.  No bony tenderness or lost range of motion, fever, purulent discharge.    Past Medical History:   Diagnosis Date    Acute sinusitis     past hx    Biventricular cardiac pacemaker in situ     Cardiomyopathy (H)     Chronic bronchitis (H)     Chronic interstitial lung disease (H)     COPD (chronic obstructive pulmonary disease) (H)     Disc disease, degenerative, cervical     Diverticulosis     Edema     Hypothyroidism     Macular degeneration     Osteoarthritis     Osteoporosis     Ovarian cancer (H)     age 38    Persistent atrial fibrillation (H)     Pulmonary fibrosis (H)     Skin cancer     Tuberculosis     past hx     acetaminophen (TYLENOL) 500 MG tablet, Take 2 tablets (1,000 mg) by mouth as needed  albuterol (PROAIR HFA;PROVENTIL HFA;VENTOLIN HFA) 90 mcg/actuation inhaler, [ALBUTEROL (PROAIR HFA;PROVENTIL HFA;VENTOLIN HFA) 90 MCG/ACTUATION INHALER] Inhale 2 puffs every 6 (six) hours as needed for wheezing.  calcium carbonate (OS-ZARI) 600 mg (1,500 mg) tablet, Take 600 mg by mouth 2 times daily (with meals)  fluticasone-vilanterol (BREO ELLIPTA) 100-25 mcg/dose DsDv inhaler, Inhale 1 puff into the " lungs daily  levothyroxine (SYNTHROID/LEVOTHROID) 75 MCG tablet, TAKE 1 TABLET BY MOUTH EVERY DAY  melatonin 3 mg Tab tablet, Take 1 tablet (3 mg) by mouth At Bedtime  metoprolol succinate (TOPROL XL) 25 MG, Take 1 tablet (25 mg) by mouth daily  Propylene Glycol (SYSTANE BALANCE OP), Apply 1 drop to eye 4 times daily  warfarin ANTICOAGULANT (COUMADIN) 1 MG tablet, Take 3 tabs (3mg) Monday, Wednesday, Saturday, and take 4 tabs (4mg) all other days, by mouth as directed.  Adjust dose based on INR results.  warfarin ANTICOAGULANT (COUMADIN) 1 MG tablet, TAKE 3 TABS ON MONDAY, WEDNESDAY, SATURDAY TO 4 TABLETS ALL OTHER DAYS, OR AS DIRECTED. ADJUST DOSE BASED ON INR RESULTS.  zolpidem (AMBIEN) 5 MG tablet, TAKE 1/2 TO 1 TABLET (2.5 TO 5 MG) BY MOUTH EVERY NIGHT AS NEEDED FOR SLEEP    denosumab (PROLIA) injection 60 mg  denosumab (PROLIA) injection 60 mg      Social History     Tobacco Use    Smoking status: Never    Smokeless tobacco: Never   Substance Use Topics    Alcohol use: Yes     Alcohol/week: 1.0 standard drink of alcohol     Types: 1 Standard drinks or equivalent per week     Comment: Alcoholic Drinks/day: occasional - 1 drink per week     Allergies   Allergen Reactions    Bee Venom Protein (Honey Bee) [Bee Venom] Anaphylaxis and Hives    Ultram [Tramadol] Nausea and Vomiting       Review of Systems  All systems negative except those listed above in HPI.    EXAM:   /82   Pulse 81   Temp 97.8  F (36.6  C) (Temporal)   Resp 16   Wt 67.1 kg (148 lb)   LMP  (LMP Unknown)   SpO2 99%   BMI 25.40 kg/m      Physical Exam  Vitals reviewed.   Constitutional:       Appearance: Normal appearance.   HENT:      Head: Normocephalic and atraumatic.   Cardiovascular:      Rate and Rhythm: Normal rate.      Pulses: Normal pulses.   Pulmonary:      Effort: Pulmonary effort is normal.   Musculoskeletal:         General: Swelling, tenderness and signs of injury present. Normal range of motion.      Cervical back: Normal  range of motion and neck supple. No rigidity or tenderness.      Comments: Left elbow skin tear with brown granulation tissue scabbing scant bleeding faint erythema edema.   Skin:     General: Skin is warm and dry.      Findings: Bruising and erythema present.   Neurological:      General: No focal deficit present.      Mental Status: She is alert and oriented to person, place, and time.      Cranial Nerves: No cranial nerve deficit (baseline blind, no new deficits).      Sensory: No sensory deficit.      Motor: No weakness.      Coordination: Coordination normal.      Gait: Gait normal.   Psychiatric:         Mood and Affect: Mood normal.         Behavior: Behavior normal.       X-ray done in clinic by me as negative for fracture or dislocation.  No sail sign.    ASSESSMENT:    ICD-10-CM    1. Elbow injury, left, initial encounter  S59.902A XR Elbow Left 2 Views      2. Fall, initial encounter  W19.XXXA         PLAN:    Discussed questionable head injury with patient as well as [place] head CT protocol  Patient declines ER at this time as she does not believe she sustained a true head injury  I advised presenting to the ER if there are any symptoms such as headache confusion vomiting trouble walking talking bruising under eyes or at the nape of neck  Advised patient to reach out to primary care provider on Monday for close follow-up    Left elbow injury x-ray done in clinic, negative for fracture  Wound was cleansed and bandaged, dressings given to patient  Wash daily with lukewarm soapy water bacitracin zinc and dress with nonstick gauze  Monitor for signs of infection redness warmth fever malodorous pus    Follow up with primary care provider with any problems, questions or concerns or if symptoms worsen or fail to improve. Patient agreed to plan and verbalized understanding.    Gracie Ireland, [facility] [place]

## 2024-03-25 ENCOUNTER — NURSE TRIAGE (OUTPATIENT)
Dept: NURSING | Facility: CLINIC | Age: 89
End: 2024-03-25
Payer: MEDICARE

## 2024-03-25 NOTE — TELEPHONE ENCOUNTER
Spoke with patient who declined neurological symptoms: headache, vision changes, confusion, sedation, swelling. Reports patient had a neurological assessment at  and there have been no changes since that visit 3/23. No CT imaging orders noted in chart.     Scheduled for follow up visit with PCP 3/26.    Advised if any symptoms arise to go to ED. Patient verbalized understanding and had no further questions at time of call.

## 2024-03-25 NOTE — TELEPHONE ENCOUNTER
Need more information.  Does she have any symptoms of a bleed in her head?  Neurologic symptoms?  Confusion?  Headache?  Sedation?  I would recommend that if she has any of these things that she go to the emergency room.  If not, then she should schedule a hospital follow-up with me this week.  I have openings this week for a hospital/ER follow-up.  OK to use the approval required slots

## 2024-03-25 NOTE — TELEPHONE ENCOUNTER
Pt is phoning stating that she fell 2 weeks ago 03/23/2024 and hurt her left forearm and was seen in [facility] who advised that pt have a CT scan of her brain. Pt is on a blood thinner and is wondering if a CT scan is needed and how to get that scheduled.    Pt state that [facility] was going to reach out to provider about scheduling a CT scan, but pt has not heard anything.     Pt can be reached at [phone]    No triage     Nano Charles RN  [place] Nurse Advisor  9:41 AM 3/25/2024          Reason for Disposition   [1] Caller requesting NON-URGENT health information AND [2] PCP's office is the best resource    Additional Information   Negative: [1] Caller is not with the adult (patient) AND [2] reporting urgent symptoms   Negative: Lab result questions   Negative: Medication questions   Negative: Caller can't be reached by phone   Negative: Caller has already spoken to PCP or another triager   Negative: RN needs further essential information from caller in order to complete triage   Negative: Requesting regular office appointment    Protocols used: Information Only Call - No T[facility]

## 2024-03-26 ENCOUNTER — ANTICOAGULATION THERAPY VISIT (OUTPATIENT)
Dept: ANTICOAGULATION | Facility: CLINIC | Age: 89
End: 2024-03-26

## 2024-03-26 ENCOUNTER — ANCILLARY PROCEDURE (OUTPATIENT)
Dept: GENERAL RADIOLOGY | Facility: CLINIC | Age: 89
End: 2024-03-26
Attending: INTERNAL MEDICINE
Payer: MEDICARE

## 2024-03-26 ENCOUNTER — OFFICE VISIT (OUTPATIENT)
Dept: INTERNAL MEDICINE | Facility: CLINIC | Age: 89
End: 2024-03-26
Payer: MEDICARE

## 2024-03-26 VITALS
BODY MASS INDEX: 25.15 KG/M2 | WEIGHT: 147.3 LBS | HEIGHT: 64 IN | HEART RATE: 82 BPM | RESPIRATION RATE: 16 BRPM | TEMPERATURE: 97.6 F | SYSTOLIC BLOOD PRESSURE: 117 MMHG | OXYGEN SATURATION: 98 % | DIASTOLIC BLOOD PRESSURE: 69 MMHG

## 2024-03-26 DIAGNOSIS — M54.2 NECK PAIN: ICD-10-CM
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
## 2024-03-26 DIAGNOSIS — S41.112D LACERATION OF LEFT UPPER EXTREMITY, SUBSEQUENT ENCOUNTER: ICD-10-CM

## 2024-03-26 DIAGNOSIS — I48.19 PERSISTENT ATRIAL FIBRILLATION (H): Primary | ICD-10-CM

## 2024-03-26 DIAGNOSIS — Z79.01 LONG TERM (CURRENT) USE OF ANTICOAGULANTS: ICD-10-CM

## 2024-03-26 DIAGNOSIS — I48.19 PERSISTENT ATRIAL FIBRILLATION (H): ICD-10-CM

## 2024-03-26 DIAGNOSIS — M81.0 OSTEOPOROSIS, SENILE: ICD-10-CM
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
## 2024-03-26 DIAGNOSIS — R91.8 ABNORMAL FINDING ON LUNG IMAGING: ICD-10-CM

## 2024-03-26 LAB — INR BLD: 2.3 (ref 0.9–1.1)

## 2024-03-26 PROCEDURE — 36416 COLLJ CAPILLARY BLOOD SPEC: CPT | Performed by: INTERNAL MEDICINE

## 2024-03-26 PROCEDURE — 85610 PROTHROMBIN TIME: CPT | Performed by: INTERNAL MEDICINE

## 2024-03-26 PROCEDURE — 72040 X-RAY EXAM NECK SPINE 2-3 VW: CPT | Mod: TC | Performed by: RADIOLOGY

## 2024-03-26 PROCEDURE — 99214 OFFICE O/P EST MOD 30 MIN: CPT | Performed by: INTERNAL MEDICINE

## 2024-03-26 NOTE — PROGRESS NOTES
1. Fall down stairs, subsequent encounter  Will check x-ray of the neck given the persistent discomfort.  CT given her underlying anticoagulation.  - CT Head w/o Contrast; Future  - XR Cervical Spine 2/3 Views; Future    2. Neck pain  As above.  She seems to getting better though.  - CT Head w/o Contrast; Future  - XR Cervical Spine 2/3 Views; Future    3. Long term (current) use of anticoagulants  She is due for an INR today.  - CT Head w/o Contrast; Future    4. Laceration of left upper extremity, subsequent encounter  This is healing well but I did tell her it may take several weeks.    5. Osteoporosis  Continue Prolia.  I am glad she has been getting her Prolia.  She is remarkably unscathed after this significant fall.    Jhonny Valenzuela is a 88 year old, presenting for the following health issues:  OFFICE VISIT  and Recheck Medication (Pt is here for office visit )      3/26/2024    10:59 AM   Additional Questions   Roomed by angélica         3/26/2024    10:59 AM   Patient Reported Additional Medications   Patient reports taking the following new medications no     History of Present Illness       Reason for visit:  Follow up    She eats 2-3 servings of fruits and vegetables daily.She consumes 1 sweetened beverage(s) daily.She exercises with enough effort to increase her heart rate 30 to 60 minutes per day.  She exercises with enough effort to increase her heart rate 5 days per week.   She is taking medications regularly.           Tete was at [employer] and thought she was grabbing for the rail but her vision is very poor and she tripped down a flight of stairs outside.  This was concrete stairs.  Fell forward.  Did hit her head once.  Also scraped up her left elbow.  No other known injuries other than some bruising.  She has had no headache but she does have neck pain.  Went to urgent care where they cleaned up the abrasion and told her to consider a head CT and go to emergency room but she refused.   "They told her to follow-up with me.  She comes in today for follow-up.  Still no neurologic symptoms but again has the neck pain left greater than right.  No neurologic symptoms.          Objective    /69 (BP Location: Right arm, Patient Position: Sitting, Cuff Size: Adult Regular)   Pulse 82   Temp 97.6  F (36.4  C) (Tympanic)   Resp 16   Ht 1.626 m (5' 4\")   Wt 66.8 kg (147 lb 4.8 oz)   LMP  (LMP Unknown)   SpO2 98%   BMI 25.28 kg/m    Body mass index is 25.28 kg/m .  Physical Exam   Pleasant woman.  No focal neurologic deficits.  Her 3 cm abrasion appears to be healing fairly well over her left elbow.  She has some ecchymosis over the leg.            Signed Electronically by: PHILIPP ALCALA MD    "

## 2024-03-26 NOTE — PROGRESS NOTES
ANTICOAGULATION MANAGEMENT     Mariia Tinoco 88 year old female is on warfarin with therapeutic INR result. (Goal INR 2.0-3.0)    Recent labs: (last 7 days)     03/26/24  1135   INR 2.3*       ASSESSMENT     Source(s): Chart Review and Patient/Caregiver Call     Warfarin doses taken: Warfarin taken as instructed   Weekly warfarin dose was decreased on 3/7/24.  Diet: No new diet changes identified  Medication/supplement changes: None noted  New illness, injury, or hospitalization: Yes:   Reported doing OK now after her fall 2 wks ago.   3/26/24 OV today with Dr. Shabazz to follow-up of neck pain, d/t a fall down the stairs on 3/10/24.   Seen at [facility] on 3/23/24 - reported she had a FALL on 3/10/24, fell 12 steps down, hit head (stated did not sustain true head injury), and left forearm. Had bad wound of left elbow. Declined ED visit.  (She was at [employer] and though she was grabbing for the rail, however, her vision is very poor and tripped down the stairs).  Signs or symptoms of bleeding or clotting: No  Previous result: Supratherapeutic last 3 INR results.  Additional findings:  referral orders for CT scan of head and Cervical spine x-rays.       PLAN     Recommended plan for temporary change(s) affecting INR     Dosing Instructions: Continue your current warfarin dose with next INR in 2 weeks       Summary  As of 3/26/2024      Full warfarin instructions:  4 mg every Wed; 3 mg all other days   Next INR check:  4/9/2024               Telephone call with Nicolas who verbalizes understanding and agrees to plan    Lab visit scheduled - INR on 4/16/24 @ [facility].   - does not drive     Education provided:   Taking warfarin: Importance of taking warfarin as instructed  Goal range and lab monitoring: goal range and significance of current result    Plan made per [facility] anticoagulation protocol    Cassandra Banuelos, RN  Anticoagulation  Clinic  3/26/2024    _______________________________________________________________________     Anticoagulation Episode Summary       Current INR goal:  2.0-3.0   TTR:  75.0% (1 y)   Target end date:  Indefinite   Send INR reminders to:  [facility]    Indications    Persistent atrial fibrillation (H) [I48.19]  Long term (current) use of anticoagulants [Z79.01]             Comments:               Anticoagulation Care Providers       Provider Role Specialty Phone number    Eagle Shabazz MD Referring Internal Medicine [phone]    Mariano Munoz MD Referring Internal Medicine [phone]

## 2024-03-28 ENCOUNTER — HOSPITAL ENCOUNTER (OUTPATIENT)
Dept: CT IMAGING | Facility: HOSPITAL | Age: 89
Discharge: HOME | End: 2024-03-28
Attending: INTERNAL MEDICINE | Admitting: INTERNAL MEDICINE
Payer: MEDICARE

## 2024-03-28 DIAGNOSIS — Z79.01 LONG TERM (CURRENT) USE OF ANTICOAGULANTS: ICD-10-CM

## 2024-03-28 DIAGNOSIS — M54.2 NECK PAIN: ICD-10-CM
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
## 2024-03-28 PROCEDURE — 70450 CT HEAD/BRAIN W/O DYE: CPT | Mod: ME

## 2024-04-12 DIAGNOSIS — F51.04 CHRONIC INSOMNIA: ICD-10-CM

## 2024-04-12 RX ORDER — ZOLPIDEM TARTRATE 5 MG/1
TABLET ORAL
Qty: 30 TABLET | Refills: 0 | Status: SHIPPED | OUTPATIENT
Start: 2024-04-12 | End: 2024-05-07

## 2024-04-16 ENCOUNTER — ANTICOAGULATION THERAPY VISIT (OUTPATIENT)
Dept: ANTICOAGULATION | Facility: CLINIC | Age: 89
End: 2024-04-16

## 2024-04-16 ENCOUNTER — LAB (OUTPATIENT)
Dept: LAB | Facility: CLINIC | Age: 89
End: 2024-04-16
Payer: MEDICARE

## 2024-04-16 DIAGNOSIS — I48.19 PERSISTENT ATRIAL FIBRILLATION (H): ICD-10-CM

## 2024-04-16 DIAGNOSIS — Z79.01 LONG TERM (CURRENT) USE OF ANTICOAGULANTS: ICD-10-CM

## 2024-04-16 DIAGNOSIS — I48.19 PERSISTENT ATRIAL FIBRILLATION (H): Primary | ICD-10-CM

## 2024-04-16 LAB — INR BLD: 2.8 (ref 0.9–1.1)

## 2024-04-16 PROCEDURE — 85610 PROTHROMBIN TIME: CPT

## 2024-04-16 PROCEDURE — 36415 COLL VENOUS BLD VENIPUNCTURE: CPT

## 2024-04-16 NOTE — PROGRESS NOTES
ANTICOAGULATION MANAGEMENT     Mariia Tinoco 88 year old female is on warfarin with therapeutic INR result. (Goal INR 2.0-3.0)    Recent labs: (last 7 days)     04/16/24  0932   INR 2.8*       ASSESSMENT     Warfarin Lab Questionnaire    Warfarin Doses Last 7 Days      4/16/2024     9:15 AM   Dose in Tablet or Mg   TAB or MG? - 1mg warfarin tablets milligram (mg)     Pt Rptd Dose SUNDAY MONDAY TUESDAY WED THURS FRIDAY SATURDAY 4/16/2024   9:15 AM 3 3 3 4 3 3 3         4/16/2024   Warfarin Lab Questionnaire   Missed doses within past 14 days? No   Changes in diet or alcohol within past 14 days? No   Medication changes since last result? No   Injuries or illness since last result? No   New shortness of breath, severe headaches or sudden changes in vision since last result? No   Abnormal bleeding since last result? No   Upcoming surgery, procedure? No   Best number to call with results? [phone]     Previous result: Therapeutic last visit at 2.3; previously outside of goal range last 3 supratherapeutic.    Additional findings:  Chart reviewed.       PLAN     Recommended plan for no diet, medication or health factor changes affecting INR     Dosing Instructions: Continue your current warfarin dose with next INR in 3-4 weeks       Summary  As of 4/16/2024      Full warfarin instructions:  4 mg every Wed; 3 mg all other days   Next INR check:  5/7/2024               Detailed voice message left for Nicolas with dosing instructions and follow up date.     Contact [phone] to schedule and with any changes, questions or concerns.     Education provided:   Taking warfarin: Importance of taking warfarin as instructed  Goal range and lab monitoring: goal range and significance of current result  Dietary considerations: importance of consistent vitamin K intake    Plan made per [facility] anticoagulation protocol    Cassandra Banuelos, RN  Anticoagulation  Clinic  4/16/2024    _______________________________________________________________________     Anticoagulation Episode Summary       Current INR goal:  2.0-3.0   TTR:  75.0% (1 y)   Target end date:  Indefinite   Send INR reminders to:  [facility]    Indications    Persistent atrial fibrillation (H) [I48.19]  Long term (current) use of anticoagulants [Z79.01]             Comments:               Anticoagulation Care Providers       Provider Role Specialty Phone number    Eagle Shabazz MD Referring Internal Medicine [phone]    Mariano Munoz MD Referring Internal Medicine [phone]

## 2024-05-07 DIAGNOSIS — F51.04 CHRONIC INSOMNIA: ICD-10-CM

## 2024-05-08 RX ORDER — ZOLPIDEM TARTRATE 5 MG/1
TABLET ORAL
Qty: 30 TABLET | Refills: 0 | Status: SHIPPED | OUTPATIENT
Start: 2024-05-08 | End: 2024-06-11

## 2024-05-15 ENCOUNTER — TELEPHONE (OUTPATIENT)
Dept: ANTICOAGULATION | Facility: CLINIC | Age: 89
End: 2024-05-15
Payer: MEDICARE

## 2024-05-15 NOTE — TELEPHONE ENCOUNTER
ANTICOAGULATION     Mariia Tinoco is overdue for an INR check.     Left message for patient to call and schedule lab appointment as soon as possible. If returning call, please schedule.     Cassandra Banuelos RN

## 2024-05-22 ENCOUNTER — TELEPHONE (OUTPATIENT)
Dept: ANTICOAGULATION | Facility: CLINIC | Age: 89
End: 2024-05-22
Payer: MEDICARE

## 2024-05-29 ENCOUNTER — ANTICOAGULATION THERAPY VISIT (OUTPATIENT)
Dept: ANTICOAGULATION | Facility: CLINIC | Age: 89
End: 2024-05-29

## 2024-05-29 ENCOUNTER — LAB (OUTPATIENT)
Dept: LAB | Facility: CLINIC | Age: 89
End: 2024-05-29
Payer: MEDICARE

## 2024-05-29 DIAGNOSIS — Z79.01 LONG TERM (CURRENT) USE OF ANTICOAGULANTS: ICD-10-CM

## 2024-05-29 DIAGNOSIS — I48.19 PERSISTENT ATRIAL FIBRILLATION (H): Primary | ICD-10-CM

## 2024-05-29 DIAGNOSIS — I48.19 PERSISTENT ATRIAL FIBRILLATION (H): ICD-10-CM

## 2024-05-29 LAB — INR BLD: 2 (ref 0.9–1.1)

## 2024-05-29 PROCEDURE — 36416 COLLJ CAPILLARY BLOOD SPEC: CPT

## 2024-05-29 PROCEDURE — 85610 PROTHROMBIN TIME: CPT

## 2024-05-29 NOTE — PROGRESS NOTES
ANTICOAGULATION MANAGEMENT     Mariia iTnoco 89 year old female is on warfarin with therapeutic INR result. (Goal INR 2.0-3.0)    Recent labs: (last 7 days)     05/29/24  0925   INR 2.0*       ASSESSMENT     Warfarin Lab Questionnaire    Warfarin Doses Last 7 Days      5/29/2024     9:17 AM   Dose in Tablet or Mg   TAB or MG? milligram (mg)     Pt Rptd Dose SUNDAY MONDAY TUESDAY WED THURS FRIDAY SATURDAY 5/29/2024   9:17 AM 3 3 3 4 3 3 3         5/29/2024   Warfarin Lab Questionnaire   Missed doses within past 14 days? No   Changes in diet or alcohol within past 14 days? No   Medication changes since last result? Yes   Please list: allergy pill   Injuries or illness since last result? No   New shortness of breath, severe headaches or sudden changes in vision since last result? No   Abnormal bleeding since last result? No   Upcoming surgery, procedure? No   Best number to call with results? [phone]     Previous result: Therapeutic last 2(+) visits  Additional findings: None       PLAN     Recommended plan for no diet, medication or health factor changes affecting INR     Dosing Instructions: Continue your current warfarin dose with next INR in 4 weeks       Summary  As of 5/29/2024      Full warfarin instructions:  4 mg every Wed; 3 mg all other days   Next INR check:  6/26/2024               Telephone call with Nicolas who verbalizes understanding and agrees to plan    Lab visit scheduled    Education provided:   Please call back if any changes to your diet, medications or how you've been taking warfarin    Plan made per [facility] anticoagulation protocol    Aurelio Duarte RN  Anticoagulation Clinic  5/29/2024    _______________________________________________________________________     Anticoagulation Episode Summary       Current INR goal:  2.0-3.0   TTR:  81.0% (1 y)   Target end date:  Indefinite   Send INR reminders to:  [facility]    Indications    Persistent atrial fibrillation (H) [I48.19]  Long term  (current) use of anticoagulants [Z79.01]             Comments:               Anticoagulation Care Providers       Provider Role Specialty Phone number    Eagle Shabazz MD Referring Internal Medicine [phone]    Mariano Munoz MD Referring Internal Medicine [phone]

## 2024-06-07 ENCOUNTER — TRANSFERRED RECORDS (OUTPATIENT)
Dept: HEALTH INFORMATION MANAGEMENT | Facility: CLINIC | Age: 89
End: 2024-06-07
Payer: MEDICARE

## 2024-06-10 DIAGNOSIS — F51.04 CHRONIC INSOMNIA: ICD-10-CM

## 2024-06-11 RX ORDER — ZOLPIDEM TARTRATE 5 MG/1
TABLET ORAL
Qty: 30 TABLET | Refills: 0 | Status: SHIPPED | OUTPATIENT
Start: 2024-06-11 | End: 2024-07-08

## 2024-06-20 DIAGNOSIS — M81.0 OSTEOPOROSIS, SENILE: ICD-10-CM

## 2024-06-20 DIAGNOSIS — Z92.29 PERSONAL HISTORY OF OTHER DRUG THERAPY: Primary | ICD-10-CM

## 2024-06-27 ENCOUNTER — TELEPHONE (OUTPATIENT)
Dept: INTERNAL MEDICINE | Facility: CLINIC | Age: 89
End: 2024-06-27
Payer: MEDICARE

## 2024-06-27 ENCOUNTER — OFFICE VISIT (OUTPATIENT)
Dept: URGENT CARE | Facility: URGENT CARE | Age: 89
End: 2024-06-27
Payer: MEDICARE

## 2024-06-27 VITALS
OXYGEN SATURATION: 99 % | WEIGHT: 145 LBS | HEIGHT: 64 IN | SYSTOLIC BLOOD PRESSURE: 129 MMHG | RESPIRATION RATE: 16 BRPM | DIASTOLIC BLOOD PRESSURE: 77 MMHG | TEMPERATURE: 98.2 F | HEART RATE: 85 BPM | BODY MASS INDEX: 24.75 KG/M2

## 2024-06-27 DIAGNOSIS — R07.0 THROAT PAIN: Primary | ICD-10-CM

## 2024-06-27 DIAGNOSIS — H61.23 BILATERAL IMPACTED CERUMEN: ICD-10-CM

## 2024-06-27 LAB
DEPRECATED S PYO AG THROAT QL EIA: NEGATIVE
GROUP A STREP BY PCR: NOT DETECTED

## 2024-06-27 PROCEDURE — 87635 SARS-COV-2 COVID-19 AMP PRB: CPT | Performed by: FAMILY MEDICINE

## 2024-06-27 PROCEDURE — 69209 REMOVE IMPACTED EAR WAX UNI: CPT | Mod: 59 | Performed by: FAMILY MEDICINE

## 2024-06-27 PROCEDURE — 87651 STREP A DNA AMP PROBE: CPT | Performed by: FAMILY MEDICINE

## 2024-06-27 PROCEDURE — 69210 REMOVE IMPACTED EAR WAX UNI: CPT | Mod: LT | Performed by: FAMILY MEDICINE

## 2024-06-27 PROCEDURE — 99213 OFFICE O/P EST LOW 20 MIN: CPT | Mod: 25 | Performed by: FAMILY MEDICINE

## 2024-06-27 NOTE — TELEPHONE ENCOUNTER
Pt daughter  states pt is ill  and lost voice , sore throat, body fatigue,  took a neg covid test  would  like  would like to be seen virtually or in person asap  today please advise  the best number to reach pt  [phone] may leave  message    please call today asap  pt only has partial lung , copd, so major concerns

## 2024-06-27 NOTE — TELEPHONE ENCOUNTER
Talked to Shabbir and  advised all appointments are booked today.  He will talk to patient and then potentially go to walk in care/Urgent care.

## 2024-06-27 NOTE — PATIENT INSTRUCTIONS
Tylenol 500 mg every 6 hours as needed for sore throat      Cepacol or numbing throat lozenges to help with the discomfort for the next few days      COVID test should return in the next 1 to 2 days our nurse team will call you if you have not looked at your results on Mychart      If symptoms worsen please seek help right away    --  For the right ear use debrox drops once a day for the next few days to help break up the ear wax that remains      Return in 2 days if you have not seen improvement

## 2024-06-27 NOTE — NURSING NOTE
Patient identified using two patient identifiers.  Ear exam showing wax occlusion completed by provider.  Solution: warm water was placed in the bilateral ear(s) via irrigation tool: elephant ear\Estefani Amaya CMA  .

## 2024-06-28 ENCOUNTER — TELEPHONE (OUTPATIENT)
Dept: NURSING | Facility: CLINIC | Age: 89
End: 2024-06-28
Payer: MEDICARE

## 2024-06-28 LAB — SARS-COV-2 RNA RESP QL NAA+PROBE: POSITIVE

## 2024-06-28 NOTE — TELEPHONE ENCOUNTER
Patient classified as COVID treatment eligible by Epic high risk algorithm:  Yes    Coronavirus (COVID-19) Notification    Reason for call  Notify of POSITIVE COVID-19 lab result, assess symptoms,  review [facility] recommendations    Lab Result   Lab test for 2019-nCoV rRt-PCR or SARS-COV-2 PCR  Oropharyngeal AND/OR nasopharyngeal swabs were POSITIVE for 2019-nCoV RNA [OR] SARS-COV-2 RNA (COVID-19) RNA     We have been unable to reach patient by phone at this time to notify of their Positive COVID-19 result.    Left voicemail message requesting a call back to [phone] [facility] for results.        A Positive COVID-19 letter will be sent via [employer] or the mail.    Janny Manuel

## 2024-07-01 ENCOUNTER — NURSE TRIAGE (OUTPATIENT)
Dept: INTERNAL MEDICINE | Facility: CLINIC | Age: 89
End: 2024-07-01
Payer: MEDICARE

## 2024-07-01 NOTE — TELEPHONE ENCOUNTER
"Chart review indicates a telephone encounter from 6/28 where  did document a call to try and relay the patient's positive test. According to that encounter she did not answer. They also sent her a letter to her My Chart on 6/29 per further chart review.    Spoke with patient who continues to express her frustration with the situation. Writer was unable to inform the patient of the above telephone encounter. Patient states she still feels sick and is going to continue to quarantine and do at home remedies. States she is \"mad at Dr Shabazz and I don't want anything from him.\"   "

## 2024-07-01 NOTE — TELEPHONE ENCOUNTER
"Nurse Triage SBAR    Is this a 2nd Level Triage? YES, LICENSED PRACTITIONER REVIEW IS REQUIRED    Situation: Patient calling because she had not heard anything from her [facility] Covid swab    Background: Patient was tested on 6/27 - Covid resulted positive, patient was not notified. By 6/30, sx worsened and patient was sen in [facility]. Documentation notes a \"virus\" of nonspecificity, patient was not tested or treated specifically for Covid at that time. Patient is now on sx day 8 and no longer Paxlovid eligible.   Of note, hx of COPD and is anticoagulated.    Assessment: Patient endorses muscle pain/strain, cough, sore throat, and significant fatigue. Denies fever, denies chest pain, and SOB is no worse than baseline. O2 sats stable at 97% per pt report.    Disposition: Routing to PCP for 2nd level triage and advisement on next best steps given timeline and minimal/no sx improvement.    Protocol Recommended Disposition:   Go To Office Now    Routed to provider    UMESH Shafer, RN (she/her)  [facility] RN      Reason for Disposition   MILD difficulty breathing (e.g., minimal/no SOB at rest, SOB with walking, pulse <100) and new-onset    Additional Information   Negative: SEVERE difficulty breathing (e.g., struggling for each breath, speaks in single words)   Negative: SEVERE weakness (e.g., can't stand or can barely walk) and new-onset or WORSE   Negative: Difficult to awaken or acting confused (e.g., disoriented, slurred speech)   Negative: Bluish (or gray) lips or face now   Negative: Sounds like a life-threatening emergency to the triager   Negative: Typical COVID-19 symptoms and lasting less than 3 weeks   Negative: Chest pain, pressure, or tightness and new-onset or worsening   Negative: Fever and new-onset or worsening   Negative: MODERATE difficulty breathing (e.g., speaks in phrases, SOB even at rest, pulse 100-120) and new-onset or WORSE   Negative: MODERATE difficulty breathing " and oxygen level (e.g., pulse oximetry) 91 to 94%   Negative: Oxygen level (e.g., pulse oximetry) 90% or lower   Negative: MODERATE difficulty breathing (e.g., speaks in phrases, SOB even at rest, pulse 100-120)   Negative: Drinking very little and dehydration suspected (e.g., no urine > 12 hours, very dry mouth, very lightheaded)   Negative: Patient sounds very sick or weak to the triager    Protocols used: Coronavirus (COVID-19) Persisting Symptoms Follow-up Call-[facility]

## 2024-07-01 NOTE — TELEPHONE ENCOUNTER
I am sorry that no one contacted her regarding her results.  I was not in the office when all of this went on and urgent care should be contacting people with results.  Please notify management.  At this point, symptomatic care is recommended.  If she is having shortness of breath or significant symptoms she should be seen in the emergency room setting.

## 2024-07-08 DIAGNOSIS — F51.04 CHRONIC INSOMNIA: ICD-10-CM

## 2024-07-08 RX ORDER — ZOLPIDEM TARTRATE 5 MG/1
TABLET ORAL
Qty: 30 TABLET | Refills: 0 | Status: SHIPPED | OUTPATIENT
Start: 2024-07-08 | End: 2024-08-09

## 2024-07-16 ENCOUNTER — LAB (OUTPATIENT)
Dept: LAB | Facility: CLINIC | Age: 89
End: 2024-07-16
Payer: MEDICARE

## 2024-07-16 ENCOUNTER — ALLIED HEALTH/NURSE VISIT (OUTPATIENT)
Dept: FAMILY MEDICINE | Facility: CLINIC | Age: 89
End: 2024-07-16
Payer: MEDICARE

## 2024-07-16 ENCOUNTER — ANTICOAGULATION THERAPY VISIT (OUTPATIENT)
Dept: ANTICOAGULATION | Facility: CLINIC | Age: 89
End: 2024-07-16

## 2024-07-16 ENCOUNTER — TELEPHONE (OUTPATIENT)
Dept: INTERNAL MEDICINE | Facility: CLINIC | Age: 89
End: 2024-07-16

## 2024-07-16 DIAGNOSIS — Z92.29 PERSONAL HISTORY OF OTHER DRUG THERAPY: Primary | ICD-10-CM

## 2024-07-16 DIAGNOSIS — Z79.01 LONG TERM (CURRENT) USE OF ANTICOAGULANTS: ICD-10-CM

## 2024-07-16 DIAGNOSIS — M81.0 SENILE OSTEOPOROSIS: ICD-10-CM

## 2024-07-16 DIAGNOSIS — I48.19 PERSISTENT ATRIAL FIBRILLATION (H): ICD-10-CM

## 2024-07-16 DIAGNOSIS — I48.19 PERSISTENT ATRIAL FIBRILLATION (H): Primary | ICD-10-CM

## 2024-07-16 LAB — INR BLD: 2.9 (ref 0.9–1.1)

## 2024-07-16 PROCEDURE — 36416 COLLJ CAPILLARY BLOOD SPEC: CPT

## 2024-07-16 PROCEDURE — 99207 PR NO CHARGE NURSE ONLY: CPT

## 2024-07-16 PROCEDURE — 85610 PROTHROMBIN TIME: CPT

## 2024-07-16 PROCEDURE — 96372 THER/PROPH/DIAG INJ SC/IM: CPT | Performed by: INTERNAL MEDICINE

## 2024-07-16 NOTE — TELEPHONE ENCOUNTER
New order for Prolia needed for next injection. Current order will  before patient's next dose is due in 6 months. Prolia order pended and routed to PCP.

## 2024-07-16 NOTE — PROGRESS NOTES
ANTICOAGULATION MANAGEMENT     Mariia Tinoco 89 year old female is on warfarin with therapeutic INR result. (Goal INR 2.0-3.0)    Recent labs: (last 7 days)     07/16/24  1032   INR 2.9*       ASSESSMENT     Warfarin Lab Questionnaire    Warfarin Doses Last 7 Days      7/16/2024    10:18 AM   Dose in Tablet or Mg   TAB or MG? - 1mg warfarin tablets  milligram (mg)     Pt Rptd Dose SUNDAY MONDAY TUESDAY WED THURS FRIDAY SATURDAY 7/16/2024  10:18 AM 3 3 3 4 3 3 3         7/16/2024   Warfarin Lab Questionnaire   Missed doses within past 14 days? No   Changes in diet or alcohol within past 14 days? No   Medication changes since last result? No - Prolia injection today           Injuries or illness since last result? No - reported abdominal pains resolved itself and she is feeling so much better from Covid.        - 6/30/24 presented ED for left flank / abdominal pain.  CT scan of abdomen showed distended GB with cholelithiasis.        - 6/27/24 presented at [facility] for symptoms of shortness of breath, 3 days of sore throat, bodyaches, and chills.  Strep test was negative, but tested positive for Covid on 6/27/24.     New shortness of breath, severe headaches or sudden changes in vision since last result? No   Abnormal bleeding since last result? No   Upcoming surgery, procedure? No   Best number to call with results? [phone]        Previous result: Therapeutic last 3 INR visits.    Additional findings: None       PLAN     Recommended plan for temporary change(s) affecting INR     Dosing Instructions: Continue your current warfarin dose with next INR in 5 weeks       Summary  As of 7/16/2024      Full warfarin instructions:  4 mg every Wed; 3 mg all other days   Next INR check:  8/20/2024               Telephone call with Nicolas who verbalizes understanding and agrees to plan    Lab visit scheduled - INR on 8/20/24 @ [facility]    Education provided: Taking warfarin: Importance of taking warfarin as  instructed  Goal range and lab monitoring: goal range and significance of current result    Plan made per [facility] anticoagulation protocol    Cassandra Banuelos, RN  Anticoagulation Clinic  7/16/2024    _______________________________________________________________________     Anticoagulation Episode Summary       Current INR goal:  2.0-3.0   TTR:  82.4% (1 y)   Target end date:  Indefinite   Send INR reminders to:  [facility]    Indications    Persistent atrial fibrillation (H) [I48.19]  Long term (current) use of anticoagulants [Z79.01]             Comments:               Anticoagulation Care Providers       Provider Role Specialty Phone number    Eagle Shabazz MD Referring Internal Medicine [phone]    Mariano Munoz MD Referring Internal Medicine [phone]

## 2024-07-16 NOTE — PROGRESS NOTES
Clinic Administered Medication Documentation      Prolia Documentation    Indication: Prolia  (denosumab) is a prescription medicine used to treat osteoporosis in patients who:   Are at high risk for fracture, meaning patients who have had a fracture related to osteoporosis, or who have multiple risk factors for fracture.  Cannot use another osteoporosis medicine or other osteoporosis medicines did not work well.  The timeline for early/late injections would be 4 weeks early and any time after the 6 month cathy. If a patient receives their injection late, then the subsequent injection would be 6 months from the date that they actually received the injection.    When was the last injection?  23   Was the last injection at least 6 months ago? Yes  Has the prior authorization been completed?  Yes  Is there an active order (written within the past 365 days, with administrations remaining, not ) in the chart?  Yes   GFR Estimate   Date Value Ref Range Status   2023 60 (L) >60 mL/min/1.73m2 Final   2021 >60 >60 mL/min/1.73m2 Final     Has patient had a GFR within the last 12 months? Yes   Is GFR under 30, or patient has a diagnosis of CKD4 or CKD5? No   Patient denies gastric bypass or parathyroid surgery in past 6 months? Yes - patient denies.   Patient denies dental work in the past two months involving drilling into the bone, such as implants/extractions, oral surgery or a tooth extraction that has not healed yet?  Yes  Patient denies plans for an emergency tooth extraction within the next week? Yes    The following steps were completed to comply with the REMS program for Prolia:  Reviewed information in the Medication Guide, including the serious risks of Prolia  and the symptoms of each risk.  Advised patient to seek prompt medical attention if they have signs or symptoms of any of the serious risks.  Provided each patient a copy of the Medication Guide and Patient Guide.    Prior to  injection, verified patient identity using patient's name and date of birth. Medication was administered. Please see MAR and medication order for additional information. Patient instructed to remain in clinic for 15 minutes and report any adverse reaction to staff immediately.    Vial/Syringe: Syringe  Was this medication supplied by the patient? No  Patient has no refills remaining. Refill encounter opened, order pended and Routed to the provider

## 2024-07-20 DIAGNOSIS — I48.19 PERSISTENT ATRIAL FIBRILLATION (H): ICD-10-CM

## 2024-07-20 DIAGNOSIS — Z79.01 LONG TERM (CURRENT) USE OF ANTICOAGULANTS: ICD-10-CM

## 2024-07-22 RX ORDER — WARFARIN SODIUM 1 MG/1
TABLET ORAL
Qty: 270 TABLET | Refills: 1 | Status: SHIPPED | OUTPATIENT
Start: 2024-07-22

## 2024-08-09 DIAGNOSIS — F51.04 CHRONIC INSOMNIA: ICD-10-CM

## 2024-08-09 RX ORDER — ZOLPIDEM TARTRATE 5 MG/1
TABLET ORAL
Qty: 30 TABLET | Refills: 0 | Status: SHIPPED | OUTPATIENT
Start: 2024-08-09 | End: 2024-09-09

## 2024-08-20 ENCOUNTER — LAB (OUTPATIENT)
Dept: LAB | Facility: CLINIC | Age: 89
End: 2024-08-20
Payer: MEDICARE

## 2024-08-20 ENCOUNTER — ANTICOAGULATION THERAPY VISIT (OUTPATIENT)
Dept: ANTICOAGULATION | Facility: CLINIC | Age: 89
End: 2024-08-20

## 2024-08-20 DIAGNOSIS — I48.19 PERSISTENT ATRIAL FIBRILLATION (H): ICD-10-CM

## 2024-08-20 DIAGNOSIS — Z79.01 LONG TERM (CURRENT) USE OF ANTICOAGULANTS: ICD-10-CM

## 2024-08-20 DIAGNOSIS — I48.19 PERSISTENT ATRIAL FIBRILLATION (H): Primary | ICD-10-CM

## 2024-08-20 LAB — INR BLD: 2.1 (ref 0.9–1.1)

## 2024-08-20 PROCEDURE — 85610 PROTHROMBIN TIME: CPT

## 2024-08-20 PROCEDURE — 36416 COLLJ CAPILLARY BLOOD SPEC: CPT

## 2024-08-20 NOTE — PROGRESS NOTES
ANTICOAGULATION MANAGEMENT     Mariia Tnioco 89 year old female is on warfarin with therapeutic INR result. (Goal INR 2.0-3.0)    Recent labs: (last 7 days)     08/20/24  0925   INR 2.1*       ASSESSMENT     Warfarin Lab Questionnaire    Warfarin Doses Last 7 Days      8/20/2024     9:16 AM   Dose in Tablet or Mg   TAB or MG - 1mg warfarin tabletgs  milligram (mg)     Pt Rptd Dose SUNDAY MONDAY TUESDAY WED THURS FRIDAY SATURDAY 8/20/2024   9:16 AM 3 3 3 4 3 3 3         8/20/2024   Warfarin Lab Questionnaire   Missed doses within past 14 days? No   Changes in diet or alcohol within past 14 days? No   Medication changes since last result? No   Injuries or illness since last result? No   New shortness of breath, severe headaches or sudden changes in vision since last result? No   Abnormal bleeding since last result? No   Upcoming surgery, procedure? No   Best number to call with results? [phone]        Previous result: Therapeutic last 4 INR visits.    Additional findings:  Chart reviewed.       PLAN     Recommended plan for no diet, medication or health factor changes affecting INR     Dosing Instructions: Continue your current warfarin dose with next INR in 5 weeks       Summary  As of 8/20/2024      Full warfarin instructions:  4 mg every Wed; 3 mg all other days   Next INR check:  9/24/2024               Detailed voice message left for Nicolas with dosing instructions and follow up date.     Contact [phone] to schedule and with any changes, questions or concerns.     Education provided: Please call back if any changes to your diet, medications or how you've been taking warfarin  Taking warfarin: Importance of taking warfarin as instructed  Goal range and lab monitoring: goal range and significance of current result  Dietary considerations: importance of consistent vitamin K intake    Plan made per [facility] anticoagulation protocol    Cassandra Banuelos, RN  Anticoagulation  Clinic  8/20/2024    _______________________________________________________________________     Anticoagulation Episode Summary       Current INR goal:  2.0-3.0   TTR:  83.7% (1 y)   Target end date:  Indefinite   Send INR reminders to:  [facility]    Indications    Persistent atrial fibrillation (H) [I48.19]  Long term (current) use of anticoagulants [Z79.01]             Comments:               Anticoagulation Care Providers       Provider Role Specialty Phone number    Eagle Shabazz MD Referring Internal Medicine [phone]    Mariano Munoz MD Referring Internal Medicine [phone]

## 2024-08-22 NOTE — TELEPHONE ENCOUNTER
"Anesthesia Pre-Procedure Evaluation    Patient: Gracy Stiles   MRN:     [id number] Gender:   female   Age:    3 year old :      2020        Procedure(s):  EXCISION, MASS, UPPER EXTREMITY, pilomatricoma     LABS:  CBC:   Lab Results   Component Value Date    HGB 10.9 2023    HGB 10.8 10/05/2022     BMP: No results found for: \"NA\", \"POTASSIUM\", \"CHLORIDE\", \"CO2\", \"BUN\", \"CR\", \"GLC\"  COAGS: No results found for: \"PTT\", \"INR\", \"FIBR\"  POC: No results found for: \"BGM\", \"HCG\", \"HCGS\"  OTHER: No results found for: \"PH\", \"LACT\", \"A1C\", \"DAVE\", \"PHOS\", \"MAG\", \"ALBUMIN\", \"PROTTOTAL\", \"ALT\", \"AST\", \"GGT\", \"ALKPHOS\", \"BILITOTAL\", \"BILIDIRECT\", \"LIPASE\", \"AMYLASE\", \"RITIKA\", \"TSH\", \"T4\", \"T3\", \"CRP\", \"CRPI\", \"SED\"     Preop Vitals    BP Readings from Last 3 Encounters:   No data found for BP    Pulse Readings from Last 3 Encounters:   No data found for Pulse      Resp Readings from Last 3 Encounters:   No data found for Resp    SpO2 Readings from Last 3 Encounters:   No data found for SpO2      Temp Readings from Last 1 Encounters:   05/15/24 36.8  C (98.2  F) (Tympanic)    Ht Readings from Last 1 Encounters:   24 0.93 m (3' 0.61\") (54%, Z= 0.11)*     * Growth percentiles are based on CDC (Girls, 2-20 Years) data.      Wt Readings from Last 1 Encounters:   24 12.6 kg (27 lb 12.5 oz) (26%, Z= -0.63)*     * Growth percentiles are based on CDC (Girls, 2-20 Years) data.    Estimated body mass index is 14.57 kg/m  as calculated from the following:    Height as of 24: 0.93 m (3' 0.61\").    Weight as of 24: 12.6 kg (27 lb 12.5 oz).     LDA:        No past medical history on file.   No past surgical history on file.   No Known Allergies     Anesthesia Evaluation        Cardiovascular Findings - negative ROS  (-) congenital heart disease and dysrhythmias    Neuro Findings - negative ROS    Pulmonary Findings - negative ROS  (-) recent URI      Skin Findings   Comments: -pilomatricoma                    " Pt is out of medication     PHYSICAL EXAM:   Mental Status/Neuro: Age Appropriate   Airway: Facies: Feasible  Mallampati: I  Mouth/Opening: Full  TM distance: Normal (Peds)  Neck ROM: Full   Respiratory: Auscultation: CTAB     Resp. Rate: Age appropriate     Resp. Effort: Normal      CV: Rhythm: Regular  Rate: Age appropriate  Heart: Normal Sounds  Edema: None   Comments:      Dental: Normal Dentition                Anesthesia Plan    ASA Status:  1    NPO Status:  NPO Appropriate    Anesthesia Type: General.     - Airway: LMA   Induction: Inhalation.   Maintenance: Balanced.        Consents    Anesthesia Plan(s) and associated risks, benefits, and realistic alternatives discussed. Questions answered and patient/representative(s) expressed understanding.     - Discussed:     - Discussed with:  Parent (Mother and/or Father)      - Extended Intubation/Ventilatory Support Discussed: No.      - Patient is DNR/DNI Status: No     Use of blood products discussed: No .     Postoperative Care    Pain management: IV analgesics, Oral pain medications.   PONV prophylaxis: Ondansetron (or other 5HT-3), Dexamethasone or Solumedrol     Comments:    Other Comments: Anxiolytic/Sedating meds prior to procedure:N/A  Discussed common and potentially harmful risks for General Anesthesia.   These risks include, but were not limited to: Conversion to secured airway, Sore throat, Airway injury, Dental injury, Aspiration, PONV, Emergence delirium/agitation  Risks of invasive procedures were not discussed: N/A    All questions were answered.           Los Choi MD    I have reviewed the pertinent notes and labs in the chart from the past 30 days and (re)examined the patient.  Any updates or changes from those notes are reflected in this note.

## 2024-09-09 DIAGNOSIS — E03.9 HYPOTHYROIDISM, UNSPECIFIED TYPE: ICD-10-CM

## 2024-09-09 DIAGNOSIS — F51.04 CHRONIC INSOMNIA: ICD-10-CM

## 2024-09-09 RX ORDER — ZOLPIDEM TARTRATE 5 MG/1
TABLET ORAL
Qty: 30 TABLET | Refills: 0 | Status: SHIPPED | OUTPATIENT
Start: 2024-09-09

## 2024-09-09 RX ORDER — LEVOTHYROXINE SODIUM 75 UG/1
75 TABLET ORAL DAILY
Qty: 90 TABLET | Refills: 1 | Status: SHIPPED | OUTPATIENT
Start: 2024-09-09

## 2024-09-24 ENCOUNTER — LAB (OUTPATIENT)
Dept: LAB | Facility: CLINIC | Age: 89
End: 2024-09-24
Payer: MEDICARE

## 2024-09-24 ENCOUNTER — ANTICOAGULATION THERAPY VISIT (OUTPATIENT)
Dept: ANTICOAGULATION | Facility: CLINIC | Age: 89
End: 2024-09-24

## 2024-09-24 DIAGNOSIS — I48.19 PERSISTENT ATRIAL FIBRILLATION (H): ICD-10-CM

## 2024-09-24 DIAGNOSIS — Z79.01 LONG TERM (CURRENT) USE OF ANTICOAGULANTS: ICD-10-CM

## 2024-09-24 DIAGNOSIS — I48.19 PERSISTENT ATRIAL FIBRILLATION (H): Primary | ICD-10-CM

## 2024-09-24 LAB — INR BLD: 2.7 (ref 0.9–1.1)

## 2024-09-24 PROCEDURE — 85610 PROTHROMBIN TIME: CPT

## 2024-09-24 PROCEDURE — 36416 COLLJ CAPILLARY BLOOD SPEC: CPT

## 2024-09-24 NOTE — PROGRESS NOTES
ANTICOAGULATION MANAGEMENT     Mariia Tinoco 89 year old female is on warfarin with therapeutic INR result. (Goal INR 2.0-3.0)    Recent labs: (last 7 days)     09/24/24  0928   INR 2.7*       ASSESSMENT     Warfarin Lab Questionnaire    Warfarin Doses Last 7 Days      9/24/2024     9:16 AM   Dose in Tablet or Mg   TAB or MG? - 1mg warfarin tablets  tablet (tab)     Pt Rptd Dose SUNDAY MONDAY TUESDAY WED THURS FRIDAY SATURDAY 9/24/2024   9:16 AM 3 3 3 4 3 3 3         9/24/2024   Warfarin Lab Questionnaire   Missed doses within past 14 days? No   Changes in diet or alcohol within past 14 days? No   Medication changes since last result? No   Injuries or illness since last result? No   New shortness of breath, severe headaches or sudden changes in vision since last result? No   Abnormal bleeding since last result? No   Upcoming surgery, procedure? No   Best number to call with results? [phone]        Previous result: Therapeutic last 5 INR visits.    Additional findings: None       PLAN     Recommended plan for no diet, medication or health factor changes affecting INR     Dosing Instructions: Continue your current warfarin dose with next INR in 6 weeks       Summary  As of 9/24/2024      Full warfarin instructions:  4 mg every Wed; 3 mg all other days   Next INR check:  11/5/2024               Telephone call with , Adam who verbalizes understanding and agrees to plan    Patient offered & declined to schedule next visit    Education provided: Please call back if any changes to your diet, medications or how you've been taking warfarin  Taking warfarin: Importance of taking warfarin as instructed  Goal range and lab monitoring: goal range and significance of current result    Plan made per [facility] anticoagulation protocol    Cassandra Banuelos RN  9/24/2024  Anticoagulation Clinic  [facility] for routing messages: [facility]  [facility] patient phone line:  [phone]        _______________________________________________________________________     Anticoagulation Episode Summary       Current INR goal:  2.0-3.0   TTR:  85.7% (1 y)   Target end date:  Indefinite   Send INR reminders to:  [facility]    Indications    Persistent atrial fibrillation (H) [I48.19]  Long term (current) use of anticoagulants [Z79.01]             Comments:               Anticoagulation Care Providers       Provider Role Specialty Phone number    Eagle Shabazz MD Referring Internal Medicine [phone]    Mariano Munoz MD Referring Internal Medicine [phone]

## 2024-10-01 DIAGNOSIS — I48.19 PERSISTENT ATRIAL FIBRILLATION (H): Primary | ICD-10-CM

## 2024-10-02 RX ORDER — METOPROLOL SUCCINATE 25 MG/1
25 TABLET, EXTENDED RELEASE ORAL DAILY
Qty: 90 TABLET | Refills: 3 | Status: SHIPPED | OUTPATIENT
Start: 2024-10-02

## 2024-10-02 NOTE — TELEPHONE ENCOUNTER
Spoke with patient who states she used to get this from Dr Corrigan office but they have been difficult to reach. States she was told to request to have Dr Shabazz take over filling this instead.

## 2024-10-08 DIAGNOSIS — F51.04 CHRONIC INSOMNIA: ICD-10-CM

## 2024-10-08 RX ORDER — ZOLPIDEM TARTRATE 5 MG/1
TABLET ORAL
Qty: 30 TABLET | Refills: 0 | Status: SHIPPED | OUTPATIENT
Start: 2024-10-08 | End: 2024-11-05

## 2024-10-23 ENCOUNTER — TELEPHONE (OUTPATIENT)
Dept: INTERNAL MEDICINE | Facility: CLINIC | Age: 89
End: 2024-10-23

## 2024-10-23 ENCOUNTER — VIRTUAL VISIT (OUTPATIENT)
Dept: INTERNAL MEDICINE | Facility: CLINIC | Age: 89
End: 2024-10-23
Payer: MEDICARE

## 2024-10-23 DIAGNOSIS — F43.23 ADJUSTMENT DISORDER WITH MIXED ANXIETY AND DEPRESSED MOOD: Primary | ICD-10-CM

## 2024-10-23 PROCEDURE — 99442 PR PHYSICIAN TELEPHONE EVALUATION 11-20 MIN: CPT | Mod: 93 | Performed by: INTERNAL MEDICINE

## 2024-10-23 RX ORDER — SERTRALINE HYDROCHLORIDE 25 MG/1
25 TABLET, FILM COATED ORAL DAILY
Qty: 30 TABLET | Refills: 1 | Status: SHIPPED | OUTPATIENT
Start: 2024-10-23

## 2024-10-23 ASSESSMENT — ANXIETY QUESTIONNAIRES
GAD7 TOTAL SCORE: 11
GAD7 TOTAL SCORE: 11
3. WORRYING TOO MUCH ABOUT DIFFERENT THINGS: NEARLY EVERY DAY
6. BECOMING EASILY ANNOYED OR IRRITABLE: SEVERAL DAYS
2. NOT BEING ABLE TO STOP OR CONTROL WORRYING: NEARLY EVERY DAY
1. FEELING NERVOUS, ANXIOUS, OR ON EDGE: SEVERAL DAYS
4. TROUBLE RELAXING: NEARLY EVERY DAY
7. FEELING AFRAID AS IF SOMETHING AWFUL MIGHT HAPPEN: NOT AT ALL
8. IF YOU CHECKED OFF ANY PROBLEMS, HOW DIFFICULT HAVE THESE MADE IT FOR YOU TO DO YOUR WORK, TAKE CARE OF THINGS AT HOME, OR GET ALONG WITH OTHER PEOPLE?: NOT DIFFICULT AT ALL
7. FEELING AFRAID AS IF SOMETHING AWFUL MIGHT HAPPEN: NOT AT ALL
5. BEING SO RESTLESS THAT IT IS HARD TO SIT STILL: NOT AT ALL
IF YOU CHECKED OFF ANY PROBLEMS ON THIS QUESTIONNAIRE, HOW DIFFICULT HAVE THESE PROBLEMS MADE IT FOR YOU TO DO YOUR WORK, TAKE CARE OF THINGS AT HOME, OR GET ALONG WITH OTHER PEOPLE: NOT DIFFICULT AT ALL
GAD7 TOTAL SCORE: 11

## 2024-10-23 ASSESSMENT — ENCOUNTER SYMPTOMS: NERVOUS/ANXIOUS: 1

## 2024-10-23 ASSESSMENT — PATIENT HEALTH QUESTIONNAIRE - PHQ9: SUM OF ALL RESPONSES TO PHQ QUESTIONS 1-9: 1

## 2024-10-23 NOTE — Clinical Note
Please contact patient to schedule a follow-up telephone visit in 3 weeks.  Place in the notes that she needs to have a PHQ-9 and KYLE-7 prior to visit.  Thank you.

## 2024-10-23 NOTE — TELEPHONE ENCOUNTER
"Patient calls to request a low dose anti-anxiety medication. States she has been experiencing anxiety, states she is \"in a bad spot right now\". This is a new symptom. Offered phone visit with Dr. Shabazz this evening, 10/23/24. Patient agreeable, visit scheduled at 5:20 pm. Offered to discuss concerns/anxiety with patient at time of phone call, she declines and would like to wait to talk with Dr. Shabazz about it. States she is ok to wait until this evening.   "

## 2024-10-23 NOTE — PROGRESS NOTES
Nicolas is a 89 year old who is being evaluated via a billable telephone visit.    What phone number would you like to be contacted at? [phone]   How would you like to obtain your AVS? Nelida  Originating Location (pt. Location): Home    Distant Location (provider location):  On-site    1. Adjustment disorder with mixed anxiety and depressed mood (Primary)  Uncontrolled.  Offered counseling and she declines.  She is willing to try medication.  Trial of low-dose sertraline 25 mg with food.  Counseled on what she can expect from medication including potential side effects and how to minimize side effects.  I will be seeing her back in about 3 weeks for follow-up via telephone.  If she runs into problems or has concerns she is to contact me sooner.  She is in agreement with that plan.  - sertraline (ZOLOFT) 25 MG tablet; Take 1 tablet (25 mg) by mouth daily.  Dispense: 30 tablet; Refill: 1     Subjective   Nicolas is a 89 year old, presenting for the following health issues:  Anxiety (Discuss starting low dose anti anxiety medication)    Anxiety    History of Present Illness       Mental Health Follow-up:  Patient presents to follow-up on Anxiety.    Patient's anxiety since last visit has been:  Worse  The patient is having other symptoms associated with anxiety.  Any significant life events: relationship concerns and health concerns  Patient is not feeling anxious or having panic attacks.  Patient has no concerns about alcohol or drug use.   She is taking medications regularly.       Tete joins me on telephone visit.  She is tearful today.  She has been under a lot of anxiety and stress.  She is worried about her family dynamics.  Kids are not doing well apparently.  Grandkids are okay.  She struggles with worry and anxiety.  She has been talking to friends who thinks that she should be on mood medications.  Daughter apparently takes Paxil it sounds like.  She is also been angry and depressed.  She cries all the  time she tells me.  She yelled at some staff at her cardiology office and hung up on them.            Objective           Vitals:  No vitals were obtained today due to virtual visit.    Physical Exam   General: Alert and no distress //Respiratory: No audible wheeze, cough, or shortness of breath // Psychiatric:  Appropriate affect, tone, and pace of words            Phone call duration: 14 minutes  Signed Electronically by: PHILIPP ALCALA MD

## 2024-10-23 NOTE — TELEPHONE ENCOUNTER
Called and scheduled telephone visit 11/13/24.      Eagle Shabazz MD  [facility]  Please contact patient to schedule a follow-up telephone visit in 3 weeks.  Place in the notes that she needs to have a PHQ-9 and KYLE-7 prior to visit.  Thank you.

## 2024-11-01 ENCOUNTER — LAB (OUTPATIENT)
Dept: LAB | Facility: CLINIC | Age: 89
End: 2024-11-01
Payer: MEDICARE

## 2024-11-01 ENCOUNTER — ANTICOAGULATION THERAPY VISIT (OUTPATIENT)
Dept: ANTICOAGULATION | Facility: CLINIC | Age: 89
End: 2024-11-01

## 2024-11-01 DIAGNOSIS — Z79.01 LONG TERM (CURRENT) USE OF ANTICOAGULANTS: ICD-10-CM

## 2024-11-01 DIAGNOSIS — I48.19 PERSISTENT ATRIAL FIBRILLATION (H): Primary | ICD-10-CM

## 2024-11-01 DIAGNOSIS — I48.19 PERSISTENT ATRIAL FIBRILLATION (H): ICD-10-CM

## 2024-11-01 LAB — INR BLD: 3.2 (ref 0.9–1.1)

## 2024-11-01 PROCEDURE — 36416 COLLJ CAPILLARY BLOOD SPEC: CPT

## 2024-11-01 PROCEDURE — 85610 PROTHROMBIN TIME: CPT

## 2024-11-01 NOTE — PROGRESS NOTES
ANTICOAGULATION MANAGEMENT     Mariia PICKARD Tinoco 89 year old female is on warfarin with supratherapeutic INR result. (Goal INR 2.0-3.0)    Recent labs: (last 7 days)     11/01/24  0922   INR 3.2*       ASSESSMENT     Warfarin Lab Questionnaire    Warfarin Doses Last 7 Days      11/1/2024     9:15 AM   Dose in Tablet or Mg   TAB or MG? - 1mg warfarin tablets  milligram (mg)     Pt Rptd Dose SUNDAY MONDAY TUESDAY WED THURS FRIDAY SATURDAY 11/1/2024   9:15 AM 3 3 3 4 3 3 3         11/1/2024   Warfarin Lab Questionnaire   Missed doses within past 14 days? No   Changes in diet or alcohol within past 14 days? No   Medication changes since last result? No - Yes started today 11/1/24 on Sertraline 25mg daily.   (Will increase INR and risk for bleeding.  Monitor INR closely)     Injuries or illness since last result? No - reported she reacted after getting her Flu shot and COVID-19 vaccinations.  She is always reacts after these vaccations,  But is feeling better now.        - Yes. 10/23/24 reported new anxiety / depression symptoms and requesting med.     New shortness of breath, severe headaches or sudden changes in vision since last result? No   Abnormal bleeding since last result? No   Upcoming surgery, procedure? No   Best number to call with results? [phone]        Previous result: Therapeutic last 6 INR visits.    Additional findings: None       PLAN     Recommended plan for temporary change(s) and ongoing change(s) affecting INR     Dosing Instructions: partial hold then decrease your warfarin dose (4.5% change) with next INR in 2 weeks       Summary  As of 11/1/2024      Full warfarin instructions:  11/1: 2 mg; Otherwise 3 mg every day   Next INR check:  11/15/2024               Telephone call with Nicolas who verbalizes understanding and agrees to plan    [facility]   - her  drives her to [employer] [place].    Education provided: Taking warfarin: Importance of taking warfarin as  instructed  Goal range and lab monitoring: goal range and significance of current result  Interaction IS anticipated between warfarin and Sertraline    Plan made per [facility] anticoagulation protocol    Cassandra Banuelos RN  11/1/2024  Anticoagulation Clinic  [facility] for routing messages: [facility]  [facility] patient phone line: [phone]        _______________________________________________________________________     Anticoagulation Episode Summary       Current INR goal:  2.0-3.0   TTR:  81.5% (1 y)   Target end date:  Indefinite   Send INR reminders to:  [facility]    Indications    Persistent atrial fibrillation (H) [I48.19]  Long term (current) use of anticoagulants [Z79.01]             Comments:  --             Anticoagulation Care Providers       Provider Role Specialty Phone number    Eagle Shabazz MD Referring Internal Medicine [phone]    Mariano Munoz MD Referring Internal Medicine [phone]

## 2024-11-05 DIAGNOSIS — I48.19 PERSISTENT ATRIAL FIBRILLATION (H): ICD-10-CM

## 2024-11-05 DIAGNOSIS — F51.04 CHRONIC INSOMNIA: ICD-10-CM

## 2024-11-05 DIAGNOSIS — Z79.01 LONG TERM (CURRENT) USE OF ANTICOAGULANTS: ICD-10-CM

## 2024-11-05 RX ORDER — ZOLPIDEM TARTRATE 5 MG/1
TABLET ORAL
Qty: 30 TABLET | Refills: 0 | Status: SHIPPED | OUTPATIENT
Start: 2024-11-05

## 2024-11-05 RX ORDER — WARFARIN SODIUM 1 MG/1
TABLET ORAL
Qty: 310 TABLET | Refills: 1 | Status: SHIPPED | OUTPATIENT
Start: 2024-11-05

## 2024-11-11 ENCOUNTER — NURSE TRIAGE (OUTPATIENT)
Dept: INTERNAL MEDICINE | Facility: CLINIC | Age: 89
End: 2024-11-11
Payer: MEDICARE

## 2024-11-11 NOTE — TELEPHONE ENCOUNTER
"Nurse Triage SBAR    Is this a 2nd Level Triage? YES, LICENSED PRACTITIONER REVIEW IS REQUIRED    Situation: Neck pain     Background: Patient last seen via virtual visit with Dr. Shabazz 10/23/24.    Assessment: Patient has virtual visit 11/13/24 scheduled. Calls requesting this be switched to in person visit. Upon further questioning, patient reports severe pain.    Patient reports pain starting in the button of her skull going down to her left shoulder and to just above her mid back. States it comes and goes. Has been ongoing for over a year. Has noticed it more for the past 6 months. Reports it has became \"debilitating\".  Rates pain as an 8 currently on a scale of 0 to 10. Reports improvement with lying down.     Denies neck stiffness, able to touch chin to chest. Denies chest pain, numbness, or fever. Denies changes in breathing. Reports weakness of her left hand and wrist which she attributes to arthritis. Denies any other weakness. Patient reports she has not mentioned concern to Dr. Shabazz before.     Protocol Recommended Disposition:   Go To Office Now    Recommendation: Informed patient that her appointment Wednesday is in a virtual, not in person, slot. Informed patient she should not wait until Wednesday to be seen. Patient is not agreeable to see different provider, would like to see Dr. Shabazz only.     Informed patient Dr. Shabazz would be notified of symptoms and we would call her back with recommendations.     If patient does not answer, she would like voicemail left with recommendations.     Routed to provider    Does the patient meet one of the following criteria for ADS visit consideration? 16+ years old, with an [facility] PCP     TIP  Providers, please consider if this condition is appropriate for management at one of our Acute and Diagnostic Services sites.     If patient is a good candidate, please use dotphrase <dot>triageresponse and select Refer to ADS to document.       Reason for Disposition   " SEVERE pain (e.g., excruciating, unable to do any normal activities)    Additional Information   Negative: Followed an injury to neck (e.g., MVA, sports, impact or collision)   Negative: Chest pain   Negative: Lymph node in the neck is swollen or painful to the touch   Negative: Sore throat is main symptom   Negative: Shock suspected (e.g., cold/pale/clammy skin, too weak to stand, low BP, rapid pulse)   Negative: Similar pain previously and it was from 'heart attack'   Negative: Similar pain previously from 'angina' and not relieved by nitroglycerin   Negative: Difficult to awaken or acting confused (e.g., disoriented, slurred speech)   Negative: Sounds like a life-threatening emergency to the triager   Negative: Difficulty breathing or unusual sweating (e.g., sweating without exertion)   Negative: Chest pain lasting longer than 5 minutes   Negative: Stiff neck (can't touch chin to chest) and has headache   Negative: Stiff neck (can't touch chin to chest) and fever   Negative: Weakness of an arm or hand   Negative: Problems with bowel or bladder control   Negative: Patient sounds very sick or weak to the triager    Protocols used: Neck Pain or [id number]-HL

## 2024-11-11 NOTE — TELEPHONE ENCOUNTER
Patient contacted and notified of Dr. Shabazz's response. Patient scheduled with Dr. Shabazz tomorrow with 1200 arrival time. Informed patient of Dr. Shabazz's recommendation to go to the Emergency Room if her pain is so severe that she cannot be at home.

## 2024-11-11 NOTE — TELEPHONE ENCOUNTER
A same-day slot tomorrow would be fine.  Thank you.  However if her pain is so severe that she cannot be at home due to excruciating pain she should go to the emergency room.

## 2024-11-12 ENCOUNTER — OFFICE VISIT (OUTPATIENT)
Dept: INTERNAL MEDICINE | Facility: CLINIC | Age: 89
End: 2024-11-12
Payer: MEDICARE

## 2024-11-12 ENCOUNTER — ANTICOAGULATION THERAPY VISIT (OUTPATIENT)
Dept: ANTICOAGULATION | Facility: CLINIC | Age: 89
End: 2024-11-12

## 2024-11-12 VITALS
HEIGHT: 64 IN | HEART RATE: 75 BPM | OXYGEN SATURATION: 97 % | RESPIRATION RATE: 17 BRPM | WEIGHT: 145 LBS | BODY MASS INDEX: 24.75 KG/M2 | TEMPERATURE: 98.1 F | DIASTOLIC BLOOD PRESSURE: 62 MMHG | SYSTOLIC BLOOD PRESSURE: 110 MMHG

## 2024-11-12 DIAGNOSIS — M54.12 CERVICAL RADICULOPATHY: ICD-10-CM

## 2024-11-12 DIAGNOSIS — G89.29 CHRONIC NECK PAIN: Primary | ICD-10-CM

## 2024-11-12 DIAGNOSIS — Z79.01 LONG TERM (CURRENT) USE OF ANTICOAGULANTS: ICD-10-CM

## 2024-11-12 DIAGNOSIS — I42.9 CARDIOMYOPATHY, UNSPECIFIED TYPE (H): ICD-10-CM

## 2024-11-12 DIAGNOSIS — I48.19 PERSISTENT ATRIAL FIBRILLATION (H): ICD-10-CM

## 2024-11-12 DIAGNOSIS — I48.19 PERSISTENT ATRIAL FIBRILLATION (H): Primary | ICD-10-CM

## 2024-11-12 DIAGNOSIS — M54.2 CHRONIC NECK PAIN: Primary | ICD-10-CM

## 2024-11-12 DIAGNOSIS — F43.23 ADJUSTMENT DISORDER WITH MIXED ANXIETY AND DEPRESSED MOOD: ICD-10-CM

## 2024-11-12 LAB — INR BLD: 2.6 (ref 0.9–1.1)

## 2024-11-12 PROCEDURE — 99214 OFFICE O/P EST MOD 30 MIN: CPT | Performed by: INTERNAL MEDICINE

## 2024-11-12 PROCEDURE — 85610 PROTHROMBIN TIME: CPT | Performed by: INTERNAL MEDICINE

## 2024-11-12 PROCEDURE — 36416 COLLJ CAPILLARY BLOOD SPEC: CPT | Performed by: INTERNAL MEDICINE

## 2024-11-12 ASSESSMENT — ANXIETY QUESTIONNAIRES
3. WORRYING TOO MUCH ABOUT DIFFERENT THINGS: MORE THAN HALF THE DAYS
5. BEING SO RESTLESS THAT IT IS HARD TO SIT STILL: SEVERAL DAYS
1. FEELING NERVOUS, ANXIOUS, OR ON EDGE: SEVERAL DAYS
7. FEELING AFRAID AS IF SOMETHING AWFUL MIGHT HAPPEN: NOT AT ALL
IF YOU CHECKED OFF ANY PROBLEMS ON THIS QUESTIONNAIRE, HOW DIFFICULT HAVE THESE PROBLEMS MADE IT FOR YOU TO DO YOUR WORK, TAKE CARE OF THINGS AT HOME, OR GET ALONG WITH OTHER PEOPLE: NOT DIFFICULT AT ALL
GAD7 TOTAL SCORE: 7
GAD7 TOTAL SCORE: 7
2. NOT BEING ABLE TO STOP OR CONTROL WORRYING: MORE THAN HALF THE DAYS
6. BECOMING EASILY ANNOYED OR IRRITABLE: NOT AT ALL

## 2024-11-12 ASSESSMENT — PATIENT HEALTH QUESTIONNAIRE - PHQ9
SUM OF ALL RESPONSES TO PHQ QUESTIONS 1-9: 4
5. POOR APPETITE OR OVEREATING: SEVERAL DAYS

## 2024-11-12 NOTE — PROGRESS NOTES
ANTICOAGULATION MANAGEMENT     Mariia Tinoco 89 year old female is on warfarin with therapeutic INR result. (Goal INR 2.0-3.0)    Recent labs: (last 7 days)     11/12/24  1256   INR 2.6*       ASSESSMENT     Source(s): Chart Review and Patient/Caregiver Call     Warfarin doses taken: Warfarin taken as instructed  Diet: No new diet changes identified  Medication/supplement changes: None noted  Was started on 11/1/24 Sertraline 25mg daily.  New illness, injury, or hospitalization: Yes:    11/12/24 medication follow-up for chronic neck pain; [facility] and depression / adjustment disorder doing better better on Sertraline.  Signs or symptoms of bleeding or clotting: No  Previous result: Supratherapeutic at 3.2 on 11/1/24.  Additional findings: None       PLAN     Recommended plan for no diet, medication or health factor changes affecting INR     Dosing Instructions: Continue your current warfarin dose with next INR in 2-3 weeks       Summary  As of 11/12/2024      Full warfarin instructions:  3 mg every day   Next INR check:  11/26/2024               Telephone call with Nicolas who verbalizes understanding and agrees to plan    Lab visit scheduled - INR on 12/4/24 @ [facility].   - she does not drive and  brings her to app.    Education provided: Taking warfarin: Importance of taking warfarin as instructed  Goal range and lab monitoring: goal range and significance of current result  Interaction IS anticipated between warfarin and Sertraline    Plan made per [facility] anticoagulation protocol    Cassandra Banuelos RN  11/12/2024  Anticoagulation Clinic  [facility] for routing messages: [facility]  [facility] patient phone line: [phone]        _______________________________________________________________________     Anticoagulation Episode Summary       Current INR goal:  2.0-3.0   TTR:  80.5% (1 y)   Target end date:  Indefinite   Send INR reminders to:  [facility]    Indications    Persistent  atrial fibrillation (H) [I48.19]  Long term (current) use of anticoagulants [Z79.01]             Comments:  --             Anticoagulation Care Providers       Provider Role Specialty Phone number    Eagle Shabazz MD Referring Internal Medicine [phone]    Mariano Munoz MD Referring Internal Medicine [phone]

## 2024-11-12 NOTE — PROGRESS NOTES
1. Chronic neck pain (Primary)  Her symptoms are most suggestive of this with cervical radiculopathy.  She cannot have an MRI.  Will do CT scan.  Refer to spine.  She may benefit from therapy and injection  - CT Cervical Spine w/o & w Contrast; Future  - Spine  Referral; Future    2. Cervical radiculopathy  As above.  - CT Cervical Spine w/o & w Contrast; Future  - Spine  Referral; Future    3. Adjustment disorder with mixed anxiety and depressed mood  Doing better with the sertraline.  Continue low dose and will repeat visit in a few weeks over the phone.    4. Persistent atrial fibrillation (H)    - INR point of care    5. Long term (current) use of anticoagulants    - INR point of care    6. Cardiomyopathy, unspecified type (H)  Most recent EF was in the 50s and no evidence of CHF at this time.    Jhonny Valenzuela is a 89 year old, presenting for the following health issues:  Neck Pain (Intermittent leftside neck pain x several month; pt reports discomfort pain varies ) and Depression (Medication follow up; pt reports some mild dizziness with taking Sertraline )      11/12/2024    12:13 PM   Additional Questions   Roomed by Lolita   Accompanied by alone         11/12/2024    12:13 PM   Patient Reported Additional Medications   Patient reports taking the following new medications none     History of Present Illness       Reason for visit:  Neck Pain  Symptom onset:  More than a month  Symptoms include:  Left side neck pains  Symptom intensity:  Moderate  Symptom progression:  Worsening  Had these symptoms before:  No  What makes it worse:  Certain movements  What makes it better:  Resting and using heating pads   She is taking medications regularly.           Comes in as an urgent add-on for evaluation of chronic neck pain that she has had for about a year now.  She took a fall in March and had some neck pain but apparently she had neck pain before that she tells me.  To the left side of her  "neck rating into her shoulder and down her left arm.  Other she also notes that her hand hurts and is weak on that left side and pain hurts from the wrist up to the elbow but she believes that these are 2 different issues.  She has not been doing anything for this.  She tells me she is beside herself at times because of the discomfort.    She recently was put on sertraline.  She did not start taking it right away.  She was little dizzy initially but she is doing better with it and believes it is helping with her mood and anxiety.  She wants to continue the same dosing.    She is due for an INR.  She has a cardiomyopathy followed closely by cardiology stable with no symptoms of heart failure.  Notes recent EF though looks like it was in the 50s.          Objective    /62 (BP Location: Left arm, Patient Position: Sitting, Cuff Size: Adult Regular)   Pulse 75   Temp 98.1  F (36.7  C) (Tympanic)   Resp 17   Ht 1.626 m (5' 4\")   Wt 65.8 kg (145 lb)   LMP  (LMP Unknown)   SpO2 97%   BMI 24.89 kg/m    Body mass index is 24.89 kg/m .  Physical Exam               Signed Electronically by: PHILIPP ALCALA MD    "

## 2024-11-12 NOTE — PATIENT INSTRUCTIONS
Get a salonpas or aspercreme or similar topical cream with lidocaine to rub on your neck/shoulder.

## 2024-11-13 ENCOUNTER — DOCUMENTATION ONLY (OUTPATIENT)
Dept: ANTICOAGULATION | Facility: CLINIC | Age: 89
End: 2024-11-13

## 2024-11-13 DIAGNOSIS — Z79.01 LONG TERM (CURRENT) USE OF ANTICOAGULANTS: ICD-10-CM

## 2024-11-13 DIAGNOSIS — I48.19 PERSISTENT ATRIAL FIBRILLATION (H): Primary | ICD-10-CM

## 2024-11-13 NOTE — PROGRESS NOTES
ANTICOAGULATION CLINIC REFERRAL RENEWAL REQUEST       An annual renewal order is required for all patients referred to [facility].?  Please review and sign the pended referral order for Mariia Tinoco.       ANTICOAGULATION SUMMARY      Warfarin indication(s)   Atrial Fibrillation, persistent    Mechanical heart valve present?  NO       Current goal range   INR: 2.0-3.0     Goal appropriate for indication? Goal INR 2-3, standard for indication(s) above     Time in Therapeutic Range (TTR)  (Goal > 60%) 80.5 %       Office visit with referring provider's group within last year Yes on 11/12/24 with Dr. Eagle Banuelos, RN  [facility]

## 2024-11-16 DIAGNOSIS — F43.23 ADJUSTMENT DISORDER WITH MIXED ANXIETY AND DEPRESSED MOOD: ICD-10-CM

## 2024-11-18 RX ORDER — SERTRALINE HYDROCHLORIDE 25 MG/1
25 TABLET, FILM COATED ORAL DAILY
Qty: 90 TABLET | Refills: 1 | Status: SHIPPED | OUTPATIENT
Start: 2024-11-18

## 2024-12-03 ENCOUNTER — VIRTUAL VISIT (OUTPATIENT)
Dept: INTERNAL MEDICINE | Facility: CLINIC | Age: 89
End: 2024-12-03
Payer: MEDICARE

## 2024-12-03 ENCOUNTER — ANCILLARY PROCEDURE (OUTPATIENT)
Dept: CT IMAGING | Facility: CLINIC | Age: 89
End: 2024-12-03
Attending: INTERNAL MEDICINE
Payer: MEDICARE

## 2024-12-03 DIAGNOSIS — F43.23 ADJUSTMENT DISORDER WITH MIXED ANXIETY AND DEPRESSED MOOD: Primary | ICD-10-CM

## 2024-12-03 DIAGNOSIS — M54.2 CHRONIC NECK PAIN: ICD-10-CM

## 2024-12-03 DIAGNOSIS — G89.29 CHRONIC NECK PAIN: ICD-10-CM

## 2024-12-03 DIAGNOSIS — M54.12 CERVICAL RADICULOPATHY: ICD-10-CM

## 2024-12-03 LAB
CREAT BLD-MCNC: 0.7 MG/DL (ref 0.5–1)
EGFRCR SERPLBLD CKD-EPI 2021: >60 ML/MIN/1.73M2

## 2024-12-03 PROCEDURE — 99441 PR PHYSICIAN TELEPHONE EVALUATION 5-10 MIN: CPT | Mod: 93 | Performed by: INTERNAL MEDICINE

## 2024-12-03 PROCEDURE — 82565 ASSAY OF CREATININE: CPT | Performed by: PATHOLOGY

## 2024-12-03 RX ORDER — IOPAMIDOL 755 MG/ML
70 INJECTION, SOLUTION INTRAVASCULAR ONCE
Status: COMPLETED | OUTPATIENT
Start: 2024-12-03 | End: 2024-12-03

## 2024-12-03 RX ADMIN — IOPAMIDOL 70 ML: 755 INJECTION, SOLUTION INTRAVASCULAR at 13:27

## 2024-12-03 ASSESSMENT — PATIENT HEALTH QUESTIONNAIRE - PHQ9: SUM OF ALL RESPONSES TO PHQ QUESTIONS 1-9: 0

## 2024-12-03 ASSESSMENT — ANXIETY QUESTIONNAIRES
GAD7 TOTAL SCORE: 4
8. IF YOU CHECKED OFF ANY PROBLEMS, HOW DIFFICULT HAVE THESE MADE IT FOR YOU TO DO YOUR WORK, TAKE CARE OF THINGS AT HOME, OR GET ALONG WITH OTHER PEOPLE?: SOMEWHAT DIFFICULT
IF YOU CHECKED OFF ANY PROBLEMS ON THIS QUESTIONNAIRE, HOW DIFFICULT HAVE THESE PROBLEMS MADE IT FOR YOU TO DO YOUR WORK, TAKE CARE OF THINGS AT HOME, OR GET ALONG WITH OTHER PEOPLE: SOMEWHAT DIFFICULT
5. BEING SO RESTLESS THAT IT IS HARD TO SIT STILL: NOT AT ALL
1. FEELING NERVOUS, ANXIOUS, OR ON EDGE: SEVERAL DAYS
2. NOT BEING ABLE TO STOP OR CONTROL WORRYING: SEVERAL DAYS
7. FEELING AFRAID AS IF SOMETHING AWFUL MIGHT HAPPEN: NOT AT ALL
6. BECOMING EASILY ANNOYED OR IRRITABLE: SEVERAL DAYS
4. TROUBLE RELAXING: NOT AT ALL
GAD7 TOTAL SCORE: 4
3. WORRYING TOO MUCH ABOUT DIFFERENT THINGS: SEVERAL DAYS

## 2024-12-03 NOTE — DISCHARGE INSTRUCTIONS

## 2024-12-03 NOTE — PROGRESS NOTES
Nicolas is a 89 year old who is being evaluated via a billable telephone visit.    What phone number would you like to be contacted at? [phone]   How would you like to obtain your AVS? Mail a copy  Originating Location (pt. Location): Home    Distant Location (provider location):  On-site    1. Adjustment disorder with mixed anxiety and depressed mood (Primary)  She seems to be doing well with the 25 mg of sertraline.  Offered to increase and she declines.  Will reassess when I see her next month.    2. Chronic neck pain  She will be meeting with the spine clinic soon.  I am not sure if they are going to be able to do any sort of injections here but maybe get her set up with some good therapy    Subjective   Nicolas is a 89 year old, presenting for the following health issues:  Results (Review and discuss recent CT cervical spine scan; scheduled to see Spine Clinic on 12/17 )      12/3/2024     3:44 PM   Additional Questions   Roomed by Lolita   Accompanied by alone         12/3/2024     3:44 PM   Patient Reported Additional Medications   Patient reports taking the following new medications none     History of Present Illness       Mental Health Follow-up:  Patient presents to follow-up on Depression & Anxiety.Patient's depression since last visit has been:  No change  The patient is not having other symptoms associated with depression.  Patient's anxiety since last visit has been:  No change  The patient is having other symptoms associated with anxiety.  Any significant life events: relationship concerns and health concerns  Patient is not feeling anxious or having panic attacks.  Patient has no concerns about alcohol or drug use.    Reason for visit:  CT results & medication follow up    She eats 2-3 servings of fruits and vegetables daily.She consumes 0 sweetened beverage(s) daily.   She is taking medications regularly.             He joins me on a phone visit.  She reports things have been going fairly well.   Her chronic neck pain and arm pain persist.  She had a CT scan of her neck today which showed arthritic changes but no evidence of major spinal stenosis or nerve impingement.  She does note that she has had carpal tunnel in that left arm in the past.  Mood has been good and she believes her anxiety is improved.  She does not wish to go up on the medication at all.      Objective           Vitals:  No vitals were obtained today due to virtual visit.    Physical Exam   General: Alert and no distress //Respiratory: No audible wheeze, cough, or shortness of breath // Psychiatric:  Appropriate affect, tone, and pace of words            Phone call duration: 9 minutes  Signed Electronically by: PHILIPP ALCALA MD

## 2024-12-04 ENCOUNTER — LAB (OUTPATIENT)
Dept: LAB | Facility: CLINIC | Age: 89
End: 2024-12-04
Payer: MEDICARE

## 2024-12-04 ENCOUNTER — ANTICOAGULATION THERAPY VISIT (OUTPATIENT)
Dept: ANTICOAGULATION | Facility: CLINIC | Age: 89
End: 2024-12-04

## 2024-12-04 DIAGNOSIS — F51.04 CHRONIC INSOMNIA: ICD-10-CM

## 2024-12-04 DIAGNOSIS — I48.19 PERSISTENT ATRIAL FIBRILLATION (H): Primary | ICD-10-CM

## 2024-12-04 DIAGNOSIS — Z79.01 LONG TERM (CURRENT) USE OF ANTICOAGULANTS: ICD-10-CM

## 2024-12-04 DIAGNOSIS — I48.19 PERSISTENT ATRIAL FIBRILLATION (H): ICD-10-CM

## 2024-12-04 DIAGNOSIS — E03.9 HYPOTHYROIDISM: Primary | ICD-10-CM

## 2024-12-04 LAB
INR BLD: 4.7 (ref 0.9–1.1)
T4 FREE SERPL-MCNC: 1.39 NG/DL (ref 0.9–1.7)
TSH SERPL DL<=0.005 MIU/L-ACNC: 6.21 UIU/ML (ref 0.3–4.2)

## 2024-12-04 PROCEDURE — 85610 PROTHROMBIN TIME: CPT

## 2024-12-04 PROCEDURE — 84443 ASSAY THYROID STIM HORMONE: CPT

## 2024-12-04 PROCEDURE — 84439 ASSAY OF FREE THYROXINE: CPT

## 2024-12-04 PROCEDURE — 36415 COLL VENOUS BLD VENIPUNCTURE: CPT

## 2024-12-04 RX ORDER — ZOLPIDEM TARTRATE 5 MG/1
TABLET ORAL
Qty: 30 TABLET | Refills: 0 | Status: SHIPPED | OUTPATIENT
Start: 2024-12-04

## 2024-12-04 NOTE — PROGRESS NOTES
ANTICOAGULATION MANAGEMENT     Mariia PICKARD Tinoco 89 year old female is on warfarin with supratherapeutic INR result. (Goal INR 2.0-3.0)    Recent labs: (last 7 days)     12/04/24  0917   INR 4.7*       ASSESSMENT     Warfarin Lab Questionnaire    Warfarin Doses Last 7 Days      12/4/2024     9:07 AM   Dose in Tablet or Mg   TAB or MG? - 1mg warfarin tablets  milligram (mg)     Pt Rptd Dose SUNDAY MONDAY TUESDAY WED THURS FRIDAY SATURDAY 12/4/2024   9:07 AM 3 3 3 3 3 3 3         12/4/2024   Warfarin Lab Questionnaire   Missed doses within past 14 days? No   Changes in diet or alcohol within past 14 days? No   Medication changes since last result? No - started on Sertraline 25mg daily on 11/1/24 malinda adjustment disorder, anxiety, and depressed mood.  (Can increase INR and risk for bleeding.  Monitor INR closely).     Injuries or illness since last result? No - chronic neck pain.  Has scheduled new appt with [facility] Clinic on 12/17/14.     New shortness of breath, severe headaches or sudden changes in vision since last result? No   Abnormal bleeding since last result? No   Upcoming surgery, procedure? No   Best number to call with results? [phone]        Previous result: Therapeutic last visit at 2.6; previously outside of goal range at 3.2    Additional findings:  since starting Sertraline on 11/1/24, warfarin doses have not been adjusted, due to therapeutic INR.       PLAN     Recommended plan for ongoing change(s) affecting INR     Dosing Instructions: hold dose then decrease your warfarin dose (9.5% change) with next INR in 10 days       Summary  As of 12/4/2024      Full warfarin instructions:  12/4: Hold; Otherwise 2 mg every Mon, Thu; 3 mg all other days   Next INR check:  12/13/2024               Telephone call with Nicolas who verbalizes understanding and agrees to plan    Lab visit scheduled - INR on 12/18/24 @ [facility]   -  drives her to appts.    Education provided: Taking warfarin: Importance of taking  warfarin as instructed  Goal range and lab monitoring: goal range and significance of current result  Interaction IS anticipated between warfarin and Sertraline    Plan made per [facility] anticoagulation protocol    Cassandra Banuelos RN  12/4/2024  Anticoagulation Clinic  [facility] for routing messages: [facility]  [facility] patient phone line: [phone]        _______________________________________________________________________     Anticoagulation Episode Summary       Current INR goal:  2.0-3.0   TTR:  75.7% (1 y)   Target end date:  Indefinite   Send INR reminders to:  [facility]    Indications    Persistent atrial fibrillation (H) [I48.19]  Long term (current) use of anticoagulants [Z79.01]             Comments:  --             Anticoagulation Care Providers       Provider Role Specialty Phone number    Eagle Shabazz MD Referring Internal Medicine [phone]    Mariano Munoz MD Referring Internal Medicine [phone]

## 2024-12-09 ENCOUNTER — NURSE TRIAGE (OUTPATIENT)
Dept: INTERNAL MEDICINE | Facility: CLINIC | Age: 89
End: 2024-12-09
Payer: MEDICARE

## 2024-12-09 NOTE — TELEPHONE ENCOUNTER
"Reason for Disposition    MODERATE difficulty breathing (e.g., speaks in phrases, SOB even at rest, pulse 100-120) of new-onset or worse than normal    Additional Information    Negative: Difficult to awaken or acting confused (e.g., disoriented, slurred speech)    Negative: Bluish (or gray) lips or face    Negative: Breathing stopped and hasn't returned    Negative: Choking on something    Negative: SEVERE difficulty breathing (e.g., struggling for each breath, speaks in single words, pulse > 120)    Negative: Passed out (e.g., fainted, lost consciousness, blacked out and was not responding)    Negative: Wheezing started suddenly after medicine, an allergic food, or bee sting    Negative: Stridor (harsh sound while breathing in)    Negative: Slow, shallow and weak breathing    Negative: Sounds like a life-threatening emergency to the triager    Negative: Chest pain    Negative: Wheezing (high pitched whistling sound) and previous asthma attacks or use of asthma medicines    Negative: Breathing difficulty and within 14 days of COVID-19 EXPOSURE (close contact) with someone diagnosed with COVID-19 (e.g., COVID test positive)    Negative: Breathing difficulty and COVID-19 is widespread in the community    Negative: Breathing diffculty and only present when coughing    Negative: Breathing difficulty and only from stuffy nose    Negative: Breathing diffculty and only from stuffy nose or runny nose from common cold    Answer Assessment - Initial Assessment Questions  1. RESPIRATORY STATUS: \"Describe your breathing?\" (e.g., wheezing, shortness of breath, unable to speak, severe coughing)       Short of breath, out of breath  2. ONSET: \"When did this breathing problem begin?\"       Started on Saturday  3. PATTERN \"Does the difficult breathing come and go, or has it been constant since it started?\"       Constant until last night about 10:00 PM, then started up again  4. SEVERITY: \"How bad is your breathing?\" (e.g., mild, " "moderate, severe)       Moderate  5. RECURRENT SYMPTOM: \"Have you had difficulty breathing before?\" If Yes, ask: \"When was the last time?\" and \"What happened that time?\"       Yes, but not to this severity  6. CARDIAC HISTORY: \"Do you have any history of heart disease?\" (e.g., heart attack, angina, bypass surgery, angioplasty)       Pacemaker present, A-fib  7. LUNG HISTORY: \"Do you have any history of lung disease?\"  (e.g., pulmonary embolus, asthma, emphysema)      COPD, pulmonary stenosis  8. CAUSE: \"What do you think is causing the breathing problem?\"       Not sure, started suddenly on Saturday  9. OTHER SYMPTOMS: \"Do you have any other symptoms?\" (e.g., chest pain, cough, dizziness, fever, runny nose)      No pain or pressure in chest. Has dizziness with exertion and walking. Grab on to wall to stabilize self with walking. Denies recent illness, no fevers. Has a cough but has chronic cough, not worse than usual.  10. O2 SATURATION MONITOR:  \"Do you use an oxygen saturation monitor (pulse oximeter) at home?\" If Yes, ask: \"What is your reading (oxygen level) today?\" \"What is your usual oxygen saturation reading?\" (e.g., 95%)        No, does have on hand but can't find in her house.  11. PREGNANCY: \"Is there any chance you are pregnant?\" \"When was your last menstrual period?\"        N/A  12. TRAVEL: \"Have you traveled out of the country in the last month?\" (e.g., travel history, exposures)        No    Protocols used: Breathing Difficulty-A-[place]    "

## 2024-12-13 NOTE — PROGRESS NOTES
"[facility] MEDSPINE CONSULTATION    Date of visit: 12/17/2024    Chief Complaint   Patient presents with    Pain      Reason for consultation:    Mariia Tinoco is a 89 year old female who is seen in consultation today at the request of her primary care physician, Eagle Shabazz       Review of Electronic Chart: Today I have also reviewed available medical information in the patient's medical record at [place] ([facility]), including relevant provider notes, laboratory work, and imaging.       Chief Complaint:    Chief Complaint   Patient presents with    Pain       Pain history:  Mariia Tinoco is a 89 year old female who presents for initial evaluation of chief pain complaint of neck pain.     Today with no pain. Attributes to recent steroid pack for PNA.     Onset: About 1 year, no injury. Slowly progressive until about 1 month ago when it got more intense. No new activity or precipitating event.   Location and Radiation: Left mid cervical spine with radiation into the left trapezius. Denies radicular symptoms into the arm but does have chronic left hand pain she attributes to arthritis.   Provoking: No specific movements.   Palliating: Rubbing her trapezius, using a ball to help massage. Laying down  Quality: \"boring into neck with a drill\" at highest intensity, at lower intensity dull ache   Severity: At it's best 2/10, at it's worst 10/10, typically live at 5/10, would love it stayed at a 2/10.   Timing: Daily but worse in evening.   Numbness: None  Weakness: Left hand related to arthritis of hand     Patient denies red flag symptoms of corresponding bowel/bladder symptoms, fever/chills, saddle anesthesia, profound motor loss, weight loss, or sudden unremitting increase in pain.    Current pain medications include:  Steroid pack - given for PNA but helped with pain, last dose on 3 days ago.   Tylenol - 1300 mg twice daily     Previous medication treatments included:  None - no " topicals, patches, muscle relaxants.   No NSAIDS    Other treatments have included:  Mariia Tinoco has not been seen at a pain clinic in the past.    PT: None but does home stretches with cervical movements.   Injections: None  Surgery: None   Alternative:     IMAGING:   CERVICAL CT SCAN 12/3/2024:   FINDINGS:  Grade I anterolistheses of C3 on C4, C4 on C5, C7 on T1, and T1 and  T2. No acute fracture or traumatic subluxation. Moderate  intervertebral disc height loss at C4-5, C5-6, and C6-7. No  prevertebral edema.     The findings on a level by level basis are as follows:     C2-3:  No spinal canal or neural foraminal stenosis.     C3-4:  No spinal canal or neural foraminal stenosis.     C4-5:  Mild uncovertebral spurring. No spinal canal or neural  foraminal stenosis.     C5-6:  Moderate uncovertebral spurring. No spinal canal or neural  foraminal stenosis.     C6-7:  Moderate uncovertebral spurring. No spinal canal or neural  foraminal stenosis.     C7-T1:  No spinal canal or neural foraminal stenosis.     Consolidative density at the periphery of the lung apices bilaterally.                                                                    IMPRESSION:  1. No acute fracture or traumatic subluxation.  2. Degenerative changes, perhaps greatest at C5-6 and C6-7.      Past Medical History:  Past Medical History:   Diagnosis Date    Acute sinusitis     past hx    Biventricular cardiac pacemaker in situ     Cardiomyopathy (H)     Chronic bronchitis (H)     Chronic interstitial lung disease (H)     COPD (chronic obstructive pulmonary disease) (H)     Disc disease, degenerative, cervical     Diverticulosis     Edema     Hypothyroidism     Macular degeneration     Osteoarthritis     Osteoporosis     Ovarian cancer (H)     age 38    Persistent atrial fibrillation (H)     Pulmonary fibrosis (H)     Skin cancer     Tuberculosis     past hx       Past Surgical History:  Past Surgical History:   Procedure Laterality Date     BIOPSY BREAST      2 biopsies, unsure of date or which breast    HC REMOVAL OF TONSILS,<14 Y/O      Description: Tonsillectomy;  Recorded: 08/31/2012;    HYSTERECTOMY      age 38    LUNG REMOVAL, PARTIAL Right     part of lobe    OOPHORECTOMY      age 38    [place] CARDIOVERSION ELECTIVE ARRHYTHMIA EXTERNAL      Description: Elective Cardioversion External;  Recorded: 08/31/2012;    ZZC REMOVAL OF OVARY(S)      Description: Oophorectomy;  Recorded: 08/31/2012;    ZZC TOTAL ABDOM HYSTERECTOMY      Description: Hysterectomy;  Recorded: 08/31/2012;    ZZC TOTAL KNEE ARTHROPLASTY Right 1/5/2017    Procedure:  RIGHT TOTAL KNEE ARTHROPLASTY;  Surgeon: Andres Phillips MD;  Location: [facility];  Service: Orthopedics       Medications:  Current Outpatient Medications   Medication Sig Dispense Refill    acetaminophen (TYLENOL) 500 MG tablet Take 2 tablets (1,000 mg) by mouth as needed      albuterol (PROAIR HFA;PROVENTIL HFA;VENTOLIN HFA) 90 mcg/actuation inhaler [ALBUTEROL (PROAIR HFA;PROVENTIL HFA;VENTOLIN HFA) 90 MCG/ACTUATION INHALER] Inhale 2 puffs every 6 (six) hours as needed for wheezing. 1 each 0    calcium carbonate (OS-ZARI) 600 mg (1,500 mg) tablet Take 600 mg by mouth 2 times daily (with meals)      fluticasone-vilanterol (BREO ELLIPTA) 100-25 mcg/dose DsDv inhaler Inhale 1 puff into the lungs daily      levothyroxine (SYNTHROID/LEVOTHROID) 75 MCG tablet TAKE 1 TABLET BY MOUTH EVERY DAY 90 tablet 1    melatonin 3 mg Tab tablet Take 1 tablet (3 mg) by mouth At Bedtime      metoprolol succinate ER (TOPROL XL) 25 MG 24 hr tablet Take 1 tablet (25 mg) by mouth daily. 90 tablet 3    Propylene Glycol (SYSTANE BALANCE OP) Apply 1 drop to eye 4 times daily      sertraline (ZOLOFT) 25 MG tablet TAKE 1 TABLET BY MOUTH EVERY DAY 90 tablet 1    warfarin ANTICOAGULANT (COUMADIN) 1 MG tablet TAKE 3 TABS (3MG) MONDAY, WEDNESDAY, SATURDAY, AND TAKE 4 TABS (4MG) ALL OTHER DAYS, BY MOUTH AS DIRECTED. ADJUST DOSE BASED ON  INR RESULTS. 310 tablet 1    warfarin ANTICOAGULANT (COUMADIN) 1 MG tablet Take 4 tabs (4mg) Wednesday and 3 tablets (3mg) all other days, or as directed.  Adjust dose based on INR results. 270 tablet 1    zolpidem (AMBIEN) 5 MG tablet TAKE 1/2 TO 1 TABLET (2.5 TO 5 MG) BY MOUTH EVERY NIGHT AS NEEDED FOR SLEEP 30 tablet 0       Allergies:     Allergies   Allergen Reactions    Bee Venom Protein (Honey Bee) [Bee Venom] Anaphylaxis and Hives    Ultram [Tramadol] Nausea and Vomiting       Family history:  Family History   Problem Relation Age of Onset    Breast Cancer Daughter 46.00    Breast Cancer Paternal Grandmother 76.00         Physical Exam:  Vitals:    12/17/24 0855   BP: 111/69   Pulse: 76   SpO2: 97%         Musculoskeletal exam:  Gait/Station/Posture:   Normal stance, arm swing, and stride;  Normal bulk and tone. Unremarkable spinal curvature.    Cervical spine:  Range of motion only mildly limited in left side bending/rotation  Myofascial tenderness: TTP at left mid cervical paraspinals and mid cervical facet joints on the left. TTP in left trapezius. No trigger points.   No focal spinous process tenderness.   Spurling's negative bilaterally.       Neurologic exam:  CN:  Limited review; patient with only minimal amount of peripheral vision   Motor Strength:  5/5 symmetric UE   Negative tubbs's.   Sensory:  (upper extremities):   Light touch: normal    Allodynia: absent    Hyperalgesia: absent       ASSESSMENT/PLAN:  The following recommendations were given to the patient. Diagnosis, treatment options, risks, benefits, and alternatives were discussed, and all questions were answered. The patient expressed understanding of the plan for management.     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     1. Other spondylosis, cervical region (Primary)  Patient with 1 year of slowly progressive left sided axial neck pain that localizes over the mid cervical facet joints consistent with cervical spondylosis. This correlates with the CT scan of her cervical spine showing mild degenerative findings at C5-6 and C6-7. No features of radiculopathy or myelopathy today. Neuro exam normal. She does have overlying myofascial pain that is contributing. She has tried Tylenol which has limited benefit. She was recently on oral steroids for pneumonia which has significantly improved her neck pain. She is on warfarin for persistent atrial fibrillation and would be high risk of medical complications were this to be held.     Discussed we can start with physical therapy to address functional mobility of her neck and development of a home exercise program. She plans to schedule this when her pain returns.     Would not recommend muscle relaxants or NSAIDS due to medical co morbidities and her age.   Would not recommend injections at this time due to medical co morbidities. Were an injection become appropriate, warfarin would need to be held. Would likely perform left C4, 5, 6 medial branch blocks or left C5-6 facet joint injection.     - Physical Therapy  Referral; Future    2. Chronic neck pain  - Spine  Referral    FOLLOW UP: As needed      BILLING TIME DOCUMENTATION:   The total TIME spent on this patient on the date of the encounter/appointment was 20 minutes.      TOTAL TIME includes:   Time spent preparing to see the patient (reviewing records and tests) - 2 min  Time spent face to face (or over the phone) with the patient - 12 min  Time spent ordering tests, medications, procedures and referrals - 2 min  Time spent Referring and communicating with other healthcare professionals - 0 min  Time spent documenting clinical information in Epic - 4 min    Julio Alvarado DO (pain fellow)     Josefa SANDERS  MD Prasanth, was present with the pain fellow who participated in this patients care and in the documentation of the note.  I have verified the history, physical exam, and the content of the note which accurately represents the visit. I participated in the medical decision making which accurately reflects the assessment & the plan of care as documented in the note.         LESLIE WU MD   Pain Management

## 2024-12-17 ENCOUNTER — OFFICE VISIT (OUTPATIENT)
Dept: PALLIATIVE MEDICINE | Facility: CLINIC | Age: 89
End: 2024-12-17
Payer: MEDICARE

## 2024-12-17 VITALS — SYSTOLIC BLOOD PRESSURE: 111 MMHG | OXYGEN SATURATION: 97 % | DIASTOLIC BLOOD PRESSURE: 69 MMHG | HEART RATE: 76 BPM

## 2024-12-17 DIAGNOSIS — M54.2 CHRONIC NECK PAIN: ICD-10-CM

## 2024-12-17 DIAGNOSIS — M47.892 OTHER SPONDYLOSIS, CERVICAL REGION: Primary | ICD-10-CM

## 2024-12-17 DIAGNOSIS — G89.29 CHRONIC NECK PAIN: ICD-10-CM

## 2024-12-17 DIAGNOSIS — M54.12 CERVICAL RADICULOPATHY: ICD-10-CM

## 2024-12-17 PROBLEM — R19.7 DIARRHEA: Status: ACTIVE | Noted: 2020-11-24

## 2024-12-17 PROBLEM — H54.10 BLINDNESS OF LEFT EYE WITH LOW VISION IN CONTRALATERAL EYE: Status: ACTIVE | Noted: 2024-12-17

## 2024-12-17 PROBLEM — U07.1 PNEUMONIA DUE TO COVID-19 VIRUS: Status: ACTIVE | Noted: 2020-11-24

## 2024-12-17 PROBLEM — J12.82 PNEUMONIA DUE TO COVID-19 VIRUS: Status: ACTIVE | Noted: 2020-11-24

## 2024-12-17 PROCEDURE — 99202 OFFICE O/P NEW SF 15 MIN: CPT | Performed by: ANESTHESIOLOGY

## 2024-12-17 ASSESSMENT — PAIN SCALES - GENERAL: PAINLEVEL_OUTOF10: NO PAIN (0)

## 2024-12-17 NOTE — PATIENT INSTRUCTIONS
Your neck pain is likely the result of underlying facet joint arthritis and some overlying muscle (myofascial pain).  Recommend you continue to stay active.     PT has been ordered. You can call and schedule this when you are ready.     We are not recommending injection therapy at this time.     If pain flares again you could consider asking your PCP for another medrol dose pack.      Our phone number here is [phone] if you have any questions.     Josefa Rader MD

## 2024-12-19 ENCOUNTER — TRANSFERRED RECORDS (OUTPATIENT)
Dept: HEALTH INFORMATION MANAGEMENT | Facility: CLINIC | Age: 89
End: 2024-12-19
Payer: MEDICARE

## 2025-01-06 DIAGNOSIS — F51.04 CHRONIC INSOMNIA: ICD-10-CM

## 2025-01-06 RX ORDER — ZOLPIDEM TARTRATE 5 MG/1
TABLET ORAL
Qty: 30 TABLET | Refills: 0 | Status: SHIPPED | OUTPATIENT
Start: 2025-01-06

## 2025-01-07 ENCOUNTER — LAB (OUTPATIENT)
Dept: LAB | Facility: CLINIC | Age: 89
End: 2025-01-07
Payer: MEDICARE

## 2025-01-07 ENCOUNTER — ANTICOAGULATION THERAPY VISIT (OUTPATIENT)
Dept: ANTICOAGULATION | Facility: CLINIC | Age: 89
End: 2025-01-07

## 2025-01-07 DIAGNOSIS — Z79.01 LONG TERM (CURRENT) USE OF ANTICOAGULANTS: ICD-10-CM

## 2025-01-07 DIAGNOSIS — I48.19 PERSISTENT ATRIAL FIBRILLATION (H): ICD-10-CM

## 2025-01-07 DIAGNOSIS — I48.19 PERSISTENT ATRIAL FIBRILLATION (H): Primary | ICD-10-CM

## 2025-01-07 LAB — INR BLD: 1.7 (ref 0.9–1.1)

## 2025-01-07 PROCEDURE — 85610 PROTHROMBIN TIME: CPT

## 2025-01-07 PROCEDURE — 36416 COLLJ CAPILLARY BLOOD SPEC: CPT

## 2025-01-07 NOTE — PROGRESS NOTES
ANTICOAGULATION MANAGEMENT     Mariia PICKARD Tinoco 89 year old female is on warfarin with subtherapeutic INR result. (Goal INR 2.0-3.0)    Recent labs: (last 7 days)     01/07/25  1021   INR 1.7*       ASSESSMENT     Warfarin Lab Questionnaire    Warfarin Doses Last 7 Days      1/7/2025    10:12 AM   Dose in Tablet or Mg   TAB or MG? - 1mg warfarin tabs (takes @ 530p) milligram (mg)     Pt Rptd Dose SUNDAY MONDAY TUESDAY WED THURS FRIDAY SATURDAY 1/7/2025  10:12 AM 3 2 3 3 2 3 3         1/7/2025   Warfarin Lab Questionnaire   Missed doses within past 14 days? No - warfarin booster dose on 12/20/24.     Changes in diet or alcohol within past 14 days? No   Medication changes since last result? No   Injuries or illness since last result? No - she has fully recovered from pneumonia.     New shortness of breath, severe headaches or sudden changes in vision since last result? No   Abnormal bleeding since last result? No   Upcoming surgery, procedure? No     Best number to call with results? [phone]     Previous result: Subtherapeutic at 1.7 on 12/20/24.   - on 12/4/24 INR was supratherapeutic at 4.7 due to pneumonia and ongoing neck pain.    Additional findings: None       PLAN     Recommended plan for temporary change(s) affecting INR     Dosing Instructions: Increase your warfarin dose (5.3% change) with next INR in 2 weeks       Summary  As of 1/7/2025      Full warfarin instructions:  2 mg every Mon; 3 mg all other days   Next INR check:  1/21/2025               Telephone call with Nicolas who verbalizes understanding and agrees to plan    Check at provider office visit - INR on 1/21/25 at her annual check up with Dr. Shabazz.    Education provided: Taking warfarin: Importance of taking warfarin as instructed  Goal range and lab monitoring: goal range and significance of current result    Plan made per [facility] anticoagulation protocol    Cassandra Banuelos, RN  1/7/2025  Anticoagulation Clinic  [facility] for routing  messages: [facility]  [facility] patient phone line: [phone]        _______________________________________________________________________     Anticoagulation Episode Summary       Current INR goal:  2.0-3.0   TTR:  67.8% (1 y)   Target end date:  Indefinite   Send INR reminders to:  [facility]    Indications    Persistent atrial fibrillation (H) [I48.19]  Long term (current) use of anticoagulants [Z79.01]             Comments:  --             Anticoagulation Care Providers       Provider Role Specialty Phone number    Eagle Shabazz MD Referring Internal Medicine [phone]    Mariano Munoz MD Referring Internal Medicine [phone]

## 2025-01-21 ENCOUNTER — ALLIED HEALTH/NURSE VISIT (OUTPATIENT)
Dept: FAMILY MEDICINE | Facility: CLINIC | Age: 89
End: 2025-01-21
Payer: MEDICARE

## 2025-01-21 ENCOUNTER — OFFICE VISIT (OUTPATIENT)
Dept: INTERNAL MEDICINE | Facility: CLINIC | Age: 89
End: 2025-01-21
Payer: MEDICARE

## 2025-01-21 ENCOUNTER — ANTICOAGULATION THERAPY VISIT (OUTPATIENT)
Dept: ANTICOAGULATION | Facility: CLINIC | Age: 89
End: 2025-01-21

## 2025-01-21 VITALS
OXYGEN SATURATION: 98 % | HEART RATE: 80 BPM | TEMPERATURE: 97.5 F | BODY MASS INDEX: 25.06 KG/M2 | HEIGHT: 64 IN | WEIGHT: 146.8 LBS | DIASTOLIC BLOOD PRESSURE: 74 MMHG | SYSTOLIC BLOOD PRESSURE: 125 MMHG | RESPIRATION RATE: 21 BRPM

## 2025-01-21 DIAGNOSIS — Z23 NEED FOR TDAP VACCINATION: ICD-10-CM

## 2025-01-21 DIAGNOSIS — Z95.0 BIVENTRICULAR CARDIAC PACEMAKER IN SITU: ICD-10-CM

## 2025-01-21 DIAGNOSIS — J41.0 SIMPLE CHRONIC BRONCHITIS (H): ICD-10-CM

## 2025-01-21 DIAGNOSIS — I42.9 CARDIOMYOPATHY, UNSPECIFIED TYPE (H): ICD-10-CM

## 2025-01-21 DIAGNOSIS — H61.23 BILATERAL IMPACTED CERUMEN: ICD-10-CM

## 2025-01-21 DIAGNOSIS — Z79.01 LONG TERM (CURRENT) USE OF ANTICOAGULANTS: ICD-10-CM

## 2025-01-21 DIAGNOSIS — M81.0 OSTEOPOROSIS, SENILE: Primary | ICD-10-CM

## 2025-01-21 DIAGNOSIS — E03.9 HYPOTHYROIDISM, UNSPECIFIED TYPE: ICD-10-CM

## 2025-01-21 DIAGNOSIS — H53.16 CHARLES BONNET SYNDROME: ICD-10-CM

## 2025-01-21 DIAGNOSIS — I48.19 PERSISTENT ATRIAL FIBRILLATION (H): ICD-10-CM

## 2025-01-21 DIAGNOSIS — J84.10 POSTINFLAMMATORY PULMONARY FIBROSIS (H): ICD-10-CM

## 2025-01-21 DIAGNOSIS — Z00.00 ENCOUNTER FOR MEDICARE ANNUAL WELLNESS EXAM: Primary | ICD-10-CM

## 2025-01-21 DIAGNOSIS — H35.30 MACULAR DEGENERATION (SENILE) OF RETINA: ICD-10-CM

## 2025-01-21 DIAGNOSIS — M81.0 OSTEOPOROSIS, SENILE: ICD-10-CM

## 2025-01-21 DIAGNOSIS — I48.19 PERSISTENT ATRIAL FIBRILLATION (H): Primary | ICD-10-CM

## 2025-01-21 PROBLEM — R19.7 DIARRHEA: Status: RESOLVED | Noted: 2020-11-24 | Resolved: 2025-01-21

## 2025-01-21 PROBLEM — U07.1 PNEUMONIA DUE TO COVID-19 VIRUS: Status: RESOLVED | Noted: 2020-11-24 | Resolved: 2025-01-21

## 2025-01-21 PROBLEM — J12.82 PNEUMONIA DUE TO COVID-19 VIRUS: Status: RESOLVED | Noted: 2020-11-24 | Resolved: 2025-01-21

## 2025-01-21 LAB
ALBUMIN UR-MCNC: ABNORMAL MG/DL
APPEARANCE UR: CLEAR
BACTERIA #/AREA URNS HPF: ABNORMAL /HPF
BILIRUB UR QL STRIP: NEGATIVE
COLOR UR AUTO: YELLOW
GLUCOSE UR STRIP-MCNC: NEGATIVE MG/DL
HGB UR QL STRIP: NEGATIVE
INR BLD: 1.8 (ref 0.9–1.1)
KETONES UR STRIP-MCNC: NEGATIVE MG/DL
LEUKOCYTE ESTERASE UR QL STRIP: NEGATIVE
NITRATE UR QL: NEGATIVE
PH UR STRIP: 5.5 [PH] (ref 5–8)
RBC #/AREA URNS AUTO: ABNORMAL /HPF
SP GR UR STRIP: >=1.03 (ref 1–1.03)
SQUAMOUS #/AREA URNS AUTO: ABNORMAL /LPF
TSH SERPL DL<=0.005 MIU/L-ACNC: 3.93 UIU/ML (ref 0.3–4.2)
UROBILINOGEN UR STRIP-ACNC: 0.2 E.U./DL
VIT D+METAB SERPL-MCNC: 35 NG/ML (ref 20–50)
WBC #/AREA URNS AUTO: ABNORMAL /HPF

## 2025-01-21 PROCEDURE — 82306 VITAMIN D 25 HYDROXY: CPT | Performed by: INTERNAL MEDICINE

## 2025-01-21 PROCEDURE — 96372 THER/PROPH/DIAG INJ SC/IM: CPT | Performed by: INTERNAL MEDICINE

## 2025-01-21 PROCEDURE — 99207 PR NO CHARGE NURSE ONLY: CPT

## 2025-01-21 PROCEDURE — 99214 OFFICE O/P EST MOD 30 MIN: CPT | Mod: 25 | Performed by: INTERNAL MEDICINE

## 2025-01-21 PROCEDURE — 36415 COLL VENOUS BLD VENIPUNCTURE: CPT | Performed by: INTERNAL MEDICINE

## 2025-01-21 PROCEDURE — 81001 URINALYSIS AUTO W/SCOPE: CPT | Performed by: INTERNAL MEDICINE

## 2025-01-21 PROCEDURE — 69209 REMOVE IMPACTED EAR WAX UNI: CPT | Performed by: INTERNAL MEDICINE

## 2025-01-21 PROCEDURE — 84443 ASSAY THYROID STIM HORMONE: CPT | Performed by: INTERNAL MEDICINE

## 2025-01-21 PROCEDURE — G0439 PPPS, SUBSEQ VISIT: HCPCS | Performed by: INTERNAL MEDICINE

## 2025-01-21 PROCEDURE — 85610 PROTHROMBIN TIME: CPT | Performed by: INTERNAL MEDICINE

## 2025-01-21 SDOH — HEALTH STABILITY: PHYSICAL HEALTH: ON AVERAGE, HOW MANY MINUTES DO YOU ENGAGE IN EXERCISE AT THIS LEVEL?: 50 MIN

## 2025-01-21 SDOH — HEALTH STABILITY: PHYSICAL HEALTH: ON AVERAGE, HOW MANY DAYS PER WEEK DO YOU ENGAGE IN MODERATE TO STRENUOUS EXERCISE (LIKE A BRISK WALK)?: 2 DAYS

## 2025-01-21 ASSESSMENT — SOCIAL DETERMINANTS OF HEALTH (SDOH): HOW OFTEN DO YOU GET TOGETHER WITH FRIENDS OR RELATIVES?: MORE THAN THREE TIMES A WEEK

## 2025-01-21 ASSESSMENT — PAIN SCALES - GENERAL: PAINLEVEL_OUTOF10: NO PAIN (0)

## 2025-01-21 ASSESSMENT — PATIENT HEALTH QUESTIONNAIRE - PHQ9: SUM OF ALL RESPONSES TO PHQ QUESTIONS 1-9: 1

## 2025-01-21 NOTE — PROGRESS NOTES
"Preventive Care Visit  [facility]  PHILIPP ALCALA MD, Internal Medicine  Jan 21, 2025      Assessment & Plan         1. Encounter for Medicare annual wellness exam (Primary)  She does remarkably well lives independently with her  in her late 80s.  She is not driving due to her blindness.  They eat healthy and stay active.  She is up-to-date on her immunizations except for tetanus which she will get at her pharmacy.    2. Persistent atrial fibrillation (H)  Due for INR today.    3. Cardiomyopathy, unspecified type (H)  Seems stable with no symptoms of heart failure.  Medications and plans per cardiology.  I believe her EF is in the 50% range    4. Simple chronic bronchitis (H)  She will be seeing her pulmonologist in the next couple months.  Continue medications per them.    5. Chronic Interstitial Lung Disease  As above.    6. Need for Tdap vaccination    - Tdap, tetanus-diptheria-acell pertussis, (BOOSTRIX) 5-2.5-18.5 LF-MCG/0.5 TIFFANY injection; Inject 0.5 mLs into the muscle once for 1 dose.  Dispense: 0.5 mL; Refill: 0    7. Bilateral impacted cerumen    - REMOVE IMPACTED CERUMEN    8. Hypothyroidism, unspecified type  She is due for TSH today.  Seems euthyroid however    9. Macular Degeneration  Stable.  Continue to follow with her ophthalmologist    10. Osteoporosis  Due for Prolia today.    11. Biventricular cardiac pacemaker in situ  Followed by cardiology.    12. Robert Bonnet syndrome  Stable.        BMI  Estimated body mass index is 25.09 kg/m  as calculated from the following:    Height as of this encounter: 1.629 m (5' 4.13\").    Weight as of this encounter: 66.6 kg (146 lb 12.8 oz).       Counseling  Appropriate preventive services were addressed with this patient via screening, questionnaire, or discussion as appropriate for fall prevention, nutrition, physical activity, Tobacco-use cessation, social engagement, weight loss and cognition.  Checklist reviewing preventive " services available has been given to the patient.  Reviewed patient's diet, addressing concerns and/or questions.   She is at risk for lack of exercise and has been provided with information to increase physical activity for the benefit of her well-being.   Updated plan of care.  Patient reported difficulty with activities of daily living were addressed today.Patient reported safety concerns were addressed today.Information on urinary incontinence and treatment options given to patient.           Jhonny Valenzuela is a 89 year old, presenting for the following:  Wellness Visit (Due for Prolia (last injection 7/16/24) /Would like ear assess for ear wax build up /Needs INR )        1/21/2025     9:17 AM   Additional Questions   Roomed by Dana GAYTAN  Tete comes in today for follow-up.  She reports she is doing well.  She maintains an active lifestyle despite her diminished vision.  She has legal blindness.  She has Robert Bonnet syndrome.  This has been stable.  Mood was poor last year we put her on sertraline and this is improving.  She is pleased with the medication.  She is due for an INR today.  She is due for Prolia for her osteoporosis.  She is on lifelong Prolia.        Health Care Directive  Patient has a Health Care Directive on file  Advance care planning document is on file and is current.      1/21/2025   General Health   How would you rate your overall physical health? Good   Feel stress (tense, anxious, or unable to sleep) Not at all         1/21/2025   Nutrition   Diet: Regular (no restrictions)         1/21/2025   Exercise   Days per week of moderate/strenous exercise 2 days   Average minutes spent exercising at this level 50 min   (!) EXERCISE CONCERN      1/21/2025   Social Factors   Frequency of gathering with friends or relatives More than three times a week   Worry food won't last until get money to buy more No   Food not last or not have enough money for food? No   Do you have  housing? (Housing is defined as stable permanent housing and does not include staying ouside in a car, in a tent, in an abandoned building, in an overnight shelter, or couch-surfing.) Yes   Are you worried about losing your housing? No   Lack of transportation? No   Unable to get utilities (heat,electricity)? No         1/21/2025   Fall Risk   Fallen 2 or more times in the past year? No     No    Trouble with walking or balance? No     No        Proxy-reported    Multiple values from one day are sorted in reverse-chronological order          1/21/2025   Activities of Daily Living- Home Safety   Needs help with the following daily activites Transportation    Shopping   Safety concerns in the home No grab bars in the bathroom       Multiple values from one day are sorted in reverse-chronological order         1/21/2025   Dental   Dentist two times every year? Yes         1/21/2025   Hearing Screening   Hearing concerns? None of the above         1/21/2025   Driving Risk Screening   Patient/family members have concerns about driving No         1/21/2025   General Alertness/Fatigue Screening   Have you been more tired than usual lately? No         1/21/2025   Urinary Incontinence Screening   Bothered by leaking urine in past 6 months Yes              Today's PHQ-2 Score:       1/21/2025    10:25 AM   PHQ-2 ( 1999 [employer])   Q1: Little interest or pleasure in doing things 0    Q2: Feeling down, depressed or hopeless 0    PHQ-2 Score 0    Q1: Little interest or pleasure in doing things Not at all   Q2: Feeling down, depressed or hopeless Not at all   PHQ-2 Score 0       Proxy-reported         1/21/2025   Substance Use   Alcohol more than 3/day or more than 7/wk No   Do you have a current opioid prescription? No   How severe/bad is pain from 1 to 10? 0/10 (No Pain)   Do you use any other substances recreationally? No     Social History     Tobacco Use    Smoking status: Never    Smokeless tobacco: Never   Vaping Use     "Vaping status: Never Used   Substance Use Topics    Alcohol use: Yes     Alcohol/week: 1.0 standard drink of alcohol     Types: 1 Standard drinks or equivalent per week     Comment: Alcoholic Drinks/day: occasional - 1 drink per week    Drug use: No                    Reviewed and updated as needed this visit by Provider                      Current providers sharing in care for this patient include:  Patient Care Team:  Eagle Shabazz MD as PCP - General  Patti Petersen OT (Inactive) as Occupational Therapist (Occupational Therapy)  Joseluis Call MD as Referring Physician (Ophthalmology)  Eagle Shabazz MD as Assigned PCP  Ryan Johnson MD as Kanu Espinal MD as Physician  Kang Smith RPH as Pharmacist  Kang Smith RPH as Pharmacist (Pharmacist)    The following health maintenance items are reviewed in Epic and correct as of today:  Health Maintenance   Topic Date Due    COPD ACTION PLAN  Never done    DTAP/TDAP/TD IMMUNIZATION (2 - Td or Tdap) 11/28/2024    ANNUAL REVIEW OF HM ORDERS  12/03/2025    TSH W/FREE T4 REFLEX  12/04/2025    MEDICARE ANNUAL WELLNESS VISIT  01/21/2026    FALL RISK ASSESSMENT  01/21/2026    ADVANCE CARE PLANNING  01/21/2030    DEXA  11/02/2037    SPIROMETRY  Completed    PHQ-2 (once per calendar year)  Completed    INFLUENZA VACCINE  Completed    Pneumococcal Vaccine: 50+ Years  Completed    RSV VACCINE  Completed    COVID-19 Vaccine  Completed    ZOSTER IMMUNIZATION  Addressed    HPV IMMUNIZATION  Aged Out    MENINGITIS IMMUNIZATION  Aged Out    RSV MONOCLONAL ANTIBODY  Aged Out            Objective    Exam  /74 (BP Location: Left arm, Patient Position: Sitting, Cuff Size: Adult Regular)   Pulse 80   Temp 97.5  F (36.4  C) (Temporal)   Resp 21   Ht 1.629 m (5' 4.13\")   Wt 66.6 kg (146 lb 12.8 oz)   LMP  (LMP Unknown)   SpO2 98%   BMI 25.09 kg/m     Estimated body mass index is 25.09 kg/m  as calculated from the following:    " "Height as of this encounter: 1.629 m (5' 4.13\").    Weight as of this encounter: 66.6 kg (146 lb 12.8 oz).    Physical Exam  Delightful woman.  She looks younger than her age.  HEENT is unremarkable except for some cerumen in both your canals.  Neck supple.  Lungs with dry crackles at the bases bilaterally.  No wheezes.  Heart is regular with a 2 out of 6 systolic murmur at the apex.  Abdomen soft.  Extremities without edema.  No focal motor deficits grossly.         1/21/2025   Mini Cog   Clock Draw Score 2 Normal   3 Item Recall 2 objects recalled   Mini Cog Total Score 4              Signed Electronically by: PHILIPP ALCALA MD    "

## 2025-01-21 NOTE — PATIENT INSTRUCTIONS
Patient Education   Preventive Care Advice   This is general advice given by our system to help you stay healthy. However, your care team may have specific advice just for you. Please talk to your care team about your preventive care needs.  Nutrition  Eat 5 or more servings of fruits and vegetables each day.  Try wheat bread, brown rice and whole grain pasta (instead of white bread, rice, and pasta).  Get enough calcium and vitamin D. Check the label on foods and aim for 100% of the RDA (recommended daily allowance).  Lifestyle  Exercise at least 150 minutes each week  (30 minutes a day, 5 days a week).  Do muscle strengthening activities 2 days a week. These help control your weight and prevent disease.  No smoking.  Wear sunscreen to prevent skin cancer.  Have a dental exam and cleaning every 6 months.  Yearly exams  See your health care team every year to talk about:  Any changes in your health.  Any medicines your care team has prescribed.  Preventive care, family planning, and ways to prevent chronic diseases.  Shots (vaccines)   HPV shots (up to age 26), if you've never had them before.  Hepatitis B shots (up to age 59), if you've never had them before.  COVID-19 shot: Get this shot when it's due.  Flu shot: Get a flu shot every year.  Tetanus shot: Get a tetanus shot every 10 years.  Pneumococcal, hepatitis A, and RSV shots: Ask your care team if you need these based on your risk.  Shingles shot (for age 50 and up)  General health tests  Diabetes screening:  Starting at age 35, Get screened for diabetes at least every 3 years.  If you are younger than age 35, ask your care team if you should be screened for diabetes.  Cholesterol test: At age 39, start having a cholesterol test every 5 years, or more often if advised.  Bone density scan (DEXA): At age 50, ask your care team if you should have this scan for osteoporosis (brittle bones).  Hepatitis C: Get tested at least once in your life.  STIs (sexually  transmitted infections)  Before age 24: Ask your care team if you should be screened for STIs.  After age 24: Get screened for STIs if you're at risk. You are at risk for STIs (including HIV) if:  You are sexually active with more than one person.  You don't use condoms every time.  You or a partner was diagnosed with a sexually transmitted infection.  If you are at risk for HIV, ask about PrEP medicine to prevent HIV.  Get tested for HIV at least once in your life, whether you are at risk for HIV or not.  Cancer screening tests  Cervical cancer screening: If you have a cervix, begin getting regular cervical cancer screening tests starting at age 21.  Breast cancer scan (mammogram): If you've ever had breasts, begin having regular mammograms starting at age 40. This is a scan to check for breast cancer.  Colon cancer screening: It is important to start screening for colon cancer at age 45.  Have a colonoscopy test every 10 years (or more often if you're at risk) Or, ask your provider about stool tests like a FIT test every year or Cologuard test every 3 years.  To learn more about your testing options, visit:   .  For help making a decision, visit:   https://bit.ly/ty55797.  Prostate cancer screening test: If you have a prostate, ask your care team if a prostate cancer screening test (PSA) at age 55 is right for you.  Lung cancer screening: If you are a current or former smoker ages 50 to 80, ask your care team if ongoing lung cancer screenings are right for you.  For informational purposes only. Not to replace the advice of your health care provider. Copyright   2023 [facility]. All rights reserved. Clinically reviewed by the [facility] Transitions [facility]. [employer] 200498 - REV 01/24.  Learning About Activities of Daily Living  What are activities of daily living?     Activities of daily living (ADLs) are the basic self-care tasks you do every day. These include eating, bathing, dressing,  and moving around.  As you age, and if you have health problems, you may find that it's harder to do some of these tasks. If so, your doctor can suggest ideas that may help.  To measure what kind of help you may need, your doctor will ask how well you are able to do ADLs. Let your doctor know if there are any tasks that you are having trouble doing. This is an important first step to getting help. And when you have the help you need, you can stay as independent as possible.  How will a doctor assess your ADLs?  Asking about ADLs is part of a routine health checkup your doctor will likely do as you age. Your health check might be done in a doctor's office, in your home, or at a hospital. The goal is to find out if you are having any problems that could make it hard to care for yourself or that make it unsafe for you to be on your own.  To measure your ADLs, your doctor will ask how hard it is for you to do routine tasks. Your doctor may also want to know if you have changed the way you do a task because of a health problem. Your doctor may watch how you:  Walk back and forth.  Keep your balance while you stand or walk.  Move from sitting to standing or from a bed to a chair.  Button or unbutton a shirt or sweater.  Remove and put on your shoes.  It's common to feel a little worried or anxious if you find you can't do all the things you used to be able to do. Talking with your doctor about ADLs is a way to make sure you're as safe as possible and able to care for yourself as well as you can. You may want to bring a caregiver, friend, or family member to your checkup. They can help you talk to your doctor.  Follow-up care is a key part of your treatment and safety. Be sure to make and go to all appointments, and call your doctor if you are having problems. It's also a good idea to know your test results and keep a list of the medicines you take.  Current as of: October 24, 2023  Content Version: 14.3    2024 [facility]  [employer].   Care instructions adapted under license by your healthcare professional. If you have questions about a medical condition or this instruction, always ask your healthcare professional. [employer] disclaims any warranty or liability for your use of this information.    Bladder Training: Care Instructions  Your Care Instructions     Bladder training is used to treat urge incontinence and stress incontinence. Urge incontinence means that the need to urinate comes on so fast that you can't get to a toilet in time. Stress incontinence means that you leak urine because of pressure on your bladder. For example, it may happen when you laugh, cough, or lift something heavy.  Bladder training can increase how long you can wait before you have to urinate. It can also help your bladder hold more urine. And it can give you better control over the urge to urinate.  It is important to remember that bladder training takes a few weeks to a few months to make a difference. You may not see results right away, but don't give up.  Follow-up care is a key part of your treatment and safety. Be sure to make and go to all appointments, and call your doctor if you are having problems. It's also a good idea to know your test results and keep a list of the medicines you take.  How can you care for yourself at home?  Work with your doctor to come up with a bladder training program that is right for you. You may use one or more of the following methods.  Delayed urination  In the beginning, try to keep from urinating for 5 minutes after you first feel the need to go.  While you wait, take deep, slow breaths to relax. Kegel exercises can also help you delay the need to go to the bathroom.  After some practice, when you can easily wait 5 minutes to urinate, try to wait 10 minutes before you urinate.  Slowly increase the waiting period until you are able to control when you have to urinate.  Scheduled urination  Empty  "your bladder when you first wake up in the morning.  Schedule times throughout the day when you will urinate.  Start by going to the bathroom every hour, even if you don't need to go.  Slowly increase the time between trips to the bathroom.  When you have found a schedule that works well for you, keep doing it.  If you wake up during the night and have to urinate, do it. Apply your schedule to waking hours only.  Kegel exercises  These tighten and strengthen pelvic muscles, which can help you control the flow of urine. (If doing these exercises causes pain, stop doing them and talk with your doctor.) To do Kegel exercises:  Squeeze your muscles as if you were trying not to pass gas. Or squeeze your muscles as if you were stopping the flow of urine. Your belly, legs, and buttocks shouldn't move.  Hold the squeeze for 3 seconds, then relax for 5 to 10 seconds.  Start with 3 seconds, then add 1 second each week until you are able to squeeze for 10 seconds.  Repeat the exercise 10 times a session. Do 3 to 8 sessions a day.  When should you call for help?  Watch closely for changes in your health, and be sure to contact your doctor if:    Your incontinence is getting worse.     You do not get better as expected.   Where can you learn more?  Go to https://www.[employer].net/patiented  Enter V684 in the search box to learn more about \"Bladder Training: Care Instructions.\"  Current as of: April 30, 2024  Content Version: 14.3    2024 [employer].   Care instructions adapted under license by your healthcare professional. If you have questions about a medical condition or this instruction, always ask your healthcare professional. [employer] disclaims any warranty or liability for your use of this information.    Eating Healthy Foods: Care Instructions  With every meal, you can make healthy food choices. Try to eat a variety of fruits, vegetables, whole grains, lean proteins, and low-fat dairy products. " "This can help you get the right balance of nutrients, including vitamins and minerals. Small changes add up over time. You can start by adding one healthy food to your meals each day.    Try to make half your plate fruits and vegetables, one-fourth whole grains, and one-fourth lean proteins. Try including dairy with your meals.   Eat more fruits and vegetables. Try to have them with most meals and snacks.   Foods for healthy eating        Fruits   These can be fresh, frozen, canned, or dried.  Try to choose whole fruit rather than fruit juice.  Eat a variety of colors.        Vegetables   These can be fresh, frozen, canned, or dried.  Beans, peas, and lentils count too.        Whole grains   Choose whole-grain breads, cereals, and noodles.  Try brown rice.        Lean proteins   These can include lean meat, poultry, fish, and eggs.  You can also have tofu, beans, peas, lentils, nuts, and seeds.        Dairy   Try milk, yogurt, and cheese.  Choose low-fat or fat-free when you can.  If you need to, use lactose-free milk or fortified plant-based milk products, such as soy milk.        Water   Drink water when you're thirsty.  Limit sugar-sweetened drinks, including soda, fruit drinks, and sports drinks.  Where can you learn more?  Go to https://www.[employer].net/patiented  Enter T756 in the search box to learn more about \"Eating Healthy Foods: Care Instructions.\"  Current as of: September 20, 2023  Content Version: 14.3    2024 [email or web address].   Care instructions adapted under license by your healthcare professional. If you have questions about a medical condition or this instruction, always ask your healthcare professional. [email or web address] disclaims any warranty or liability for your use of this information.    Learning About Being Active as an Older Adult  Why is being active important as you get older?     Being active is one of the best things you can do for your health. And it's never too late to " start. Being active--or getting active, if you aren't already--has definite benefits. It can:  Give you more energy,  Keep your mind sharp.  Improve balance to reduce your risk of falls.  Help you manage chronic illness with fewer medicines.  No matter how old you are, how fit you are, or what health problems you have, there is a form of activity that will work for you. And the more physical activity you can do, the better your overall health will be.  What kinds of activity can help you stay healthy?  Being more active will make your daily activities easier. Physical activity includes planned exercise and things you do in daily life. There are four types of activity:  Aerobic.  Doing aerobic activity makes your heart and lungs strong.  Includes walking, dancing, and gardening.  Aim for at least 2  hours spread throughout the week.  It improves your energy and can help you sleep better.  Muscle-strengthening.  This type of activity can help maintain muscle and strengthen bones.  Includes climbing stairs, using resistance bands, and lifting or carrying heavy loads.  Aim for at least twice a week.  It can help protect the knees and other joints.  Stretching.  Stretching gives you better range of motion in joints and muscles.  Includes upper arm stretches, calf stretches, and gentle yoga.  Aim for at least twice a week, preferably after your muscles are warmed up from other activities.  It can help you function better in daily life.  Balancing.  This helps you stay coordinated and have good posture.  Includes heel-to-toe walking, isaac chi, and certain types of yoga.  Aim for at least 3 days a week.  It can reduce your risk of falling.  Even if you have a hard time meeting the recommendations, it's better to be more active than less active. All activity done in each category counts toward your weekly total. You'd be surprised how daily things like carrying groceries, keeping up with grandchildren, and taking the stairs  "can add up.  What keeps you from being active?  If you've had a hard time being more active, you're not alone. Maybe you remember being able to do more. Or maybe you've never thought of yourself as being active. It's frustrating when you can't do the things you want. Being more active can help. What's holding you back?  Getting started.  Have a goal, but break it into easy tasks. Small steps build into big accomplishments.  Staying motivated.  If you feel like skipping your activity, remember your goal. Maybe you want to move better and stay independent. Every activity gets you one step closer.  Not feeling your best.  Start with 5 minutes of an activity you enjoy. Prove to yourself you can do it. As you get comfortable, increase your time.  You may not be where you want to be. But you're in the process of getting there. Everyone starts somewhere.  How can you find safe ways to stay active?  Talk with your doctor about any physical challenges you're facing. Make a plan with your doctor if you have a health problem or aren't sure how to get started with activity.  If you're already active, ask your doctor if there is anything you should change to stay safe as your body and health change.  If you tend to feel dizzy after you take medicine, avoid activity at that time. Try being active before you take your medicine. This will reduce your risk of falls.  If you plan to be active at home, make sure to clear your space before you get started. Remove things like TV cords, coffee tables, and throw rugs. It's safest to have plenty of space to move freely.  The key to getting more active is to take it slow and steady. Try to improve only a little bit at a time. Pick just one area to improve on at first. And if an activity hurts, stop and talk to your doctor.  Where can you learn more?  Go to https://www.healthwise.net/patiented  Enter P600 in the search box to learn more about \"Learning About Being Active as an Older " "Adult.\"  Current as of: July 31, 2024  Content Version: 14.3    2024 [employer].   Care instructions adapted under license by your healthcare professional. If you have questions about a medical condition or this instruction, always ask your healthcare professional. [employer] disclaims any warranty or liability for your use of this information.       "

## 2025-01-21 NOTE — PROGRESS NOTES
Clinic Administered Medication Documentation      Prolia Documentation    Indication: Prolia  (denosumab) is a prescription medicine used to treat osteoporosis in patients who:   Are at high risk for fracture, meaning patients who have had a fracture related to osteoporosis, or who have multiple risk factors for fracture.  Cannot use another osteoporosis medicine or other osteoporosis medicines did not work well.  The timeline for early/late injections would be 4 weeks early and any time after the 6 month cathy. If a patient receives their injection late, then the subsequent injection would be 6 months from the date that they actually received the injection.    When was the last injection?  24  Was the last injection at least 6 months ago? Yes  Has the prior authorization been completed?  Yes  Is there an active order (written within the past 365 days, with administrations remaining, not ) in the chart?  Yes   GFR Estimate   Date Value Ref Range Status   2021 >60 >60 mL/min/1.73m2 Final     GFR, ESTIMATED POCT   Date Value Ref Range Status   2024 >60 >60 mL/min/1.73m2 Final     Has patient had a GFR within the last 12 months? Yes   Is GFR under 30, or patient has a diagnosis of CKD4 or CKD5? No   Patient denies gastric bypass or parathyroid surgery in past 6 months? Yes - patient denies.   Patient denies undergoing any dental procedures involving drilling into the bone, such as implants, extractions, or oral surgery, within the past two months that have not yet healed?  Yes - patient denies  Patient denies plans for an emergency tooth extraction within the next week? Yes    The following steps were completed to comply with the REMS program for Prolia:  Reviewed information in the Medication Guide, including the serious risks of Prolia  and the symptoms of each risk.  Advised patient to seek prompt medical attention if they have signs or symptoms of any of the serious risks.  Provided each  patient a copy of the Medication Guide and Patient Guide.    Prior to injection, verified patient identity using patient's name and date of birth. Medication was administered. Please see MAR and medication order for additional information. Patient instructed to remain in clinic for 15 minutes and report any adverse reaction to staff immediately.    Vial/Syringe: Single dose vial. Was entire vial of medication used? Yes  Was this medication supplied by the patient? No  Verified that the patient has administrations remaining in their prescription.

## 2025-01-21 NOTE — PROGRESS NOTES
ANTICOAGULATION MANAGEMENT     Mariia Tinoco 89 year old female is on warfarin with subtherapeutic INR result. (Goal INR 2.0-3.0)    Recent labs: (last 7 days)     01/21/25  1037   INR 1.8*       ASSESSMENT     Source(s): Chart Review and Patient/Caregiver Call     Warfarin doses taken: Warfarin taken as instructed  Diet: No new diet changes identified  Medication/supplement changes: None noted  New illness, injury, or hospitalization: No   1/21/25 Medicare wellness visit - no new changes.   1/8/25 [facility] follow-up of paroxysmal atrial fib.  Has been doing well.  Signs or symptoms of bleeding or clotting: No  Previous result: Subtherapeutic last 2 INR results; (1.7, 1.7  Additional findings: None       PLAN     Recommended plan for no diet, medication or health factor changes affecting INR     Dosing Instructions: Increase your warfarin dose (10% change) with next INR in 2-3 weeks       Summary  As of 1/21/2025      Full warfarin instructions:  4 mg every Tue; 3 mg all other days   Next INR check:  2/4/2025               Telephone call with Nicolas who verbalizes understanding and agrees to plan    [facility]   -  drives her to [employer].  (She is legally blind)    Education provided: Taking warfarin: Importance of taking warfarin as instructed  Goal range and lab monitoring: goal range and significance of current result    Plan made per [facility] anticoagulation protocol    Cassandra Banuelos, RN  1/21/2025  Anticoagulation Clinic  [employer] for routing messages: luis BYRD [facility] patient phone line: [phone]        _______________________________________________________________________     Anticoagulation Episode Summary       Current INR goal:  2.0-3.0   TTR:  64.3% (1 y)   Target end date:  Indefinite   Send INR reminders to:  [facility]    Indications    Persistent atrial fibrillation (H) [I48.19]  Long term (current) use of anticoagulants  [Z79.01]             Comments:  --             Anticoagulation Care Providers       Provider Role Specialty Phone number    Eagle Shabazz MD Referring Internal Medicine [phone]    Mariano Munoz MD Referring Internal Medicine [phone]

## 2025-01-21 NOTE — NURSING NOTE
Six Item Cognitive Impairment Test   (6CIT):    What year is it?                               Correct - 0 points  What month is it?                               Correct - 0 points    Give the patient an address to remember with five components:   [place] ( first and last name - 2 components)   [address]  (number and name of street - 2 components)   [place] ( city - 1 component)    About what time is it (within the hour)? Correct - 0 points  Count backwards from 20 to 1:   Correct - 0 points  Say the months of the year in reverse: Correct - 0 points  Repeat the address phrase:   Correct - 0 points    Total 6CIT Score:      0/28    Interpretation: The 6CIT uses an inverse score and questions are weighted to produce a total out of 28. Scores of 0-7 are considered normal and 8 or more significant.    Advantages The test has high sensitivity without compromising specificity even in mild dementia. It is easy to translate linguistically and culturally.  Disadvantages The main disadvantage is in the scoring and weighting of the test, which is initially confusing, however computer models have simplified this greatly.    Probability Statistics: At the 7/8 cut off: Overall figures sensitivity 90% specificity 100%, in mild dementia sensitivity = 78% , specificity = 100%    Copyright 2000 [facility], [facility], [place]. Courtesy of Dr. Yaakov Tian

## 2025-01-22 NOTE — NURSING NOTE
RN ear assessment completed prior to ear irrigation. Otoscopic exam reveals cerumen present and irrigation advised. Post Ear Irrigation exam completed and cerumen plug removed.  Pain assessment completed.     Patient tolerated procedure:  yes

## 2025-02-04 DIAGNOSIS — F51.04 CHRONIC INSOMNIA: ICD-10-CM

## 2025-02-04 RX ORDER — ZOLPIDEM TARTRATE 5 MG/1
TABLET ORAL
Qty: 30 TABLET | Refills: 0 | Status: SHIPPED | OUTPATIENT
Start: 2025-02-04

## 2025-02-11 ENCOUNTER — LAB (OUTPATIENT)
Dept: LAB | Facility: CLINIC | Age: 89
End: 2025-02-11
Payer: MEDICARE

## 2025-02-11 ENCOUNTER — ANTICOAGULATION THERAPY VISIT (OUTPATIENT)
Dept: ANTICOAGULATION | Facility: CLINIC | Age: 89
End: 2025-02-11

## 2025-02-11 DIAGNOSIS — I48.19 PERSISTENT ATRIAL FIBRILLATION (H): ICD-10-CM

## 2025-02-11 DIAGNOSIS — Z79.01 LONG TERM (CURRENT) USE OF ANTICOAGULANTS: ICD-10-CM

## 2025-02-11 DIAGNOSIS — I48.19 PERSISTENT ATRIAL FIBRILLATION (H): Primary | ICD-10-CM

## 2025-02-11 LAB — INR BLD: 2.4 (ref 0.9–1.1)

## 2025-02-11 PROCEDURE — 85610 PROTHROMBIN TIME: CPT

## 2025-02-11 PROCEDURE — 36416 COLLJ CAPILLARY BLOOD SPEC: CPT

## 2025-02-11 NOTE — PROGRESS NOTES
ANTICOAGULATION MANAGEMENT     Mariia Tinoco 89 year old female is on warfarin with therapeutic INR result. (Goal INR 2.0-3.0)    Recent labs: (last 7 days)     02/11/25  0919   INR 2.4*       ASSESSMENT     Warfarin Lab Questionnaire    Warfarin Doses Last 7 Days      2/11/2025     9:16 AM   Dose in Tablet or Mg   TAB or MG? - 1mg warfarian tabs (530p) milligram (mg)     Pt Rptd Dose SUNDAY MONDAY TUESDAY WED THURS FRIDAY SATURDAY 2/11/2025   9:16 AM 3 3 4 3 3 3 3         2/11/2025   Warfarin Lab Questionnaire   Missed doses within past 14 days? No   Changes in diet or alcohol within past 14 days? No   Medication changes since last result? No - Yes.  Increased weekly warfarin dose by 10% on 1/21/25.     Injuries or illness since last result? No   New shortness of breath, severe headaches or sudden changes in vision since last result? No   Abnormal bleeding since last result? No   Upcoming surgery, procedure? No   Best number to call with results? [phone]     Previous result: Subtherapeutic last 3 INR results.    Additional findings: None       PLAN     Recommended plan for no diet, medication or health factor changes affecting INR     Dosing Instructions: Continue your current warfarin dose with next INR in 2-3 weeks       Summary  As of 2/11/2025      Full warfarin instructions:  4 mg every Tue; 3 mg all other days   Next INR check:  2/25/2025               Telephone call with Nicolas who verbalizes understanding and agrees to plan    Lab [facility] - [facility]   - her  brings her to \[facility]".    Education provided: Taking warfarin: Importance of taking warfarin as instructed  Goal range and lab monitoring: goal range and significance of current result    Plan made per [facility] anticoagulation protocol    Cassandra Banuelos, RN  2/11/2025  Anticoagulation Clinic  [email or web address] for routing messages: luis [address]  [facility] patient phone line:  [phone]        _______________________________________________________________________     Anticoagulation Episode Summary       Current INR goal:  2.0-3.0   TTR:  68.2% (1 y)   Target end date:  Indefinite   Send INR reminders to:  [facility]    Indications    Persistent atrial fibrillation (H) [I48.19]  Long term (current) use of anticoagulants [Z79.01]             Comments:  --             Anticoagulation Care Providers       Provider Role Specialty Phone number    Eagle Shabazz MD Referring Internal Medicine [phone]    Mariano Munoz MD Referring Internal Medicine [phone]

## 2025-02-14 NOTE — TELEPHONE ENCOUNTER
Please CALL DAUGHTER and PT.  If she is having that much severe pain, I would urge her to be seen again, preferably in the emergency room.  She is on the blood thinners and sometimes hematomas can become so large that they cause pressure to build up and damage surrounding tissues because of so much pressure that compromises blood flow and nerves.   The patient is a 56y Male complaining of fall.

## 2025-02-25 NOTE — TELEPHONE ENCOUNTER
Patient: Jacklyn Ta    Procedure: Procedure(s):  Insert picc line  3T MRCP       Diagnosis: Cholangitis (H) [K83.09]  Diagnosis Additional Information: No value filed.    Anesthesia Type:   No value filed.     Note:    Oropharynx: oropharynx clear of all foreign objects and spontaneously breathing  Level of Consciousness: drowsy  Oxygen Supplementation: blow-by O2  Level of Supplemental Oxygen (L/min / FiO2): 6  Independent Airway: airway patency satisfactory and stable  Dentition: dentition unchanged  Vital Signs Stable: post-procedure vital signs reviewed and stable  Report to RN Given: handoff report given  Patient transferred to:  Recovery    Handoff Report: Identifed the Patient, Identified the Reponsible Provider, Reviewed the pertinent medical history, Discussed the surgical course, Reviewed Intra-OP anesthesia mangement and issues during anesthesia, Set expectations for post-procedure period and Allowed opportunity for questions and acknowledgement of understanding      Vitals:  Vitals Value Taken Time   BP 92/59 02/25/25 1406   Temp 36.3 02/25/25 1407   Pulse 115 02/25/25 1407   Resp 33 02/25/25 1407   SpO2 100 % 02/25/25 1407   Vitals shown include unfiled device data.    Electronically Signed By: SHANNAN Mariano CRNA  February 25, 2025  2:08 PM   Message sent to the ordering provider for the Reclast infusion do to the order not being signed.  We can't schedule til this is completed.

## 2025-03-03 DIAGNOSIS — I48.19 PERSISTENT ATRIAL FIBRILLATION (H): ICD-10-CM

## 2025-03-03 DIAGNOSIS — Z79.01 LONG TERM (CURRENT) USE OF ANTICOAGULANTS: ICD-10-CM

## 2025-03-03 DIAGNOSIS — F51.04 CHRONIC INSOMNIA: ICD-10-CM

## 2025-03-03 DIAGNOSIS — E03.9 HYPOTHYROIDISM, UNSPECIFIED TYPE: ICD-10-CM

## 2025-03-03 DIAGNOSIS — F43.23 ADJUSTMENT DISORDER WITH MIXED ANXIETY AND DEPRESSED MOOD: ICD-10-CM

## 2025-03-03 RX ORDER — LEVOTHYROXINE SODIUM 75 UG/1
75 TABLET ORAL DAILY
Qty: 90 TABLET | Refills: 1 | Status: SHIPPED | OUTPATIENT
Start: 2025-03-03

## 2025-03-03 RX ORDER — ZOLPIDEM TARTRATE 5 MG/1
TABLET ORAL
Qty: 30 TABLET | Refills: 0 | Status: SHIPPED | OUTPATIENT
Start: 2025-03-03

## 2025-03-03 RX ORDER — SERTRALINE HYDROCHLORIDE 25 MG/1
25 TABLET, FILM COATED ORAL DAILY
Qty: 90 TABLET | Refills: 1 | Status: SHIPPED | OUTPATIENT
Start: 2025-03-03

## 2025-03-03 RX ORDER — WARFARIN SODIUM 1 MG/1
TABLET ORAL
Qty: 300 TABLET | Refills: 1 | Status: SHIPPED | OUTPATIENT
Start: 2025-03-03

## 2025-03-04 ENCOUNTER — ANTICOAGULATION THERAPY VISIT (OUTPATIENT)
Dept: ANTICOAGULATION | Facility: CLINIC | Age: 89
End: 2025-03-04

## 2025-03-04 ENCOUNTER — LAB (OUTPATIENT)
Dept: LAB | Facility: CLINIC | Age: 89
End: 2025-03-04
Payer: COMMERCIAL

## 2025-03-04 DIAGNOSIS — I48.19 PERSISTENT ATRIAL FIBRILLATION (H): ICD-10-CM

## 2025-03-04 DIAGNOSIS — I48.19 PERSISTENT ATRIAL FIBRILLATION (H): Primary | ICD-10-CM

## 2025-03-04 DIAGNOSIS — Z79.01 LONG TERM (CURRENT) USE OF ANTICOAGULANTS: ICD-10-CM

## 2025-03-04 LAB — INR BLD: 2.4 (ref 0.9–1.1)

## 2025-03-04 PROCEDURE — 85610 PROTHROMBIN TIME: CPT

## 2025-03-04 PROCEDURE — 36416 COLLJ CAPILLARY BLOOD SPEC: CPT

## 2025-03-04 NOTE — PROGRESS NOTES
ANTICOAGULATION MANAGEMENT     Mariia Tinoco 89 year old female is on warfarin with therapeutic INR result. (Goal INR 2.0-3.0)    Recent labs: (last 7 days)     03/04/25  1017   INR 2.4*       ASSESSMENT     Source(s): Chart Review and Patient/Caregiver Call     Warfarin doses taken: Warfarin taken as instructed  Diet: No new diet changes identified  Medication/supplement changes: None noted  New illness, injury, or hospitalization: No  Signs or symptoms of bleeding or clotting: No  Previous result: Therapeutic last visit at 2.4; previously outside of goal - subtherapeutic last 3 INR.  Additional findings: None       PLAN     Recommended plan for no diet, medication or health factor changes affecting INR     Dosing Instructions: Continue your current warfarin dose with next INR in 3-4 weeks       Summary  As of 3/4/2025      Full warfarin instructions:  4 mg every Tue; 3 mg all other days   Next INR check:  4/1/2025               Telephone call with Nicolas who verbalizes understanding and agrees to plan    Lab visit scheduled - INR on 4/1/25 @ [facility].   - her  brings her to appts.    Education provided: Taking warfarin: Importance of taking warfarin as instructed  Goal range and lab monitoring: goal range and significance of current result    Plan made per [facility] anticoagulation protocol    Cassandra Banuelos, RN  3/4/2025  Anticoagulation Clinic  [employer] for routing messages: [facility]  [facility] patient phone line: [phone]        _______________________________________________________________________     Anticoagulation Episode Summary       Current INR goal:  2.0-3.0   TTR:  73.9% (1 y)   Target end date:  Indefinite   Send INR reminders to:  [facility]    Indications    Persistent atrial fibrillation (H) [I48.19]  Long term (current) use of anticoagulants [Z79.01]             Comments:  --             Anticoagulation Care Providers       Provider Role Specialty Phone number    Eagle Shabazz,  MD Referring Internal Medicine [phone]    Mariano Munoz MD Referring Internal Medicine [phone]

## 2025-04-01 ENCOUNTER — ANTICOAGULATION THERAPY VISIT (OUTPATIENT)
Dept: ANTICOAGULATION | Facility: CLINIC | Age: 89
End: 2025-04-01

## 2025-04-01 ENCOUNTER — LAB (OUTPATIENT)
Dept: LAB | Facility: CLINIC | Age: 89
End: 2025-04-01
Payer: COMMERCIAL

## 2025-04-01 DIAGNOSIS — I48.19 PERSISTENT ATRIAL FIBRILLATION (H): Primary | ICD-10-CM

## 2025-04-01 DIAGNOSIS — Z79.01 LONG TERM (CURRENT) USE OF ANTICOAGULANTS: ICD-10-CM

## 2025-04-01 DIAGNOSIS — I48.19 PERSISTENT ATRIAL FIBRILLATION (H): ICD-10-CM

## 2025-04-01 LAB — INR BLD: 2 (ref 0.9–1.1)

## 2025-04-01 PROCEDURE — 85610 PROTHROMBIN TIME: CPT

## 2025-04-01 PROCEDURE — 36416 COLLJ CAPILLARY BLOOD SPEC: CPT

## 2025-04-01 NOTE — PROGRESS NOTES
ANTICOAGULATION MANAGEMENT     aMriia Tinoco 89 year old female is on warfarin with therapeutic INR result. (Goal INR 2.0-3.0)    Recent labs: (last 7 days)     04/01/25  1024   INR 2.0*       ASSESSMENT     Warfarin Lab Questionnaire    Warfarin Doses Last 7 Days      4/1/2025    10:07 AM   Dose in Tablet or Mg   TAB or MG? milligram (mg)     Pt Rptd Dose SUNDAY MONDAY TUESDAY WED THURS FRIDAY SATURDAY 4/1/2025  10:07 AM 3 3 4 3 3 3 3         4/1/2025   Warfarin Lab Questionnaire   Missed doses within past 14 days? No   Changes in diet or alcohol within past 14 days? No   Medication changes since last result? No   Injuries or illness since last result? No   New shortness of breath, severe headaches or sudden changes in vision since last result? No   Abnormal bleeding since last result? No   Upcoming surgery, procedure? No   Best number to call with results? [phone]     Previous result: Therapeutic last visit; previously outside of goal range  Additional findings: None       PLAN     Recommended plan for no diet, medication or health factor changes affecting INR     Dosing Instructions: Continue your current warfarin dose with next INR in 4 weeks       Summary  As of 4/1/2025      Full warfarin instructions:  4 mg every Tue; 3 mg all other days   Next INR check:  4/29/2025               Telephone call with Nicolas who verbalizes understanding and agrees to plan    Lab visit scheduled    Education provided: Please call back if any changes to your diet, medications or how you've been taking warfarin    Plan made per [facility] anticoagulation protocol    Aurelio Duarte RN  4/1/2025  Anticoagulation Clinic  [employer] for routing messages: luis [address]  [facility] patient phone line: [phone]        _______________________________________________________________________     Anticoagulation Episode Summary       Current INR goal:  2.0-3.0   TTR:  76.4% (1 y)   Target end date:  Indefinite   Send INR reminders to:   [facility]    Indications    Persistent atrial fibrillation (H) [I48.19]  Long term (current) use of anticoagulants [Z79.01]             Comments:  --             Anticoagulation Care Providers       Provider Role Specialty Phone number    Eagle Shabazz MD Referring Internal Medicine [phone]    Mariano Munoz MD Referring Internal Medicine [phone]             Composite Graft Text: The defect edges were debeveled with a #15 scalpel blade.  Given the location of the defect, shape of the defect, the proximity to free margins and the fact the defect was full thickness a composite graft was deemed most appropriate.  The defect was outline and then transferred to the donor site.  A full thickness graft was then excised from the donor site. The graft was then placed in the primary defect, oriented appropriately and then sutured into place.  The secondary defect was then repaired using a primary closure.

## 2025-04-07 DIAGNOSIS — F51.04 CHRONIC INSOMNIA: ICD-10-CM

## 2025-04-07 RX ORDER — ZOLPIDEM TARTRATE 5 MG/1
TABLET ORAL
Qty: 30 TABLET | Refills: 0 | Status: SHIPPED | OUTPATIENT
Start: 2025-04-07

## 2025-04-29 ENCOUNTER — LAB (OUTPATIENT)
Dept: LAB | Facility: CLINIC | Age: 89
End: 2025-04-29
Payer: COMMERCIAL

## 2025-04-29 ENCOUNTER — ANTICOAGULATION THERAPY VISIT (OUTPATIENT)
Dept: ANTICOAGULATION | Facility: CLINIC | Age: 89
End: 2025-04-29

## 2025-04-29 DIAGNOSIS — Z79.01 LONG TERM (CURRENT) USE OF ANTICOAGULANTS: ICD-10-CM

## 2025-04-29 DIAGNOSIS — I48.19 PERSISTENT ATRIAL FIBRILLATION (H): ICD-10-CM

## 2025-04-29 DIAGNOSIS — I48.19 PERSISTENT ATRIAL FIBRILLATION (H): Primary | ICD-10-CM

## 2025-04-29 LAB — INR BLD: 2.2 (ref 0.9–1.1)

## 2025-04-29 PROCEDURE — 85610 PROTHROMBIN TIME: CPT

## 2025-04-29 PROCEDURE — 36416 COLLJ CAPILLARY BLOOD SPEC: CPT

## 2025-04-29 NOTE — PROGRESS NOTES
ANTICOAGULATION MANAGEMENT     Mariia Tinoco 89 year old female is on warfarin with therapeutic INR result. (Goal INR 2.0-3.0)    Recent labs: (last 7 days)     04/29/25  1013   INR 2.2*       ASSESSMENT     Warfarin Lab Questionnaire    Warfarin Doses Last 7 Days      4/29/2025    10:04 AM   Dose in Tablet or Mg   TAB or MG? - 1mg warfarin tabs milligram (mg)     Pt Rptd Dose SUNDAY MONDAY TUESDAY WED THURS FRIDAY SATURDAY 4/29/2025  10:04 AM 3 3 4 3 3 3 3         4/29/2025   Warfarin Lab Questionnaire   Missed doses within past 14 days? No   Changes in diet or alcohol within past 14 days? No   Medication changes since last result? No   Injuries or illness since last result? No   New shortness of breath, severe headaches or sudden changes in vision since last result? No   Abnormal bleeding since last result? No   Upcoming surgery, procedure? No   Best number to call with results? [phone]     Previous result: Therapeutic last 3 INR visits    Additional findings:  Chart reviewed       PLAN     Recommended plan for no diet, medication or health factor changes affecting INR     Dosing Instructions: Continue your current warfarin dose with next INR in 5 weeks       Summary  As of 4/29/2025      Full warfarin instructions:  4 mg every Tue; 3 mg all other days   Next INR check:  6/3/2025               Detailed voice message left for Nicolas with dosing instructions and follow up date.     Contact [phone] to schedule and with any changes, questions or concerns.     Education provided: Please call back if any changes to your diet, medications or how you've been taking warfarin  Taking warfarin: Importance of taking warfarin as instructed  Goal range and lab monitoring: goal range and significance of current result    Plan made per [facility] anticoagulation protocol    Cassandra Banuelos, RN  4/29/2025  Anticoagulation Clinic  [facility] [employer] for routing messages: luis [address]  [facility] patient phone line:  [phone]        _______________________________________________________________________     Anticoagulation Episode Summary       Current INR goal:  2.0-3.0   TTR:  76.4% (1 y)   Target end date:  Indefinite   Send INR reminders to:  [facility]    Indications    Persistent atrial fibrillation (H) [I48.19]  Long term (current) use of anticoagulants [Z79.01]             Comments:  --             Anticoagulation Care Providers       Provider Role Specialty Phone number    Eagle Shabazz MD Referring Internal Medicine [phone]    Mariano Munoz MD Referring Internal Medicine [phone]

## 2025-05-05 DIAGNOSIS — F51.04 CHRONIC INSOMNIA: ICD-10-CM

## 2025-05-05 RX ORDER — ZOLPIDEM TARTRATE 5 MG/1
TABLET ORAL
Qty: 30 TABLET | Refills: 0 | Status: SHIPPED | OUTPATIENT
Start: 2025-05-05

## 2025-05-09 ENCOUNTER — TRANSFERRED RECORDS (OUTPATIENT)
Dept: HEALTH INFORMATION MANAGEMENT | Facility: CLINIC | Age: 89
End: 2025-05-09
Payer: COMMERCIAL

## 2025-05-13 ENCOUNTER — TRANSFERRED RECORDS (OUTPATIENT)
Dept: HEALTH INFORMATION MANAGEMENT | Facility: CLINIC | Age: 89
End: 2025-05-13
Payer: COMMERCIAL

## 2025-05-27 ENCOUNTER — NURSE TRIAGE (OUTPATIENT)
Dept: INTERNAL MEDICINE | Facility: CLINIC | Age: 89
End: 2025-05-27

## 2025-05-27 ENCOUNTER — TELEPHONE (OUTPATIENT)
Dept: ANTICOAGULATION | Facility: CLINIC | Age: 89
End: 2025-05-27

## 2025-05-27 ENCOUNTER — OFFICE VISIT (OUTPATIENT)
Dept: FAMILY MEDICINE | Facility: CLINIC | Age: 89
End: 2025-05-27
Payer: COMMERCIAL

## 2025-05-27 VITALS
TEMPERATURE: 97.7 F | OXYGEN SATURATION: 96 % | SYSTOLIC BLOOD PRESSURE: 126 MMHG | HEART RATE: 72 BPM | DIASTOLIC BLOOD PRESSURE: 68 MMHG | RESPIRATION RATE: 16 BRPM

## 2025-05-27 DIAGNOSIS — R06.02 SOB (SHORTNESS OF BREATH): ICD-10-CM

## 2025-05-27 DIAGNOSIS — J06.9 URI WITH COUGH AND CONGESTION: Primary | ICD-10-CM

## 2025-05-27 DIAGNOSIS — I48.19 PERSISTENT ATRIAL FIBRILLATION (H): Primary | ICD-10-CM

## 2025-05-27 DIAGNOSIS — Z79.01 LONG TERM (CURRENT) USE OF ANTICOAGULANTS: ICD-10-CM

## 2025-05-27 LAB
ERYTHROCYTE [DISTWIDTH] IN BLOOD BY AUTOMATED COUNT: 15.5 % (ref 10–15)
FLUAV RNA SPEC QL NAA+PROBE: NEGATIVE
FLUBV RNA RESP QL NAA+PROBE: NEGATIVE
HCT VFR BLD AUTO: 37.6 % (ref 35–47)
HGB BLD-MCNC: 13.5 G/DL (ref 11.7–15.7)
MCH RBC QN AUTO: 33.9 PG (ref 26.5–33)
MCHC RBC AUTO-ENTMCNC: 35.9 G/DL (ref 31.5–36.5)
MCV RBC AUTO: 95 FL (ref 78–100)
PLATELET # BLD AUTO: 175 10E3/UL (ref 150–450)
RBC # BLD AUTO: 3.98 10E6/UL (ref 3.8–5.2)
RSV RNA SPEC NAA+PROBE: NEGATIVE
SARS-COV-2 RNA RESP QL NAA+PROBE: NEGATIVE
WBC # BLD AUTO: 6.1 10E3/UL (ref 4–11)

## 2025-05-27 PROCEDURE — 99214 OFFICE O/P EST MOD 30 MIN: CPT

## 2025-05-27 PROCEDURE — 83880 ASSAY OF NATRIURETIC PEPTIDE: CPT

## 2025-05-27 PROCEDURE — 3078F DIAST BP <80 MM HG: CPT

## 2025-05-27 PROCEDURE — 3074F SYST BP LT 130 MM HG: CPT

## 2025-05-27 PROCEDURE — 87637 SARSCOV2&INF A&B&RSV AMP PRB: CPT

## 2025-05-27 PROCEDURE — 1125F AMNT PAIN NOTED PAIN PRSNT: CPT

## 2025-05-27 PROCEDURE — 36415 COLL VENOUS BLD VENIPUNCTURE: CPT

## 2025-05-27 PROCEDURE — 85027 COMPLETE CBC AUTOMATED: CPT

## 2025-05-27 RX ORDER — AZITHROMYCIN 250 MG/1
TABLET, FILM COATED ORAL
Qty: 6 TABLET | Refills: 0 | Status: CANCELLED | OUTPATIENT
Start: 2025-05-27 | End: 2025-06-01

## 2025-05-27 RX ORDER — ALBUTEROL SULFATE 90 UG/1
2 INHALANT RESPIRATORY (INHALATION) EVERY 6 HOURS PRN
Qty: 18 G | Refills: 0 | Status: SHIPPED | OUTPATIENT
Start: 2025-05-27

## 2025-05-27 RX ORDER — BENZONATATE 100 MG/1
100 CAPSULE ORAL 3 TIMES DAILY PRN
Qty: 30 CAPSULE | Refills: 0 | Status: SHIPPED | OUTPATIENT
Start: 2025-05-27

## 2025-05-27 RX ORDER — LATANOPROST 50 UG/ML
1 SOLUTION/ DROPS OPHTHALMIC DAILY
COMMUNITY
Start: 2025-05-09

## 2025-05-27 ASSESSMENT — ENCOUNTER SYMPTOMS: SHORTNESS OF BREATH: 1

## 2025-05-27 ASSESSMENT — PAIN SCALES - GENERAL: PAINLEVEL_OUTOF10: MILD PAIN (2)

## 2025-05-27 NOTE — TELEPHONE ENCOUNTER
Agree she needs to be seen.  Ok to to be seen in clinic or WIC.  I don't have any openings today.  I can see her tomorrow during a same day slot, but if she is SOB, she should be seen today.

## 2025-05-27 NOTE — PATIENT INSTRUCTIONS
Your symptoms are most likely related to a viral infection. We are however going to get a chest xray to rule out any concerns for infection in your lungs. Outside of this, we will test you for a number of more common viral infections that would explain your current symptoms well. If any of these are positive, please follow the treatment recommendations listed below to keep you comfortable while your body heals. If all of your testing is negative, it is most likely that a more minor virus is causing your symptoms. This should run its course over the next 1-2 weeks, and the treatment options listed below can be very helpful for comfort and symptom management.    Please continue to use your inhaler to help with your coughing. It is called albuterol, and it will help to open up the airways to your lungs. You can use 2 puffs when you are coughing, wheezing, or feeling short of breath. If your symptoms do not improve after a few minutes, you can use 2 more puffs. I would encourage you to be seen in the clinic if you are needing more than 4 puffs to resolve your breathing concerns.     I have also prescribed a medication called Tessalon Perels. This is a cough medication that can be useful to prevent coughing jags overnight, and help you get a good nights rest. This medication can be used during the day, but it can often make people a bit drowsy, so I would encourage you to take it in the evening for the first time to determine how your body reacts to the medication.    Cepacol lozenges: These are lozenges that you will suck on like a cough drops or hard candy.  This will help to manage your throat pains that you are able to continue to eat and drink    Saline spray: I would like you to  with saline spray over-the-counter.  This works best when you lean slightly forward, insert the spray nozzle into your nostril, and direct the nozzle towards the opposite side of your face.  This is sometimes easier to be done in  the shower over the sink as it can get a little bit messy.  You will know that you have done this correctly if your eyes slightly water after spraying the solution.  You can do this as prescribed on the box for as long as it takes to treat your symptoms.    Ibuprofen and Tylenol: You can take ibuprofen and Tylenol as prescribed on the box around the clock.  You can either take them on alternating schedules or you can take them together.  These medications work differently in your body, and are safe to be taken together.    Humidifier: Using a humidifier in the area that you sleep or taking a very hot shower to inhale some of the vaporized steam can help to open up your nasal passages and sinuses and encourage them to drain.    Rest: get adequate rest including 7-8 hours of sleep, and low activity levels during the day to encourage healing. Avoid high impact exercise and rigorous physical labor    Nutrition: support healing by fueling your body with healthy foods. Fruits, vegetables, and whole foods are all great options!    Hydration: increase fluid intake. Illness leads to increased dehydration, so your body needs more fluid intake than it might when you are healthy. Baker and sugar free teas are great options. Try to avoid beverages that cause more dehydration including coffee, soda, energy drinks, and alcohol.     Please follow-up in the next few days to let me know how you are feeling.    It is important to seek immediate medical attention if you are having symptoms of chest pain, fever, shortness of breath, palpitations, or any changes in your mental status.

## 2025-05-27 NOTE — TELEPHONE ENCOUNTER
"ANTICOAGULATION  MANAGEMENT     Interacting Medication Review    Interacting medication(s): Azithromycin (Zithromax, Z-bernie) NOT PRESCRIBED with warfarin.   - started 5/28/25 - Methylprednisolone dosepak 4mg (as packet directed)   - currently prescribed - Tessalon 100mg 1 caps 3x/day, PRN for cough              - and Albuterol HFA inhaler 2 puffs q6hrs, PRN for wheezing, cough, SOB    Duration: as packet directed - Medrol dosepak   - Negative Chest x-ray and lab results - Influenza A/B, RSV, and SARS (Covid-19)    Indication: URI with cough and congestion    New medication?: Yes, interaction may increase INR and risk of bleeding. Closer INR monitoring recommended.        PLAN     Continue your current warfarin dose with next INR in 1 week        Summary  As of 5/27/2025      Full warfarin instructions:  4 mg every Tue; 3 mg all other days   Next INR check:  6/4/2025               Patient was not contacted    New recheck date updated on anticoagulation calendar     - INR scheduled on 6/3/25 @ [facility]    [facility] priority set/moved to \"high\" for new major drug interaction    Plan made per [facility] anticoagulation protocol    Cassandra Banuelos, RN  5/27/2025  Anticoagulation Clinic  [employer] for routing messages: luis [address]  [facility] patient phone line: [phone]  "

## 2025-05-27 NOTE — TELEPHONE ENCOUNTER
"Nurse Triage SBAR    Is this a 2nd Level Triage? YES, LICENSED PRACTITIONER REVIEW IS REQUIRED    Situation: patient's  is calling in with patient answering questions in background. C2C on file. Patient was on vacation in [place] last week, got home Wedensday. Patient has history of COPD, Pacemaker, lung disease, TB with partial lung removal, CA.  said patient started to get sick a day or two after arriving back in [place].  Patient has a cough and congestion, \"horrific cough\". Non productive, deep in chest.  Patient does not use neb, uses daily inhaler, it helps some, not much. Denies fevers. Chest pain comes and goes.  Across whole chest. Not just with coughing.  Pacemaker is set to go continuously. Denies feeling and jolts, denies irregular beats.  Denies arm pain, jaw pain or back pain. Patient is dizzy and lightheaded intermittently.     Background: cough, congested, chest pain.     Assessment: advised to be seen in ED due to not knowing if chest pain is due to COPD, cough or possibly pacer.     Protocol Recommended Disposition:   Go to ED Now    Recommendation: patient and  verbalized understanding, however adamantly declined at this time.  States wants message sent to PCP to ask to come in to clinic instead.  RN did review reasons why advising ED,  states he gets it but they've been there done that and they don't want to unless PCP says they should. RN advised message will be sent but to please go in.      Routed to provider    Does the patient meet one of the following criteria for ADS visit consideration? 16+ years old, with an [facility] PCP     TIP  Providers, please consider if this condition is appropriate for management at one of our Acute and Diagnostic Services sites.     If patient is a good candidate, please use dotphrase <dot>triageresponse and select Refer to ADS to document.        Reason for Disposition   Difficulty breathing    Additional Information   Negative: SEVERE " difficulty breathing (e.g., struggling for each breath, speaks in single words)   Negative: Difficult to awaken or acting confused (e.g., disoriented, slurred speech)   Negative: Shock suspected (e.g., cold/pale/clammy skin, too weak to stand, low BP, rapid pulse)   Negative: Passed out (i.e., lost consciousness, collapsed and was not responding)   Negative: Pain also in shoulder(s) or arm(s) or jaw   Negative: SEVERE chest pain   Negative: Followed an injury to chest   Negative: Chest pain lasting longer than 5 minutes and ANY of the following:         Pain is crushing, pressure-like, or heavy         Over 44 years old          Over 30 years old and one cardiac risk factor (e.g diabetes, high blood pressure, high cholesterol, smoker, or family history of heart disease)         History of heart disease (e.g. angina, heart attack, heart failure, bypass surgery, takes nitroglycerin)   Negative: Heart beating < 50 beats per minute OR > 140 beats per minute   Negative: Visible sweat on face or sweat dripping down face   Negative: Sounds like a life-threatening emergency to the triager    Protocols used: Chest Pain-A-[place]

## 2025-05-27 NOTE — PROGRESS NOTES
Assessment & Plan     (J06.9) URI with cough and congestion  (primary encounter diagnosis)  Comment: Acute and stable. No signs of respiratory distress, vital signs stable, and no red flag signs requiring immediate need for medical attention.  Will specifically concern for bronchitis, but adding on chest x-ray to rule out concerns for pneumonia or lower respiratory concerns related to shortness of breath.  Adding on Tessalon Perles, and encouragement to continue to utilize albuterol inhaler, as she is not utilizing this consistently.  Testing for influenza, COVID, and RSV, as these are quite common causes of patient's current symptom profile.  Will add on doxycycline twice daily for 10 days if chest x-ray indicates concerns for infection. Offered education on medications including appropriate dosing, possible side effects, and possible adverse effects.  Education given on return to clinic instructions as well as alarm signs that would require the need for immediate medical attention.  Patient attested to understanding.  Plan: XR Chest 2 Views, CBC with platelets, Influenza        A/B, RSV and SARS-CoV2 PCR (COVID-19),         benzonatate (TESSALON) 100 MG capsule,         albuterol (PROAIR HFA/PROVENTIL HFA/VENTOLIN         HFA) 108 (90 Base) MCG/ACT inhaler    (R06.02) SOB (shortness of breath)  Comment: Acute and stable. No signs of respiratory distress, vital signs stable, and no red flag signs requiring immediate need for medical attention.  Having a hard time getting a full breath.  Adding on chest x-ray for further evaluation of respiratory health.  Not concern for cardiac etiology.  Pacemaker in place.  Patient continues on warfarin.  Cardiac evaluation today is unremarkable.  Adding on BMP to rule out concerns for cardiac etiology, and encouraging patient to utilize her albuterol inhaler, as it seems that chronic respiratory exacerbation is a piece of the cause of patient's symptoms.  She uses her  controller inhaler daily, but has not been using her albuterol inhaler.  Would like to assess response to this medication, and potentially need to consider adding on Medrol Dosepak for additional inflammation management moving forward. Offered education on medications including appropriate dosing, possible side effects, and possible adverse effects.  Education given on return to clinic instructions as well as alarm signs that would require the need for immediate medical attention.  Patient attested to understanding.  Plan: XR Chest 2 Views, CBC with platelets, Influenza        A/B, RSV and SARS-CoV2 PCR (COVID-19),         albuterol (PROAIR HFA/PROVENTIL HFA/VENTOLIN         HFA) 108 (90 Base) MCG/ACT inhaler, NT-proBNP      This progress note has been dictated, with use of voice recognition software. Any grammatical, typographical, or context errors are unintentional and inherent to use of voice recognition software.     Ordering of each unique test  Prescription drug management  I spent a total of 23 minutes on the day of the visit.   Time spent by me today doing chart review, history and exam, documentation and further activities per the note    Follow-up   Follow-up in 5 to 7 days if symptoms are not improving    Patient Instructions  Your symptoms are most likely related to a viral infection. We are however going to get a chest xray to rule out any concerns for infection in your lungs. Outside of this, we will test you for a number of more common viral infections that would explain your current symptoms well. If any of these are positive, please follow the treatment recommendations listed below to keep you comfortable while your body heals. If all of your testing is negative, it is most likely that a more minor virus is causing your symptoms. This should run its course over the next 1-2 weeks, and the treatment options listed below can be very helpful for comfort and symptom management.    Please continue to  use your inhaler to help with your coughing. It is called albuterol, and it will help to open up the airways to your lungs. You can use 2 puffs when you are coughing, wheezing, or feeling short of breath. If your symptoms do not improve after a few minutes, you can use 2 more puffs. I would encourage you to be seen in the clinic if you are needing more than 4 puffs to resolve your breathing concerns.     I have also prescribed a medication called Tessalon Perels. This is a cough medication that can be useful to prevent coughing jags overnight, and help you get a good nights rest. This medication can be used during the day, but it can often make people a bit drowsy, so I would encourage you to take it in the evening for the first time to determine how your body reacts to the medication.    Cepacol lozenges: These are lozenges that you will suck on like a cough drops or hard candy.  This will help to manage your throat pains that you are able to continue to eat and drink    Saline spray: I would like you to  with saline spray over-the-counter.  This works best when you lean slightly forward, insert the spray nozzle into your nostril, and direct the nozzle towards the opposite side of your face.  This is sometimes easier to be done in the shower over the sink as it can get a little bit messy.  You will know that you have done this correctly if your eyes slightly water after spraying the solution.  You can do this as prescribed on the box for as long as it takes to treat your symptoms.    Ibuprofen and Tylenol: You can take ibuprofen and Tylenol as prescribed on the box around the clock.  You can either take them on alternating schedules or you can take them together.  These medications work differently in your body, and are safe to be taken together.    Humidifier: Using a humidifier in the area that you sleep or taking a very hot shower to inhale some of the vaporized steam can help to open up your nasal  "passages and sinuses and encourage them to drain.    Rest: get adequate rest including 7-8 hours of sleep, and low activity levels during the day to encourage healing. Avoid high impact exercise and rigorous physical labor    Nutrition: support healing by fueling your body with healthy foods. Fruits, vegetables, and whole foods are all great options!    Hydration: increase fluid intake. Illness leads to increased dehydration, so your body needs more fluid intake than it might when you are healthy. Baker and sugar free teas are great options. Try to avoid beverages that cause more dehydration including coffee, soda, energy drinks, and alcohol.     Please follow-up in the next few days to let me know how you are feeling.    It is important to seek immediate medical attention if you are having symptoms of chest pain, fever, shortness of breath, palpitations, or any changes in your mental status.        Jhonny Valenzuela is a 89 year old, presenting for the following health issues:  Shortness of Breath (Pt reports cough and congestion, \"horrific cough\". Non productive, deep in chest. Denies taking temperature. Chest pain comes and goes I think its just from coughing\". Denies arm pain, jaw pain or back pain. Patient denies dizziness or lightheadedness at this time. intermittently. )        5/27/2025     2:46 PM   Additional Questions   Roomed by Zenaida DALY RN   Accompanied by  Adam         5/27/2025     2:46 PM   Patient Reported Additional Medications   Patient reports taking the following new medications none     Shortness of Breath    History of Present Illness       Reason for visit:  SOB and coughing  Symptom onset:  3-7 days ago  Symptoms include:  SOB and Coughing  Symptom intensity:  Severe  Symptom progression:  Worsening  Had these symptoms before:  Yes  Has tried/received treatment for these symptoms:  Yes  Previous treatment was successful:  Yes  Prior treatment description:  Medications  What makes it " worse:  Excertion  What makes it better:  Nothing   She is taking medications regularly.      Nicolas is an 89-year-old female with a past medical history significant for Robert Peggy syndrome, hypothyroidism, atrial fibrillation, cardiac pacemaker in place, osteoporosis, and macular degeneration who presents today with concerns for coughing and shortness of breath.  Patient reports that in the last week after traveling to [place] she has been experiencing persistent symptoms including coughing, shortness of breath, occasional lightheadedness with position changes, fatigue, and a sore throat.  She originally thought it was potentially related to her pacemaker, but she declines feeling that her heart is skipping a beat, or having persistent lightheadedness or dizziness.  When she gets into coughing jags or is feeling short of breath she does occasionally feel that her heart is racing, but she declines that coughing has ever been an accompanying symptom of her A-fib.  She then thought it was potentially related to seasonal allergies, but has never had seasonal allergies in the past.  She declines any fever, chills, body aches, GI upset, headaches, chest congestion, or productive cough.  She has not been around anyone else who has been ill to the best of her knowledge.        Review of Systems  Constitutional, HEENT, cardiovascular, pulmonary, gi and gu systems are negative, except as otherwise noted.      Objective    /68 (BP Location: Left arm, Patient Position: Sitting, Cuff Size: Adult Regular)   Pulse 72   Temp 97.7  F (36.5  C) (Temporal)   Resp 16   LMP  (LMP Unknown)   SpO2 96%   There is no height or weight on file to calculate BMI.  Physical Exam   GENERAL: alert and no distress  EYES: Eyes grossly normal to inspection, PERRL and conjunctivae and sclerae normal  HENT: ear canals and TM's normal, nose and mouth without ulcers or lesions  NECK: no adenopathy, no asymmetry, masses, or scars  RESP:  Easy rate, depth, and work of breathing without use of accessory muscles.  Diminished breath sounds in bilateral lower lobes.  No wheezing, rales, or rhonchi  CV: regular rate and rhythm, normal S1 S2, no S3 or S4, no murmur, click or rub, no peripheral edema  ABDOMEN: soft, nontender, no hepatosplenomegaly, no masses and bowel sounds normal  MS: no gross musculoskeletal defects noted, no edema    Dorothy Laureano DNP FNP-C  Family Nurse Practitioner - Same Day Provider  [facility] [place]        Signed Electronically by: ALEXANDRIA Stark CNP

## 2025-05-28 ENCOUNTER — TELEPHONE (OUTPATIENT)
Dept: INTERNAL MEDICINE | Facility: CLINIC | Age: 89
End: 2025-05-28
Payer: COMMERCIAL

## 2025-05-28 ENCOUNTER — RESULTS FOLLOW-UP (OUTPATIENT)
Dept: INTERNAL MEDICINE | Facility: CLINIC | Age: 89
End: 2025-05-28
Payer: COMMERCIAL

## 2025-05-28 DIAGNOSIS — J06.9 URI WITH COUGH AND CONGESTION: ICD-10-CM

## 2025-05-28 DIAGNOSIS — R06.02 SOB (SHORTNESS OF BREATH): Primary | ICD-10-CM

## 2025-05-28 LAB — NT-PROBNP SERPL-MCNC: 2437 PG/ML (ref 0–624)

## 2025-05-28 RX ORDER — METHYLPREDNISOLONE 4 MG/1
TABLET ORAL
Qty: 21 TABLET | Refills: 0 | Status: SHIPPED | OUTPATIENT
Start: 2025-05-28

## 2025-05-28 NOTE — TELEPHONE ENCOUNTER
Reason for Call:  Appointment Request    Patient requesting this type of appt:  Hospital/ED Follow-Up     Requested provider: Eagle Shabazz    Reason patient unable to be scheduled: no openings    When does patient want to be seen/preferred time: she states she is only supposed to be seen on Monday and can not do a virtual as she is blind and only wants to see Dr Shabazz.    Comments: She requires one on Monday as per the ER.  She was told to the medication for 3 days -Furosemide 20 mgs and then do not take on Sunday and see Dr Shabazz     Could [facility] this [facility] you [facility] or would you prefer to receive a phone call?:   Patient would prefer a phone call   Okay to leave a detailed message?: Yes at Home number on file [phone] (home)    Call taken on 5/28/2025 at 3:17 PM by Pam J. Behr

## 2025-05-28 NOTE — TELEPHONE ENCOUNTER
Called to relay BNP results.  BNP is over 2400 today.  Last BNP evaluation was 2021 at which time it was right around 300.  Considering presence of persistent shortness of breath and coughing, I think the safest next step is evaluation in the emergency department.  She has seen no improvements with the plan of care that was put in place yesterday with concerns for bronchitis. relayed this recommendation to patient, and she verbalized understanding and agreement.  Will go to [facility] immediately.  Verbalized that her  would take her there.     Dorothy Laureano DNP FNP-C  Family Nurse Practitioner - Same Day Provider  [facility] [place]

## 2025-05-30 ENCOUNTER — TRANSFERRED RECORDS (OUTPATIENT)
Dept: HEALTH INFORMATION MANAGEMENT | Facility: CLINIC | Age: 89
End: 2025-05-30
Payer: COMMERCIAL

## 2025-06-02 ENCOUNTER — ANTICOAGULATION THERAPY VISIT (OUTPATIENT)
Dept: ANTICOAGULATION | Facility: CLINIC | Age: 89
End: 2025-06-02

## 2025-06-02 ENCOUNTER — ANCILLARY PROCEDURE (OUTPATIENT)
Dept: GENERAL RADIOLOGY | Facility: CLINIC | Age: 89
End: 2025-06-02
Attending: INTERNAL MEDICINE
Payer: COMMERCIAL

## 2025-06-02 ENCOUNTER — OFFICE VISIT (OUTPATIENT)
Dept: INTERNAL MEDICINE | Facility: CLINIC | Age: 89
End: 2025-06-02
Payer: COMMERCIAL

## 2025-06-02 VITALS
BODY MASS INDEX: 22.64 KG/M2 | WEIGHT: 135.9 LBS | HEART RATE: 73 BPM | HEIGHT: 65 IN | SYSTOLIC BLOOD PRESSURE: 108 MMHG | TEMPERATURE: 97.3 F | OXYGEN SATURATION: 98 % | RESPIRATION RATE: 22 BRPM | DIASTOLIC BLOOD PRESSURE: 64 MMHG

## 2025-06-02 DIAGNOSIS — H35.30 MACULAR DEGENERATION (SENILE) OF RETINA: ICD-10-CM

## 2025-06-02 DIAGNOSIS — J41.0 SIMPLE CHRONIC BRONCHITIS (H): ICD-10-CM

## 2025-06-02 DIAGNOSIS — J84.10 POSTINFLAMMATORY PULMONARY FIBROSIS (H): ICD-10-CM

## 2025-06-02 DIAGNOSIS — R06.02 SOB (SHORTNESS OF BREATH): ICD-10-CM

## 2025-06-02 DIAGNOSIS — R05.1 ACUTE COUGH: ICD-10-CM

## 2025-06-02 DIAGNOSIS — I48.19 PERSISTENT ATRIAL FIBRILLATION (H): Primary | ICD-10-CM

## 2025-06-02 DIAGNOSIS — I48.19 PERSISTENT ATRIAL FIBRILLATION (H): ICD-10-CM

## 2025-06-02 DIAGNOSIS — Z79.01 LONG TERM (CURRENT) USE OF ANTICOAGULANTS: ICD-10-CM

## 2025-06-02 DIAGNOSIS — I42.9 CARDIOMYOPATHY, UNSPECIFIED TYPE (H): ICD-10-CM

## 2025-06-02 DIAGNOSIS — R05.1 ACUTE COUGH: Primary | ICD-10-CM

## 2025-06-02 PROCEDURE — 71046 X-RAY EXAM CHEST 2 VIEWS: CPT | Mod: TC | Performed by: RADIOLOGY

## 2025-06-02 RX ORDER — PREDNISONE 20 MG/1
40 TABLET ORAL DAILY
Qty: 10 TABLET | Refills: 0 | Status: SHIPPED | OUTPATIENT
Start: 2025-06-02 | End: 2025-06-07

## 2025-06-02 RX ORDER — AZITHROMYCIN 250 MG/1
TABLET, FILM COATED ORAL
Qty: 6 TABLET | Refills: 0 | Status: SHIPPED | OUTPATIENT
Start: 2025-06-02 | End: 2025-06-07

## 2025-06-02 ASSESSMENT — PATIENT HEALTH QUESTIONNAIRE - PHQ9
SUM OF ALL RESPONSES TO PHQ QUESTIONS 1-9: 9
SUM OF ALL RESPONSES TO PHQ QUESTIONS 1-9: 9

## 2025-06-02 ASSESSMENT — ANXIETY QUESTIONNAIRES
7. FEELING AFRAID AS IF SOMETHING AWFUL MIGHT HAPPEN: NOT AT ALL
GAD7 TOTAL SCORE: 3
1. FEELING NERVOUS, ANXIOUS, OR ON EDGE: NEARLY EVERY DAY
2. NOT BEING ABLE TO STOP OR CONTROL WORRYING: NOT AT ALL
5. BEING SO RESTLESS THAT IT IS HARD TO SIT STILL: NOT AT ALL
4. TROUBLE RELAXING: NOT AT ALL
6. BECOMING EASILY ANNOYED OR IRRITABLE: NOT AT ALL
GAD7 TOTAL SCORE: 3
GAD7 TOTAL SCORE: 3
3. WORRYING TOO MUCH ABOUT DIFFERENT THINGS: NOT AT ALL
7. FEELING AFRAID AS IF SOMETHING AWFUL MIGHT HAPPEN: NOT AT ALL

## 2025-06-02 ASSESSMENT — PAIN SCALES - GENERAL: PAINLEVEL_OUTOF10: NO PAIN (0)

## 2025-06-02 NOTE — PROGRESS NOTES
1. Acute cough (Primary)  She does not appear to me to be in significant heart failure.  I think her cough is her usual chronic bronchitis.  Will treat with azithromycin and prednisone.  See me back in a week or sooner if things or not improving.  - XR Chest 2 Views; Future  - azithromycin (ZITHROMAX) 250 MG tablet; Take 2 tablets (500 mg) by mouth daily for 1 day, THEN 1 tablet (250 mg) daily for 4 days.  Dispense: 6 tablet; Refill: 0  - predniSONE (DELTASONE) 20 MG tablet; Take 2 tablets (40 mg) by mouth daily for 5 days.  Dispense: 10 tablet; Refill: 0    2. SOB (shortness of breath)  Continue her current inhalers.    3. Persistent atrial fibrillation (H)  She is due for an INR today.    4. Cardiomyopathy, unspecified type (H)  Continue medications per cardiology    5. Simple chronic bronchitis (H)  As above    6. Chronic Interstitial Lung Disease  As above    7. Macular Degeneration  I have asked that she discuss with her pharmacist that she is blind and to have them give her instructions that she is able to actually read.      The longitudinal plan of care for the diagnosis(es)/condition(s) as documented were addressed during this visit. Due to the added complexity in care, I will continue to support Nicolas in the subsequent management and with ongoing continuity of care.  Well over 40 minutes was spent with patient and family and care and in documentation.  46 minutes spent.    Jhonny Valenzuela is a 89 year old, presenting for the following health issues:  Hospital F/U (Pt is here for follow up from visit on 5/28/2025 to  Emergency Dept seen for SOB (shortness of breath) (Primary Dx). Discuss  which inhaler she could use when needed. )        6/2/2025    12:07 PM   Additional Questions   Roomed by Giulia   Accompanied by  yael and daughter         6/2/2025    12:10 PM   Patient Reported Additional Medications   Patient reports taking the following new medications preservision     HPI      Tete comes  "in today as an urgent add-on for evaluation of a recent illness she has had.  She has been sick for 2 weeks.  She has seen Dorothy from our clinic as well as the emergency room at [facility].  Tete is a complicated patient with interstitial lung disease as well as chronic heart issues including a A-fib and cardiomyopathy.  She sees cardiology and pulmonary.  She went out to a graduation ceremony in [place].  Was on 2 planes.  She got sick when she got back to [place].  Cough that is constant.  It has been quite difficult for her.  She is quite upset because she does not like how our office works.  It is she has had a hard time corresponding with her office.  She is blind which makes things difficult difficult as well.  Very displeased.  Family is upset as well.  She reports today she is feeling a little little bit better than she has in the past.  She believes she is bringing up sputum but does not actually look at it and she just immediately swallows it so she does not know the color.  She is not having fever.  She had an elevated BNP and so was treated possibly for heart failure.  She was given a diuretic and that did nothing for her she tells me.  She has been losing weight but does not know why.  ED/UC Followup:    Facility:  [facility]  Date of visit: 5/28/25  Reason for visit: SOB  Current Status: stable            Objective    /64 (BP Location: Left arm, Patient Position: Sitting, Cuff Size: Adult Regular)   Pulse 73   Temp 97.3  F (36.3  C) (Temporal)   Resp 22   Ht 1.645 m (5' 4.75\")   Wt 61.6 kg (135 lb 14.4 oz)   LMP  (LMP Unknown)   SpO2 98%   BMI 22.79 kg/m    Body mass index is 22.79 kg/m .  Physical Exam       Pleasant woman who is in no distress.  Weight is down about 10 pounds from when I last saw her.  She does have an occasional cough through the interview.  Sounds coarse.  No elevation of her jugular venous pressure.  Lungs with some fine crackles at the bases that are dry and unchanged " from her usual dry crackles left greater than right.  No wheezes or rhonchi.        Signed Electronically by: PHILIPP ALCALA MD    Answers submitted by the patient for this visit:  Patient Health Questionnaire (Submitted on 6/2/2025)  PHQ9 TOTAL SCORE: 9  Patient Health Questionnaire (G7) (Submitted on 6/2/2025)  KYLE 7 TOTAL SCORE: 3

## 2025-06-02 NOTE — PROGRESS NOTES
ANTICOAGULATION MANAGEMENT  and BPA INTERACTION    Mariia Tinoco 89 year old female is on warfarin with therapeutic INR result. (Goal INR 2.0-3.0)    Recent labs: (last 7 days)     06/02/25  1321   INR 2.7*       ASSESSMENT     Source(s): Chart Review and Patient/Caregiver Call     Warfarin doses taken: Warfarin taken as instructed  Diet: No new diet changes identified  Medication/supplement changes: Yes   6/2/25 treated with Azithromycin 250mg as packet directed, from 6/2 - 7, and Prednisone 20mg take 2 tabs daily for 5 days, from 6/2-7.  (Both meds above have known interaction with warfarin.  Monitor INR closely).  New illness, injury, or hospitalization: Yes:    6/2/25 hospital follow-up today with ongoing acute cough due to chronic bronchitis.  Signs or symptoms of bleeding or clotting: No  Previous result: Therapeutic last 4 INR visits  Additional findings: None       PLAN     Recommended plan for temporary change(s) affecting INR     Dosing Instructions: Continue your current warfarin dose with next INR in 1 week       Summary  As of 6/2/2025      Full warfarin instructions:  4 mg every Tue; 3 mg all other days   Next INR check:  6/6/2025               Telephone call with Nicolas who verbalizes understanding and agrees to plan    Check at provider office visit - INR on 6/9/25 at follow-up visit with Dr. Ozuna   - patient preferred to check INR on day she comes for follow-up with PCP.    Education provided: Taking warfarin: Importance of taking warfarin as instructed  Goal range and lab monitoring: goal range and significance of current result  Interaction IS anticipated between warfarin and Azithromycin and Prednisone  Symptom monitoring: monitoring for bleeding signs and symptoms and when to seek medical attention/emergency care    Plan made per [facility] anticoagulation protocol    Cassandra Banuelos, RN  6/2/2025  Anticoagulation Clinic  [employer] for routing messages: [facility]  [facility] patient phone line:  [phone]        _______________________________________________________________________     Anticoagulation Episode Summary       Current INR goal:  2.0-3.0   TTR:  76.4% (1 y)   Target end date:  Indefinite   Send INR reminders to:  [facility]    Indications    Persistent atrial fibrillation (H) [I48.19]  Long term (current) use of anticoagulants [Z79.01]             Comments:  --             Anticoagulation Care Providers       Provider Role Specialty Phone number    Eagle Shabazz MD Referring Internal Medicine [phone]    Mariano Munoz MD Referring Internal Medicine [phone]

## 2025-06-03 ENCOUNTER — RESULTS FOLLOW-UP (OUTPATIENT)
Dept: INTERNAL MEDICINE | Facility: CLINIC | Age: 89
End: 2025-06-03

## 2025-06-04 DIAGNOSIS — F51.04 CHRONIC INSOMNIA: ICD-10-CM

## 2025-06-04 RX ORDER — ZOLPIDEM TARTRATE 5 MG/1
TABLET ORAL
Qty: 30 TABLET | Refills: 0 | Status: SHIPPED | OUTPATIENT
Start: 2025-06-04

## 2025-06-09 ENCOUNTER — ANTICOAGULATION THERAPY VISIT (OUTPATIENT)
Dept: ANTICOAGULATION | Facility: CLINIC | Age: 89
End: 2025-06-09

## 2025-06-09 ENCOUNTER — OFFICE VISIT (OUTPATIENT)
Dept: INTERNAL MEDICINE | Facility: CLINIC | Age: 89
End: 2025-06-09
Payer: COMMERCIAL

## 2025-06-09 VITALS
WEIGHT: 140.1 LBS | RESPIRATION RATE: 20 BRPM | OXYGEN SATURATION: 95 % | BODY MASS INDEX: 23.34 KG/M2 | DIASTOLIC BLOOD PRESSURE: 69 MMHG | HEIGHT: 65 IN | SYSTOLIC BLOOD PRESSURE: 109 MMHG | TEMPERATURE: 97.9 F | HEART RATE: 89 BPM

## 2025-06-09 DIAGNOSIS — R06.02 SOB (SHORTNESS OF BREATH): ICD-10-CM

## 2025-06-09 DIAGNOSIS — I48.19 PERSISTENT ATRIAL FIBRILLATION (H): Primary | ICD-10-CM

## 2025-06-09 DIAGNOSIS — J41.0 SIMPLE CHRONIC BRONCHITIS (H): ICD-10-CM

## 2025-06-09 DIAGNOSIS — Z79.01 LONG TERM (CURRENT) USE OF ANTICOAGULANTS: ICD-10-CM

## 2025-06-09 DIAGNOSIS — R05.1 ACUTE COUGH: Primary | ICD-10-CM

## 2025-06-09 DIAGNOSIS — J84.10 POSTINFLAMMATORY PULMONARY FIBROSIS (H): ICD-10-CM

## 2025-06-09 DIAGNOSIS — I48.19 PERSISTENT ATRIAL FIBRILLATION (H): ICD-10-CM

## 2025-06-09 LAB
INR BLD: 2.1 (ref 0.9–1.1)
NT-PROBNP SERPL-MCNC: 1882 PG/ML (ref 0–624)

## 2025-06-09 PROCEDURE — 3078F DIAST BP <80 MM HG: CPT | Performed by: INTERNAL MEDICINE

## 2025-06-09 PROCEDURE — 83880 ASSAY OF NATRIURETIC PEPTIDE: CPT | Performed by: INTERNAL MEDICINE

## 2025-06-09 PROCEDURE — 36416 COLLJ CAPILLARY BLOOD SPEC: CPT | Performed by: INTERNAL MEDICINE

## 2025-06-09 PROCEDURE — 99214 OFFICE O/P EST MOD 30 MIN: CPT | Performed by: INTERNAL MEDICINE

## 2025-06-09 PROCEDURE — 85610 PROTHROMBIN TIME: CPT | Performed by: INTERNAL MEDICINE

## 2025-06-09 PROCEDURE — 3074F SYST BP LT 130 MM HG: CPT | Performed by: INTERNAL MEDICINE

## 2025-06-09 PROCEDURE — G2211 COMPLEX E/M VISIT ADD ON: HCPCS | Performed by: INTERNAL MEDICINE

## 2025-06-09 PROCEDURE — 36415 COLL VENOUS BLD VENIPUNCTURE: CPT | Performed by: INTERNAL MEDICINE

## 2025-06-09 RX ORDER — CODEINE PHOSPHATE AND GUAIFENESIN 10; 100 MG/5ML; MG/5ML
1-2 SOLUTION ORAL EVERY 4 HOURS PRN
Qty: 240 ML | Refills: 0 | Status: SHIPPED | OUTPATIENT
Start: 2025-06-09

## 2025-06-09 RX ORDER — INHALER, ASSIST DEVICES
SPACER (EA) MISCELLANEOUS
Qty: 1 EACH | Refills: 0 | Status: SHIPPED | OUTPATIENT
Start: 2025-06-09

## 2025-06-09 NOTE — PROGRESS NOTES
1. Acute cough (Primary)  Persistent craw of unclear etiology.  Likely multifactorial.  She was treated for possible heart failure and that did not make any difference.  She has been treated with steroid and antibiotic did not make a difference.  Albuterol seems to be helpful.  I urged her to utilize this on a regular basis.  She was given a spacer as she is having some issues with getting it directed into her airway.  Will have her follow-up with her pulmonologist ASAP.  I have put in an urgent referral for her to meet with them.  - guaiFENesin-codeine (ROBITUSSIN AC) 100-10 MG/5ML solution; Take 5-10 mLs by mouth every 4 hours as needed for cough.  Dispense: 240 mL; Refill: 0  - Adult Pulmonary Medicine  Referral; Future    2. Persistent atrial fibrillation (H)  Due for an INR.  I will check the BNP again.  If it is quite high or higher I am going to recommend maybe a short course again of some diuretic to see if that improves things.  - INR point of care (finger stick)- Recommended for same day result; Future  - NT-proBNP; Future    3. SOB (shortness of breath)  As above.  - NT-proBNP; Future    4. Simple chronic bronchitis (H)  As above.  Will have her meet with her pulmonologist ASAP to get her take on her symptoms.  - guaiFENesin-codeine (ROBITUSSIN AC) 100-10 MG/5ML solution; Take 5-10 mLs by mouth every 4 hours as needed for cough.  Dispense: 240 mL; Refill: 0  - Adult Pulmonary Medicine  Referral; Future  - spacer (OPTICHAMBER RAVINDER) holding chamber; Use as directed  Dispense: 1 each; Refill: 0    5. Chronic Interstitial Lung Disease  As above.  - Adult Pulmonary Medicine  Referral; Future     The longitudinal plan of care for the diagnosis(es)/condition(s) as documented were addressed during this visit. Due to the added complexity in care, I will continue to support Nicolas in the subsequent management and with ongoing continuity of care.    Jhonny Valenzuela is a 89 year old,  "presenting for the following health issues:  INR Followup (INR check-up) and RECHECK (Health check-up)      6/9/2025    11:58 AM   Additional Questions   Roomed by Faith   Accompanied by  Adam and Daughter Alana     History of Present Illness       Reason for visit:  SOB and coughing  Symptom onset:  3-7 days ago  Symptoms include:  SOB and Coughing  Symptom intensity:  Severe  Symptom progression:  Worsening  Had these symptoms before:  Yes  Has tried/received treatment for these symptoms:  Yes  Previous treatment was successful:  Yes  Prior treatment description:  Medications  What makes it worse:  Excertion  What makes it better:  Nothing   She is taking medications regularly.          He comes in today for follow-up of coughing.  I saw her last week and she had been having a couple weeks of cough after she had been on a trip in to [place].  Initially she had been diagnosed as possibly having heart failure but then I thought she likely was is having an acute exacerbation of her chronic bronchitis.  She was given prednisone and an antibiotic and comes in today for follow-up and states she is not any better whatsoever.  She has not worse but she is not any better.  She has a complicated history including A-fib and interstitial lung disease.  Follows with pulmonary through the [facility].            Objective    /69 (BP Location: Left arm, Patient Position: Sitting, Cuff Size: Adult Regular)   Pulse 89   Temp 97.9  F (36.6  C) (Tympanic)   Resp 20   Ht 1.645 m (5' 4.75\")   Wt 63.5 kg (140 lb 1.6 oz)   LMP  (LMP Unknown)   SpO2 95%   BMI 23.49 kg/m    Body mass index is 23.49 kg/m .  Physical Exam   Pleasant woman.  She looks well.  No distress.  Initially no cough but then later does have a somewhat coarse cough that is nonproductive.  Lungs are with a few dry crackles at the bases bilaterally otherwise clear            Signed Electronically by: PHILIPP ALCALA MD    "

## 2025-06-09 NOTE — PROGRESS NOTES
ANTICOAGULATION MANAGEMENT     Mariia Tinoco 89 year old female is on warfarin with therapeutic INR result. (Goal INR 2.0-3.0)    Recent labs: (last 7 days)     06/09/25  1314   INR 2.1*       ASSESSMENT     Source(s): Chart Review and Patient/Caregiver Call     Warfarin doses taken: Warfarin taken as instructed  Diet: No new diet changes identified  Medication/supplement changes: None noted finished z bernie and prednisone for cough, made no impact  New illness, injury, or hospitalization: No  Signs or symptoms of bleeding or clotting: No  Previous result: Therapeutic last 2(+) visits  Additional findings: persistent cough, will see pulmonology.  Sees Dr Shabazz again in 3 weeks       PLAN     Recommended plan for no diet, medication or health factor changes affecting INR     Dosing Instructions: Continue your current warfarin dose with next INR in 3 weeks       Summary  As of 6/9/2025      Full warfarin instructions:  4 mg every Tue; 3 mg all other days   Next INR check:  7/3/2025               Telephone call with Nicolas who verbalizes understanding and agrees to plan    Check at provider office visit    Education provided: Please call back if any changes to your diet, medications or how you've been taking warfarin    Plan made per [facility] anticoagulation protocol    Aurelio Duarte RN  6/9/2025  Anticoagulation Clinic  [facility] for routing messages: [facility]uis [facility]  [facility] patient phone line: [phone]        _______________________________________________________________________     Anticoagulation Episode Summary       Current INR goal:  2.0-3.0   TTR:  76.4% (1 y)   Target end date:  Indefinite   Send INR reminders to:  [facility]    Indications    Persistent atrial fibrillation (H) [I48.19]  Long term (current) use of anticoagulants [Z79.01]             Comments:  --             Anticoagulation Care Providers       Provider Role Specialty Phone number    Eagle Shabazz MD Referring Internal Medicine  [phone]    Mariano Munoz MD [facility] Internal Medicine [phone]

## 2025-06-10 ENCOUNTER — RESULTS FOLLOW-UP (OUTPATIENT)
Dept: INTERNAL MEDICINE | Facility: CLINIC | Age: 89
End: 2025-06-10

## 2025-06-10 DIAGNOSIS — I48.19 PERSISTENT ATRIAL FIBRILLATION (H): Primary | ICD-10-CM

## 2025-06-10 RX ORDER — POTASSIUM CHLORIDE 1500 MG/1
20 TABLET, EXTENDED RELEASE ORAL 2 TIMES DAILY
Qty: 14 TABLET | Refills: 0 | Status: SHIPPED | OUTPATIENT
Start: 2025-06-10 | End: 2025-06-17

## 2025-06-10 RX ORDER — FUROSEMIDE 40 MG/1
40 TABLET ORAL DAILY
Qty: 7 TABLET | Refills: 0 | Status: SHIPPED | OUTPATIENT
Start: 2025-06-10 | End: 2025-07-03

## 2025-06-12 ENCOUNTER — TELEPHONE (OUTPATIENT)
Dept: INTERNAL MEDICINE | Facility: CLINIC | Age: 89
End: 2025-06-12
Payer: COMMERCIAL

## 2025-06-12 ENCOUNTER — PATIENT OUTREACH (OUTPATIENT)
Dept: CARE COORDINATION | Facility: CLINIC | Age: 89
End: 2025-06-12
Payer: COMMERCIAL

## 2025-06-12 NOTE — TELEPHONE ENCOUNTER
June 12, 2025    Home health orders was received via fax for Dr. Shabazz.  Patient label was attached to paperwork and placed in provider's inbox to be signed.    Leslie Gray

## 2025-06-12 NOTE — PROGRESS NOTES
Clinical Product Navigator RN reviewed chart; patient on payer product coverage.  Review results:   CPN Initial Information Gathering  Referral Source: Health Plan  Referrals Places: Care Coordination    Patient identified by Health Plan as a candidate for Primary Care Coordination due to the below open gaps in care/concerns identified by Health Plan:     !Clinical Complexity: Condition (adult) - Multiple major medical conditions  !Clinical Complexity: IP Risk - Emerging: Moderate IP stay probability  Drug Safety Care Opportunities; Utilization - Ambulatory care sensitive ED visit in last 6 months that was not emergent; ER visit for a big 5 chronic condition;   Chronic condition and BH condition  SDoH Access to care concerns or transportation insecurity  !Impactable Factor(M): ER Visits - 1 or more in last 30 days for Diabetes, CHF, CAD, Asthma or COPD    Primary Care Coordination program opened for patient outreach and assessment of above open gaps in care/concerns and to offer ongoing support.     Luciana Corbin RN   Clinical Product Navigator   [email or web address]   Office: [phone]

## 2025-06-16 ENCOUNTER — PATIENT OUTREACH (OUTPATIENT)
Dept: CARE COORDINATION | Facility: CLINIC | Age: 89
End: 2025-06-16
Payer: COMMERCIAL

## 2025-06-16 NOTE — PROGRESS NOTES
Clinic Care Coordination Contact  Community Health Worker Initial Outreach       Patient accepts CC: No, patient declined CCC support at this time. Patient will be sent Care Coordination introduction letter for future reference.       Edyta NINO Ambulatory CHW  [facility] Care Coordination   [facility]   Office: [phone]

## 2025-07-03 ENCOUNTER — ANTICOAGULATION THERAPY VISIT (OUTPATIENT)
Dept: ANTICOAGULATION | Facility: CLINIC | Age: 89
End: 2025-07-03

## 2025-07-03 ENCOUNTER — OFFICE VISIT (OUTPATIENT)
Dept: INTERNAL MEDICINE | Facility: CLINIC | Age: 89
End: 2025-07-03
Payer: COMMERCIAL

## 2025-07-03 VITALS
DIASTOLIC BLOOD PRESSURE: 58 MMHG | BODY MASS INDEX: 22.88 KG/M2 | TEMPERATURE: 96.8 F | HEART RATE: 72 BPM | SYSTOLIC BLOOD PRESSURE: 108 MMHG | RESPIRATION RATE: 26 BRPM | WEIGHT: 137.3 LBS | HEIGHT: 65 IN | OXYGEN SATURATION: 98 %

## 2025-07-03 DIAGNOSIS — R05.1 ACUTE COUGH: Primary | ICD-10-CM

## 2025-07-03 DIAGNOSIS — Z79.01 LONG TERM (CURRENT) USE OF ANTICOAGULANTS: ICD-10-CM

## 2025-07-03 DIAGNOSIS — J84.10 POSTINFLAMMATORY PULMONARY FIBROSIS (H): ICD-10-CM

## 2025-07-03 DIAGNOSIS — I48.19 PERSISTENT ATRIAL FIBRILLATION (H): ICD-10-CM

## 2025-07-03 DIAGNOSIS — I48.19 PERSISTENT ATRIAL FIBRILLATION (H): Primary | ICD-10-CM

## 2025-07-03 DIAGNOSIS — F51.04 CHRONIC INSOMNIA: ICD-10-CM

## 2025-07-03 LAB — INR BLD: 2.6 (ref 0.9–1.1)

## 2025-07-03 RX ORDER — ZOLPIDEM TARTRATE 5 MG/1
TABLET ORAL
Qty: 30 TABLET | Refills: 0 | Status: SHIPPED | OUTPATIENT
Start: 2025-07-03

## 2025-07-03 ASSESSMENT — PAIN SCALES - GENERAL: PAINLEVEL_OUTOF10: NO PAIN (0)

## 2025-07-03 NOTE — PROGRESS NOTES
1. Acute cough (Primary)  Resolved.  If she has a recurrent cough she is going to contact her pulmonologist immediately for further evaluation and management.    2. Chronic Interstitial Lung Disease  As above.    3. Chronic insomnia  She needs a refill of her Ambien today.  - zolpidem (AMBIEN) 5 MG tablet; TAKE 1/2 TO 1 TABLET (2.5 TO 5 MG) BY MOUTH EVERY NIGHT AS NEEDED FOR SLEEP  Dispense: 30 tablet; Refill: 0     Subjective   Nicolas is a 89 year old, presenting for the following health issues:  RECHECK (Follow up to 6/9 visit - acute cough)      7/3/2025    11:06 AM   Additional Questions   Roomed by Zenaida DALY RN   Accompanied by self         7/3/2025    11:06 AM   Patient Reported Additional Medications   Patient reports taking the following new medications none     History of Present Illness       Reason for visit:  Acute cough follow up    She eats 4 or more servings of fruits and vegetables daily.She consumes 0 sweetened beverage(s) daily.She exercises with enough effort to increase her heart rate 30 to 60 minutes per day.  She exercises with enough effort to increase her heart rate 5 days per week.   She is taking medications regularly.            Tete comes in today for follow-up.  She was having several weeks of cough that was quite distressing to her.  She has known interstitial lung disease as well as multiple other medical issues that can cause cough including heart failure.  There was concern that possibly heart failure was contributing versus just a acute bronchitis.  She was treated with multiple different modalities.  None of which really improved things although she did get better on her own.  She did see her pulmonologist urgently at my request.  By the time she got to her she was feeling better.  She does not know what made her feel better.  She continues to feel well.  She was placed on a new inhaler which she thinks is working well for her.          Objective    /58 (BP Location: Left  "arm, Patient Position: Sitting, Cuff Size: Adult Regular)   Pulse 72   Temp 96.8  F (36  C) (Temporal)   Resp 26   Ht 1.638 m (5' 4.5\")   Wt 62.3 kg (137 lb 4.8 oz)   LMP  (LMP Unknown)   SpO2 98%   BMI 23.20 kg/m    Body mass index is 23.2 kg/m .  Physical Exam   Delightful woman who looks well today.            Signed Electronically by: PHILIPP ALCALA MD    "

## 2025-07-03 NOTE — PROGRESS NOTES
ANTICOAGULATION MANAGEMENT     Mariia Tinoco 89 year old female is on warfarin with therapeutic INR result. (Goal INR 2.0-3.0)    Recent labs: (last 7 days)     07/03/25  1148   INR 2.6*       ASSESSMENT     Source(s): Chart Reviewed    Diet: No new diet changes identified  Medication/supplement changes: None noted  New illness, injury, or hospitalization: No   Follow-up OV today - acute cough has resolved.  Hs known chronic ILD.  Signs or symptoms of bleeding or clotting: No  Previous result: Therapeutic last 6 INR visits  Additional findings: None       PLAN     Recommended plan for no diet, medication or health factor changes affecting INR     Dosing Instructions: Continue your current warfarin dose with next INR in 4 weeks       Summary  As of 7/3/2025      Full warfarin instructions:  4 mg every Tue; 3 mg all other days   Next INR check:  8/1/2025               Detailed voice message left for Nicolas with dosing instructions and follow up date.     Lab visit scheduled - INR on 8/1/25 @ [facility]   - will coincide INR and Prolia injections, since patient as transportation.   - otherwise, generally 6 weeks for next INR, if she continues to stay therapeutic.    Education provided: Taking warfarin: Importance of taking warfarin as instructed  Goal range and lab monitoring: goal range and significance of current result    Plan made per [facility] anticoagulation protocol    Cassandra Banuelos RN  7/3/2025  Anticoagulation Clinic  [facility] [place] for [facility] messages[facility]  [facility] patient phone line: [phone]        _______________________________________________________________________     Anticoagulation Episode Summary       Current INR goal:  2.0-3.0   TTR:  76.4% (1 y)   Target end date:  Indefinite   Send INR reminders to:  [facility]    Indications    Persistent atrial fibrillation (H) [I48.19]  Long term (current) use of anticoagulants [Z79.01]             Comments:  --             Anticoagulation Care  Providers       Provider Role Specialty Phone number    Eagle Shabazz MD Referring Internal Medicine [phone]    Mariano Munoz MD Referring Internal Medicine [phone]

## 2025-07-10 ENCOUNTER — TELEPHONE (OUTPATIENT)
Dept: INTERNAL MEDICINE | Facility: CLINIC | Age: 89
End: 2025-07-10
Payer: COMMERCIAL

## 2025-07-10 DIAGNOSIS — M81.0 SENILE OSTEOPOROSIS: ICD-10-CM

## 2025-07-10 DIAGNOSIS — Z92.29 PERSONAL HISTORY OF OTHER DRUG THERAPY: Primary | ICD-10-CM

## 2025-07-10 NOTE — TELEPHONE ENCOUNTER
Patient's last prolia was 2025, pt has an upcoming prolia injection appointment on 2025 at [place].     If this patient is to continue with Prolia injection therapy a new, active prolia order is needed for the upcoming administration.     A Prolia order has been pended with the previously administered order's associated diagnoses; signing provider to review diagnoses to ensure these still apply to patient.    Diagnoses from order used during 2025 administration:   Personal history of other drug therapy [Z92.29]  - Primary      Senile osteoporosis [M81.0]          Have previous orders been discontinued due to being ? Yes    Patient's most recent labs within the past year (Calcium, GFR, Creatinine, Vitamin D):      Latest Reference Range & Units 23 11:07   Creatinine 0.51 - 0.95 mg/dL 0.92   GFR Estimate >60 mL/min/1.73m2 60 (L)   Calcium 8.8 - 10.2 mg/dL 9.6   Vitamin D, Total (25-Hydroxy) 20 - 50 ng/mL 35   (L): Data is abnormally low    Labs were completed more than 1 year ago, labs are pended if provider would like values checked prior to administration..     Prolia provider information with link to prescribing information: [email or web address]  Note: Per Prolia ordering smartset, an albumin level is recommended if Calcium level is < 8.5.     Provider action needed: please review/sign prolia order, review associated diagnoses, review/sign recent labs (if needed); please remove lab orders if not needed.  If labs are ordered, please route to Scheduling so that patient can be scheduled for lab draw and labs can be followed for results prior to prolia administration. The following steps were completed to comply with the REMS program for Prolia:  Reviewed the serious risks of Prolia  and the symptoms of each risk.  Advised patient to seek prompt medical attention if they have signs or symptoms of any of the serious risks.  Patient will be provided a  copy of the Medication Guide and Patient Brochure prior to first injection.    Bharat Coombs RN

## 2025-08-04 DIAGNOSIS — F51.04 CHRONIC INSOMNIA: ICD-10-CM

## 2025-08-04 RX ORDER — ZOLPIDEM TARTRATE 5 MG/1
TABLET ORAL
Qty: 30 TABLET | Refills: 0 | Status: SHIPPED | OUTPATIENT
Start: 2025-08-04

## 2025-08-25 DIAGNOSIS — F43.23 ADJUSTMENT DISORDER WITH MIXED ANXIETY AND DEPRESSED MOOD: ICD-10-CM

## 2025-08-25 DIAGNOSIS — E03.9 HYPOTHYROIDISM, UNSPECIFIED TYPE: ICD-10-CM

## 2025-08-25 RX ORDER — SERTRALINE HYDROCHLORIDE 25 MG/1
25 TABLET, FILM COATED ORAL DAILY
Qty: 90 TABLET | Refills: 1 | Status: SHIPPED | OUTPATIENT
Start: 2025-08-25

## 2025-08-25 RX ORDER — LEVOTHYROXINE SODIUM 75 UG/1
75 TABLET ORAL DAILY
Qty: 90 TABLET | Refills: 2 | Status: SHIPPED | OUTPATIENT
Start: 2025-08-25

## 2025-09-03 DIAGNOSIS — F51.04 CHRONIC INSOMNIA: ICD-10-CM

## 2025-09-03 RX ORDER — ZOLPIDEM TARTRATE 5 MG/1
TABLET ORAL
Qty: 30 TABLET | Refills: 0 | Status: SHIPPED | OUTPATIENT
Start: 2025-09-03